# Patient Record
Sex: FEMALE | Race: WHITE | Employment: OTHER | URBAN - METROPOLITAN AREA
[De-identification: names, ages, dates, MRNs, and addresses within clinical notes are randomized per-mention and may not be internally consistent; named-entity substitution may affect disease eponyms.]

---

## 2017-01-05 ENCOUNTER — OFFICE VISIT (OUTPATIENT)
Dept: SURGERY | Age: 82
End: 2017-01-05

## 2017-01-05 VITALS
HEIGHT: 65 IN | SYSTOLIC BLOOD PRESSURE: 130 MMHG | BODY MASS INDEX: 32.99 KG/M2 | WEIGHT: 198 LBS | DIASTOLIC BLOOD PRESSURE: 72 MMHG

## 2017-01-05 DIAGNOSIS — Z98.890 POST-OPERATIVE STATE: Primary | ICD-10-CM

## 2017-01-05 PROCEDURE — 99024 POSTOP FOLLOW-UP VISIT: CPT | Performed by: SURGERY

## 2017-01-19 ENCOUNTER — OFFICE VISIT (OUTPATIENT)
Dept: SURGERY | Age: 82
End: 2017-01-19

## 2017-01-19 VITALS
BODY MASS INDEX: 33.32 KG/M2 | SYSTOLIC BLOOD PRESSURE: 132 MMHG | DIASTOLIC BLOOD PRESSURE: 74 MMHG | WEIGHT: 200 LBS | HEIGHT: 65 IN

## 2017-01-19 DIAGNOSIS — Z98.890 POST-OPERATIVE STATE: Primary | ICD-10-CM

## 2017-01-19 PROCEDURE — 99024 POSTOP FOLLOW-UP VISIT: CPT | Performed by: SURGERY

## 2021-01-01 ENCOUNTER — APPOINTMENT (OUTPATIENT)
Dept: CT IMAGING | Age: 86
DRG: 445 | End: 2021-01-01
Payer: MEDICARE

## 2021-01-01 ENCOUNTER — APPOINTMENT (OUTPATIENT)
Dept: ULTRASOUND IMAGING | Age: 86
DRG: 445 | End: 2021-01-01
Payer: MEDICARE

## 2021-01-01 ENCOUNTER — HOSPITAL ENCOUNTER (INPATIENT)
Age: 86
LOS: 5 days | Discharge: HOME | DRG: 445 | End: 2021-01-06
Attending: EMERGENCY MEDICINE | Admitting: INTERNAL MEDICINE
Payer: MEDICARE

## 2021-01-01 DIAGNOSIS — K81.0 ACUTE CHOLECYSTITIS: Primary | ICD-10-CM

## 2021-01-01 DIAGNOSIS — R10.11 RIGHT UPPER QUADRANT ABDOMINAL PAIN: ICD-10-CM

## 2021-01-01 DIAGNOSIS — N30.00 ACUTE CYSTITIS WITHOUT HEMATURIA: ICD-10-CM

## 2021-01-01 LAB
A/G RATIO: 1.5 (ref 1.1–2.2)
ALBUMIN SERPL-MCNC: 3.7 G/DL (ref 3.4–5)
ALP BLD-CCNC: 110 U/L (ref 40–129)
ALT SERPL-CCNC: 11 U/L (ref 10–40)
ANION GAP SERPL CALCULATED.3IONS-SCNC: 11 MMOL/L (ref 3–16)
AST SERPL-CCNC: 16 U/L (ref 15–37)
BACTERIA: ABNORMAL /HPF
BASOPHILS ABSOLUTE: 0.1 K/UL (ref 0–0.2)
BASOPHILS RELATIVE PERCENT: 0.5 %
BILIRUB SERPL-MCNC: 1.1 MG/DL (ref 0–1)
BILIRUBIN URINE: NEGATIVE
BLOOD, URINE: ABNORMAL
BUN BLDV-MCNC: 23 MG/DL (ref 7–20)
CALCIUM SERPL-MCNC: 9.3 MG/DL (ref 8.3–10.6)
CHLORIDE BLD-SCNC: 105 MMOL/L (ref 99–110)
CLARITY: ABNORMAL
CO2: 21 MMOL/L (ref 21–32)
COLOR: YELLOW
CREAT SERPL-MCNC: 0.7 MG/DL (ref 0.6–1.2)
EKG ATRIAL RATE: 77 BPM
EKG DIAGNOSIS: NORMAL
EKG P AXIS: 71 DEGREES
EKG P-R INTERVAL: 194 MS
EKG Q-T INTERVAL: 422 MS
EKG QRS DURATION: 88 MS
EKG QTC CALCULATION (BAZETT): 477 MS
EKG R AXIS: -21 DEGREES
EKG T AXIS: 55 DEGREES
EKG VENTRICULAR RATE: 77 BPM
EOSINOPHILS ABSOLUTE: 0 K/UL (ref 0–0.6)
EOSINOPHILS RELATIVE PERCENT: 0.3 %
EPITHELIAL CELLS, UA: ABNORMAL /HPF (ref 0–5)
GFR AFRICAN AMERICAN: >60
GFR NON-AFRICAN AMERICAN: >60
GLOBULIN: 2.5 G/DL
GLUCOSE BLD-MCNC: 167 MG/DL (ref 70–99)
GLUCOSE BLD-MCNC: 220 MG/DL (ref 70–99)
GLUCOSE BLD-MCNC: 267 MG/DL (ref 70–99)
GLUCOSE URINE: 500 MG/DL
HCT VFR BLD CALC: 38.8 % (ref 36–48)
HEMOGLOBIN: 12.8 G/DL (ref 12–16)
KETONES, URINE: ABNORMAL MG/DL
LEUKOCYTE ESTERASE, URINE: ABNORMAL
LIPASE: 18 U/L (ref 13–60)
LYMPHOCYTES ABSOLUTE: 0.8 K/UL (ref 1–5.1)
LYMPHOCYTES RELATIVE PERCENT: 6.9 %
MCH RBC QN AUTO: 30.8 PG (ref 26–34)
MCHC RBC AUTO-ENTMCNC: 33 G/DL (ref 31–36)
MCV RBC AUTO: 93.4 FL (ref 80–100)
MICROSCOPIC EXAMINATION: YES
MONOCYTES ABSOLUTE: 0.5 K/UL (ref 0–1.3)
MONOCYTES RELATIVE PERCENT: 4.6 %
NEUTROPHILS ABSOLUTE: 9.8 K/UL (ref 1.7–7.7)
NEUTROPHILS RELATIVE PERCENT: 87.7 %
NITRITE, URINE: NEGATIVE
PDW BLD-RTO: 13.7 % (ref 12.4–15.4)
PERFORMED ON: ABNORMAL
PERFORMED ON: ABNORMAL
PH UA: 5 (ref 5–8)
PLATELET # BLD: 181 K/UL (ref 135–450)
PMV BLD AUTO: 9.6 FL (ref 5–10.5)
POTASSIUM SERPL-SCNC: 3.7 MMOL/L (ref 3.5–5.1)
PROTEIN UA: NEGATIVE MG/DL
RBC # BLD: 4.16 M/UL (ref 4–5.2)
RBC UA: ABNORMAL /HPF (ref 0–4)
SARS-COV-2, NAAT: NOT DETECTED
SODIUM BLD-SCNC: 137 MMOL/L (ref 136–145)
SPECIFIC GRAVITY UA: 1.02 (ref 1–1.03)
TOTAL PROTEIN: 6.2 G/DL (ref 6.4–8.2)
URINE REFLEX TO CULTURE: YES
URINE TYPE: ABNORMAL
UROBILINOGEN, URINE: 0.2 E.U./DL
WBC # BLD: 11.2 K/UL (ref 4–11)
WBC UA: ABNORMAL /HPF (ref 0–5)

## 2021-01-01 PROCEDURE — 2580000003 HC RX 258: Performed by: EMERGENCY MEDICINE

## 2021-01-01 PROCEDURE — 6360000002 HC RX W HCPCS: Performed by: PHYSICIAN ASSISTANT

## 2021-01-01 PROCEDURE — 2580000003 HC RX 258: Performed by: PHYSICIAN ASSISTANT

## 2021-01-01 PROCEDURE — 6360000002 HC RX W HCPCS: Performed by: EMERGENCY MEDICINE

## 2021-01-01 PROCEDURE — 87186 SC STD MICRODIL/AGAR DIL: CPT

## 2021-01-01 PROCEDURE — 99285 EMERGENCY DEPT VISIT HI MDM: CPT

## 2021-01-01 PROCEDURE — 6370000000 HC RX 637 (ALT 250 FOR IP): Performed by: PHYSICIAN ASSISTANT

## 2021-01-01 PROCEDURE — 76705 ECHO EXAM OF ABDOMEN: CPT

## 2021-01-01 PROCEDURE — 6360000002 HC RX W HCPCS: Performed by: INTERNAL MEDICINE

## 2021-01-01 PROCEDURE — 83036 HEMOGLOBIN GLYCOSYLATED A1C: CPT

## 2021-01-01 PROCEDURE — U0002 COVID-19 LAB TEST NON-CDC: HCPCS

## 2021-01-01 PROCEDURE — 96375 TX/PRO/DX INJ NEW DRUG ADDON: CPT

## 2021-01-01 PROCEDURE — P9612 CATHETERIZE FOR URINE SPEC: HCPCS

## 2021-01-01 PROCEDURE — 87077 CULTURE AEROBIC IDENTIFY: CPT

## 2021-01-01 PROCEDURE — 81001 URINALYSIS AUTO W/SCOPE: CPT

## 2021-01-01 PROCEDURE — 85025 COMPLETE CBC W/AUTO DIFF WBC: CPT

## 2021-01-01 PROCEDURE — 74176 CT ABD & PELVIS W/O CONTRAST: CPT

## 2021-01-01 PROCEDURE — 1200000000 HC SEMI PRIVATE

## 2021-01-01 PROCEDURE — 99254 IP/OBS CNSLTJ NEW/EST MOD 60: CPT | Performed by: SURGERY

## 2021-01-01 PROCEDURE — 6360000002 HC RX W HCPCS

## 2021-01-01 PROCEDURE — 93005 ELECTROCARDIOGRAM TRACING: CPT | Performed by: EMERGENCY MEDICINE

## 2021-01-01 PROCEDURE — 96365 THER/PROPH/DIAG IV INF INIT: CPT

## 2021-01-01 PROCEDURE — 80053 COMPREHEN METABOLIC PANEL: CPT

## 2021-01-01 PROCEDURE — 83690 ASSAY OF LIPASE: CPT

## 2021-01-01 PROCEDURE — 87086 URINE CULTURE/COLONY COUNT: CPT

## 2021-01-01 RX ORDER — ENALAPRIL MALEATE 5 MG/1
5 TABLET ORAL DAILY
Status: DISCONTINUED | OUTPATIENT
Start: 2021-01-02 | End: 2021-01-03

## 2021-01-01 RX ORDER — 0.9 % SODIUM CHLORIDE 0.9 %
1000 INTRAVENOUS SOLUTION INTRAVENOUS ONCE
Status: COMPLETED | OUTPATIENT
Start: 2021-01-01 | End: 2021-01-01

## 2021-01-01 RX ORDER — SODIUM CHLORIDE 9 MG/ML
1000 INJECTION, SOLUTION INTRAVENOUS CONTINUOUS
Status: DISCONTINUED | OUTPATIENT
Start: 2021-01-01 | End: 2021-01-02

## 2021-01-01 RX ORDER — MAGNESIUM SULFATE 1 G/100ML
1 INJECTION INTRAVENOUS PRN
Status: DISCONTINUED | OUTPATIENT
Start: 2021-01-01 | End: 2021-01-06 | Stop reason: HOSPADM

## 2021-01-01 RX ORDER — ACETAMINOPHEN 325 MG/1
650 TABLET ORAL EVERY 6 HOURS PRN
Status: DISCONTINUED | OUTPATIENT
Start: 2021-01-01 | End: 2021-01-06 | Stop reason: HOSPADM

## 2021-01-01 RX ORDER — DEXTROSE MONOHYDRATE 25 G/50ML
12.5 INJECTION, SOLUTION INTRAVENOUS PRN
Status: DISCONTINUED | OUTPATIENT
Start: 2021-01-01 | End: 2021-01-05 | Stop reason: SDUPTHER

## 2021-01-01 RX ORDER — DEXTROSE MONOHYDRATE 50 MG/ML
100 INJECTION, SOLUTION INTRAVENOUS PRN
Status: DISCONTINUED | OUTPATIENT
Start: 2021-01-01 | End: 2021-01-05 | Stop reason: SDUPTHER

## 2021-01-01 RX ORDER — FENTANYL CITRATE 50 UG/ML
25 INJECTION, SOLUTION INTRAMUSCULAR; INTRAVENOUS EVERY 30 MIN PRN
Status: DISCONTINUED | OUTPATIENT
Start: 2021-01-01 | End: 2021-01-01

## 2021-01-01 RX ORDER — POLYETHYLENE GLYCOL 3350 17 G/17G
17 POWDER, FOR SOLUTION ORAL DAILY PRN
Status: DISCONTINUED | OUTPATIENT
Start: 2021-01-01 | End: 2021-01-06 | Stop reason: HOSPADM

## 2021-01-01 RX ORDER — NICOTINE POLACRILEX 4 MG
15 LOZENGE BUCCAL PRN
Status: DISCONTINUED | OUTPATIENT
Start: 2021-01-01 | End: 2021-01-05 | Stop reason: SDUPTHER

## 2021-01-01 RX ORDER — ONDANSETRON 2 MG/ML
INJECTION INTRAMUSCULAR; INTRAVENOUS
Status: COMPLETED
Start: 2021-01-01 | End: 2021-01-01

## 2021-01-01 RX ORDER — ATORVASTATIN CALCIUM 10 MG/1
10 TABLET, FILM COATED ORAL DAILY
Status: DISCONTINUED | OUTPATIENT
Start: 2021-01-01 | End: 2021-01-06 | Stop reason: HOSPADM

## 2021-01-01 RX ORDER — MORPHINE SULFATE 4 MG/ML
4 INJECTION, SOLUTION INTRAMUSCULAR; INTRAVENOUS ONCE
Status: COMPLETED | OUTPATIENT
Start: 2021-01-01 | End: 2021-01-01

## 2021-01-01 RX ORDER — ONDANSETRON 2 MG/ML
4 INJECTION INTRAMUSCULAR; INTRAVENOUS ONCE
Status: COMPLETED | OUTPATIENT
Start: 2021-01-01 | End: 2021-01-01

## 2021-01-01 RX ORDER — POTASSIUM CHLORIDE 7.45 MG/ML
10 INJECTION INTRAVENOUS PRN
Status: DISCONTINUED | OUTPATIENT
Start: 2021-01-01 | End: 2021-01-06 | Stop reason: HOSPADM

## 2021-01-01 RX ORDER — ONDANSETRON 2 MG/ML
4 INJECTION INTRAMUSCULAR; INTRAVENOUS EVERY 6 HOURS PRN
Status: DISCONTINUED | OUTPATIENT
Start: 2021-01-01 | End: 2021-01-06 | Stop reason: HOSPADM

## 2021-01-01 RX ORDER — PROMETHAZINE HYDROCHLORIDE 25 MG/1
12.5 TABLET ORAL EVERY 6 HOURS PRN
Status: DISCONTINUED | OUTPATIENT
Start: 2021-01-01 | End: 2021-01-06 | Stop reason: HOSPADM

## 2021-01-01 RX ORDER — SODIUM CHLORIDE 0.9 % (FLUSH) 0.9 %
10 SYRINGE (ML) INJECTION PRN
Status: DISCONTINUED | OUTPATIENT
Start: 2021-01-01 | End: 2021-01-06 | Stop reason: HOSPADM

## 2021-01-01 RX ORDER — POTASSIUM CHLORIDE 20 MEQ/1
40 TABLET, EXTENDED RELEASE ORAL PRN
Status: DISCONTINUED | OUTPATIENT
Start: 2021-01-01 | End: 2021-01-06 | Stop reason: HOSPADM

## 2021-01-01 RX ORDER — FENTANYL CITRATE 50 UG/ML
25 INJECTION, SOLUTION INTRAMUSCULAR; INTRAVENOUS ONCE
Status: COMPLETED | OUTPATIENT
Start: 2021-01-01 | End: 2021-01-01

## 2021-01-01 RX ORDER — ACETAMINOPHEN 650 MG/1
650 SUPPOSITORY RECTAL EVERY 6 HOURS PRN
Status: DISCONTINUED | OUTPATIENT
Start: 2021-01-01 | End: 2021-01-06 | Stop reason: HOSPADM

## 2021-01-01 RX ORDER — MORPHINE SULFATE 2 MG/ML
2 INJECTION, SOLUTION INTRAMUSCULAR; INTRAVENOUS EVERY 4 HOURS PRN
Status: DISCONTINUED | OUTPATIENT
Start: 2021-01-01 | End: 2021-01-06 | Stop reason: HOSPADM

## 2021-01-01 RX ORDER — KETOROLAC TROMETHAMINE 30 MG/ML
15 INJECTION, SOLUTION INTRAMUSCULAR; INTRAVENOUS ONCE
Status: COMPLETED | OUTPATIENT
Start: 2021-01-01 | End: 2021-01-01

## 2021-01-01 RX ORDER — SODIUM CHLORIDE 9 MG/ML
INJECTION, SOLUTION INTRAVENOUS CONTINUOUS
Status: DISCONTINUED | OUTPATIENT
Start: 2021-01-01 | End: 2021-01-06 | Stop reason: HOSPADM

## 2021-01-01 RX ORDER — SODIUM CHLORIDE 0.9 % (FLUSH) 0.9 %
10 SYRINGE (ML) INJECTION EVERY 12 HOURS SCHEDULED
Status: DISCONTINUED | OUTPATIENT
Start: 2021-01-01 | End: 2021-01-06 | Stop reason: HOSPADM

## 2021-01-01 RX ADMIN — FENTANYL CITRATE 25 MCG: 50 INJECTION, SOLUTION INTRAMUSCULAR; INTRAVENOUS at 10:36

## 2021-01-01 RX ADMIN — ATORVASTATIN CALCIUM 10 MG: 10 TABLET, FILM COATED ORAL at 16:35

## 2021-01-01 RX ADMIN — ONDANSETRON HYDROCHLORIDE 4 MG: 2 INJECTION, SOLUTION INTRAMUSCULAR; INTRAVENOUS at 17:57

## 2021-01-01 RX ADMIN — ONDANSETRON HYDROCHLORIDE 4 MG: 2 INJECTION, SOLUTION INTRAMUSCULAR; INTRAVENOUS at 10:47

## 2021-01-01 RX ADMIN — KETOROLAC TROMETHAMINE 15 MG: 30 INJECTION, SOLUTION INTRAMUSCULAR at 06:04

## 2021-01-01 RX ADMIN — INSULIN LISPRO 2 UNITS: 100 INJECTION, SOLUTION INTRAVENOUS; SUBCUTANEOUS at 21:32

## 2021-01-01 RX ADMIN — Medication 10 ML: at 19:52

## 2021-01-01 RX ADMIN — MORPHINE SULFATE 4 MG: 4 INJECTION INTRAVENOUS at 11:57

## 2021-01-01 RX ADMIN — MORPHINE SULFATE 2 MG: 2 INJECTION, SOLUTION INTRAMUSCULAR; INTRAVENOUS at 21:50

## 2021-01-01 RX ADMIN — ENOXAPARIN SODIUM 40 MG: 40 INJECTION SUBCUTANEOUS at 16:36

## 2021-01-01 RX ADMIN — SODIUM CHLORIDE 1000 ML: 9 INJECTION, SOLUTION INTRAVENOUS at 09:14

## 2021-01-01 RX ADMIN — ONDANSETRON HYDROCHLORIDE 4 MG: 2 INJECTION, SOLUTION INTRAVENOUS at 06:47

## 2021-01-01 RX ADMIN — SODIUM CHLORIDE 1000 ML: 9 INJECTION, SOLUTION INTRAVENOUS at 19:52

## 2021-01-01 RX ADMIN — CEFTRIAXONE SODIUM 1 G: 1 INJECTION, POWDER, FOR SOLUTION INTRAMUSCULAR; INTRAVENOUS at 09:13

## 2021-01-01 RX ADMIN — SODIUM CHLORIDE 1000 ML: 9 INJECTION, SOLUTION INTRAVENOUS at 06:04

## 2021-01-01 RX ADMIN — MORPHINE SULFATE 2 MG: 2 INJECTION, SOLUTION INTRAMUSCULAR; INTRAVENOUS at 17:56

## 2021-01-01 RX ADMIN — FENTANYL CITRATE 25 MCG: 50 INJECTION, SOLUTION INTRAMUSCULAR; INTRAVENOUS at 06:05

## 2021-01-01 ASSESSMENT — PAIN SCALES - GENERAL
PAINLEVEL_OUTOF10: 4
PAINLEVEL_OUTOF10: 8
PAINLEVEL_OUTOF10: 8
PAINLEVEL_OUTOF10: 2
PAINLEVEL_OUTOF10: 4
PAINLEVEL_OUTOF10: 7
PAINLEVEL_OUTOF10: 7

## 2021-01-01 ASSESSMENT — PAIN DESCRIPTION - PAIN TYPE
TYPE: ACUTE PAIN

## 2021-01-01 ASSESSMENT — PAIN DESCRIPTION - ORIENTATION: ORIENTATION: MID

## 2021-01-01 ASSESSMENT — PAIN DESCRIPTION - LOCATION
LOCATION: ABDOMEN

## 2021-01-01 ASSESSMENT — PAIN DESCRIPTION - FREQUENCY
FREQUENCY: CONTINUOUS
FREQUENCY: CONTINUOUS

## 2021-01-01 ASSESSMENT — PAIN DESCRIPTION - ONSET: ONSET: GRADUAL

## 2021-01-01 ASSESSMENT — PAIN DESCRIPTION - DESCRIPTORS: DESCRIPTORS: ACHING;DISCOMFORT

## 2021-01-01 NOTE — ED NOTES
Pt son Rachel Estrada updated on status. Pt denies pain med needed at this time. NICOLAS Johnson  01/01/21 8944

## 2021-01-01 NOTE — ED NOTES
[address] via stretcher.       Lance Carpio RN  01/01/21 [id number]

## 2021-01-01 NOTE — ED NOTES
2263 Perfect serve sent to Dr. Marisela Davenport for consult.     Call was returned by Dr. Katie Burnett serve sent to Dr. Gregory Del Toro for admission     Call was returned by [facility] and spoke to Dr. Jazmine Stevens  01/01/21 [address] [place] [address]  01/01/21 1029

## 2021-01-01 NOTE — ED NOTES
Perfect Serve sent to on call hospitalist: pt in pain rated 7. Has fentanyl on MAR from ED orders - should I discontinue this? Only Tylenol ordered for mild pain rated 1-3 - can she have pain med for pain rated 7?  Thanks, Nursing

## 2021-01-01 NOTE — ED NOTES
Called son Wesley Francis, to get medication and allergy information. Will pass his name and number along to day nurse for followup when we have test results.       Angel Puckett RN  01/01/21 9076

## 2021-01-01 NOTE — ED TRIAGE NOTES
Chief Complaint   Patient presents with    Abdominal Pain     abd pain since 2300 last night. Pain is located mid-RUQ abd.  pt does have hernia above her umbillicus. pt states pain got worse after eating.

## 2021-01-01 NOTE — ED PROVIDER NOTES
Emergency Physician Note  [address] 11 [facility] ED  [address] [facility] [place]. [address]  Dept: [phone]  Loc: [phone]  Open Note Time:  4:59 AM EST    Chief Complaint  Abdominal Pain (abd pain since 2300 last night. Pain is located mid-RUQ abd.  pt does have hernia above her umbillicus. pt states pain got worse after eating.)       History of Present Illness  Duong Aponte is a 82 y.o. female  has a past medical history of Cancer ([address] Clostridium difficile diarrhea, Clostridium difficile infection, Diabetes mellitus ([facility] [address] Hyperlipidemia, and Hypertension. who presents to the ED for armond pain. Patient states she started having abdominal pain around 11 PM last night. She states she had eaten several hours before that. She cannot give me any further descriptors other than that the pain in the right upper quadrant and that is constant. She not had any vomiting associated with it. Patient has infrequent bowel movements and she cannot remember the last time she had any bowel movements. Denies fever, chills, malaise, chest pain, shortness of breath, cough,vomiting, diarrhea, headache, sore throat, dysuria, back pain, rash. No palliative/provocative factors. Unless otherwise stated in this report or unable to obtain because of the patient's clinical or mental status as evidenced by the medical record, this patient's positive and negative responses for review of systems, constitutional, psych, eyes, ENT, cardiovascular, respiratory, gastrointestinal, neurological, genitourinary, musculoskeletal, integument systems and systems related to the presenting problem are either stated in the preceding paragraph or were not pertinent or were negative for the symptoms and/or complaints related to the medical problem.     I have reviewed the following from the nursing documentation:      Prior to Admission medications Medication Sig Start Date End Date Taking? Authorizing Provider   enalapril (VASOTEC) 5 MG tablet Take 1 tablet by mouth daily 1/5/16  Yes Historical Provider, MD   hydrochlorothiazide (HYDRODIURIL) 25 MG tablet Take 25 mg by mouth daily   Yes Historical Provider, MD   glyBURIDE (DIABETA) 5 MG tablet Take 7.5 mg by mouth daily (with breakfast). Yes Historical Provider, MD   atorvastatin (LIPITOR) 10 MG tablet Take 10 mg by mouth daily. Yes Historical Provider, MD   aspirin 81 MG chewable tablet Take 81 mg by mouth daily. Yes Historical Provider, MD       Allergies as of 01/01/2021 - Review Complete 01/01/2021   Allergen Reaction Noted    Clindamycin/lincomycin Diarrhea 10/12/2015    Metronidazole Photosensitivity 06/18/2015       Past Medical History:   Diagnosis Date    Cancer [facility])     skin cancer 2014; colon 2015    Clostridium difficile diarrhea 06/25/2015    per physicians notes; negative on 6/25/15    Clostridium difficile infection 12/09/2016    Diabetes mellitus ([facility] [address]     Hyperlipidemia     Hypertension         Surgical History:   Past Surgical History:   Procedure Laterality Date    ABDOMEN SURGERY Right 10/15/2015    right colectomy, pain ball    ABDOMEN SURGERY  12/06/2016    trasverse colectomy    COLON SURGERY      COLONOSCOPY  2015    ascending colon mass, diverticulosis, hemorrhoids    COLONOSCOPY  11/08/2016    Transverse colon mass; Diverticulosis; Hemorrhoids    HERNIA REPAIR      x2    OTHER SURGICAL HISTORY Left 9/29/2014    excision of basal cell skin cancer Left face with full thickness skin graph from abdomen    SKIN GRAFT      UPPER GASTROINTESTINAL ENDOSCOPY      VASCULAR SURGERY          Family History:    Family History   Problem Relation Age of Onset    Cancer Mother     Cancer Father        Social History     Socioeconomic History    Marital status:       Spouse name: Not on file    Number of children: Not on file    Years of education: Not on file  Highest education level: Not on file   Occupational History    Not on file   Social Needs    Financial resource strain: Not on file    Food insecurity     Worry: Not on file     Inability: Not on file    Transportation needs     Medical: Not on file     Non-medical: Not on file   Tobacco Use    Smoking status: Never Smoker   Substance and Sexual Activity    Alcohol use: No    Drug use: No    Sexual activity: Not Currently   Lifestyle    Physical activity     Days per week: Not on file     Minutes per session: Not on file    Stress: Not on file   Relationships    Social connections     Talks on phone: Not on file     Gets together: Not on file     Attends Jew service: Not on file     Active member of club or organization: Not on file     Attends meetings of clubs or organizations: Not on file     Relationship status: Not on file    Intimate partner violence     Fear of current or ex partner: Not on file     Emotionally abused: Not on file     Physically abused: Not on file     Forced sexual activity: Not on file   Other Topics Concern    Not on file   Social History Narrative    Not on file       Nursing notes reviewed. ED Triage Vitals [01/01/21 0442]   Enc Vitals Group      BP (!) 175/63      Pulse 82      Resp 18      Temp 97.9 °F (36.6 °C)      Temp Source Oral      SpO2 96 %      Weight 208 lb (94.3 kg)      Height 5' 5\" (1.651 m)      Head Circumference       Peak Flow       Pain Score       Pain Loc       Pain Edu? Excl. in [address]? GENERAL:   Body mass index is 34.61 kg/m². Awake, alert. Well developed, well nourished with apparent distress. Nontoxic-appearing, non-ill-appearing. HENT:   Normocephalic, Atraumatic, no lacerations. No ENT exam due to PPE. EYES:   Conjunctiva normal,   Pupils equal round and reactive to light,   Extraocular movements normal.  NECK:  Trachea is midline. No stridor.     CHEST:  Regular rate and regular rhythm, no murmurs/rubs/gallops, normal S1/S2, chest wall non-tender. LUNGS:  No respiratory distress. No abdominal retractions, no sternal retractions. Clear to auscultation bilaterally, no wheezing, no rhochi, no rales  Speaking comfortably in full sentences  ABDOMEN:  Soft, moderate right upper quadrant tenderness to palpation, negative Santos sign, non-distended. No rebound. No costovertebral angle tenderness to palpation. Normal BS, no organomegaly, no abdominal masses  EXTREMITIES:  Moves extremities x4 with purpose. Normal range of motion, no edema,   No tenderness, no deformity,   distal pulses present and equal bilaterally. BACK:  No midline tenderness in the cervical, thoracic, and lumbar spine. No deformities, no step-off. SKIN:  Warm, dry and intact. NEUROLOGIC:  Normal mental status. Moving all extremities to command. Alert and oriented x4   without focal motor deficit or gross sensory deficit. Normal speech. PSYCHIATRIC:  Not anxious,   normal mood and affect,   thoughts are linear and organized,   without delusions/hallucinations,   Not responding to internal stimuli,  responds appropriately to questions    LABS and DIAGNOSTIC RESULTS  EKG  The Ekg interpreted by me shows  normal sinus rhythm with a rate of 77  Axis is   Left axis deviation  QTc is  normal  Intervals and Durations are unremarkable. ST Segments: normal  Delta waves, Brugada Syndrome, and Short TN are not present. Prior EKG to compare with was available. No significant changes compared to prior EKG from 2016-12-24    RADIOLOGY  X-RAYS:  I have reviewed radiologic plain film image(s). ALL OTHER NON-PLAIN FILM IMAGES SUCH AS CT, ULTRASOUND AND MRI HAVE BEEN READ BY THE RADIOLOGIST.   US GALLBLADDER RUQ    (Results Pending)   CT ABDOMEN PELVIS W WO CONTRAST Additional Contrast? None    (Results Pending)        LABS  Results for orders placed or performed during the hospital encounter of 01/01/21   CBC auto differential Result Value Ref Range    WBC 11.2 (H) 4.0 - 11.0 K/uL    RBC 4.16 4.00 - 5.20 M/uL    Hemoglobin 12.8 12.0 - 16.0 g/dL    Hematocrit 38.8 36.0 - 48.0 %    MCV 93.4 80.0 - 100.0 fL    MCH 30.8 26.0 - 34.0 pg    MCHC 33.0 31.0 - 36.0 g/dL    RDW 13.7 12.4 - 15.4 %    Platelets 487 827 - 444 K/uL    MPV 9.6 5.0 - 10.5 fL    Neutrophils % 87.7 %    Lymphocytes % 6.9 %    Monocytes % 4.6 %    Eosinophils % 0.3 %    Basophils % 0.5 %    Neutrophils Absolute 9.8 (H) 1.7 - 7.7 K/uL    Lymphocytes Absolute 0.8 (L) 1.0 - 5.1 K/uL    Monocytes Absolute 0.5 0.0 - 1.3 K/uL    Eosinophils Absolute 0.0 0.0 - 0.6 K/uL    Basophils Absolute 0.1 0.0 - 0.2 K/uL   Comprehensive metabolic panel   Result Value Ref Range    Sodium 137 136 - 145 mmol/L    Potassium 3.7 3.5 - 5.1 mmol/L    Chloride 105 99 - 110 mmol/L    CO2 21 21 - 32 mmol/L    Anion Gap 11 3 - 16    Glucose 267 (H) 70 - 99 mg/dL    BUN 23 (H) 7 - 20 mg/dL    CREATININE 0.7 0.6 - 1.2 mg/dL    GFR Non-African American >60 >60    GFR African American >60 >60    Calcium 9.3 8.3 - 10.6 mg/dL    Total Protein 6.2 (L) 6.4 - 8.2 g/dL    Alb 3.7 3.4 - 5.0 g/dL    Albumin/Globulin Ratio 1.5 1.1 - 2.2    Total Bilirubin 1.1 (H) 0.0 - 1.0 mg/dL    Alkaline Phosphatase 110 40 - 129 U/L    ALT 11 10 - 40 U/L    AST 16 15 - 37 U/L    Globulin 2.5 g/dL   Lipase   Result Value Ref Range    Lipase 18.0 13.0 - 60.0 U/L       SCREENINGS  NIH Score     [facility]     [facility]    Heart Score              PROCEDURES    MEDICAL DECISION MAKING    Procedures/interventions/images ordered for this visit  Orders Placed This Encounter   Procedures    CBC auto differential    Comprehensive metabolic panel    Lipase    Urine, reflex to culture    EKG 12 Lead       Medications ordered for this visit  No orders of the defined types were placed in this encounter.       ED course notes for this visit I wore N95 Envo mask with filter protection, facial shield and gloves when I evaluated the patient. I evaluated the patient in room 05/05      I have signed out Mily Newman Emergency Department care to my colleague, Dr. Naomi Hale. We discussed the pertinent history, physical exam, completed/pending test results (if applicable) and current treatment plan. Please refer to his/her chart for the patients remaining Emergency Department course and final disposition. Final Impression    1. Right upper quadrant abdominal pain        Blood pressure (!) 175/63, pulse 82, temperature 97.9 °F (36.6 °C), temperature source Oral, resp. rate 18, height 5' 5\" (1.651 m), weight 208 lb (94.3 kg), SpO2 96 %, not currently breastfeeding. The note was completed using Dragon voice recognition transcription. Every effort was made to ensure accuracy; however, inadvertent transcription errors may be present despite my best efforts to edit errors.     Chris Dominguez MD  [address] [place] [address], MD  01/01/21 9769

## 2021-01-01 NOTE — CONSULTS
Department of General Surgery Consult    PATIENT NAME: Rupinder Shah   YOB: 1935    ADMISSION DATE: 1/1/2021  4:33 AM      TODAY'S DATE: 1/1/2021    Reason for Consult:  cholecystitis    Chief Complaint: RUQ pain    Requesting Physician:  Dennis Jarrell    HISTORY OF PRESENT ILLNESS:              The patient is a 82 y.o. female who presents with RUQ pain. Reports starting last night and has been sharp and constant. Some nausea but no emesis. No fevers. No pain like this before. Past Medical History:        Diagnosis Date    Cancer [facility])     skin cancer 2014; colon 2015    Clostridium difficile diarrhea 06/25/2015    per physicians notes; negative on 6/25/15    Clostridium difficile infection 12/09/2016    Diabetes mellitus ([address]     Hyperlipidemia     Hypertension        Past Surgical History:        Procedure Laterality Date    ABDOMEN SURGERY Right 10/15/2015    right colectomy, pain ball    ABDOMEN SURGERY  12/06/2016    trasverse colectomy    COLON SURGERY      COLONOSCOPY  2015    ascending colon mass, diverticulosis, hemorrhoids    COLONOSCOPY  11/08/2016    Transverse colon mass; Diverticulosis; Hemorrhoids    HERNIA REPAIR      x2    OTHER SURGICAL HISTORY Left 9/29/2014    excision of basal cell skin cancer Left face with full thickness skin graph from abdomen    SKIN GRAFT      UPPER GASTROINTESTINAL ENDOSCOPY      VASCULAR SURGERY         Current Medications:   Current Facility-Administered Medications: fentaNYL (SUBLIMAZE) injection 25 mcg, 25 mcg, Intravenous, Q30 Min PRN  0.9 % sodium chloride infusion, 1,000 mL, Intravenous, Continuous  ondansetron (ZOFRAN) injection 4 mg, 4 mg, Intravenous, Q6H PRN  Prior to Admission medications    Medication Sig Start Date End Date Taking?  Authorizing Provider   enalapril (VASOTEC) 5 MG tablet Take 1 tablet by mouth daily 1/5/16  Yes Historical Provider, MD hydrochlorothiazide (HYDRODIURIL) 25 MG tablet Take 25 mg by mouth daily   Yes Historical Provider, MD   glyBURIDE (DIABETA) 5 MG tablet Take 7.5 mg by mouth daily (with breakfast). Yes Historical Provider, MD   atorvastatin (LIPITOR) 10 MG tablet Take 10 mg by mouth daily. Yes Historical Provider, MD   aspirin 81 MG chewable tablet Take 81 mg by mouth daily. Yes Historical Provider, MD        Allergies:  Clindamycin/lincomycin, Metronidazole, and Vancomycin    Social History:   TOBACCO:  no  ETOH:  no    Family History:        Problem Relation Age of Onset    Cancer Mother     Cancer Father        REVIEW OF SYSTEMS:  CONSTITUTIONAL:  negative  HEENT:  negative  RESPIRATORY:  negative  CARDIOVASCULAR:  negative  GASTROINTESTINAL:  negative except for nausea and abdominal pain  GENITOURINARY:  negative  HEMATOLOGIC/LYMPHATIC:  negative  NEUROLOGICAL:  Negative  * All other ROS reviewed and negative. PHYSICAL EXAM:  VITALS:  BP (!) 131/55   Pulse 68   Temp 98.7 °F (37.1 °C) (Oral)   Resp 17   Ht 5' 5\" (1.651 m)   Wt 208 lb (94.3 kg)   SpO2 95%   BMI 34.61 kg/m²   24HR INTAKE/OUTPUT:    No intake/output data recorded.   I/O this shift:  In: -   Out: 150 [Urine:150]    CONSTITUTIONAL:  alert, no apparent distress and mildly obese  EYES:  PERRL, sclera clear  ENT:  Normocephalic,atraumatic, without obvious abnormality  NECK:  supple, symmetrical, trachea midline  LUNGS: Resp effort easy and unlabored, no crackles or wheezing  CARDIOVASCULAR:  NO JVD, regular rate and rhythm and no murmur noted  ABDOMEN:  , normal bowel sounds, soft, non-distended, tenderness noted in the right upper quadrant Santos's sign is absent, voluntary guarding absent, no masses palpated   MUSCULOSKELETAL: No clubbing or cyanosis, 0+ pitting edema lower extremities  NEUROLOGIC:  Mental Status Exam:  Level of Alertness:   awake  PSYCHIATRIC:   person, place, time  SKIN:  no jaundice    DATA:    CBC:   Recent Labs     01/01/21

## 2021-01-02 LAB
ESTIMATED AVERAGE GLUCOSE: 159.9 MG/DL
GLUCOSE BLD-MCNC: 180 MG/DL (ref 70–99)
GLUCOSE BLD-MCNC: 199 MG/DL (ref 70–99)
GLUCOSE BLD-MCNC: 208 MG/DL (ref 70–99)
GLUCOSE BLD-MCNC: 247 MG/DL (ref 70–99)
GLUCOSE BLD-MCNC: 275 MG/DL (ref 70–99)
HBA1C MFR BLD: 7.2 %
PERFORMED ON: ABNORMAL

## 2021-01-02 PROCEDURE — 6370000000 HC RX 637 (ALT 250 FOR IP): Performed by: PHYSICIAN ASSISTANT

## 2021-01-02 PROCEDURE — 2580000003 HC RX 258: Performed by: PHYSICIAN ASSISTANT

## 2021-01-02 PROCEDURE — 6360000002 HC RX W HCPCS: Performed by: INTERNAL MEDICINE

## 2021-01-02 PROCEDURE — 6360000002 HC RX W HCPCS: Performed by: PHYSICIAN ASSISTANT

## 2021-01-02 PROCEDURE — 1200000000 HC SEMI PRIVATE

## 2021-01-02 PROCEDURE — 99232 SBSQ HOSP IP/OBS MODERATE 35: CPT | Performed by: SURGERY

## 2021-01-02 PROCEDURE — 99223 1ST HOSP IP/OBS HIGH 75: CPT | Performed by: INTERNAL MEDICINE

## 2021-01-02 RX ADMIN — Medication 10 ML: at 09:00

## 2021-01-02 RX ADMIN — ENALAPRIL MALEATE 5 MG: 5 TABLET ORAL at 09:44

## 2021-01-02 RX ADMIN — SODIUM CHLORIDE: 9 INJECTION, SOLUTION INTRAVENOUS at 06:18

## 2021-01-02 RX ADMIN — INSULIN LISPRO 3 UNITS: 100 INJECTION, SOLUTION INTRAVENOUS; SUBCUTANEOUS at 08:53

## 2021-01-02 RX ADMIN — Medication 10 ML: at 20:35

## 2021-01-02 RX ADMIN — ENOXAPARIN SODIUM 40 MG: 40 INJECTION SUBCUTANEOUS at 08:53

## 2021-01-02 RX ADMIN — INSULIN LISPRO 2 UNITS: 100 INJECTION, SOLUTION INTRAVENOUS; SUBCUTANEOUS at 11:41

## 2021-01-02 RX ADMIN — MORPHINE SULFATE 2 MG: 2 INJECTION, SOLUTION INTRAMUSCULAR; INTRAVENOUS at 23:45

## 2021-01-02 RX ADMIN — SODIUM CHLORIDE: 9 INJECTION, SOLUTION INTRAVENOUS at 18:42

## 2021-01-02 RX ADMIN — ATORVASTATIN CALCIUM 10 MG: 10 TABLET, FILM COATED ORAL at 08:52

## 2021-01-02 RX ADMIN — INSULIN LISPRO 1 UNITS: 100 INJECTION, SOLUTION INTRAVENOUS; SUBCUTANEOUS at 16:29

## 2021-01-02 RX ADMIN — MORPHINE SULFATE 2 MG: 2 INJECTION, SOLUTION INTRAMUSCULAR; INTRAVENOUS at 06:18

## 2021-01-02 RX ADMIN — INSULIN LISPRO 2 UNITS: 100 INJECTION, SOLUTION INTRAVENOUS; SUBCUTANEOUS at 20:36

## 2021-01-02 RX ADMIN — MORPHINE SULFATE 2 MG: 2 INJECTION, SOLUTION INTRAMUSCULAR; INTRAVENOUS at 10:45

## 2021-01-02 ASSESSMENT — PAIN SCALES - GENERAL
PAINLEVEL_OUTOF10: 3
PAINLEVEL_OUTOF10: 7
PAINLEVEL_OUTOF10: 7
PAINLEVEL_OUTOF10: 8
PAINLEVEL_OUTOF10: 7

## 2021-01-02 ASSESSMENT — PAIN DESCRIPTION - LOCATION
LOCATION: BACK
LOCATION: ABDOMEN

## 2021-01-02 ASSESSMENT — PAIN DESCRIPTION - PAIN TYPE: TYPE: ACUTE PAIN

## 2021-01-02 ASSESSMENT — PAIN DESCRIPTION - DESCRIPTORS: DESCRIPTORS: ACHING;DISCOMFORT

## 2021-01-02 NOTE — PROGRESS NOTES
Admission assessment complete. A/O x4. See doc flow. Nightly medications given see MAR. Patient activity, bedrest. Pur-wick in place. Patient aware to call out for bedpan if needed. Call light and bedside table within easy reach. Will continue to monitor.

## 2021-01-02 NOTE — PROGRESS NOTES
[facility] SURGERY    PATIENT NAME: Lev Newman     TODAY'S DATE: 1/2/2021    CHIEF COMPLAINT: abd pain    INTERVAL HISTORY/HPI:    Pt with less RUQ pain, no nausea or emesis, no fevers. REVIEW OF SYSTEMS:  Pertinent positives and negatives as per interval history section    OBJECTIVE:  VITALS:  /63   Pulse 78   Temp 99.4 °F (37.4 °C) (Oral)   Resp 18   Ht 5' 5\" (1.651 m)   Wt 205 lb (93 kg)   SpO2 90%   BMI 34.11 kg/m²     INTAKE/OUTPUT:    I/O last 3 completed shifts:  In: -   Out: 350 [Urine:350]  No intake/output data recorded. CONSTITUTIONAL:  awake and alert  LUNGS:  Respirations easy and unlabored, no crackles or wheezing  CARD:  regular rate and rhythm  ABDOMEN:  normal bowel sounds, soft, non-distended, less tender RUQ     Data:  CBC:   Recent Labs     01/01/21  0530   WBC 11.2*   HGB 12.8   HCT 38.8        BMP:    Recent Labs     01/01/21  0530      K 3.7      CO2 21   BUN 23*   CREATININE 0.7   GLUCOSE 267*     Hepatic:   Recent Labs     01/01/21  0530   AST 16   ALT 11   BILITOT 1.1*   ALKPHOS 110     Mag:    No results for input(s): MG in the last 72 hours. Phos:   No results for input(s): PHOS in the last 72 hours. INR: No results for input(s): INR in the last 72 hours. Radiology Review:  *Imaging personally reviewed by me. NA      ASSESSMENT AND PLAN:  81 yo with acute cholecystitis  1. Start clear liquid diet  2. Continue IV abx  3.   If continues to improve then possible further diet advancement and discharge tomorrow on oral abx     Electronically signed by Sue Alan, [address]

## 2021-01-02 NOTE — H&P
History and Physical        HISTORY OF PRESENT ILLNESS:      82-year-old female with history of diabetes mellitus, hypertension, hyperlipidemia presenting  with complaints of right upper quadrant abdominal pain for a few hours. Severe . Imaging studies in the ED consistent with acute cholecystitis. patient admitted. patient seen in consultation by general surgery. Patient complains of persistent pain in the right upper quadrant area, denies any nausea or vomiting, she is having low-grade fevers. She is in no distress  On IV antibiotics    Past Medical History:   Diagnosis Date    Cancer [facility])     skin cancer 2014; colon 2015    Clostridium difficile diarrhea 06/25/2015    per physicians notes; negative on 6/25/15    Clostridium difficile infection 12/09/2016    Diabetes mellitus ([facility] [address]     Hyperlipidemia     Hypertension        Past Surgical History:   Procedure Laterality Date    ABDOMEN SURGERY Right 10/15/2015    right colectomy, pain ball    ABDOMEN SURGERY  12/06/2016    trasverse colectomy    COLON SURGERY      COLONOSCOPY  2015    ascending colon mass, diverticulosis, hemorrhoids    COLONOSCOPY  11/08/2016    Transverse colon mass; Diverticulosis; Hemorrhoids    HERNIA REPAIR      x2    OTHER SURGICAL HISTORY Left 9/29/2014    excision of basal cell skin cancer Left face with full thickness skin graph from abdomen    SKIN GRAFT      UPPER GASTROINTESTINAL ENDOSCOPY      VASCULAR SURGERY         Patient is allergic to clindamycin/lincomycin; metronidazole; and vancomycin. Prior to Admission medications    Medication Sig Start Date End Date Taking? Authorizing Provider   enalapril (VASOTEC) 5 MG tablet Take 1 tablet by mouth daily 1/5/16  Yes Historical Provider, MD   hydrochlorothiazide (HYDRODIURIL) 25 MG tablet Take 25 mg by mouth daily   Yes Historical Provider, MD   glyBURIDE (DIABETA) 5 MG tablet Take 7.5 mg by mouth daily (with breakfast).    Yes Historical Provider, MD BP: (!) 141/64   Pulse: 77   Resp: 16   Temp: 99.4 °F (37.4 °C)   SpO2: 92%       Gen: No distress. Alert. Appears fatigued and ill  Eyes: PERRL. No sclera icterus. No conjunctival injection. ENT: No discharge. Pharynx clear. Neck: Trachea midline. Normal thyroid. Resp: No accessory muscle use. No crackles. No wheezes. No rhonchi. No dullness on percussion. CV: Regular rate. Regular rhythm. No murmur or rub. No edema. GI: Right upper quadrant tender. Non-distended. No masses. No organomegaly. Normal bowel sounds. No hernia. Skin: Warm and dry. No nodule on exposed extremities. No rash on exposed extremities. Lymph: No cervical LAD. No supraclavicular LAD. M/S: No cyanosis. No joint deformity. No clubbing. Intact peripheral pulses. Brisk cap refill, < 2 secs  Neuro: Awake. Reflexes 2+ symmetric bilaterally   Psych: Oriented x 3. No anxiety or agitation. CBC:   Recent Labs     01/01/21  0530   WBC 11.2*   HGB 12.8   HCT 38.8   MCV 93.4        BMP:   Recent Labs     01/01/21  0530      K 3.7      CO2 21   BUN 23*   CREATININE 0.7     LIVER PROFILE:   Recent Labs     01/01/21  0530   AST 16   ALT 11   LIPASE 18.0   BILITOT 1.1*   ALKPHOS 110     PT/INR: No results for input(s): PROTIME, INR in the last 72 hours. APTT: No results for input(s): APTT in the last 72 hours. UA:  Recent Labs     01/01/21  0758   COLORU Yellow   PHUR 5.0   WBCUA 10-20*   RBCUA 0-2   BACTERIA 3+*   CLARITYU SL CLOUDY*   SPECGRAV 1.025   LEUKOCYTESUR SMALL*   UROBILINOGEN 0.2   BILIRUBINUR Negative   BLOODU TRACE-INTACT*   GLUCOSEU 500*     Radiology  CT ABDOMEN PELVIS WO CONTRAST Additional Contrast? None   Final Result   Mild focal ileus in the jejunum, with adjacent mesenteric edema. Enteritis   appears most likely. Rather quadrant hernia with small bowel content. No obstruction is apparent. Cholelithiasis.          US GALLBLADDER RUQ   Final Result Cholelithiasis with features suggestive of acute cholecystitis. No significant dilation of the common duct. Hepatic steatosis. 1.2 cm hyperechoic focus in the left lobe of the liver,   suggestive of hemangioma. ASSESSMENT:    Active Problems:    Acute cholecystitis  Resolved Problems:    * No resolved hospital problems. *      PLAN:  #  Acute cholecystitis, cholelithiasis.   -General surgery consult  -Continue IV antibiotics  Pain control    #Diabetes mellitus   -on sliding scale insulin    #Hypertension   -blood pressure stable     #Hyperlipidemia   -on statins     DIET CLEAR LIQUID;   Full Code        Katie Ball 1/2/2021 2:38 PM

## 2021-01-02 NOTE — PROGRESS NOTES
Pt transported in stretcher with all personal belongings to . Assist x3 to bed and positioned for comfort with PureWick in place. Call light and bed alarm use reviewed with verbal understanding received and call light in reach, bed alarm activated. Primary nurse, Ángel Peres RN aware of pt's arrival and care of pt transferred.  Shahriar Ely Clinical

## 2021-01-02 NOTE — CARE COORDINATION
Case Management Assessment  Initial Evaluation      Patient Name: Maia Diaz  YOB: 1935  Diagnosis: Acute cholecystitis [K81.0]  Date / Time: 1/1/2021  4:33 AM    Admission status/Date: IP 01/01/2021  Chart Reviewed: Yes      Patient Interviewed: No -attempted (no answers)  Family Interviewed:  Yes - sonEloy      Hospitalization in the last 30 days:  No      Health Care Decision Maker :    [address] (Son) (CM - must 1st enter selection under Navigator - emergency contact- Health Care Decision Maker Relationship and pick relationship)   Who do you trust or have selected to make healthcare decisions for you      Met with: [address] (son, NOK)  Bridger Fernando conducted  (bedside/phone): phone    Current PCP: ISADORA Solomon    Financial  Medicare  Precert required for SNF : NA         3 night stay required - WAIVED    ADLS  Support Systems/Care Needs:    Transportation: family    Meal Preparation: self Brittany Alfred    Housing  Living Arrangements: Lives w/son   Steps: 3  Intent for return to present living arrangements: Yes  Identified Issues: NA    Home Care Information  Active with [address] : No Agency:(Services)     Passport/Waiver : No  :                      Phone Number:    Passport/Waiver Services: NA          Durable Medical Equiptment   DME Provider: ELD[facility]  Equipment:   Walker_X__Cane___RTS___ BSC_X__Shower Chair_X__Hospital Bed___W/C____Other________  02 at ____Liter(s)---wears(frequency)_______ HHN ___ CPAP___ BiPap___   N/A____      Home O2 Use :  No      Community Service Affiliation  Dialysis:  No    · Agency:  · Location:  · Dialysis Schedule:  · Phone:   · Fax:     Other Community Services: (ex:PT/OT,Mental Health,Wound Clinic, [facility]) DISCHARGE PLAN: Explained Case Management role/services. Chart reviewed. Met with pt's son via phone d/t C19 restrictions and explained the role of the CM. Plans to return home. IPTA. Uses walker PRN. Denies any new HHC or DMe needs at this time. CM will follow and reassess. Anticipate DC on oral abtx tomorrow.

## 2021-01-03 LAB
GLUCOSE BLD-MCNC: 169 MG/DL (ref 70–99)
GLUCOSE BLD-MCNC: 180 MG/DL (ref 70–99)
GLUCOSE BLD-MCNC: 194 MG/DL (ref 70–99)
GLUCOSE BLD-MCNC: 212 MG/DL (ref 70–99)
GLUCOSE BLD-MCNC: 217 MG/DL (ref 70–99)
ORGANISM: ABNORMAL
PERFORMED ON: ABNORMAL
URINE CULTURE, ROUTINE: ABNORMAL

## 2021-01-03 PROCEDURE — 1200000000 HC SEMI PRIVATE

## 2021-01-03 PROCEDURE — 99232 SBSQ HOSP IP/OBS MODERATE 35: CPT | Performed by: INTERNAL MEDICINE

## 2021-01-03 PROCEDURE — 6370000000 HC RX 637 (ALT 250 FOR IP): Performed by: INTERNAL MEDICINE

## 2021-01-03 PROCEDURE — 99231 SBSQ HOSP IP/OBS SF/LOW 25: CPT | Performed by: SURGERY

## 2021-01-03 PROCEDURE — 6360000002 HC RX W HCPCS: Performed by: EMERGENCY MEDICINE

## 2021-01-03 PROCEDURE — 6360000002 HC RX W HCPCS: Performed by: PHYSICIAN ASSISTANT

## 2021-01-03 PROCEDURE — 2580000003 HC RX 258: Performed by: PHYSICIAN ASSISTANT

## 2021-01-03 PROCEDURE — 6370000000 HC RX 637 (ALT 250 FOR IP): Performed by: PHYSICIAN ASSISTANT

## 2021-01-03 PROCEDURE — 6360000002 HC RX W HCPCS: Performed by: INTERNAL MEDICINE

## 2021-01-03 RX ORDER — ENALAPRIL MALEATE 10 MG/1
5 TABLET ORAL DAILY
Status: DISCONTINUED | OUTPATIENT
Start: 2021-01-03 | End: 2021-01-06 | Stop reason: HOSPADM

## 2021-01-03 RX ADMIN — ENALAPRIL MALEATE 5 MG: 10 TABLET ORAL at 10:28

## 2021-01-03 RX ADMIN — INSULIN LISPRO 1 UNITS: 100 INJECTION, SOLUTION INTRAVENOUS; SUBCUTANEOUS at 20:09

## 2021-01-03 RX ADMIN — SODIUM CHLORIDE: 9 INJECTION, SOLUTION INTRAVENOUS at 20:04

## 2021-01-03 RX ADMIN — ACETAMINOPHEN 650 MG: 325 TABLET ORAL at 08:02

## 2021-01-03 RX ADMIN — ENOXAPARIN SODIUM 40 MG: 40 INJECTION SUBCUTANEOUS at 08:02

## 2021-01-03 RX ADMIN — SODIUM CHLORIDE: 9 INJECTION, SOLUTION INTRAVENOUS at 08:02

## 2021-01-03 RX ADMIN — ATORVASTATIN CALCIUM 10 MG: 10 TABLET, FILM COATED ORAL at 08:02

## 2021-01-03 RX ADMIN — MORPHINE SULFATE 2 MG: 2 INJECTION, SOLUTION INTRAMUSCULAR; INTRAVENOUS at 17:04

## 2021-01-03 RX ADMIN — ACETAMINOPHEN 650 MG: 325 TABLET ORAL at 16:45

## 2021-01-03 RX ADMIN — INSULIN LISPRO 2 UNITS: 100 INJECTION, SOLUTION INTRAVENOUS; SUBCUTANEOUS at 08:05

## 2021-01-03 RX ADMIN — Medication 10 ML: at 08:02

## 2021-01-03 RX ADMIN — ONDANSETRON HYDROCHLORIDE 4 MG: 2 INJECTION, SOLUTION INTRAMUSCULAR; INTRAVENOUS at 17:04

## 2021-01-03 RX ADMIN — INSULIN LISPRO 1 UNITS: 100 INJECTION, SOLUTION INTRAVENOUS; SUBCUTANEOUS at 12:14

## 2021-01-03 RX ADMIN — INSULIN LISPRO 1 UNITS: 100 INJECTION, SOLUTION INTRAVENOUS; SUBCUTANEOUS at 16:45

## 2021-01-03 RX ADMIN — Medication 10 ML: at 20:05

## 2021-01-03 ASSESSMENT — PAIN SCALES - GENERAL
PAINLEVEL_OUTOF10: 0

## 2021-01-03 NOTE — PROGRESS NOTES
[facility] SURGERY    PATIENT NAME: Jacques Braga     TODAY'S DATE: 1/3/2021    CHIEF COMPLAINT: abd pain    INTERVAL HISTORY/HPI:    Pt reports some pain improved, some nausea today,  Low grade fevers,      REVIEW OF SYSTEMS:  Pertinent positives and negatives as per interval history section    OBJECTIVE:  VITALS:  BP (!) 146/70   Pulse 76   Temp 100.8 °F (38.2 °C) (Oral)   Resp 18   Ht 5' 5\" (1.651 m)   Wt 205 lb (93 kg)   SpO2 90%   BMI 34.11 kg/m²     INTAKE/OUTPUT:    I/O last 3 completed shifts: In: 560 [P.O.:560]  Out: 700 [Urine:700]  No intake/output data recorded. CONSTITUTIONAL:  awake and alert  LUNGS:  Respirations easy and unlabored,  CARD:  regular rate  ABDOMEN:  normal bowel sounds, soft, non-distended, tenderness noted RUQ     Data:  CBC:   Recent Labs     01/01/21  0530   WBC 11.2*   HGB 12.8   HCT 38.8        BMP:    Recent Labs     01/01/21  0530      K 3.7      CO2 21   BUN 23*   CREATININE 0.7   GLUCOSE 267*     Hepatic:   Recent Labs     01/01/21  0530   AST 16   ALT 11   BILITOT 1.1*   ALKPHOS 110     Mag:    No results for input(s): MG in the last 72 hours. Phos:   No results for input(s): PHOS in the last 72 hours. INR: No results for input(s): INR in the last 72 hours. Radiology Review:  *Imaging personally reviewed by me. NA      ASSESSMENT AND PLAN:  83 yo with acute cholecystitis  1. Stay on clear liquids until pain improved further and nausea better  2. Continue IV abx  3.   Repeat labs tomorrow     Electronically signed by Syed Cardenas, [address]

## 2021-01-03 NOTE — PROGRESS NOTES
Progress Note    Admit Date:  1/1/2021    Admitted with acute cholecystitis. Surgery consulted    Subjective:  Ms. Kimberly Perez complains of persistent abdominal pain. she has right upper quadrant tenderness . she has recurrent nausea and vomiting.   continues to have fevers    Objective:   BP (!) 146/70   Pulse 76   Temp 100.8 °F (38.2 °C) (Oral)   Resp 18   Ht 5' 5\" (1.651 m)   Wt 205 lb (93 kg)   SpO2 90%   BMI 34.11 kg/m²       Intake/Output Summary (Last 24 hours) at 1/3/2021 1831  Last data filed at 1/3/2021 1741  Gross per 24 hour   Intake 860 ml   Output 700 ml   Net 160 ml         Physical Exam:  General:  Awake, alert, NAD  Ill appearing  Skin:  Warm and dry  Neck:  JVD absent. Neck supple  Chest:  Clear to auscultation, respiration easy. No wheezes, rales or rhonchi. Cardiovascular:  RRR ,S1S2 normal  Abdomen:  Soft,  RUQ tender, non distended, BS +  Extremities:  No edema. Intact peripheral pulses. Brisk cap refill, < 2 secs  Neuro: non focal      Medications:   Scheduled Meds:   enalapril  5 mg Oral Daily    atorvastatin  10 mg Oral Daily    sodium chloride flush  10 mL Intravenous 2 times per day    enoxaparin  40 mg Subcutaneous Daily    insulin lispro  0-6 Units Subcutaneous Q4H       Continuous Infusions:   dextrose      sodium chloride 75 mL/hr at 01/03/21 0802       Data:  CBC:   Recent Labs     01/01/21  0530   WBC 11.2*   RBC 4.16   HGB 12.8   HCT 38.8   MCV 93.4   RDW 13.7        BMP:   Recent Labs     01/01/21  0530      K 3.7      CO2 21   BUN 23*   CREATININE 0.7     BNP: No results for input(s): BNP in the last 72 hours. PT/INR: No results for input(s): PROTIME, INR in the last 72 hours. APTT: No results for input(s): APTT in the last 72 hours. CARDIAC ENZYMES: No results for input(s): CKMB, CKMBINDEX, TROPONINI in the last 72 hours.     Invalid input(s): CKTOTAL;3  FASTING LIPID PANEL:  Lab Results   Component Value Date    CHOL 120 10/13/2015 HDL 35 (L) 10/13/2015    TRIG 209 (H) 10/13/2015     LIVER PROFILE:   Recent Labs     01/01/21  0530   AST 16   ALT 11   BILITOT 1.1*   ALKPHOS 110          CT ABDOMEN PELVIS WO CONTRAST Additional Contrast? None   Final Result   Mild focal ileus in the jejunum, with adjacent mesenteric edema. Enteritis   appears most likely. Rather quadrant hernia with small bowel content. No obstruction is apparent. Cholelithiasis. US GALLBLADDER RUQ   Final Result   Cholelithiasis with features suggestive of acute cholecystitis. No significant dilation of the common duct. Hepatic steatosis. 1.2 cm hyperechoic focus in the left lobe of the liver,   suggestive of hemangioma. Assessment:  Active Problems:    Acute cholecystitis    Essential hypertension    Hyperlipidemia  Resolved Problems:    * No resolved hospital problems. *      Plan:    #  Acute cholecystitis, cholelithiasis.   -General surgery consulted  -Continue IV antibiotics  -Pain control   she is doing about the same , continue current management     #Diabetes mellitus   -on sliding scale insulin     #Hypertension   -blood pressure stable      #Hyperlipidemia   -on statins      DIET CLEAR LIQUID;   Full Code         Narciso Rivera MD 1/3/2021 6:31 PM

## 2021-01-04 LAB
ANION GAP SERPL CALCULATED.3IONS-SCNC: 10 MMOL/L (ref 3–16)
BASOPHILS ABSOLUTE: 0.1 K/UL (ref 0–0.2)
BASOPHILS RELATIVE PERCENT: 0.4 %
BUN BLDV-MCNC: 21 MG/DL (ref 7–20)
CALCIUM SERPL-MCNC: 8.6 MG/DL (ref 8.3–10.6)
CHLORIDE BLD-SCNC: 110 MMOL/L (ref 99–110)
CO2: 19 MMOL/L (ref 21–32)
CREAT SERPL-MCNC: 0.6 MG/DL (ref 0.6–1.2)
EOSINOPHILS ABSOLUTE: 0.2 K/UL (ref 0–0.6)
EOSINOPHILS RELATIVE PERCENT: 0.9 %
GFR AFRICAN AMERICAN: >60
GFR NON-AFRICAN AMERICAN: >60
GLUCOSE BLD-MCNC: 152 MG/DL (ref 70–99)
GLUCOSE BLD-MCNC: 159 MG/DL (ref 70–99)
GLUCOSE BLD-MCNC: 162 MG/DL (ref 70–99)
GLUCOSE BLD-MCNC: 167 MG/DL (ref 70–99)
GLUCOSE BLD-MCNC: 173 MG/DL (ref 70–99)
GLUCOSE BLD-MCNC: 179 MG/DL (ref 70–99)
GLUCOSE BLD-MCNC: 206 MG/DL (ref 70–99)
HCT VFR BLD CALC: 36.2 % (ref 36–48)
HEMOGLOBIN: 11.8 G/DL (ref 12–16)
LYMPHOCYTES ABSOLUTE: 0.7 K/UL (ref 1–5.1)
LYMPHOCYTES RELATIVE PERCENT: 4.3 %
MCH RBC QN AUTO: 31 PG (ref 26–34)
MCHC RBC AUTO-ENTMCNC: 32.6 G/DL (ref 31–36)
MCV RBC AUTO: 95.2 FL (ref 80–100)
MONOCYTES ABSOLUTE: 0.9 K/UL (ref 0–1.3)
MONOCYTES RELATIVE PERCENT: 5.4 %
NEUTROPHILS ABSOLUTE: 14.7 K/UL (ref 1.7–7.7)
NEUTROPHILS RELATIVE PERCENT: 89 %
PDW BLD-RTO: 14.4 % (ref 12.4–15.4)
PERFORMED ON: ABNORMAL
PLATELET # BLD: 169 K/UL (ref 135–450)
PMV BLD AUTO: 9.4 FL (ref 5–10.5)
POTASSIUM SERPL-SCNC: 3.3 MMOL/L (ref 3.5–5.1)
RBC # BLD: 3.81 M/UL (ref 4–5.2)
SODIUM BLD-SCNC: 139 MMOL/L (ref 136–145)
WBC # BLD: 16.5 K/UL (ref 4–11)

## 2021-01-04 PROCEDURE — 99232 SBSQ HOSP IP/OBS MODERATE 35: CPT | Performed by: SURGERY

## 2021-01-04 PROCEDURE — 6360000002 HC RX W HCPCS: Performed by: PHYSICIAN ASSISTANT

## 2021-01-04 PROCEDURE — 85025 COMPLETE CBC W/AUTO DIFF WBC: CPT

## 2021-01-04 PROCEDURE — 2580000003 HC RX 258: Performed by: PHYSICIAN ASSISTANT

## 2021-01-04 PROCEDURE — 36415 COLL VENOUS BLD VENIPUNCTURE: CPT

## 2021-01-04 PROCEDURE — 80048 BASIC METABOLIC PNL TOTAL CA: CPT

## 2021-01-04 PROCEDURE — 6370000000 HC RX 637 (ALT 250 FOR IP): Performed by: INTERNAL MEDICINE

## 2021-01-04 PROCEDURE — 6360000002 HC RX W HCPCS: Performed by: NURSE PRACTITIONER

## 2021-01-04 PROCEDURE — 99232 SBSQ HOSP IP/OBS MODERATE 35: CPT | Performed by: INTERNAL MEDICINE

## 2021-01-04 PROCEDURE — 1200000000 HC SEMI PRIVATE

## 2021-01-04 PROCEDURE — 6370000000 HC RX 637 (ALT 250 FOR IP): Performed by: PHYSICIAN ASSISTANT

## 2021-01-04 PROCEDURE — 2580000003 HC RX 258: Performed by: NURSE PRACTITIONER

## 2021-01-04 RX ADMIN — PIPERACILLIN SODIUM AND TAZOBACTAM SODIUM 3.38 G: 3; .375 INJECTION, POWDER, LYOPHILIZED, FOR SOLUTION INTRAVENOUS at 09:54

## 2021-01-04 RX ADMIN — ENALAPRIL MALEATE 5 MG: 10 TABLET ORAL at 07:40

## 2021-01-04 RX ADMIN — ATORVASTATIN CALCIUM 10 MG: 10 TABLET, FILM COATED ORAL at 07:40

## 2021-01-04 RX ADMIN — Medication 10 ML: at 07:41

## 2021-01-04 RX ADMIN — INSULIN LISPRO 1 UNITS: 100 INJECTION, SOLUTION INTRAVENOUS; SUBCUTANEOUS at 07:45

## 2021-01-04 RX ADMIN — POTASSIUM BICARBONATE 40 MEQ: 782 TABLET, EFFERVESCENT ORAL at 09:55

## 2021-01-04 RX ADMIN — PIPERACILLIN SODIUM AND TAZOBACTAM SODIUM 3.38 G: 3; .375 INJECTION, POWDER, LYOPHILIZED, FOR SOLUTION INTRAVENOUS at 16:19

## 2021-01-04 RX ADMIN — INSULIN LISPRO 2 UNITS: 100 INJECTION, SOLUTION INTRAVENOUS; SUBCUTANEOUS at 11:31

## 2021-01-04 RX ADMIN — ENOXAPARIN SODIUM 40 MG: 40 INJECTION SUBCUTANEOUS at 07:40

## 2021-01-04 RX ADMIN — INSULIN LISPRO 1 UNITS: 100 INJECTION, SOLUTION INTRAVENOUS; SUBCUTANEOUS at 16:23

## 2021-01-04 ASSESSMENT — PAIN SCALES - GENERAL: PAINLEVEL_OUTOF10: 0

## 2021-01-04 NOTE — PROGRESS NOTES
Patient resting, eyes closed, respirations witnessed as e/e. No signs of distress. Call light and bedside table is easy reach. Will continue to monitor.

## 2021-01-04 NOTE — PROGRESS NOTES
Shift assessment complete. See doc flow. Nightly medications given see MAR. Patient assisted to BSC, barry BM, dark green in color. Call light and bedside table within easy reach. Will continue to monitor.

## 2021-01-04 NOTE — CARE COORDINATION
INTERDISCIPLINARY PLAN OF CARE CONFERENCE    Date/Time: 1/4/2021 3:32 PM  Completed by: Page Phipps Case Management      Patient Name:  Mini Grubbs  YOB: 1935  Admitting Diagnosis: Acute cholecystitis [K81.0]     Admit Date/Time:  1/1/2021  4:33 AM    Chart reviewed. Interdisciplinary team contacted or reviewed plan related to patient progress and discharge plans. Disciplines included Case Management, Nursing, and Dietitian. Current Status: Stable  PT/OT recommendation for discharge plan of care: N/A    Expected D/C Disposition:  Home  Confirmed plan with patient  Yes   Discharge Plan Comments: Reviewed chart and spoke with pt at bedside. Plan remains for home. Unsure of dc needs. Will follow.     Home O2 in place on admit: No  Pt informed of need to bring portable home O2 tank on day of discharge for nursing to connect prior to leaving:  Not Indicated  Verbalized agreement/Understanding:  Not Indicated

## 2021-01-04 NOTE — PLAN OF CARE
Problem: Pain:  Goal: Pain level will decrease  Outcome: Ongoing  Goal: Control of acute pain  Outcome: Ongoing  Goal: Control of chronic pain  Outcome: Ongoing     Problem: Falls - Risk of:  Goal: Will remain free from falls  Outcome: Ongoing  Goal: Absence of physical injury  Outcome: Ongoing     Problem: Skin Integrity:  Goal: Will show no infection signs and symptoms  Outcome: Ongoing  Goal: Absence of new skin breakdown  Outcome: Ongoing

## 2021-01-04 NOTE — PROGRESS NOTES
MCHC 32.6 01/04/2021    RDW 14.4 01/04/2021    NRBC 1 06/24/2015    SEGSPCT 73.5 01/30/2017    BANDSPCT 8 06/25/2015    METASPCT 1 06/25/2015    LYMPHOPCT 4.3 01/04/2021    MONOPCT 5.4 01/04/2021    BASOPCT 0.4 01/04/2021    MONOSABS 0.9 01/04/2021    LYMPHSABS 0.7 01/04/2021    EOSABS 0.2 01/04/2021    BASOSABS 0.1 01/04/2021     CMP:    Lab Results   Component Value Date     01/04/2021    K 3.3 01/04/2021     01/04/2021    CO2 19 01/04/2021    BUN 21 01/04/2021    CREATININE 0.6 01/04/2021    GFRAA >60 01/04/2021    AGRATIO 1.5 01/01/2021    LABGLOM >60 01/04/2021    GLUCOSE 159 01/04/2021    PROT 6.2 01/01/2021    LABALBU 3.7 01/01/2021    CALCIUM 8.6 01/04/2021    BILITOT 1.1 01/01/2021    ALKPHOS 110 01/01/2021    AST 16 01/01/2021    ALT 11 01/01/2021         Brielle Alford [employer]  Electronically signed 1/4/2021 at 3:18 PM     Patient seen and examined. I agree with the assessment and plan from NIRAJ Mosqueda. Patient without complaints. Abdomen benign. WBC 16 today. Continue antibiotics. Follow WBC.     [address] [facility]

## 2021-01-04 NOTE — PROGRESS NOTES
PT/INR: No results for input(s): PROTIME, INR in the last 72 hours. APTT: No results for input(s): APTT in the last 72 hours. CARDIAC ENZYMES: No results for input(s): CKMB, CKMBINDEX, TROPONINI in the last 72 hours. Invalid input(s): CKTOTAL;3  FASTING LIPID PANEL:  Lab Results   Component Value Date    CHOL 120 10/13/2015    HDL 35 (L) 10/13/2015    TRIG 209 (H) 10/13/2015     LIVER PROFILE:   No results for input(s): AST, ALT, ALB, BILIDIR, BILITOT, ALKPHOS in the last 72 hours. CT ABDOMEN PELVIS WO CONTRAST Additional Contrast? None   Final Result   Mild focal ileus in the jejunum, with adjacent mesenteric edema. Enteritis   appears most likely. Rather quadrant hernia with small bowel content. No obstruction is apparent. Cholelithiasis. US GALLBLADDER RUQ   Final Result   Cholelithiasis with features suggestive of acute cholecystitis. No significant dilation of the common duct. Hepatic steatosis. 1.2 cm hyperechoic focus in the left lobe of the liver,   suggestive of hemangioma. Assessment:  Active Problems:    Acute cholecystitis    Essential hypertension    Hyperlipidemia  Resolved Problems:    * No resolved hospital problems. *      Plan:    #  Acute cholecystitis, cholelithiasis.   -General surgery consulted  -Continue IV antibiotics  -Pain control   she is doing about the same , continue current management   IV zosyn day #1     # Klebsiella UTI  -  On zosyn    #Diabetes mellitus   -on sliding scale insulin     #Hypertension   -blood pressure stable      #Hyperlipidemia   -on statins      DIET CLEAR LIQUID;   Full Code         Philippe Hines MD 1/4/2021 11:58 AM

## 2021-01-04 NOTE — FLOWSHEET NOTE
01/04/21 0714   Vital Signs   Temp 97.8 °F (36.6 °C)   Temp Source Oral   Pulse 87   Heart Rate Source Monitor   Resp 16   BP (!) 156/73   BP Location Right upper arm   Patient Position Semi fowlers   Level of Consciousness Alert (0)   MEWS Score 1   Patient Currently in Pain Denies   Oxygen Therapy   SpO2 90 %   O2 Device None (Room air)   Alert and oriented. 90-92% on room air. No respiratory distress noted. Pt resting in bed quietly at this time. Denies any needs. All needs and call light within reach.

## 2021-01-05 LAB
ALBUMIN SERPL-MCNC: 2.1 G/DL (ref 3.4–5)
ALP BLD-CCNC: 113 U/L (ref 40–129)
ALT SERPL-CCNC: 51 U/L (ref 10–40)
ANION GAP SERPL CALCULATED.3IONS-SCNC: 15 MMOL/L (ref 3–16)
AST SERPL-CCNC: 94 U/L (ref 15–37)
BASOPHILS ABSOLUTE: 0 K/UL (ref 0–0.2)
BASOPHILS RELATIVE PERCENT: 0.3 %
BILIRUB SERPL-MCNC: 1.5 MG/DL (ref 0–1)
BILIRUBIN DIRECT: 0.4 MG/DL (ref 0–0.3)
BILIRUBIN, INDIRECT: 1.1 MG/DL (ref 0–1)
BUN BLDV-MCNC: 17 MG/DL (ref 7–20)
CALCIUM SERPL-MCNC: 8.3 MG/DL (ref 8.3–10.6)
CHLORIDE BLD-SCNC: 106 MMOL/L (ref 99–110)
CO2: 19 MMOL/L (ref 21–32)
CREAT SERPL-MCNC: 0.6 MG/DL (ref 0.6–1.2)
EOSINOPHILS ABSOLUTE: 0.2 K/UL (ref 0–0.6)
EOSINOPHILS RELATIVE PERCENT: 1.8 %
GFR AFRICAN AMERICAN: >60
GFR NON-AFRICAN AMERICAN: >60
GLUCOSE BLD-MCNC: 137 MG/DL (ref 70–99)
GLUCOSE BLD-MCNC: 138 MG/DL (ref 70–99)
GLUCOSE BLD-MCNC: 139 MG/DL (ref 70–99)
GLUCOSE BLD-MCNC: 145 MG/DL (ref 70–99)
GLUCOSE BLD-MCNC: 153 MG/DL (ref 70–99)
GLUCOSE BLD-MCNC: 155 MG/DL (ref 70–99)
GLUCOSE BLD-MCNC: 161 MG/DL (ref 70–99)
GLUCOSE BLD-MCNC: 168 MG/DL (ref 70–99)
HCT VFR BLD CALC: 34.2 % (ref 36–48)
HEMOGLOBIN: 11.2 G/DL (ref 12–16)
LYMPHOCYTES ABSOLUTE: 0.7 K/UL (ref 1–5.1)
LYMPHOCYTES RELATIVE PERCENT: 5.9 %
MAGNESIUM: 2.1 MG/DL (ref 1.8–2.4)
MCH RBC QN AUTO: 30.9 PG (ref 26–34)
MCHC RBC AUTO-ENTMCNC: 32.6 G/DL (ref 31–36)
MCV RBC AUTO: 94.7 FL (ref 80–100)
MONOCYTES ABSOLUTE: 1 K/UL (ref 0–1.3)
MONOCYTES RELATIVE PERCENT: 8.8 %
NEUTROPHILS ABSOLUTE: 9.5 K/UL (ref 1.7–7.7)
NEUTROPHILS RELATIVE PERCENT: 83.2 %
PDW BLD-RTO: 14.4 % (ref 12.4–15.4)
PERFORMED ON: ABNORMAL
PHOSPHORUS: 1.8 MG/DL (ref 2.5–4.9)
PLATELET # BLD: 175 K/UL (ref 135–450)
PMV BLD AUTO: 9.4 FL (ref 5–10.5)
POTASSIUM SERPL-SCNC: 3.1 MMOL/L (ref 3.5–5.1)
RBC # BLD: 3.61 M/UL (ref 4–5.2)
SODIUM BLD-SCNC: 140 MMOL/L (ref 136–145)
TOTAL PROTEIN: 5.4 G/DL (ref 6.4–8.2)
WBC # BLD: 11.4 K/UL (ref 4–11)

## 2021-01-05 PROCEDURE — 83735 ASSAY OF MAGNESIUM: CPT

## 2021-01-05 PROCEDURE — 36415 COLL VENOUS BLD VENIPUNCTURE: CPT

## 2021-01-05 PROCEDURE — 85025 COMPLETE CBC W/AUTO DIFF WBC: CPT

## 2021-01-05 PROCEDURE — 80048 BASIC METABOLIC PNL TOTAL CA: CPT

## 2021-01-05 PROCEDURE — 84100 ASSAY OF PHOSPHORUS: CPT

## 2021-01-05 PROCEDURE — 2580000003 HC RX 258: Performed by: INTERNAL MEDICINE

## 2021-01-05 PROCEDURE — 6360000002 HC RX W HCPCS: Performed by: NURSE PRACTITIONER

## 2021-01-05 PROCEDURE — 99232 SBSQ HOSP IP/OBS MODERATE 35: CPT | Performed by: SURGERY

## 2021-01-05 PROCEDURE — 6360000002 HC RX W HCPCS: Performed by: PHYSICIAN ASSISTANT

## 2021-01-05 PROCEDURE — 2580000003 HC RX 258: Performed by: NURSE PRACTITIONER

## 2021-01-05 PROCEDURE — 2500000003 HC RX 250 WO HCPCS: Performed by: INTERNAL MEDICINE

## 2021-01-05 PROCEDURE — 1200000000 HC SEMI PRIVATE

## 2021-01-05 PROCEDURE — 80076 HEPATIC FUNCTION PANEL: CPT

## 2021-01-05 PROCEDURE — 99232 SBSQ HOSP IP/OBS MODERATE 35: CPT | Performed by: INTERNAL MEDICINE

## 2021-01-05 PROCEDURE — 6370000000 HC RX 637 (ALT 250 FOR IP): Performed by: INTERNAL MEDICINE

## 2021-01-05 PROCEDURE — 2580000003 HC RX 258: Performed by: PHYSICIAN ASSISTANT

## 2021-01-05 PROCEDURE — 6370000000 HC RX 637 (ALT 250 FOR IP): Performed by: PHYSICIAN ASSISTANT

## 2021-01-05 RX ORDER — DEXTROSE MONOHYDRATE 25 G/50ML
12.5 INJECTION, SOLUTION INTRAVENOUS PRN
Status: DISCONTINUED | OUTPATIENT
Start: 2021-01-05 | End: 2021-01-06 | Stop reason: HOSPADM

## 2021-01-05 RX ORDER — DEXTROSE MONOHYDRATE 50 MG/ML
100 INJECTION, SOLUTION INTRAVENOUS PRN
Status: DISCONTINUED | OUTPATIENT
Start: 2021-01-05 | End: 2021-01-06 | Stop reason: HOSPADM

## 2021-01-05 RX ORDER — NICOTINE POLACRILEX 4 MG
15 LOZENGE BUCCAL PRN
Status: DISCONTINUED | OUTPATIENT
Start: 2021-01-05 | End: 2021-01-06 | Stop reason: HOSPADM

## 2021-01-05 RX ADMIN — ENALAPRIL MALEATE 5 MG: 10 TABLET ORAL at 07:59

## 2021-01-05 RX ADMIN — PIPERACILLIN SODIUM AND TAZOBACTAM SODIUM 3.38 G: 3; .375 INJECTION, POWDER, LYOPHILIZED, FOR SOLUTION INTRAVENOUS at 16:35

## 2021-01-05 RX ADMIN — PIPERACILLIN SODIUM AND TAZOBACTAM SODIUM 3.38 G: 3; .375 INJECTION, POWDER, LYOPHILIZED, FOR SOLUTION INTRAVENOUS at 01:26

## 2021-01-05 RX ADMIN — ATORVASTATIN CALCIUM 10 MG: 10 TABLET, FILM COATED ORAL at 07:59

## 2021-01-05 RX ADMIN — SODIUM CHLORIDE: 9 INJECTION, SOLUTION INTRAVENOUS at 10:20

## 2021-01-05 RX ADMIN — ENOXAPARIN SODIUM 40 MG: 40 INJECTION SUBCUTANEOUS at 07:59

## 2021-01-05 RX ADMIN — ACETAMINOPHEN 650 MG: 325 TABLET ORAL at 01:44

## 2021-01-05 RX ADMIN — INSULIN LISPRO 1 UNITS: 100 INJECTION, SOLUTION INTRAVENOUS; SUBCUTANEOUS at 12:00

## 2021-01-05 RX ADMIN — PIPERACILLIN SODIUM AND TAZOBACTAM SODIUM 3.38 G: 3; .375 INJECTION, POWDER, LYOPHILIZED, FOR SOLUTION INTRAVENOUS at 08:00

## 2021-01-05 RX ADMIN — POTASSIUM PHOSPHATE, MONOBASIC AND POTASSIUM PHOSPHATE, DIBASIC 40 MMOL: 224; 236 INJECTION, SOLUTION, CONCENTRATE INTRAVENOUS at 10:19

## 2021-01-05 RX ADMIN — SODIUM CHLORIDE: 9 INJECTION, SOLUTION INTRAVENOUS at 08:03

## 2021-01-05 RX ADMIN — ACETAMINOPHEN 650 MG: 325 TABLET ORAL at 20:56

## 2021-01-05 ASSESSMENT — PAIN SCALES - GENERAL
PAINLEVEL_OUTOF10: 3
PAINLEVEL_OUTOF10: 0

## 2021-01-05 NOTE — PROGRESS NOTES
General Surgery [facility], [address] [facility]  Daily Progress Note    Pt Name: Sahil Arita  Medical Record Number: [id number]  Date of Birth 1935   Today's Date: 1/5/2021    ASSESSMENT  1. CT abd and pelvis 1/1: gallstones with focal jejunal ileus  2. RUQ ultrasound: stones with sludge, + Santos's sign  3. ABD: soft, + RUQ tenderness appears improved, no N/V, + flatus, + Bms, obese   4. Leuks improving 16.5->11.4  5. K + 3.3->3.1  6. VSS: afebrile     PLAN  1. Zosyn   2. IVF  3. Labs in the am   4. May advance to low fat DM diet  5. OOB/Ambulate   6. Pt appears to be improving: if pt tolerates a diet and she continues to improve would anticipate D/C in the am     Yanick Nieves has improved from yesterday. Pain is well controlled. She has no nausea and no vomiting. She has passed flatus and has had a bowel movement. She is tolerating clear liquids. Current activity is up with assistance    OBJECTIVE  VITALS:  height is 5' 5\" (1.651 m) and weight is 205 lb (93 kg). Her oral temperature is 98 °F (36.7 °C). Her blood pressure is 148/70 (abnormal) and her pulse is 68. Her respiration is 18 and oxygen saturation is 95%. VITALS:  BP (!) 148/70   Pulse 68   Temp 98 °F (36.7 °C) (Oral)   Resp 18   Ht 5' 5\" (1.651 m)   Wt 205 lb (93 kg)   SpO2 95%   BMI 34.11 kg/m²   INTAKE/OUTPUT:  No intake or output data in the 24 hours ending 01/05/21 1155  GENERAL: alert, no distress  I/O last 3 completed shifts: In: 120 [P.O.:120]  Out: -   No intake/output data recorded.     LABS  Recent Labs     01/05/21  0620   WBC 11.4*   HGB 11.2*   HCT 34.2*         K 3.1*      CO2 19*   BUN 17   CREATININE 0.6   MG 2.10   PHOS 1.8*   CALCIUM 8.3   AST 94*   ALT 51*   BILITOT 1.5*   BILIDIR 0.4*     CBC with Differential:    Lab Results   Component Value Date    WBC 11.4 01/05/2021    RBC 3.61 01/05/2021    HGB 11.2 01/05/2021    HCT 34.2 01/05/2021     01/05/2021    MCV 94.7 01/05/2021 MCH 30.9 01/05/2021    MCHC 32.6 01/05/2021    RDW 14.4 01/05/2021    NRBC 1 06/24/2015    SEGSPCT 73.5 01/30/2017    BANDSPCT 8 06/25/2015    METASPCT 1 06/25/2015    LYMPHOPCT 5.9 01/05/2021    MONOPCT 8.8 01/05/2021    BASOPCT 0.3 01/05/2021    MONOSABS 1.0 01/05/2021    LYMPHSABS 0.7 01/05/2021    EOSABS 0.2 01/05/2021    BASOSABS 0.0 01/05/2021     CMP:    Lab Results   Component Value Date     01/05/2021    K 3.1 01/05/2021     01/05/2021    CO2 19 01/05/2021    BUN 17 01/05/2021    CREATININE 0.6 01/05/2021    GFRAA >60 01/05/2021    AGRATIO 1.5 01/01/2021    LABGLOM >60 01/05/2021    GLUCOSE 145 01/05/2021    PROT 5.4 01/05/2021    LABALBU 2.1 01/05/2021    CALCIUM 8.3 01/05/2021    BILITOT 1.5 01/05/2021    ALKPHOS 113 01/05/2021    AST 94 01/05/2021    ALT 51 01/05/2021         Brielle Alford [employer]  Electronically signed 1/5/2021 at 11:55 AM

## 2021-01-05 NOTE — PROGRESS NOTES
Progress Note    Admit Date:  1/1/2021    Admitted with acute cholecystitis. Surgery consulted. Subjective:  Ms. Jose Eduardo Perez  is starting to improve today . no fevers . abdominal pain is decreased. Potassium was low today, she is getting IV KCl and complains of pain in her right wrist and arm. Objective:   BP (!) 144/68   Pulse 82   Temp 97.8 °F (36.6 °C) (Oral)   Resp 18   Ht 5' 5\" (1.651 m)   Wt 205 lb (93 kg)   SpO2 93%   BMI 34.11 kg/m²     No intake or output data in the 24 hours ending 01/05/21 1355      Physical Exam:  General:  Awake, alert, NAD  Ill appearing  Skin:  Warm and dry  Neck:  JVD absent. Neck supple  Chest:  Clear to auscultation, respiration easy. No wheezes, rales or rhonchi. Cardiovascular:  RRR ,S1S2 normal  Abdomen:  Soft,  RUQ tenderness decreased , non distended, BS +  Extremities:  No edema. Intact peripheral pulses. Brisk cap refill, < 2 secs  Neuro: non focal      Medications:   Scheduled Meds:   potassium phosphate IVPB  40 mmol Intravenous Once    piperacillin-tazobactam  3.375 g Intravenous Q8H    enalapril  5 mg Oral Daily    atorvastatin  10 mg Oral Daily    sodium chloride flush  10 mL Intravenous 2 times per day    enoxaparin  40 mg Subcutaneous Daily    insulin lispro  0-6 Units Subcutaneous Q4H       Continuous Infusions:   dextrose      sodium chloride 75 mL/hr at 01/05/21 1020       Data:  CBC:   Recent Labs     01/04/21  0648 01/05/21  0620   WBC 16.5* 11.4*   RBC 3.81* 3.61*   HGB 11.8* 11.2*   HCT 36.2 34.2*   MCV 95.2 94.7   RDW 14.4 14.4    175     BMP:   Recent Labs     01/04/21  0648 01/05/21  0620    140   K 3.3* 3.1*    106   CO2 19* 19*   PHOS  --  1.8*   BUN 21* 17   CREATININE 0.6 0.6     BNP: No results for input(s): BNP in the last 72 hours. PT/INR: No results for input(s): PROTIME, INR in the last 72 hours. APTT: No results for input(s): APTT in the last 72 hours. CARDIAC ENZYMES: No results for input(s): CKMB, CKMBINDEX, TROPONINI in the last 72 hours. Invalid input(s): CKTOTAL;3  FASTING LIPID PANEL:  Lab Results   Component Value Date    CHOL 120 10/13/2015    HDL 35 (L) 10/13/2015    TRIG 209 (H) 10/13/2015     LIVER PROFILE:   Recent Labs     01/05/21  0620   AST 94*   ALT 51*   BILIDIR 0.4*   BILITOT 1.5*   ALKPHOS 113          CT ABDOMEN PELVIS WO CONTRAST Additional Contrast? None   Final Result   Mild focal ileus in the jejunum, with adjacent mesenteric edema. Enteritis   appears most likely. Rather quadrant hernia with small bowel content. No obstruction is apparent. Cholelithiasis. US GALLBLADDER RUQ   Final Result   Cholelithiasis with features suggestive of acute cholecystitis. No significant dilation of the common duct. Hepatic steatosis. 1.2 cm hyperechoic focus in the left lobe of the liver,   suggestive of hemangioma. Assessment:  Active Problems:    Acute cholecystitis    Essential hypertension    Hyperlipidemia  Resolved Problems:    * No resolved hospital problems. *      Plan:    #  Acute cholecystitis, cholelithiasis.   -General surgery consulted  -Continue IV antibiotics  -Pain control   she is doing about the same , continue current management   IV zosyn day #2  White count decreased 11.0 today  Afebrile     # Klebsiella UTI  -  On zosyn    # Hypokalemia and hypophosphatemia  - replete    #Diabetes mellitus   -on sliding scale insulin     #Hypertension   -blood pressure stable      #Hyperlipidemia   -on statins      DIET CLEAR LIQUID;   Full Code         Irvin Henning MD 1/5/2021 1:55 PM

## 2021-01-05 NOTE — PROGRESS NOTES
Patient states PIV site is hurting/burning. Potassium phosphate infusing,  through PIV at 100 ml/hr, rate decreased to 50 ml/hr, patient states \"feels much better. \"

## 2021-01-05 NOTE — PROGRESS NOTES
Handoff report and transfer of care given to [facility]. Pt in stable condition. Call light within reach. Bed locked in lowest position. Denies further needs at this time.

## 2021-01-05 NOTE — PROGRESS NOTES
Shift assessment complete; see flow sheet. Pt/AO x4. Scheduled medications administered; See MAR. IV infusing without difficulty. Pt denies any needs at this time. Call light within reach, bed in low locked position.

## 2021-01-05 NOTE — FLOWSHEET NOTE
01/05/21 0745   Vital Signs   Temp 98 °F (36.7 °C)   Temp Source Oral   Pulse 72   Heart Rate Source Monitor   Resp 18   BP (!) 154/69   BP Location Left upper arm   Patient Position Sitting   Level of Consciousness Alert (0)   MEWS Score 1   Patient Currently in Pain Denies   Pain Assessment   Pain Assessment 0-10   Pain Level 0   Oxygen Therapy   SpO2 95 %   O2 Device None (Room air)   Lab returned with some abnormal's. New orders received. Potassium phosphate given as ordered. 95% room air. Abdomen remain tender, round. Zosyn as ordered. Pt resting in bed quietly at this time. Denies any needs. All needs and call light within reach.

## 2021-01-06 VITALS
TEMPERATURE: 99.1 F | WEIGHT: 205 LBS | OXYGEN SATURATION: 95 % | DIASTOLIC BLOOD PRESSURE: 64 MMHG | HEIGHT: 65 IN | HEART RATE: 89 BPM | BODY MASS INDEX: 34.16 KG/M2 | RESPIRATION RATE: 18 BRPM | SYSTOLIC BLOOD PRESSURE: 175 MMHG

## 2021-01-06 LAB
ANION GAP SERPL CALCULATED.3IONS-SCNC: 11 MMOL/L (ref 3–16)
BUN BLDV-MCNC: 12 MG/DL (ref 7–20)
CALCIUM SERPL-MCNC: 8.4 MG/DL (ref 8.3–10.6)
CHLORIDE BLD-SCNC: 109 MMOL/L (ref 99–110)
CO2: 20 MMOL/L (ref 21–32)
CREAT SERPL-MCNC: 0.6 MG/DL (ref 0.6–1.2)
GFR AFRICAN AMERICAN: >60
GFR NON-AFRICAN AMERICAN: >60
GLUCOSE BLD-MCNC: 107 MG/DL (ref 70–99)
GLUCOSE BLD-MCNC: 125 MG/DL (ref 70–99)
GLUCOSE BLD-MCNC: 142 MG/DL (ref 70–99)
GLUCOSE BLD-MCNC: 145 MG/DL (ref 70–99)
GLUCOSE BLD-MCNC: 205 MG/DL (ref 70–99)
HCT VFR BLD CALC: 33.8 % (ref 36–48)
HEMOGLOBIN: 11.1 G/DL (ref 12–16)
MAGNESIUM: 2.2 MG/DL (ref 1.8–2.4)
MCH RBC QN AUTO: 30.5 PG (ref 26–34)
MCHC RBC AUTO-ENTMCNC: 32.7 G/DL (ref 31–36)
MCV RBC AUTO: 93.2 FL (ref 80–100)
PDW BLD-RTO: 14.5 % (ref 12.4–15.4)
PERFORMED ON: ABNORMAL
PHOSPHORUS: 2 MG/DL (ref 2.5–4.9)
PLATELET # BLD: 176 K/UL (ref 135–450)
PMV BLD AUTO: 9.6 FL (ref 5–10.5)
POTASSIUM SERPL-SCNC: 3 MMOL/L (ref 3.5–5.1)
RBC # BLD: 3.63 M/UL (ref 4–5.2)
SODIUM BLD-SCNC: 140 MMOL/L (ref 136–145)
WBC # BLD: 12.2 K/UL (ref 4–11)

## 2021-01-06 PROCEDURE — 6360000002 HC RX W HCPCS: Performed by: PHYSICIAN ASSISTANT

## 2021-01-06 PROCEDURE — 6360000002 HC RX W HCPCS: Performed by: NURSE PRACTITIONER

## 2021-01-06 PROCEDURE — 36415 COLL VENOUS BLD VENIPUNCTURE: CPT

## 2021-01-06 PROCEDURE — 99238 HOSP IP/OBS DSCHRG MGMT 30/<: CPT | Performed by: INTERNAL MEDICINE

## 2021-01-06 PROCEDURE — 6370000000 HC RX 637 (ALT 250 FOR IP): Performed by: INTERNAL MEDICINE

## 2021-01-06 PROCEDURE — 84100 ASSAY OF PHOSPHORUS: CPT

## 2021-01-06 PROCEDURE — 83735 ASSAY OF MAGNESIUM: CPT

## 2021-01-06 PROCEDURE — 85027 COMPLETE CBC AUTOMATED: CPT

## 2021-01-06 PROCEDURE — 2500000003 HC RX 250 WO HCPCS: Performed by: INTERNAL MEDICINE

## 2021-01-06 PROCEDURE — 2580000003 HC RX 258: Performed by: INTERNAL MEDICINE

## 2021-01-06 PROCEDURE — 80048 BASIC METABOLIC PNL TOTAL CA: CPT

## 2021-01-06 PROCEDURE — 2580000003 HC RX 258: Performed by: NURSE PRACTITIONER

## 2021-01-06 PROCEDURE — 6370000000 HC RX 637 (ALT 250 FOR IP): Performed by: PHYSICIAN ASSISTANT

## 2021-01-06 PROCEDURE — 99231 SBSQ HOSP IP/OBS SF/LOW 25: CPT | Performed by: SURGERY

## 2021-01-06 RX ORDER — POTASSIUM CHLORIDE 20 MEQ/1
20 TABLET, EXTENDED RELEASE ORAL 2 TIMES DAILY
Qty: 60 TABLET | Refills: 1 | Status: ON HOLD | OUTPATIENT
Start: 2021-01-06 | End: 2021-02-18 | Stop reason: HOSPADM

## 2021-01-06 RX ORDER — AMOXICILLIN AND CLAVULANATE POTASSIUM 875; 125 MG/1; MG/1
1 TABLET, FILM COATED ORAL 2 TIMES DAILY
Qty: 20 TABLET | Refills: 0 | Status: SHIPPED | OUTPATIENT
Start: 2021-01-06 | End: 2021-01-06 | Stop reason: HOSPADM

## 2021-01-06 RX ORDER — LEVOFLOXACIN 750 MG/1
750 TABLET ORAL DAILY
Qty: 10 TABLET | Refills: 0 | Status: SHIPPED | OUTPATIENT
Start: 2021-01-06 | End: 2021-01-16

## 2021-01-06 RX ORDER — LACTOBACILLUS RHAMNOSUS GG 10B CELL
CAPSULE ORAL
Qty: 30 CAPSULE | Refills: 1 | Status: SHIPPED | OUTPATIENT
Start: 2021-01-06 | End: 2021-04-08

## 2021-01-06 RX ADMIN — PIPERACILLIN SODIUM AND TAZOBACTAM SODIUM 3.38 G: 3; .375 INJECTION, POWDER, LYOPHILIZED, FOR SOLUTION INTRAVENOUS at 09:45

## 2021-01-06 RX ADMIN — ENALAPRIL MALEATE 5 MG: 10 TABLET ORAL at 08:54

## 2021-01-06 RX ADMIN — POTASSIUM PHOSPHATE, MONOBASIC AND POTASSIUM PHOSPHATE, DIBASIC 40 MMOL: 224; 236 INJECTION, SOLUTION, CONCENTRATE INTRAVENOUS at 10:26

## 2021-01-06 RX ADMIN — ENOXAPARIN SODIUM 40 MG: 40 INJECTION SUBCUTANEOUS at 08:53

## 2021-01-06 RX ADMIN — ATORVASTATIN CALCIUM 10 MG: 10 TABLET, FILM COATED ORAL at 08:54

## 2021-01-06 RX ADMIN — PIPERACILLIN SODIUM AND TAZOBACTAM SODIUM 3.38 G: 3; .375 INJECTION, POWDER, LYOPHILIZED, FOR SOLUTION INTRAVENOUS at 01:36

## 2021-01-06 RX ADMIN — POTASSIUM & SODIUM PHOSPHATES POWDER PACK 280-160-250 MG 250 MG: 280-160-250 PACK at 10:29

## 2021-01-06 NOTE — PROGRESS NOTES
General Surgery [place], [place]  Daily Progress Note    Pt Name: Lev Newman  Medical Record Number: [id number]  Date of Birth 1935   Today's Date: 1/6/2021    ASSESSMENT  1. CT abd and pelvis 1/1: gallstones with focal jejunal ileus  2. RUQ ultrasound: stones with sludge, + Santos's sign  3. ABD: soft, + RUQ tenderness which continues to improve per pt, no N/V, + flatus, + Bms, obese   4. Leuks improving 16.5->11.4->12.2  5. K + 3.3->3.1->3  6. VSS: afebrile     PLAN  1. Zosyn   2. IVF  3. Labs in the am   4. May advance to low fat DM diet  5. OOB/Ambulate   6. Pt may be discharged to home from a surgical standpoint on PO antibiotics. F/U with Dr. Vane Prince in the office in 1-2 weeks to discuss possible cholecystectomy. Lori Chowdhury has improved from yesterday. Pain is well controlled. She has no nausea and no vomiting. She has passed flatus and has had a bowel movement. She is tolerating a low fat diet. Current activity is up with assistance    OBJECTIVE  VITALS:  height is 5' 5\" (1.651 m) and weight is 205 lb (93 kg). Her oral temperature is 99.1 °F (37.3 °C). Her blood pressure is 175/64 (abnormal) and her pulse is 89. Her respiration is 18 and oxygen saturation is 95%. VITALS:  BP (!) 175/64   Pulse 89   Temp 99.1 °F (37.3 °C) (Oral)   Resp 18   Ht 5' 5\" (1.651 m)   Wt 205 lb (93 kg)   SpO2 95%   BMI 34.11 kg/m²   INTAKE/OUTPUT:      Intake/Output Summary (Last 24 hours) at 1/6/2021 1510  Last data filed at 1/6/2021 0901  Gross per 24 hour   Intake 60 ml   Output    Net 60 ml     GENERAL: alert, no distress  I/O last 3 completed shifts: In: 61 [P.O.:60]  Out: -   No intake/output data recorded.     LABS  Recent Labs     01/05/21  0620 01/06/21  0646   WBC 11.4* 12.2*   HGB 11.2* 11.1*   HCT 34.2* 33.8*    176    140   K 3.1* 3.0*    109   CO2 19* 20*   BUN 17 12   CREATININE 0.6 0.6   MG 2.10 2.20   PHOS 1.8* 2.0*   CALCIUM 8.3 8.4   AST 94*  -- ALT 51*  --    BILITOT 1.5*  --    BILIDIR 0.4*  --      CBC with Differential:    Lab Results   Component Value Date    WBC 12.2 01/06/2021    RBC 3.63 01/06/2021    HGB 11.1 01/06/2021    HCT 33.8 01/06/2021     01/06/2021    MCV 93.2 01/06/2021    MCH 30.5 01/06/2021    MCHC 32.7 01/06/2021    RDW 14.5 01/06/2021    NRBC 1 06/24/2015    SEGSPCT 73.5 01/30/2017    BANDSPCT 8 06/25/2015    METASPCT 1 06/25/2015    LYMPHOPCT 5.9 01/05/2021    MONOPCT 8.8 01/05/2021    BASOPCT 0.3 01/05/2021    MONOSABS 1.0 01/05/2021    LYMPHSABS 0.7 01/05/2021    EOSABS 0.2 01/05/2021    BASOSABS 0.0 01/05/2021     CMP:    Lab Results   Component Value Date     01/06/2021    K 3.0 01/06/2021     01/06/2021    CO2 20 01/06/2021    BUN 12 01/06/2021    CREATININE 0.6 01/06/2021    GFRAA >60 01/06/2021    AGRATIO 1.5 01/01/2021    LABGLOM >60 01/06/2021    GLUCOSE 142 01/06/2021    PROT 5.4 01/05/2021    LABALBU 2.1 01/05/2021    CALCIUM 8.4 01/06/2021    BILITOT 1.5 01/05/2021    ALKPHOS 113 01/05/2021    AST 94 01/05/2021    ALT 51 01/05/2021         Brielle Rocío [employer]  Electronically signed 1/6/2021 at 3:10 PM       Patient seen and examined. I agree with the assessment and plan from NIRAJ Mosqueda. Feeling better. Abdomen benign. Home with PO antibiotics and outpatient follow up.     [address] [facility]

## 2021-01-06 NOTE — PROGRESS NOTES
Pt antibiotic changed to Levaquin and is aware to  at [facility] [place]. Pt given written and verbal discharge instructions with prescriptions. Pt indicated understanding and received prescription and home medication instructions. Pt packed own belongings.  Pt waiting to for son to

## 2021-01-06 NOTE — CARE COORDINATION
DISCHARGE ORDER  Date/Time 2021 2:06 PM  Completed by: Destini Sahu, Case Management    Patient Name: Joanna Rosales      : 1935  Admitting Diagnosis: Acute cholecystitis [K81.0]      Admit order Date and Status: IP 2021  (verify MD's last order for status of admission)      Noted discharge order. If applicable PT/OT recommendation at Discharge: NA  DME recommendation by PT/OT:NA  Confirmed discharge plan w/patient (face to face) pt will call family for transportation  Discharge Plan: Chart reviewed. Met with pt at bedside and explained the role of the CM. Plans to return home in care of son. No HHC or DME needs. Reviewed chart. Role of discharge planner explained and patient verbalized understanding. Discharge order is noted. Has Home O2 in place on admit:  No  Informed of need to bring portable home O2 tank on day of discharge for nursing to connect prior to leaving:   No  Verbalized agreement/Understanding:   No  Pt is being d/c'd to home today. Pt's O2 sats are 95% on RA. Discharge timeout done with David Ricketts. All discharge needs and concerns addressed.

## 2021-01-06 NOTE — PROGRESS NOTES
AM assessment completed, see flow sheet. Pt is alert and oriented. Vital signs are WNL. Respirations are even & easy. No complaints voiced. Pt denies needs at this time. SR up x 2, and bed in low position. Call light is within reach.

## 2021-01-06 NOTE — PROGRESS NOTES
Handoff report and transfer of care given to [facility]. Pt in stable condition. Call light within reach. Bed locked in lowest position. Denies further needs at this time.

## 2021-01-06 NOTE — DISCHARGE SUMMARY
Name:  El Pearl  Room:  8970/1528-46  MRN:    [id number]    Discharge Summary      This discharge summary is in conjunction with a complete physical exam done on the day of discharge. Discharging Physician: Dr. Leon Batch: 1/1/2021  Discharge:  1/6/2020    HPI taken from admission H&P:       82-year-old female with history of diabetes mellitus, hypertension, hyperlipidemia presenting  with complaints of right upper quadrant abdominal pain for a few hours. Severe . Imaging studies in the ED consistent with acute cholecystitis. patient admitted. patient seen in consultation by general surgery. Patient complains of persistent pain in the right upper quadrant area, denies any nausea or vomiting, she is having low-grade fevers. She is in no distress  On IV antibiotics    Diagnoses this Admission and Hospital Course     Acute cholecystitis, cholelithiasis. - General surgery consulted  - Pain control  - treated with IV zosyn   - White count decreased, Afebrile   - discharged on PO abx Levaquin with Lactobacillus. close follow up with general surgeon for cholecystectomy     Klebsiella UTI  - s/p rocephin x 1 dose > On zosyn x 3 days  - sent w/ Levaquin as above     Hypokalemia and hypophosphatemia  - repleted  - discharged with K supplements     Diabetes mellitus   - on sliding scale insulin     Hypertension   - blood pressure stable      Hyperlipidemia   - on statins    Procedures (Please Review Full Report for Details)  N/A    Consults    General Surgery    Physical Exam at Discharge:    BP (!) 175/64   Pulse 89   Temp 99.1 °F (37.3 °C) (Oral)   Resp 18   Ht 5' 5\" (1.651 m)   Wt 205 lb (93 kg)   SpO2 95%   BMI 34.11 kg/m²   General:  Awake, alert, NAD  Ill appearing  Skin:  Warm and dry  Neck:  JVD absent. Neck supple  Chest:  Clear to auscultation, respiration easy. No wheezes, rales or rhonchi.    Cardiovascular:  RRR ,S1S2 normal Abdomen:  Soft,  RUQ tenderness decreased , non distended, BS +  Extremities:  No edema. Intact peripheral pulses. Brisk cap refill, < 2 secs  Neuro: non focal    CBC:   Recent Labs     01/04/21  0648 01/05/21  0620 01/06/21  0646   WBC 16.5* 11.4* 12.2*   HGB 11.8* 11.2* 11.1*   HCT 36.2 34.2* 33.8*   MCV 95.2 94.7 93.2    175 176     BMP:   Recent Labs     01/04/21  0648 01/05/21  0620 01/06/21  0646    140 140   K 3.3* 3.1* 3.0*    106 109   CO2 19* 19* 20*   PHOS  --  1.8* 2.0*   BUN 21* 17 12   CREATININE 0.6 0.6 0.6     LIVER PROFILE:   Recent Labs     01/05/21  0620   AST 94*   ALT 51*   BILIDIR 0.4*   BILITOT 1.5*   ALKPHOS 113     CULTURES    Urine cx: Klebsiella pneumoniae >100,000 CFU/mL    Klebsiella pneumoniae (1)    Antibiotic Interpretation LOAN Status    ampicillin Resistant >=32 mcg/mL     ceFAZolin Sensitive <=4 mcg/mL      NOTE: Cefazolin should only be used for uncomplicated UTI         for E.coli or Klebsiella pneumoniae. cefepime Sensitive <=0.12 mcg/mL     cefTRIAXone Sensitive <=0.25 mcg/mL     ciprofloxacin Sensitive <=0.25 mcg/mL     ertapenem Sensitive <=0.12 mcg/mL     gentamicin Sensitive <=1 mcg/mL     levofloxacin Sensitive <=0.12 mcg/mL     nitrofurantoin Resistant 128 mcg/mL     piperacillin-tazobactam Sensitive <=4 mcg/mL     trimethoprim-sulfamethoxazole Sensitive <=20 mcg/mL       RADIOLOGY    CT ABDOMEN PELVIS WO CONTRAST Additional Contrast? None 1/1/2021   Final Result   Mild focal ileus in the jejunum, with adjacent mesenteric edema. Enteritis   appears most likely. Rather quadrant hernia with small bowel content. No obstruction is apparent. Cholelithiasis. US GALLBLADDER RUQ 1/1/2021   Final Result   Cholelithiasis with features suggestive of acute cholecystitis. No significant dilation of the common duct. Hepatic steatosis. 1.2 cm hyperechoic focus in the left lobe of the liver,   suggestive of hemangioma. Discharge Medications     Medication List      START taking these medications    lactobacillus capsule  One po tid     levoFLOXacin 750 MG tablet  Commonly known as: Levaquin  Take 1 tablet by mouth daily for 10 days     potassium chloride 20 MEQ extended release tablet  Commonly known as: KLOR-CON M  Take 1 tablet by mouth 2 times daily        CONTINUE taking these medications    aspirin 81 MG chewable tablet     atorvastatin 10 MG tablet  Commonly known as: LIPITOR     Diabeta 5 MG tablet  Generic drug: glyBURIDE     enalapril 5 MG tablet  Commonly known as: VASOTEC     hydroCHLOROthiazide 25 MG tablet  Commonly known as: HYDRODIURIL        STOP taking these medications    ferrous sulfate 325 (65 Fe) MG tablet  Commonly known as: Pankaj-Jeremie     tetrahydrozoline 0.05 % ophthalmic solution     warfarin 1 MG tablet  Commonly known as: COUMADIN           Where to Get Your Medications      These medications were sent to [address] [place] [address] [phone]  [address]    Phone: [phone]   · lactobacillus capsule  · levoFLOXacin 750 MG tablet     You can get these medications from any pharmacy    Bring a paper prescription for each of these medications  · potassium chloride 20 MEQ extended release tablet           Discharged in stable condition to home. Follow Up: Follow up with PCP in 1 week.       Simona Ray MD  1/6/21

## 2021-01-06 NOTE — PROGRESS NOTES
Shift assessment complete; see flow sheet. Pt A/O x4. Scheduled medications administered; See MAR. IV infusing without difficulty. Pt c/o pain 3/10 PRN tylenol given at this time. Pt denies any needs at this time. Call light within reach, bed in low locked position.

## 2021-01-06 NOTE — DISCHARGE INSTR - COC
Continuity of Care Form    Patient Name: Oscar Lopez   :  1935  MRN:  [id number]    Admit date:  2021  Discharge date:  ***    Code Status Order: Full Code   Advance Directives:      Admitting Physician:  Mercedez Funk MD  PCP: Yaima Bishop, APRN - CNP    Discharging Nurse: [facility] Unit/Room#: 0226/0226-02  Discharging Unit Phone Number: ***    Emergency Contact:   Extended Emergency Contact Information  Primary Emergency Contact: Telly Solomon \"Bill\"   [address] Phone: [phone]  Work Phone: [phone]  Mobile Phone: [phone]  Relation: Child  Secondary Emergency Contact: Sandy [address] Phone: [phone]  Work Phone: [phone]  Mobile Phone: [phone]  Relation: Child    Past Surgical History:  Past Surgical History:   Procedure Laterality Date    ABDOMEN SURGERY Right 10/15/2015    right colectomy, pain ball    ABDOMEN SURGERY  2016    trasverse colectomy    COLON SURGERY      COLONOSCOPY  2015    ascending colon mass, diverticulosis, hemorrhoids    COLONOSCOPY  2016    Transverse colon mass; Diverticulosis; Hemorrhoids    HERNIA REPAIR      x2    OTHER SURGICAL HISTORY Left 2014    excision of basal cell skin cancer Left face with full thickness skin graph from abdomen    SKIN GRAFT      UPPER GASTROINTESTINAL ENDOSCOPY      VASCULAR SURGERY         Immunization History: There is no immunization history on file for this patient.     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    Malignant neoplasm of transverse colon ([address] C18.4 Malignant neoplasm of transverse colon (HCC) C18.4    Intra-abdominal abscess ([address] K65.1    Acute cholecystitis K81.0    Essential hypertension I10    Hyperlipidemia E78.5       Isolation/Infection:   Isolation            No Isolation          Patient Infection Status       Infection Onset Added Last Indicated Last Indicated By Review Planned Expiration Resolved Resolved By    None active    Resolved    COVID-19 Rule Out 01/01/21 01/01/21 01/01/21 COVID-19 (Ordered)   01/01/21 Rule-Out Test Resulted            Nurse Assessment:  Last Vital Signs: BP (!) 175/74   Pulse 91   Temp 99.6 °F (37.6 °C) (Oral)   Resp 18   Ht 5' 5\" (1.651 m)   Wt 205 lb (93 kg)   SpO2 94%   BMI 34.11 kg/m²     Last documented pain score (0-10 scale): Pain Level: 3  Last Weight:   Wt Readings from Last 1 Encounters:   01/02/21 205 lb (93 kg)     Mental Status:  {IP PT MENTAL STATUS:20030:::0}    IV Access:  { CHRIS IV ACCESS:157562389:::0}    Nursing Mobility/ADLs:  Walking   {CHP DME ADLs:598211545:::0}  Transfer  {CHP DME ADLs:606947822:::0}  Bathing  {CHP DME ADLs:702535167:::0}  Dressing  {CHP DME ADLs:915863524:::0}  Toileting  {CHP DME ADLs:044496937:::0}  Feeding  {CHP DME ADLs:546990993:::0}  Med Admin  {CHP DME ADLs:743007660:::0}  Med Delivery   { CHRIS MED Delivery:246829557:::0}    Wound Care Documentation and Therapy:        Elimination:  Continence: Bowel: {YES / KN:39785}  Bladder: {YES / GR:17872}  Urinary Catheter: {Urinary Catheter:570044911:::0}   Colostomy/Ileostomy/Ileal Conduit: {YES / KQ:21255}       Date of Last BM: ***    Intake/Output Summary (Last 24 hours) at 1/6/2021 1300  Last data filed at 1/6/2021 0901  Gross per 24 hour   Intake 60 ml   Output    Net 60 ml     No intake/output data recorded.     Safety Concerns:     [facility] Safety Concerns:671358292:::0}    Impairments/Disabilities:      [facility] Impairments/Disabilities:487767156:::0}    Nutrition Therapy:  Current Nutrition Therapy:

## 2021-01-08 ENCOUNTER — TELEPHONE (OUTPATIENT)
Dept: SURGERY | Age: 86
End: 2021-01-08

## 2021-01-08 NOTE — TELEPHONE ENCOUNTER
Patient has been established with both  and  most recently Dr. Katie Bhakta for bowel resection in 2016, pt now needs to be seen for gallbladder. She was scheduled with  I called the patient to try and reschedule her with Dr. Katie Bhakta since he is the last and most recent surgeon and patient refused to change appt.  Sending as an [address] [address]

## 2021-01-11 ENCOUNTER — TELEPHONE (OUTPATIENT)
Dept: SURGERY | Age: 86
End: 2021-01-11

## 2021-01-11 NOTE — TELEPHONE ENCOUNTER
She was scheduled Wednesday 1/13/2021 for consult for gallbladder. She has recently been in hospital. Son is asking if you would be willing to do a telephone visit for her to discuss possible surgery?

## 2021-01-11 NOTE — TELEPHONE ENCOUNTER
Spoke to patient's son about appt on Wednesday, he stated that she fell today when he took her out of the house and is now requesting a virtual visit. Okay to schedule virtual visit/ phone call?

## 2021-01-13 ENCOUNTER — HOSPITAL ENCOUNTER (OUTPATIENT)
Age: 86
Discharge: HOME | End: 2021-01-13

## 2021-01-13 ENCOUNTER — INITIAL CONSULT (OUTPATIENT)
Dept: SURGERY | Age: 86
End: 2021-01-13

## 2021-01-13 VITALS — DIASTOLIC BLOOD PRESSURE: 53 MMHG | TEMPERATURE: 97.3 F | SYSTOLIC BLOOD PRESSURE: 110 MMHG

## 2021-01-13 DIAGNOSIS — Z01.818 PRE-OP TESTING: Primary | ICD-10-CM

## 2021-01-13 DIAGNOSIS — K81.0 ACUTE CHOLECYSTITIS: Primary | ICD-10-CM

## 2021-01-13 DIAGNOSIS — Z01.818 PRE-OP TESTING: ICD-10-CM

## 2021-01-13 PROCEDURE — U0003 INFECTIOUS AGENT DETECTION BY NUCLEIC ACID (DNA OR RNA); SEVERE ACUTE RESPIRATORY SYNDROME CORONAVIRUS 2 (SARS-COV-2) (CORONAVIRUS DISEASE [COVID-19]), AMPLIFIED PROBE TECHNIQUE, MAKING USE OF HIGH THROUGHPUT TECHNOLOGIES AS DESCRIBED BY CMS-2020-01-R: HCPCS

## 2021-01-13 PROCEDURE — 99213 OFFICE O/P EST LOW 20 MIN: CPT | Performed by: SURGERY

## 2021-01-13 PROCEDURE — U0002 COVID-19 LAB TEST NON-CDC: HCPCS

## 2021-01-13 RX ORDER — HEPARIN SODIUM 5000 [USP'U]/ML
5000 INJECTION, SOLUTION INTRAVENOUS; SUBCUTANEOUS ONCE
Status: CANCELLED | OUTPATIENT
Start: 2021-01-13

## 2021-01-13 RX ORDER — SODIUM CHLORIDE 0.9 % (FLUSH) 0.9 %
10 SYRINGE (ML) INJECTION EVERY 12 HOURS SCHEDULED
Status: CANCELLED | OUTPATIENT
Start: 2021-01-13

## 2021-01-13 RX ORDER — SODIUM CHLORIDE 0.9 % (FLUSH) 0.9 %
10 SYRINGE (ML) INJECTION PRN
Status: CANCELLED | OUTPATIENT
Start: 2021-01-13

## 2021-01-14 ENCOUNTER — TELEPHONE (OUTPATIENT)
Dept: SURGERY | Age: 86
End: 2021-01-14

## 2021-01-14 LAB — SARS-COV-2, PCR: DETECTED

## 2021-01-15 NOTE — TELEPHONE ENCOUNTER
I called and spoke to her son, Yaima Vitale, he is on hippa. He rescheduled her surgery to 2/16/2021.

## 2021-02-10 VITALS — WEIGHT: 203 LBS | HEIGHT: 65 IN | BODY MASS INDEX: 33.82 KG/M2

## 2021-02-10 NOTE — PROGRESS NOTES
PAT completed with patient and son, orders in Epic placed by MD. Patient aware of 0645 arrival time on 02/16/2021 NPO after midnight the night prior to surgery may take medication with sip of water, pt aware due to Covid restrictions she can bring 1 family member with her DOS buy they will remain in waiting room. Keyona Cooley

## 2021-02-10 NOTE — PRE-PROCEDURE INSTRUCTIONS

## 2021-02-10 NOTE — PRE-PROCEDURE INSTRUCTIONS
[facility] Preoperative Screening for Elective Surgery/Invasive Procedures While COVID-19 present in the community     Have you had any of the following symptoms? o Fever, chills  o Cough  o Shortness of breath  o Muscle aches/pain  o Diarrhea  o Abdominal pain, nausea, vomiting  o Loss or decrease in taste and / or smell   Risk of Exposure  o Have you recently been hospitalized for COVID-19 or flu-like illness, if so when?  o Recently diagnosed with COVID-19, if so when?  o Recently tested for COVID-19, if so when?  o Have you been in close contact with a person or family member who currently has or recently had COVID-19? If yes, when and in what context?  o Do you live with anybody who in the last 14 days has had fever, chills, shortness of breath, muscle aches, flu-like illness?  o Do you have any close contacts or family members who are currently in the hospital for COVID-19 or flu-like illness? If yes, assess recent close contact with this person. Indicate if the patient has a positive screen by answering yes to one or more of the above questions. Patients who test positive or screen positive prior to surgery or on the day of surgery should be evaluated in conjunction with the surgeon/proceduralist/anesthesiologist to determine the urgency of the procedure.

## 2021-02-12 NOTE — PROGRESS NOTES
[facility]      S:   Patient presents for follow up of recent admission at [facility] for acute cholecystitis. She reports improvement. Occasional abdominal pain. Eating OK. O:   Comfortable. No distress. 203#     Chest CTA   Heart regular   Abdomen soft. Mild RUQ tenderness. A:     Encounter Diagnosis   Name Primary?  Acute cholecystitis Yes            P:   The diagnosis and recommended procedure were explained. Questions answered. Prepare for gallbladder surgery. Greater than 50% visit spent counseling about diagnosis, treatment plan and expected post operative course. The patient was counseled at length about the risks of beth Covid-19 during their perioperative period and any recovery window from their procedure. The patient was made aware that beth Covid-19  may worsen their prognosis for recovering from their procedure  and lend to a higher morbidity and/or mortality risk. All material risks, benefits, and reasonable alternatives including postponing the procedure were discussed. The patient does wish to proceed with the procedure at this time.

## 2021-02-17 ENCOUNTER — APPOINTMENT (OUTPATIENT)
Dept: GENERAL RADIOLOGY | Age: 86
DRG: 310 | End: 2021-02-17
Attending: INTERNAL MEDICINE
Payer: MEDICARE

## 2021-02-17 ENCOUNTER — HOSPITAL ENCOUNTER (OUTPATIENT)
Age: 86
Setting detail: OUTPATIENT SURGERY
Discharge: STILL ADMITTED OR ADMINISTRATIVE | End: 2021-02-17
Attending: SURGERY | Admitting: SURGERY

## 2021-02-17 ENCOUNTER — HOSPITAL ENCOUNTER (INPATIENT)
Age: 86
LOS: 1 days | Discharge: HOME | DRG: 310 | End: 2021-02-18
Attending: INTERNAL MEDICINE | Admitting: INTERNAL MEDICINE
Payer: MEDICARE

## 2021-02-17 PROBLEM — I48.91 ATRIAL FIBRILLATION (HCC): Status: ACTIVE | Noted: 2021-02-17

## 2021-02-17 LAB
A/G RATIO: 1.3 (ref 1.1–2.2)
ALBUMIN SERPL-MCNC: 3.4 G/DL (ref 3.4–5)
ALP BLD-CCNC: 104 U/L (ref 40–129)
ALT SERPL-CCNC: 12 U/L (ref 10–40)
ANION GAP SERPL CALCULATED.3IONS-SCNC: 10 MMOL/L (ref 3–16)
AST SERPL-CCNC: 17 U/L (ref 15–37)
BASOPHILS ABSOLUTE: 0.1 K/UL (ref 0–0.2)
BASOPHILS RELATIVE PERCENT: 0.8 %
BILIRUB SERPL-MCNC: 1.3 MG/DL (ref 0–1)
BILIRUBIN DIRECT: 0.3 MG/DL (ref 0–0.3)
BILIRUBIN, INDIRECT: 1 MG/DL (ref 0–1)
BUN BLDV-MCNC: 16 MG/DL (ref 7–20)
CALCIUM SERPL-MCNC: 9.3 MG/DL (ref 8.3–10.6)
CHLORIDE BLD-SCNC: 107 MMOL/L (ref 99–110)
CO2: 23 MMOL/L (ref 21–32)
CREAT SERPL-MCNC: 0.7 MG/DL (ref 0.6–1.2)
EKG ATRIAL RATE: 49 BPM
EKG DIAGNOSIS: NORMAL
EKG Q-T INTERVAL: 336 MS
EKG QRS DURATION: 84 MS
EKG QTC CALCULATION (BAZETT): 490 MS
EKG R AXIS: -35 DEGREES
EKG T AXIS: 58 DEGREES
EKG VENTRICULAR RATE: 128 BPM
EOSINOPHILS ABSOLUTE: 0.1 K/UL (ref 0–0.6)
EOSINOPHILS RELATIVE PERCENT: 0.9 %
GFR AFRICAN AMERICAN: >60
GFR NON-AFRICAN AMERICAN: >60
GLOBULIN: 2.6 G/DL
GLUCOSE BLD-MCNC: 139 MG/DL (ref 70–99)
GLUCOSE BLD-MCNC: 154 MG/DL (ref 70–99)
GLUCOSE BLD-MCNC: 157 MG/DL (ref 70–99)
GLUCOSE BLD-MCNC: 181 MG/DL (ref 70–99)
HCT VFR BLD CALC: 37 % (ref 36–48)
HEMOGLOBIN: 12 G/DL (ref 12–16)
LYMPHOCYTES ABSOLUTE: 1 K/UL (ref 1–5.1)
LYMPHOCYTES RELATIVE PERCENT: 11.5 %
MCH RBC QN AUTO: 30 PG (ref 26–34)
MCHC RBC AUTO-ENTMCNC: 32.6 G/DL (ref 31–36)
MCV RBC AUTO: 92 FL (ref 80–100)
MONOCYTES ABSOLUTE: 0.5 K/UL (ref 0–1.3)
MONOCYTES RELATIVE PERCENT: 6.5 %
NEUTROPHILS ABSOLUTE: 6.8 K/UL (ref 1.7–7.7)
NEUTROPHILS RELATIVE PERCENT: 80.3 %
PDW BLD-RTO: 15.3 % (ref 12.4–15.4)
PERFORMED ON: ABNORMAL
PLATELET # BLD: 200 K/UL (ref 135–450)
PMV BLD AUTO: 9 FL (ref 5–10.5)
POTASSIUM REFLEX MAGNESIUM: 4.2 MMOL/L (ref 3.5–5.1)
RBC # BLD: 4.02 M/UL (ref 4–5.2)
SODIUM BLD-SCNC: 140 MMOL/L (ref 136–145)
T4 FREE: 1 NG/DL (ref 0.9–1.8)
TOTAL PROTEIN: 6 G/DL (ref 6.4–8.2)
TSH SERPL DL<=0.05 MIU/L-ACNC: 2.18 UIU/ML (ref 0.27–4.2)
WBC # BLD: 8.5 K/UL (ref 4–11)

## 2021-02-17 PROCEDURE — 36415 COLL VENOUS BLD VENIPUNCTURE: CPT

## 2021-02-17 PROCEDURE — 6370000000 HC RX 637 (ALT 250 FOR IP): Performed by: INTERNAL MEDICINE

## 2021-02-17 PROCEDURE — 93010 ELECTROCARDIOGRAM REPORT: CPT | Performed by: INTERNAL MEDICINE

## 2021-02-17 PROCEDURE — 2060000000 HC ICU INTERMEDIATE R&B

## 2021-02-17 PROCEDURE — 2580000003 HC RX 258: Performed by: INTERNAL MEDICINE

## 2021-02-17 PROCEDURE — 99255 IP/OBS CONSLTJ NEW/EST HI 80: CPT | Performed by: INTERNAL MEDICINE

## 2021-02-17 PROCEDURE — 6360000002 HC RX W HCPCS: Performed by: INTERNAL MEDICINE

## 2021-02-17 PROCEDURE — 71046 X-RAY EXAM CHEST 2 VIEWS: CPT

## 2021-02-17 PROCEDURE — 93005 ELECTROCARDIOGRAM TRACING: CPT | Performed by: ANESTHESIOLOGY

## 2021-02-17 PROCEDURE — 84443 ASSAY THYROID STIM HORMONE: CPT

## 2021-02-17 PROCEDURE — 80053 COMPREHEN METABOLIC PANEL: CPT

## 2021-02-17 PROCEDURE — 99223 1ST HOSP IP/OBS HIGH 75: CPT | Performed by: PHYSICIAN ASSISTANT

## 2021-02-17 PROCEDURE — 83036 HEMOGLOBIN GLYCOSYLATED A1C: CPT

## 2021-02-17 PROCEDURE — 84439 ASSAY OF FREE THYROXINE: CPT

## 2021-02-17 PROCEDURE — 85025 COMPLETE CBC W/AUTO DIFF WBC: CPT

## 2021-02-17 RX ORDER — DEXTROSE MONOHYDRATE 50 MG/ML
100 INJECTION, SOLUTION INTRAVENOUS PRN
Status: DISCONTINUED | OUTPATIENT
Start: 2021-02-17 | End: 2021-02-18 | Stop reason: HOSPADM

## 2021-02-17 RX ORDER — DEXTROSE MONOHYDRATE 25 G/50ML
12.5 INJECTION, SOLUTION INTRAVENOUS PRN
Status: DISCONTINUED | OUTPATIENT
Start: 2021-02-17 | End: 2021-02-18 | Stop reason: HOSPADM

## 2021-02-17 RX ORDER — SODIUM CHLORIDE 0.9 % (FLUSH) 0.9 %
10 SYRINGE (ML) INJECTION EVERY 12 HOURS SCHEDULED
Status: DISCONTINUED | OUTPATIENT
Start: 2021-02-17 | End: 2021-02-18 | Stop reason: HOSPADM

## 2021-02-17 RX ORDER — POTASSIUM CHLORIDE 750 MG/1
20 TABLET, EXTENDED RELEASE ORAL 2 TIMES DAILY
Status: DISCONTINUED | OUTPATIENT
Start: 2021-02-17 | End: 2021-02-18 | Stop reason: HOSPADM

## 2021-02-17 RX ORDER — SODIUM CHLORIDE 0.9 % (FLUSH) 0.9 %
10 SYRINGE (ML) INJECTION PRN
Status: DISCONTINUED | OUTPATIENT
Start: 2021-02-17 | End: 2021-02-18 | Stop reason: HOSPADM

## 2021-02-17 RX ORDER — ATORVASTATIN CALCIUM 10 MG/1
10 TABLET, FILM COATED ORAL DAILY
Status: DISCONTINUED | OUTPATIENT
Start: 2021-02-17 | End: 2021-02-18 | Stop reason: HOSPADM

## 2021-02-17 RX ORDER — HEPARIN SODIUM 5000 [USP'U]/ML
5000 INJECTION, SOLUTION INTRAVENOUS; SUBCUTANEOUS ONCE
Status: DISCONTINUED | OUTPATIENT
Start: 2021-02-17 | End: 2021-02-17 | Stop reason: HOSPADM

## 2021-02-17 RX ORDER — POLYETHYLENE GLYCOL 3350 17 G/17G
17 POWDER, FOR SOLUTION ORAL DAILY PRN
Status: DISCONTINUED | OUTPATIENT
Start: 2021-02-17 | End: 2021-02-18 | Stop reason: HOSPADM

## 2021-02-17 RX ORDER — ONDANSETRON 2 MG/ML
4 INJECTION INTRAMUSCULAR; INTRAVENOUS EVERY 6 HOURS PRN
Status: DISCONTINUED | OUTPATIENT
Start: 2021-02-17 | End: 2021-02-18 | Stop reason: HOSPADM

## 2021-02-17 RX ORDER — DIGOXIN 125 MCG
125 TABLET ORAL DAILY
Status: DISCONTINUED | OUTPATIENT
Start: 2021-02-18 | End: 2021-02-18 | Stop reason: HOSPADM

## 2021-02-17 RX ORDER — SODIUM CHLORIDE 0.9 % (FLUSH) 0.9 %
10 SYRINGE (ML) INJECTION EVERY 12 HOURS SCHEDULED
Status: DISCONTINUED | OUTPATIENT
Start: 2021-02-17 | End: 2021-02-17 | Stop reason: HOSPADM

## 2021-02-17 RX ORDER — SODIUM CHLORIDE 0.9 % (FLUSH) 0.9 %
10 SYRINGE (ML) INJECTION PRN
Status: DISCONTINUED | OUTPATIENT
Start: 2021-02-17 | End: 2021-02-17 | Stop reason: HOSPADM

## 2021-02-17 RX ORDER — ACETAMINOPHEN 650 MG/1
650 SUPPOSITORY RECTAL EVERY 6 HOURS PRN
Status: DISCONTINUED | OUTPATIENT
Start: 2021-02-17 | End: 2021-02-18 | Stop reason: HOSPADM

## 2021-02-17 RX ORDER — ACETAMINOPHEN 325 MG/1
650 TABLET ORAL EVERY 6 HOURS PRN
Status: DISCONTINUED | OUTPATIENT
Start: 2021-02-17 | End: 2021-02-18 | Stop reason: HOSPADM

## 2021-02-17 RX ORDER — PROMETHAZINE HYDROCHLORIDE 25 MG/1
12.5 TABLET ORAL EVERY 6 HOURS PRN
Status: DISCONTINUED | OUTPATIENT
Start: 2021-02-17 | End: 2021-02-18 | Stop reason: HOSPADM

## 2021-02-17 RX ORDER — DIGOXIN 0.25 MG/ML
250 INJECTION INTRAMUSCULAR; INTRAVENOUS EVERY 4 HOURS
Status: COMPLETED | OUTPATIENT
Start: 2021-02-17 | End: 2021-02-17

## 2021-02-17 RX ORDER — SODIUM CHLORIDE, SODIUM LACTATE, POTASSIUM CHLORIDE, CALCIUM CHLORIDE 600; 310; 30; 20 MG/100ML; MG/100ML; MG/100ML; MG/100ML
INJECTION, SOLUTION INTRAVENOUS CONTINUOUS
Status: DISCONTINUED | OUTPATIENT
Start: 2021-02-17 | End: 2021-02-17 | Stop reason: HOSPADM

## 2021-02-17 RX ORDER — GLYBURIDE 5 MG/1
7.5 TABLET ORAL
Status: DISCONTINUED | OUTPATIENT
Start: 2021-02-17 | End: 2021-02-18 | Stop reason: HOSPADM

## 2021-02-17 RX ORDER — NICOTINE POLACRILEX 4 MG
15 LOZENGE BUCCAL PRN
Status: DISCONTINUED | OUTPATIENT
Start: 2021-02-17 | End: 2021-02-18 | Stop reason: HOSPADM

## 2021-02-17 RX ADMIN — POTASSIUM CHLORIDE 20 MEQ: 750 TABLET, EXTENDED RELEASE ORAL at 20:13

## 2021-02-17 RX ADMIN — POTASSIUM CHLORIDE 20 MEQ: 750 TABLET, EXTENDED RELEASE ORAL at 11:15

## 2021-02-17 RX ADMIN — METOPROLOL TARTRATE 25 MG: 25 TABLET, FILM COATED ORAL at 11:15

## 2021-02-17 RX ADMIN — DIGOXIN 250 MCG: 0.25 INJECTION INTRAMUSCULAR; INTRAVENOUS at 11:15

## 2021-02-17 RX ADMIN — ENOXAPARIN SODIUM 90 MG: 100 INJECTION SUBCUTANEOUS at 11:15

## 2021-02-17 RX ADMIN — Medication 10 ML: at 11:25

## 2021-02-17 RX ADMIN — ENOXAPARIN SODIUM 90 MG: 100 INJECTION SUBCUTANEOUS at 20:13

## 2021-02-17 RX ADMIN — Medication 10 ML: at 20:12

## 2021-02-17 RX ADMIN — METOPROLOL TARTRATE 25 MG: 25 TABLET, FILM COATED ORAL at 20:13

## 2021-02-17 RX ADMIN — INSULIN LISPRO 1 UNITS: 100 INJECTION, SOLUTION INTRAVENOUS; SUBCUTANEOUS at 16:16

## 2021-02-17 RX ADMIN — ATORVASTATIN CALCIUM 10 MG: 10 TABLET, FILM COATED ORAL at 11:15

## 2021-02-17 RX ADMIN — DIGOXIN 250 MCG: 0.25 INJECTION INTRAMUSCULAR; INTRAVENOUS at 15:47

## 2021-02-17 NOTE — CONSULTS
[facility]  [phone]        Reason for Consultation/Chief Complaint: \"I have been asked to see her for new onset afib. \"    History of Present Illness:  Tiffanie Roque is a 82 y.o. patient who presented to the hospital for elective cholecystectomy but found to be in afib RVR now admitted for further evaluation and optimization of cardiac status before dario. She has history of PAD,s/p thrombectomy and fasciotomy on 6/20/15, then underwent angiogram w/ angioplasty on 6/22/15    DM2,No angina CHF MI or CVA   Acute renal failure recovered. Denies chest pain, shortness of breath, edema, dizziness, palpitations and syncope. I have been asked to provide consultation regarding further management and testing. Past Medical History:   has a past medical history of Cancer ([facility] Clostridium difficile diarrhea, Clostridium difficile infection, COVID-19, Diabetes mellitus ([facility] Hyperlipidemia, and Hypertension. Surgical History:   has a past surgical history that includes hernia repair; other surgical history (Left, 9/29/2014); Upper gastrointestinal endoscopy; vascular surgery; Skin graft; Colonoscopy (2015); Colonoscopy (11/08/2016); Abdomen surgery (Right, 10/15/2015); Abdomen surgery (12/06/2016); and Colon surgery. Social History:   reports that she has never smoked. She has never used smokeless tobacco. She reports that she does not drink alcohol or use drugs. Family History:  family history includes Cancer in her father and mother. Home Medications:  Were reviewed and are listed in nursing record. and/or listed below  Prior to Admission medications    Medication Sig Start Date End Date Taking? Authorizing Provider   glyBURIDE (DIABETA) 5 MG tablet Take 7.5 mg by mouth daily (with breakfast). Yes Historical Provider, MD   aspirin 81 MG chewable tablet Take 81 mg by mouth daily.    Yes Historical Provider, MD   potassium chloride (KLOR-CON M) 20 MEQ extended release tablet Take 1 tablet by mouth 2 times daily 1/6/21   Fran Challenger, MD   lactobacillus (CULTURELLE) capsule One po tid 1/6/21   DESIRAE Duggan   enalapril (VASOTEC) 5 MG tablet Take 1 tablet by mouth daily 1/5/16   Historical Provider, MD   hydrochlorothiazide (HYDRODIURIL) 25 MG tablet Take 25 mg by mouth daily    Historical Provider, MD   atorvastatin (LIPITOR) 10 MG tablet Take 10 mg by mouth daily. Historical Provider, MD        Allergies:  Clindamycin/lincomycin, Metronidazole, and Vancomycin     Review of Systems:   12 point ROS negative in all areas as listed below except as in [place]  Constitutional, EENT,pulmonary, , Musculoskeletal, skin, neurological, hematological, endocrine, Psychiatric    Physical Examination:    Vitals:    02/17/21 0847   BP: 124/78   Pulse: 133   Resp: 18   Temp: 98 °F (36.7 °C)   SpO2: 97%    Weight: 203 lb (92.1 kg)     obese    General Appearance:  Alert, cooperative, no distress, appears stated age   Head:  Normocephalic, without obvious abnormality, atraumatic   Eyes:  PERRL, conjunctiva/corneas clear       Nose: Nares normal, no drainage or sinus tenderness   Throat: Lips, mucosa, and tongue normal   Neck: Supple, symmetrical, trachea midline, no adenopathy, thyroid: not enlarged, symmetric, no tenderness/mass/nodules, no carotid bruit or JVD       Lungs:   Clear to auscultation bilaterally, respirations unlabored   Chest Wall:  No tenderness or deformity   Heart:   irreg irreg high pitched aortic stenosis murmur. no rub or gallop   Abdomen:   Soft, non-tender, bowel sounds active all four quadrants,  no masses, no organomegaly    Obese midline scar       Extremities: Extremities normal, atraumatic, no cyanosis or edema   Pulses:  faint pulses infeet.    Skin: Skin color, texture, turgor normal, no rashes or lesions   Pysch: Normal mood and affect   Neurologic: Normal gross motor and sensory exam.         Labs  CBC:   Lab Results   Component Value Date    WBC 12.2 01/06/2021    RBC 3.63 01/06/2021    HGB 11.1 01/06/2021    HCT 33.8 01/06/2021    MCV 93.2 01/06/2021    RDW 14.5 01/06/2021     01/06/2021     CMP:    Lab Results   Component Value Date     01/06/2021    K 3.0 01/06/2021     01/06/2021    CO2 20 01/06/2021    BUN 12 01/06/2021    CREATININE 0.6 01/06/2021    GFRAA >60 01/06/2021    AGRATIO 1.5 01/01/2021    LABGLOM >60 01/06/2021    GLUCOSE 142 01/06/2021    PROT 5.4 01/05/2021    CALCIUM 8.4 01/06/2021    BILITOT 1.5 01/05/2021    ALKPHOS 113 01/05/2021    AST 94 01/05/2021    ALT 51 01/05/2021     PT/INR:  No results found for: PTINR  Lab Results   Component Value Date    CKTOTAL 1,458 (H) 06/24/2015    TROPONINI <0.01 10/13/2015     I have reviewed the following tests and documented in this encounter as follows:   Discussed with the patient. EKG:  I have reviewed EKG with the following interpretation:  Impression:       Final 02/17/2021  7:13 AM 14   Atrial fibrillation with rapid ventricular responseLeft axis deviationLow voltage QRSInferior infarct , age undeterminedCannot rule out Anterior infarct , age undeterminedAbnormal ECGWhen compared with ECG of 01-JAN-2021 04:53,Atrial fibrillation has replaced Sinus rhythmVent.  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  Hypertension   Hyperlipidemia    Patient Active Problem List   Diagnosis    Ischemia of lower extremity    DM2 (diabetes mellitus, type 2) ([facility] [address]    History of fasciotomy    Mass of colon    Anemia    Iron deficiency anemia due to chronic blood loss    Chest pain    Colonic mass    Ischemic neuropathy of right foot    Malignant neoplasm of transverse colon ([facility]    Malignant neoplasm of transverse colon ([facility]    [facility]-abdominal [facility]    Acute cholecystitis    Essential hypertension    Hyperlipidemia    Atrial fibrillation (HCC)         Plan:    I had the opportunity to review the clinical symptoms and presentation of Mandie Solomon. I recommend that the patient undergo chest xray echo doppler of heart to evaluate heart murmur and heart function  TSH  Rate control with metoprolol  Anticoagulation with lovenox  Replace K  Echo doppler of heart to evaluate for aortic stenosis  If rate is controlled and no critical echo findings she could proceed with gall bladder surgery  I will follow her. I will address the patient's cardiac risk factors and adjusted pharmacologic treatment as needed. In addition, I have reinforced the need for patient directed risk factor modification. Thank you for allowing to us to participate in the care or Gagan Burton. Further evaluation will be based upon the patient's clinical course and testing results. All questions and concerns were addressed to the patient/family. Alternatives to my treatment were discussed. The note was completed using EMR. Every effort was made to ensure accuracy; however, inadvertent computerized transcription errors may be present.

## 2021-02-17 NOTE — PROGRESS NOTES
Patient given second dose of digoxin, HR in the 90's currently but remains in A-fib. Son at bedside visiting patient. Call light in reach. Will continue to monitor.

## 2021-02-17 NOTE — PROGRESS NOTES
Patient came in with irregular heart rate EKG obtained shows new onset A-fib with RVR  updated states surgery is cancelled. Spoke with  called placed to hospital MD and patient admitted to room 307 for evaluation. Madhu Short

## 2021-02-17 NOTE — H&P
potassium chloride (KLOR-CON M) 20 MEQ extended release tablet Take 1 tablet by mouth 2 times daily 1/6/21   Irvin Henning MD   lactobacillus (CULTURELLE) capsule One po tid 1/6/21   DESIRAE Alvarado   enalapril (VASOTEC) 5 MG tablet Take 1 tablet by mouth daily 1/5/16   Historical Provider, MD   hydrochlorothiazide (HYDRODIURIL) 25 MG tablet Take 25 mg by mouth daily    Historical Provider, MD   atorvastatin (LIPITOR) 10 MG tablet Take 10 mg by mouth daily. Historical Provider, MD       Allergies:  Clindamycin/lincomycin, Metronidazole, and Vancomycin    Social History:  The patient currently lives at home with her son     TOBACCO:   reports that she has never smoked. She has never used smokeless tobacco.  ETOH:   reports no history of alcohol use. Family History:   Positive as follows:        Problem Relation Age of Onset   [facility] Cancer Mother     Cancer Father        REVIEW OF SYSTEMS:       Constitutional: Negative for fever   HENT: Negative for sore throat   Eyes: Negative for redness   Respiratory: Negative  for dyspnea, cough   Cardiovascular: Negative for chest pain   Gastrointestinal: Negative for vomiting, diarrhea   Genitourinary: Negative for hematuria   Musculoskeletal: Negative for arthralgias   Skin: Negative for rash   Neurological: Negative for syncope   Hematological: Negative for adenopathy   Psychiatric/Behavorial: Negative for anxiety    PHYSICAL EXAM:    /78   Pulse 133   Temp 98 °F (36.7 °C) (Oral)   Resp 18   Ht 5' 5\" (1.651 m)   Wt 203 lb (92.1 kg)   SpO2 97%   BMI 33.78 kg/m²     Gen: No distress. Alert. Eyes: PERRL. No sclera icterus. No conjunctival injection. ENT: No discharge. Pharynx clear. Neck: No JVD. No Carotid Bruit. Trachea midline. Resp: No accessory muscle use. No crackles. No wheezes. No rhonchi. CV: Tachycardic . Irregularly irregular rhythm. +8/6 systolic murmur at sternal border. No rub. No edema. GI: Non-tender. Non-distended.   Normal bowel sounds. Old healed abdominal scar with hernia- soft, not tender   Skin: Warm and dry. No nodule on exposed extremities. No rash on exposed extremities. M/S: No cyanosis. No joint deformity. No clubbing. Neuro: Awake. Grossly nonfocal    Psych: Oriented x 3. No anxiety or agitation. CBC:   Recent Labs     02/17/21 0915   WBC 8.5   HGB 12.0   HCT 37.0   MCV 92.0          BMP:   Recent Labs     02/17/21  0915      K 4.2      CO2 23   BUN 16   CREATININE 0.7     LIVER PROFILE:   Recent Labs     02/17/21 0915   AST 17   ALT 12   BILITOT 1.3*   ALKPHOS 104       U/A:    Lab Results   Component Value Date    COLORU Yellow 01/01/2021    WBCUA 10-20 01/01/2021    RBCUA 0-2 01/01/2021    BACTERIA 3+ 01/01/2021    CLARITYU SL CLOUDY 01/01/2021    SPECGRAV 1.025 01/01/2021    LEUKOCYTESUR SMALL 01/01/2021    BLOODU TRACE-INTACT 01/01/2021    GLUCOSEU 500 01/01/2021    AMORPHOUS 4+ 06/19/2015       CULTURES  None     EKG:  I have reviewed the EKG with the following interpretation:   A fib RVR- ventricular rate 128 bpm     RADIOLOGY  XR CHEST (2 VW)    (Results Pending)       Pertinent previous results reviewed   Stress test 2015  Impression   IMPRESSION:   1. No pharmcologically induced reversible perfusion defects to suggest   ischemia   2. Ejection fraction of 67%   3. No focal wall motion abnormality. ASSESSMENT/PLAN:    #Atrial fibrillation with RVR  - new onset  - TTE ordered  - TSH 2.18  - Lopressor 25 mg BID added  - Dig IV today with PO starting 2/18  - [id number]=0. Will use Lovenox 1 mg/kg BID for now  - appreciate cardio assistance     #Heart murmur  - TTE pending     #Cholelithiasis   - admitted early Jan 2021 for acute cholecystitis and treated conservatively with antibiotics.   Returned to [facility] 2/17/21 for elective cholecystectomy, surgery postponed 2/2 a fib rvr as above    #History of VTE  - previously on coumadin, no longer taking     #PAD   - f/b vascular surgery   - post

## 2021-02-17 NOTE — PROGRESS NOTES
4 Eyes Skin Assessment     The patient is being assess for   Admission    I agree that 2 RN's have performed a thorough Head to Toe Skin Assessment on the patient. ALL assessment sites listed below have been assessed. Areas assessed by both nurses:   [x]   Head, Face, and Ears   [x]   Shoulders, Back, and Chest, Abdomen  [x]   Arms, Elbows, and Hands   [x]   Coccyx, Sacrum, and Ischium  [x]   Legs, Feet, and Heels        Redness to R great toe  Redness to bilateral groins  Blanchable redness on coccyx  Rash on abdomen and large scar noted    **SHARE this note so that the co-signing nurse is able to place an eSignature**    Co-signer eSignature: Electronically signed by Yolanda Sam RN on 2/17/21 at 6:35 PM EST    Does the Patient have Skin Breakdown?   Yes [facility] WOUND CARE was Initiated documentation include the Jen-wound, Wound Assessment, Measurements, Dressing Treatment, Drainage, and Color\",          Priyank Prevention initiated:  Yes   Wound Care Orders initiated:  Yes      [id number]  nurse consulted for Pressure Injury (Stage 3,4, Unstageable, DTI, NWPT, Complex wounds)and New or Established Ostomies:  No      Primary Nurse eSignature: Electronically signed by Shanelle Meneses RN on 2/17/21 at 6:34 PM EST

## 2021-02-17 NOTE — PROGRESS NOTES
Patient admitted to room 307 from pre-op. Patient oriented to room, call light, bed rails, phone, lights and bathroom. Patient instructed about the schedule of the day including: vital sign frequency, lab draws, possible tests, frequency of MD and staff rounds, daily weights, I &O's and prescribed diet. Bed alarm in place, patient aware of placement and reason. Telemetry box in place, patient aware of placement and reason. Bed locked, in lowest position, side rails up 2/4, call light within reach. Recliner Assessment  Patient is not able to demonstrate the ability to move from a reclining position to an upright position within the recliner due to generalized weakness. Pt's son, Sourav Rao updated on plan of care and is agreeable.

## 2021-02-18 VITALS
OXYGEN SATURATION: 97 % | HEART RATE: 95 BPM | HEIGHT: 65 IN | SYSTOLIC BLOOD PRESSURE: 121 MMHG | BODY MASS INDEX: 33.2 KG/M2 | WEIGHT: 199.3 LBS | TEMPERATURE: 98.1 F | RESPIRATION RATE: 16 BRPM | DIASTOLIC BLOOD PRESSURE: 74 MMHG

## 2021-02-18 PROBLEM — I48.91 ATRIAL FIBRILLATION WITH RVR (HCC): Status: RESOLVED | Noted: 2021-02-17 | Resolved: 2021-02-18

## 2021-02-18 LAB
ANION GAP SERPL CALCULATED.3IONS-SCNC: 10 MMOL/L (ref 3–16)
BASOPHILS ABSOLUTE: 0 K/UL (ref 0–0.2)
BASOPHILS RELATIVE PERCENT: 0.6 %
BUN BLDV-MCNC: 18 MG/DL (ref 7–20)
CALCIUM SERPL-MCNC: 9.1 MG/DL (ref 8.3–10.6)
CHLORIDE BLD-SCNC: 109 MMOL/L (ref 99–110)
CO2: 21 MMOL/L (ref 21–32)
CREAT SERPL-MCNC: 0.7 MG/DL (ref 0.6–1.2)
EOSINOPHILS ABSOLUTE: 0.2 K/UL (ref 0–0.6)
EOSINOPHILS RELATIVE PERCENT: 3.2 %
ESTIMATED AVERAGE GLUCOSE: 145.6 MG/DL
GFR AFRICAN AMERICAN: >60
GFR NON-AFRICAN AMERICAN: >60
GLUCOSE BLD-MCNC: 121 MG/DL (ref 70–99)
GLUCOSE BLD-MCNC: 124 MG/DL (ref 70–99)
GLUCOSE BLD-MCNC: 175 MG/DL (ref 70–99)
HBA1C MFR BLD: 6.7 %
HCT VFR BLD CALC: 35.5 % (ref 36–48)
HEMOGLOBIN: 11.6 G/DL (ref 12–16)
INR BLD: 1.14 (ref 0.86–1.14)
LV EF: 58 %
LVEF MODALITY: NORMAL
LYMPHOCYTES ABSOLUTE: 1.6 K/UL (ref 1–5.1)
LYMPHOCYTES RELATIVE PERCENT: 22.4 %
MCH RBC QN AUTO: 30.4 PG (ref 26–34)
MCHC RBC AUTO-ENTMCNC: 32.8 G/DL (ref 31–36)
MCV RBC AUTO: 92.7 FL (ref 80–100)
MONOCYTES ABSOLUTE: 0.6 K/UL (ref 0–1.3)
MONOCYTES RELATIVE PERCENT: 8.3 %
NEUTROPHILS ABSOLUTE: 4.7 K/UL (ref 1.7–7.7)
NEUTROPHILS RELATIVE PERCENT: 65.5 %
PDW BLD-RTO: 15.5 % (ref 12.4–15.4)
PERFORMED ON: ABNORMAL
PERFORMED ON: ABNORMAL
PLATELET # BLD: 189 K/UL (ref 135–450)
PMV BLD AUTO: 9.1 FL (ref 5–10.5)
POTASSIUM REFLEX MAGNESIUM: 4.3 MMOL/L (ref 3.5–5.1)
PROTHROMBIN TIME: 13.3 SEC (ref 10–13.2)
RBC # BLD: 3.82 M/UL (ref 4–5.2)
SODIUM BLD-SCNC: 140 MMOL/L (ref 136–145)
WBC # BLD: 7.2 K/UL (ref 4–11)

## 2021-02-18 PROCEDURE — 85025 COMPLETE CBC W/AUTO DIFF WBC: CPT

## 2021-02-18 PROCEDURE — 2580000003 HC RX 258: Performed by: INTERNAL MEDICINE

## 2021-02-18 PROCEDURE — 6370000000 HC RX 637 (ALT 250 FOR IP): Performed by: INTERNAL MEDICINE

## 2021-02-18 PROCEDURE — 6360000002 HC RX W HCPCS: Performed by: INTERNAL MEDICINE

## 2021-02-18 PROCEDURE — 80048 BASIC METABOLIC PNL TOTAL CA: CPT

## 2021-02-18 PROCEDURE — 36415 COLL VENOUS BLD VENIPUNCTURE: CPT

## 2021-02-18 PROCEDURE — 93306 TTE W/DOPPLER COMPLETE: CPT

## 2021-02-18 PROCEDURE — 85610 PROTHROMBIN TIME: CPT

## 2021-02-18 PROCEDURE — 99239 HOSP IP/OBS DSCHRG MGMT >30: CPT | Performed by: PHYSICIAN ASSISTANT

## 2021-02-18 PROCEDURE — 99233 SBSQ HOSP IP/OBS HIGH 50: CPT | Performed by: INTERNAL MEDICINE

## 2021-02-18 RX ORDER — WARFARIN SODIUM 2.5 MG/1
TABLET ORAL
Qty: 60 TABLET | Refills: 0 | Status: SHIPPED | OUTPATIENT
Start: 2021-02-18 | End: 2021-02-18 | Stop reason: SDUPTHER

## 2021-02-18 RX ORDER — DIGOXIN 125 MCG
125 TABLET ORAL DAILY
Qty: 30 TABLET | Refills: 0 | Status: SHIPPED | OUTPATIENT
Start: 2021-02-19 | End: 2021-02-26 | Stop reason: SDUPTHER

## 2021-02-18 RX ORDER — WARFARIN SODIUM 2.5 MG/1
TABLET ORAL
Qty: 60 TABLET | Refills: 0 | Status: ON HOLD | OUTPATIENT
Start: 2021-02-18 | End: 2021-03-25

## 2021-02-18 RX ORDER — DIGOXIN 125 MCG
125 TABLET ORAL DAILY
Qty: 30 TABLET | Refills: 0 | Status: SHIPPED | OUTPATIENT
Start: 2021-02-19 | End: 2021-02-18

## 2021-02-18 RX ADMIN — METOPROLOL TARTRATE 25 MG: 25 TABLET, FILM COATED ORAL at 08:43

## 2021-02-18 RX ADMIN — ENOXAPARIN SODIUM 90 MG: 100 INJECTION SUBCUTANEOUS at 08:43

## 2021-02-18 RX ADMIN — GLYBURIDE 7.5 MG: 5 TABLET ORAL at 08:43

## 2021-02-18 RX ADMIN — Medication 10 ML: at 08:43

## 2021-02-18 RX ADMIN — INSULIN LISPRO 1 UNITS: 100 INJECTION, SOLUTION INTRAVENOUS; SUBCUTANEOUS at 12:02

## 2021-02-18 RX ADMIN — POTASSIUM CHLORIDE 20 MEQ: 750 TABLET, EXTENDED RELEASE ORAL at 08:43

## 2021-02-18 RX ADMIN — DIGOXIN 125 MCG: 125 TABLET ORAL at 08:43

## 2021-02-18 RX ADMIN — ATORVASTATIN CALCIUM 10 MG: 10 TABLET, FILM COATED ORAL at 08:43

## 2021-02-18 NOTE — DISCHARGE SUMMARY
artery angioplasty. - ASA held for potential surgery in near future, but will resume now. She is at risk for bleeding with ASA and warfarin use, but with history of PAD I will cont ASA for now. She can f/u with PCP for further evaluation of ASA necessity      #HTN  - Held enalapril and HCTZ- BP stable with addition of lopressor. Will stop enalapril and HCTZ, K supplementation. cont lopressor on discharge      #DM2  - can resume home regimen      #HLD  - cont statin    Procedures (Please Review Full Report for Details)  None     Consults    Cardiology       Physical Exam at Discharge:    /74   Pulse 95   Temp 98.1 °F (36.7 °C) (Oral)   Resp 16   Ht 5' 5\" (1.651 m)   Wt 199 lb 4.8 oz (90.4 kg)   SpO2 97%   BMI 33.17 kg/m²   Gen: No distress. Alert. Eyes: PERRL. No sclera icterus. No conjunctival injection. ENT: No discharge. Pharynx clear. Neck: No JVD. No Carotid Bruit. Trachea midline. Resp: No accessory muscle use. No crackles. No wheezes. No rhonchi. CV: Normal rate . Irregularly irregular rhythm. +0/5 systolic murmur at sternal border. No rub. No edema. GI: Non-tender. Non-distended. Normal bowel sounds. Old healed abdominal scar with hernia- soft, not tender   Skin: Warm and dry. No nodule on exposed extremities. No rash on exposed extremities. M/S: No cyanosis. No joint deformity. No clubbing. Neuro: Awake. Grossly nonfocal    Psych: Oriented x 3. No anxiety or agitation.        CBC:   Recent Labs     02/17/21  0915 02/18/21  0457   WBC 8.5 7.2   HGB 12.0 11.6*   HCT 37.0 35.5*   MCV 92.0 92.7    189     BMP:   Recent Labs     02/17/21  0915 02/18/21  0456    140   K 4.2 4.3    109   CO2 23 21   BUN 16 18   CREATININE 0.7 0.7     LIVER PROFILE:   Recent Labs     02/17/21  0915   AST 17   ALT 12   BILIDIR 0.3   BILITOT 1.3*   ALKPHOS 104     CULTURES  None     RADIOLOGY  XR CHEST (2 VW)   Final Result   Pulmonary vascular congestion with no pulmonary edema or focal infiltrate             TTE 2/18/2021  Normal left ventricular systolic function with an estimated ejection   fraction of 55-60%. No regional wall motion abnormalities are seen. There is moderate concentric left ventricular hypertrophy. The left atrium is mildly dilated. Mild posterior mitral annular calcification is present. Mild mitral regurgitation. Moderate aortic stenosis and moderate aortic regurgitation is present. Normal systolic pulmonary artery pressure (SPAP) estimated at 31 mmHg (RA   pressure 3 mmHg). Discharge Medications     Medication List      START taking these medications    digoxin 125 MCG tablet  Commonly known as: LANOXIN  Take 1 tablet by mouth daily  Start taking on: February 19, 2021     enoxaparin 100 MG/ML injection  Commonly known as: LOVENOX  Inject 0.9 mLs into the skin 2 times daily for 5 days     metoprolol tartrate 25 MG tablet  Commonly known as: LOPRESSOR  Take 1 tablet by mouth 2 times daily     warfarin 2.5 MG tablet  Commonly known as: Coumadin  Take 3 tablets by mouth daily for 1 day, THEN 2 tablets daily for 5 days. Then see the coumadin clinic for blood check and further dosing.   Start taking on: February 18, 2021        CONTINUE taking these medications    aspirin 81 MG chewable tablet     atorvastatin 10 MG tablet  Commonly known as: LIPITOR     Diabeta 5 MG tablet  Generic drug: glyBURIDE     lactobacillus capsule  One po tid        STOP taking these medications    enalapril 5 MG tablet  Commonly known as: VASOTEC     hydroCHLOROthiazide 25 MG tablet  Commonly known as: HYDRODIURIL     potassium chloride 20 MEQ extended release tablet  Commonly known as: KLOR-CON M           Where to Get Your Medications      These medications were sent to [address] [place] [address] [phone]  [address] [place] [address]    Phone: [phone]   · digoxin 125 MCG tablet  · enoxaparin 100 MG/ML injection  · metoprolol tartrate 25 MG tablet  · warfarin 2.5 MG tablet           Discharged in stable condition to home     Follow Up:   Follow up with PCP in 1 week (BP check, BMP checK), cardio as recommended, Coumadin clinic on Monday 2/22  >30 mts spent  Huber Hernandez PA-C  2/18/2021 9:33 AM

## 2021-02-18 NOTE — CARE COORDINATION
Schedule:  · Phone:   · Fax: Other Community Services: (ex:PT/OT,Mental Health,Wound Clinic, Cardio/Pul [address])    DISCHARGE PLAN: Explained Case Management role/services. Reviewed chart and spoke with pt via telephone. Pt states that she lives at home with her son and is IPTA. Pt states that she plans to return home at discharge and denies need for hhc or other services. Pt states that she does not have prescription coverage and cannot afford Eliquis/Xarelto. Pt cost would be $470 per Praveena Purcell in [employer]. Shayy Buitrago PA aware and will direct CM. Shayy Buitrago, [address] will look at Lovenox/Coumadin therapy. DISCHARGE ORDER  Date/Time 2021 1:53 PM  Completed by: Angi Sawyer, Case Management    Patient Name: Pam Cisneros      : 1935  Admitting Diagnosis: Atrial fibrillation ([address] [I48.91]      Admit order Date and Status:2021 inpt  (verify MD's last order for status of admission)      Noted discharge order. If applicable PT/OT recommendation at Discharge: n/a  DME recommendation by PT/OT:n/a  Confirmed discharge plan  (pt): Yes  with whom_______pt________  If pt confirmed DC plan does family need to be contacted by CM No if yes who___n/a___  Discharge Plan: Reviewed chart. Role of discharge planner explained and patient verbalized understanding. Discharge order is noted. Pt is being d/c'd to home today with son. Pt's O2 sats are 97% on RA. Pt states that she is independent and declines HHC. Pt's Lovenox and Cuumadin was sent to [facility] Tahira Quijano RN to set up first Appt to Coumadin clinic. Info in the d/c instructions to coumadin clinic. No further discharge needs needed or noted. Reviewed chart. Role of discharge planner explained and patient verbalized understanding. Discharge order is noted.     Has Home O2 in place on admit:  No  Informed of need to bring portable home O2 tank on day of discharge for nursing to connect prior to leaving:   Not Indicated  Verbalized agreement/Understanding:   Not Indicated    Discharge timeout done with Lisa Olivier RN. All discharge needs and concerns addressed.

## 2021-02-18 NOTE — PROGRESS NOTES
Shift assessment completed. See flow sheet. Medications given. Patient is A&O x4. She is sitting up eating breakfast. Echo arrived to perform ECHO. Patient denies further needs at this time. Call light within reach.

## 2021-02-18 NOTE — PROGRESS NOTES
End of shift report given to Deacon Roman. Pt in stable condition, care transferred at this time.  Electronically signed by Toro Turpin RN on 2/18/2021 at 7:35 AM

## 2021-02-18 NOTE — PROGRESS NOTES
Patient educated on discharge instructions as well as new medications use, dosage, administration and possible side effects. Patient verified knowledge. IV removed without difficulty and dry dressing in place. Telemetry monitor removed and returned to [facility]. Pt left facility in stable condition to Home with all of their personal belongings.

## 2021-02-18 NOTE — PROGRESS NOTES
[address] Daily Progress Note      Admit Date:  2/17/2021    Subjective:  Ms. Collins Cornelius is seen for new onset afib   She feels well this am  Denies chest pain, shortness of breath, edema, dizziness, palpitations and syncope.        Reason for Consultation/Chief Complaint: \"I have been asked to see her for new onset afib. \"     History of Present Illness:  Rupinder Shah is a 82 y.o. patient who presented to the hospital for elective cholecystectomy but found to be in afib RVR now admitted for further evaluation and optimization of cardiac status before dario. She has history of PAD,s/p thrombectomy and fasciotomy on 6/20/15, then underwent angiogram w/ angioplasty on 6/22/15    DM2,No angina CHF MI or CVA   Acute renal failure recovered. Denies chest pain, shortness of breath, edema, dizziness, palpitations and syncope. ROS:  12 point ROS negative in all areas as listed below except as in [address]  Constitutional, EENT,  pulmonary, GI, , Musculoskeletal, skin, neurological, hematological, endocrine, Psychiatric    Past Medical History:   Diagnosis Date    Cancer [address] [address]     skin cancer 2014; colon 2015    Clostridium difficile diarrhea 06/25/2015    per physicians notes; negative on 6/25/15    Clostridium difficile infection 12/09/2016    COVID-19 01/13/2021    Diabetes mellitus ([facility] [address] [facility]     Hyperlipidemia     Hypertension      Past Surgical History:   Procedure Laterality Date    ABDOMEN SURGERY Right 10/15/2015    right colectomy, pain ball    ABDOMEN SURGERY  12/06/2016    trasverse colectomy    COLON SURGERY      COLONOSCOPY  2015    ascending colon mass, diverticulosis, hemorrhoids    COLONOSCOPY  11/08/2016    Transverse colon mass; Diverticulosis;  Hemorrhoids    HERNIA REPAIR      x2    OTHER SURGICAL HISTORY Left 9/29/2014    excision of basal cell skin cancer Left face with full thickness skin graph from abdomen    SKIN GRAFT      UPPER GASTROINTESTINAL ENDOSCOPY      VASCULAR SURGERY Objective:   /74   Pulse 95   Temp 98.1 °F (36.7 °C) (Oral)   Resp 16   Ht 5' 5\" (1.651 m)   Wt 199 lb 4.8 oz (90.4 kg)   SpO2 97%   BMI 33.17 kg/m²       Intake/Output Summary (Last 24 hours) at 2/18/2021 0841  Last data filed at 2/18/2021 7572  Gross per 24 hour   Intake 420 ml   Output 620 ml   Net -200 ml       TELEMETRY:  afib rate 85/min    Physical Exam:  General: No Respiratory distress, appears well developed and well nourished. Eyes:  Sclera nonicteric  Nose/Sinuses:  negative findings: nose shows no deformity, asymmetry, or inflammation, nasal mucosa normal, septum midline with no perforation or bleeding  Back:  no pain to palpation  Joint:  no active joint inflammation  Musculoskeletal:  negative  Skin:  Warm and dry  Neck:  Negative for JVD and Carotid Bruits. Chest:  Crackles bilat  auscultation, respiration easy  Cardiovascular:  irreg irreg  N2L9 normal, Systolic  Murmur loudest at base of heart, no rub or thrill.   Extremities:   No edema, clubbing, cyanosis,  Neuro: intact    Medications:    atorvastatin  10 mg Oral Daily    glyBURIDE  7.5 mg Oral Daily with breakfast    potassium chloride  20 mEq Oral BID    sodium chloride flush  10 mL Intravenous 2 times per day    insulin lispro  0-6 Units Subcutaneous TID WC    insulin lispro  0-3 Units Subcutaneous Nightly    metoprolol tartrate  25 mg Oral BID    digoxin  125 mcg Oral Daily    enoxaparin  1 mg/kg Subcutaneous BID      dextrose       glucose, dextrose, glucagon (rDNA), dextrose, sodium chloride flush, promethazine **OR** ondansetron, polyethylene glycol, acetaminophen **OR** acetaminophen, perflutren lipid microspheres    Lab Data:  CBC:   Recent Labs     02/17/21  0915 02/18/21  0457   WBC 8.5 7.2   HGB 12.0 11.6*   HCT 37.0 35.5*   MCV 92.0 92.7    189     BMP:   Recent Labs     02/17/21  0915 02/18/21  0456    140   K 4.2 4.3    109   CO2 23 21   BUN 16 18   CREATININE 0.7 0.7     LIVER PROFILE:   Recent Labs     02/17/21  0915   AST 17   ALT 12   BILIDIR 0.3   BILITOT 1.3*   ALKPHOS 104     PT/INR: No results for input(s): PROTIME, INR in the last 72 hours. APTT: No results for input(s): APTT in the last 72 hours. BNP:  No results for input(s): BNP in the last 72 hours. IMAGING:   I have reviewed the following tests and documented in this encounter as follows:   Discussed with the patient. EKG:  I have reviewed EKG with the following interpretation:  Impression:          Final 02/17/2021  7:13 AM 14   Atrial fibrillation with rapid ventricular responseLeft axis deviationLow voltage QRSInferior infarct , age undeterminedCannot rule out Anterior infarct , age undeterminedAbnormal ECGWhen compared with ECG of 01-JAN-2021 04:53,Atrial fibrillation has replaced Sinus rhythmVent. rate has increased BY  51 BPMMinimal criteria for Anterior infarct are now PresentConfirmed by Lakeisha Minor MD, [address] ([id number]) on 2/17/2021 7:26:24 AM   Chest xray pending  Echo pending     Assessment  Atrial fibrillations RVR- rate now controlled  Hypokalemia- resolved K 4.3  Moderate aortic stenosis and moderate aortic regurgitation  Normal systolic function. Hypertension controlled  Hyperlipidemia   she is euthyroid TSH 2.1      Plan:  1. Rate controlled continue digoxin and metoprolol  2. Anticoagulation ->switch to PO apixaban  3.  Ok to proceed with dario when schedule permits  Stop anticoagulation for 38 hrs before anticipated surgery  4. stop potassium since K is normalized  Signing off  Will set up office follow up    Fariba Ware MD 2/18/2021 8:41 AM

## 2021-02-22 ENCOUNTER — ANTI-COAG VISIT (OUTPATIENT)
Dept: PHARMACY | Age: 86
End: 2021-02-22

## 2021-02-22 ENCOUNTER — TELEPHONE (OUTPATIENT)
Dept: SURGERY | Age: 86
End: 2021-02-22

## 2021-02-22 DIAGNOSIS — I48.91 ATRIAL FIBRILLATION WITH RVR (HCC): ICD-10-CM

## 2021-02-22 LAB — INTERNATIONAL NORMALIZATION RATIO, POC: 1.6

## 2021-02-22 PROCEDURE — 85610 PROTHROMBIN TIME: CPT | Performed by: PHARMACIST

## 2021-02-22 PROCEDURE — 99213 OFFICE O/P EST LOW 20 MIN: CPT | Performed by: PHARMACIST

## 2021-02-22 NOTE — TELEPHONE ENCOUNTER
I called and spoke to son, Charmaine May, he is on hipaa. He rescheduled her lap dario surgery for 3/2/2021. He states she  is on Lovenox injections and will be starting on Warfarin. She has 1 more dose of Lovenox tomorrow morning, then will start Warfarin. He states she also takes Aspirin 81 mg. What should she do with Warfarin and Aspirin prior to surgery on 3/2/2021?

## 2021-02-22 NOTE — PROGRESS NOTES
Ms. Bob Almeida is here for management of anticoagulation for new onset AFib. PMH also significant for Hx DVT, PAD, DM, HTN, HLD. She presents today w/out complaint. Pt verifies dosing regimen as listed above. Pt denies s/s bleeding/bruising/swelling/SOB. No BRBPR. No melena. Address missed doses  Reviewed pt medication list  No changes in RX/OTCs/Herbal medications. Reviewed dietary concerns  Address EToH and tobacco use. As initial clinic visit, provided pt with counseling for medication use/compliance, adverse events, and nutrition; clinic overview & orientation; and educational materials. Cardiologist: Dr. Ricco Bello  PCP: John Bower NP. Pt plans to have PCP manage warfarin going forward? States she had it managed by PCP last time she was on warfarin in ~2015 for DVT. She started warfarin on Fri 2/19 with 7.5 mg on day one, then 5 mg daily. Has gotten 3 doses so far. As INR has risen pretty quickly for just 3 doses, will cut back slightly on dose going forward. Still bridging with lovenox, last dose tomorrow AM. Based on trend so far that should be ok to stop then. I had planned to recheck INR this Wed but pt did not want to make appointment. Strongly recommended pt to call clinic for appt this week if able or to contact PCP office to have INR checked this week. INR 1.6 is below therapeutic range of 2-3  Recommend to reduce dose to 5 mg daily except 2.5 mg Q Tue/Thu/Sat  Patient has 2.5 mg tablets. Will continue to monitor and check INR in 2-3 days. Dosing reminder card given with phone number, appointment date and time. Return to clinic:  Pt to call clinic or follow up with PCP.     Lyudmila Davis, PharmD 9:04 AM EST 2/22/21

## 2021-02-22 NOTE — TELEPHONE ENCOUNTER
Pts son Jojo William calling in to get the patient's gallbladder surgery rescheduled, his call back number is [phone]

## 2021-02-23 NOTE — TELEPHONE ENCOUNTER
I called son, Galo Saha to inform him of instructions. He now states he \" forgot\" that she has already been taking Warfarin since she got home from hospital on 2/19/2021, and she was still on Lovenox also, with last dose of Lovenox this morning. So she is currently on Warfarin and Aspirin. She went to anti-coag clinic yesterday, and she is to have her INR rechecked in 2-3 days. Her lap dario surgery is scheduled for 3/2/2021. Please advise re: Warfarin and Aspirin.

## 2021-02-23 NOTE — TELEPHONE ENCOUNTER
Continue ASA. Do not start warfarin. Please find out what dose of Lovenox she is taking and call in enough syringes to continue until last dose of Lovenox will be the night before surgery.

## 2021-02-23 NOTE — TELEPHONE ENCOUNTER
I called and spoke to Marion and informed him of instructions. He repeated them back to me and verbalized understanding. I called [address] in [facility] and spoke to pharmacist Nam Bustamante and called in verbal order for Lovenox injections per her previous order, Lovenox 100 mg/ml injections, Inject 0.9 mls into the skin 2 x daily x 13 doses.

## 2021-02-23 NOTE — TELEPHONE ENCOUNTER
Discontinue warfarin. Does not need INR. Continue Lovenox at current dose until night before surgery. Call in necessary syringes to accomplish that. Stop ASA 3 days pre op.

## 2021-02-24 PROBLEM — I35.0 NONRHEUMATIC AORTIC VALVE STENOSIS: Status: ACTIVE | Noted: 2021-02-24

## 2021-02-24 PROBLEM — I35.1 NONRHEUMATIC AORTIC VALVE INSUFFICIENCY: Status: ACTIVE | Noted: 2021-02-24

## 2021-02-24 NOTE — PROGRESS NOTES
[address] Office Note  2/26/2021     Subjective:  Ms. Doug Moulton is here today for hospital follow up of afib    [place]:    She present to the hospital 2/17/21 for elective cholecystectomy but was found to be afib rvr. She had echo during hospitalization  Which revealed EF 55-60%. Mild MR. Moderate AS and moderate AR. Today she reports doing well. Denies chest pain, shortness of breath, edema, dizziness, palpitations and syncope. She is not taking aspirin and coumadin at this time in view of upcoming surgery on 3/2/21. PMH:   Afib, HLD, PAD, s/p thrombectomy and fasciotomy on 6/20/15, then underwent angiogram w/ angioplasty on 6/22/15, DM2. Acute renal failure recovered     Review of Systems:         12 point ROS negative in all areas as listed below except as in [address], EENT, pulmonary, GI, , Musculoskeletal, skin, neurological, hematological, endocrine, Psychiatric    Reviewed past medical history, social, and family history. reports that she has never smoked. She has never used smokeless tobacco. She reports that she does not drink alcohol or use drugs. MOM- Brain cancer, no heart disease   DAD- prostate cancer, no heart disease   Past Medical History:   Diagnosis Date    Cancer ([address]     skin cancer 2014; colon 2015    Clostridium difficile diarrhea 06/25/2015    per physicians notes; negative on 6/25/15    Clostridium difficile infection 12/09/2016    COVID-19 01/13/2021    Diabetes mellitus [facility]     Hyperlipidemia     Hypertension      Past Surgical History:   Procedure Laterality Date    ABDOMEN SURGERY Right 10/15/2015    right colectomy, pain ball    ABDOMEN SURGERY  12/06/2016    trasverse colectomy    COLON SURGERY      COLONOSCOPY  2015    ascending colon mass, diverticulosis, hemorrhoids    COLONOSCOPY  11/08/2016    Transverse colon mass; Diverticulosis;  Hemorrhoids    HERNIA REPAIR      x2    OTHER SURGICAL HISTORY Left 9/29/2014    excision of basal cell skin cancer Left face with full thickness skin graph from abdomen    SKIN GRAFT      UPPER GASTROINTESTINAL ENDOSCOPY      VASCULAR SURGERY         Objective:   /60   Pulse 90   Temp 96.9 °F (36.1 °C)   Ht 5' 5\" (1.651 m)   Wt 200 lb 12.8 oz (91.1 kg)   SpO2 98%   BMI 33.41 kg/m²     Wt Readings from Last 3 Encounters:   02/24/21 199 lb (90.3 kg)   02/26/21 200 lb 12.8 oz (91.1 kg)   02/18/21 199 lb 4.8 oz (90.4 kg)       Physical Exam:  General: No Respiratory distress, appears well developed and well nourished. Eyes:  Sclera nonicteric  Nose/Sinuses:  negative findings: nose shows no deformity, asymmetry, or inflammation, nasal mucosa normal, septum midline with no perforation or bleeding  Back:  no pain to palpation  Joint:  no active joint inflammation  Musculoskeletal:  negative  Skin:  Warm and dry  Neck:  Negative for JVD and Carotid Bruits. Chest:  Clear to auscultation, respiration easy  Cardiovascular:  irreg ireg S2 normal, Aortic stenosis murmur, no rub or thrill. Extremities:   Trace BLE edema, clubbing, cyanosis,  Pulses:  pedal pulses are normal.  Neuro: intact    Medications:   Outpatient Encounter Medications as of 2/26/2021   Medication Sig Dispense Refill    atorvastatin (LIPITOR) 10 MG tablet Take 1 tablet by mouth daily 90 tablet 3    digoxin (LANOXIN) 125 MCG tablet Take 1 tablet by mouth daily 90 tablet 3    metoprolol tartrate (LOPRESSOR) 25 MG tablet Take 1 tablet by mouth 2 times daily 180 tablet 3    warfarin (COUMADIN) 2.5 MG tablet Take 3 tablets by mouth daily for 1 day, THEN 2 tablets daily for 5 days. Then see the coumadin clinic for blood check and further dosing. 60 tablet 0    glyBURIDE (DIABETA) 5 MG tablet Take 7.5 mg by mouth daily (with breakfast).  aspirin 81 MG chewable tablet Take 81 mg by mouth daily.       [DISCONTINUED] enoxaparin (LOVENOX) 100 MG/ML injection Inject into the skin 2 times daily      [DISCONTINUED] metoprolol tartrate (LOPRESSOR) 25 MG tablet Take 1 tablet by mouth 2 times daily 60 tablet 0    [DISCONTINUED] digoxin (LANOXIN) 125 MCG tablet Take 1 tablet by mouth daily 30 tablet 0    lactobacillus (CULTURELLE) capsule One po tid 30 capsule 1    [DISCONTINUED] atorvastatin (LIPITOR) 10 MG tablet Take 10 mg by mouth daily. No facility-administered encounter medications on file as of 2/26/2021. Lab Data:  CBC: No results for input(s): WBC, HGB, HCT, MCV, PLT in the last 72 hours. BMP: No results for input(s): NA, K, CL, CO2, PHOS, BUN, CREATININE in the last 72 hours. Invalid input(s): CA  LIVER PROFILE: No results for input(s): AST, ALT, LIPASE, BILIDIR, BILITOT, ALKPHOS in the last 72 hours. Invalid input(s): AMYLASE,  ALB  LIPID:   Lab Results   Component Value Date    CHOL 120 10/13/2015     Lab Results   Component Value Date    TRIG 209 (H) 10/13/2015     Lab Results   Component Value Date    HDL 35 (L) 10/13/2015     Lab Results   Component Value Date    LDLCALC 43 10/13/2015     Lab Results   Component Value Date    LABVLDL 42 10/13/2015     No results found for: CHOLHDLRATIO  PT/INR:   No results for input(s): PROTIME, INR in the last 72 hours. A1C:   Lab Results   Component Value Date    LABA1C 6.7 02/17/2021     BNP:  No results for input(s): BNP in the last 72 hours. IMAGING:   I have reviewed the following tests and documented in this encounter as follows:     ECHO 2/18/21   Normal left ventricular systolic function with an estimated ejection   fraction of 55-60%. No regional wall motion abnormalities are seen. There is moderate concentric left ventricular hypertrophy. The left atrium is mildly dilated. Mild posterior mitral annular calcification is present. Mild mitral regurgitation. Moderate aortic stenosis and moderate aortic regurgitation is present. Normal systolic pulmonary artery pressure (SPAP) estimated at 31 mmHg (RA   pressure 3 mmHg).     CXR 2/17/21   Pulmonary vascular congestion with no pulmonary edema or focal infiltrate       EKG 2/17/20   Atrial fibrillation with rapid ventricular responseLeft axis deviationLow voltage QRSInferior infarct , age undeterminedCannot rule out Anterior infarct , age undeterminedAbnormal ECGWhen compared with ECG of 01-JAN-2021 04:53,Atrial fibrillation has replaced Sinus rhythmVent. rate has increased BY  51 BPMMinimal criteria for Anterior infarct are now PresentConfirmed by Ray Chen MD, [address] ([id number]) on 2/17/2021 7:26:24 AM     EKG 1/1/21   Normal sinus rhythmWhen compared with ECG of 12/23/16, low voltage QRS now present. Confirmed by Dana Baca ([id number]) on 1/1/2021 11:49:00 AM     Assessment:  1. Persistent atrial fibrillation ([address]    2. Mixed hyperlipidemia    3. Nonrheumatic aortic valve stenosis    4. Nonrheumatic aortic valve insufficiency     [id number] Score for Atrial Fibrillation Stroke Risk   Risk   Factors  Component Value   C CHF No 0   H HTN Yes 1   A2 Age >= 76 Yes,  (82 y.o.) 2   D DM Yes 1   S2 Prior Stroke/TIA No 0   V Vascular Disease No 0   A Age 74-69 No,  (82 y.o.) 0   Sc Sex female 1    [id number]  Score  5   Score last updated 2/26/21 9:09 PM EST    Click here for a link to the UpToDate guideline \"Atrial Fibrillation: Anticoagulation therapy to prevent embolization    Disclaimer: Risk Score calculation is dependent on accuracy of patient problem list and past encounter diagnosis. Plan:  1. Meds reviewed. Refills as warranted   2. No changes today. Continue current medications including metoprolol and digoxin. she is not taking asa or coumadin due to surgery next week  3. Ok to proceed with elective cholecystectomy. Resume anticoagulation after surgery when safe per surgery recommendation. 4. Follow up with me in 6 months      This note was scribed in the presence of Molly Velásquez MD by Angeli Tabor RN      QUALITY MEASURES  1. Tobacco Cessation Counseling: NA   2. Retake of BP if >140/90: NA   3. Documentation to PCP/referring for new patient:  Sent to PCP at close of office visit  4. CAD patient on anti-platelet: NA   5. CAD patient on STATIN therapy: HLD- Lipitor  6. Patient with CHF and aFib on anticoagulation: Afib- warfarin   I, Dr. Sandy Burt, personally performed the services described in this documentation, as scribed by the above signed scribe in my presence. It is both accurate and complete to my knowledge. I agree with the details independently gathered by the clinical support staff, while the remaining scribed note accurately describes my personal service to the patient.           Kristine Ndiaye MD 2/26/2021 6:37 PM

## 2021-02-26 ENCOUNTER — OFFICE VISIT (OUTPATIENT)
Dept: CARDIOLOGY CLINIC | Age: 86
End: 2021-02-26
Payer: MEDICARE

## 2021-02-26 VITALS
TEMPERATURE: 96.9 F | BODY MASS INDEX: 33.45 KG/M2 | SYSTOLIC BLOOD PRESSURE: 132 MMHG | DIASTOLIC BLOOD PRESSURE: 60 MMHG | HEART RATE: 90 BPM | WEIGHT: 200.8 LBS | OXYGEN SATURATION: 98 % | HEIGHT: 65 IN

## 2021-02-26 DIAGNOSIS — I48.19 PERSISTENT ATRIAL FIBRILLATION (HCC): Primary | ICD-10-CM

## 2021-02-26 DIAGNOSIS — E78.2 MIXED HYPERLIPIDEMIA: ICD-10-CM

## 2021-02-26 DIAGNOSIS — I35.0 NONRHEUMATIC AORTIC VALVE STENOSIS: ICD-10-CM

## 2021-02-26 DIAGNOSIS — I35.1 NONRHEUMATIC AORTIC VALVE INSUFFICIENCY: ICD-10-CM

## 2021-02-26 PROCEDURE — 99214 OFFICE O/P EST MOD 30 MIN: CPT | Performed by: INTERNAL MEDICINE

## 2021-02-26 RX ORDER — DIGOXIN 125 MCG
125 TABLET ORAL DAILY
Qty: 90 TABLET | Refills: 3 | Status: ON HOLD | OUTPATIENT
Start: 2021-02-26 | End: 2021-03-25 | Stop reason: HOSPADM

## 2021-02-26 RX ORDER — ATORVASTATIN CALCIUM 10 MG/1
10 TABLET, FILM COATED ORAL DAILY
Qty: 90 TABLET | Refills: 3 | Status: SHIPPED | OUTPATIENT
Start: 2021-02-26 | End: 2021-07-07

## 2021-02-26 NOTE — PATIENT INSTRUCTIONS
1. Meds reviewed. Refills as warranted   2. No changes today. Continue current medications   3. Ok to proceed with elective cholecystectomy. Resume anticoagulation after surgery when safe per surgery recommendation.    4. Follow up with me in 6 months

## 2021-03-01 ENCOUNTER — ANESTHESIA EVENT (OUTPATIENT)
Dept: OPERATING ROOM | Age: 86
DRG: 419 | End: 2021-03-01
Payer: MEDICARE

## 2021-03-01 NOTE — ANESTHESIA PRE PROCEDURE
Department of Anesthesiology  Preprocedure Note       Name:  Yordy Mckee   Age:  82 y.o.  :  1935                                          MRN:  [id number]         Date:  3/2/2021      Surgeon: Barber Rojas MD    Procedure: LAPAROSCOPIC CHOLECYSTECTOMY, POSSIBLE CHOLANGIOGRAMS, POSSIBLE CONVENTIONAL CHOLECYSTECTOMY    HPI:  82 y.o. female with DM, HTN, HLD, PAD, history of colon CA sp resection 2016 who presented to [facility] for an outpatient cholecystectomy early 2021. In preop she was found to be in a fib with RVR, this is a new diagnosis. She was directly admitted to hospital for further eval She also had acute cholecystitis and was treated conservatively with antibiotics. [id number]   Atrial fibrillation 120; rapid ventricular response  Left axis deviation  Low voltage QRS  Inferior infarct , age undetermined  Cannot rule out Anterior infarct , age undetermined  Abnormal ECG  When compared with ECG of 2021 04:53,  Atrial fibrillation has replaced Sinus rhythm  Vent. rate has increased BY 51 BPM  Minimal criteria for Anterior infarct are now Present . .. Echo:  Normal left ventricular systolic function with an estimated ejection   fraction of 55-60%. No regional wall motion abnormalities are seen. There is moderate concentric left ventricular hypertrophy. The left atrium is mildly dilated. Mild posterior mitral annular calcification is present. Mild mitral regurgitation. Moderate aortic stenosis and moderate aortic regurgitation is present. Normal systolic pulmonary artery pressure    Medications prior to admission:    warfarin (COUMADIN) 2.5 MG tablet Take 3 tablets by mouth daily for 1 day, THEN 2 tablets daily for 5 days. Then see the coumadin clinic for blood check and further dosing. lactobacillus (CULTURELLE) capsule One po tid   glyBURIDE (DIABETA) 5 MG tablet Take 7.5 mg by mouth daily (with breakfast). aspirin 81 MG chewable tablet Take 81 mg by mouth daily. atorvastatin (LIPITOR) 10 MG tablet Take 1 tablet by mouth daily   digoxin (LANOXIN) 125 MCG tablet Take 1 tablet by mouth daily   metoprolol tartrate (LOPRESSOR) 25 MG tablet Take 1 tablet by mouth 2 times daily     Allergies:     Clindamycin/Lincomycin Diarrhea    Metronidazole Photosensitivity    Vancomycin      Son said unknown reaction     Problem List:     Ischemia of lower extremity    DM2 (di[facility] mellitus, type 2) [facility]    History of fasciotomy    Mass of colon    Anemia    Iron deficiency anemia due to chronic blood loss    Chest pain    Colonic mass    Ischemic neuropathy of right foot    Malignant neoplasm of transverse colon (HCC)    Malignant neoplasm of transverse colon (HCC)    Intra-abdominal abscess (HCC)    Acute cholecystitis    Benign essential HTN    Hyperlipidemia    Atrial fibrillation with RVR (HCC)    PAD (peripheral artery disease) (HCC)    History of venous thromboembolism    Calculus of gallbladder without cholecystitis without obstruction    Nonrheumatic aortic valve stenosis    Nonrheumatic aortic valve insufficiency     Past Medical History:     Cancer ([facility]     skin cancer 2014; colon 2015    Clostridium difficile diarrhea 06/25/2015    per physicians notes; negative on 6/25/15    Clostridium difficile infection 12/09/2016    COVID-19 01/13/2021    Diabetes mellitus ([facility]     Hyperlipidemia     Hypertension      Past Surgical History:     ABDOMEN SURGERY Right 10/15/2015    right colectomy, pain ball    ABDOMEN SURGERY  12/06/2016    trasverse colectomy    COLON SURGERY      COLONOSCOPY  2015    ascending colon mass, diverticulosis, hemorrhoids    COLONOSCOPY  11/08/2016    Transverse colon mass; Diverticulosis;  Hemorrhoids    HERNIA REPAIR      x2    OTHER SURGICAL HISTORY Left 9/29/2014    excision of basal cell skin cancer Left face with full thickness skin graph from abdomen  SKIN GRAFT      UPPER GASTROINTESTINAL ENDOSCOPY      VASCULAR SURGERY       Social History:    Tobacco Use    Smoking status: Never Smoker    Smokeless tobacco: Never Used   Substance Use Topics    Alcohol use: No     Vital Signs (Current):    BP: 165/90 Pulse: 100   Resp: 18 SpO2: 99   Temp: 97.4 °F (36.3 °C)   Height: 5' 5\" (1.651 m)  (02/24/21) Weight: 199 lb (90.3 kg)  (02/24/21)   BMI: 33.2           BP Readings from Last 3 Encounters:   02/26/21 132/60   02/18/21 121/74   01/13/21 (!) 110/53     NPO Status: >8 hrs                          BMI:   Wt Readings from Last 3 Encounters:   02/26/21 200 lb 12.8 oz (91.1 kg)   02/18/21 199 lb 4.8 oz (90.4 kg)   02/10/21 203 lb (92.1 kg)     Body mass index is 33.12 kg/m².     CBC:     WBC 7.2 02/18/2021    HGB 11.6 02/18/2021    HCT 35.5 02/18/2021     02/18/2021     CMP:      02/18/2021    K 4.3 02/18/2021     02/18/2021    CO2 21 02/18/2021    BUN 18 02/18/2021    CREATININE 0.7 02/18/2021    GLUCOSE 124 02/18/2021    PROT 6.0 02/17/2021    CALCIUM 9.1 02/18/2021    BILITOT 1.3 02/17/2021    ALKPHOS 104 02/17/2021    AST 17 02/17/2021    ALT 12 02/17/2021     Coags:    PROTIME 13.3 02/18/2021    INR 1.6 02/22/2021    INR 1.14 02/18/2021     COVID-19 Screening (If Applicable):   Lab Results   Component Value Date    COVID19 DETECTED 01/13/2021     Anesthesia Evaluation  Patient summary reviewed and Nursing notes reviewed no history of anesthetic complications:   Airway: Mallampati: III  TM distance: >3 FB   Neck ROM: limited  Mouth opening: < 3 FB Dental:          Pulmonary: breath sounds clear to auscultation  (+) recent URI:      (-) COPD, asthma, sleep apnea, wheezes and not a current smoker                           Cardiovascular:  Exercise tolerance: good (>4 METS),   (+) hypertension:, dysrhythmias: atrial fibrillation,     (-) past MI,  angina,  OCASIO and murmur    ECG reviewed  Rhythm: regular  Rate: normal  Echocardiogram reviewed Neuro/Psych:   (+) neuromuscular disease:,    (-) seizures, TIA and CVA            ROS comment: H/O peripheral neuropathy- foot GI/Hepatic/Renal:   (+) morbid obesity     (-) GERD, hepatitis, liver disease and no renal disease      ROS comment: GI: See HPI. Endo/Other:    (+) DiabetesType II DM, , blood dyscrasia: bridge therapy and anticoagulation therapy:., .    (-) hypothyroidism               Abdominal:   (+) obese,         Vascular:   + DVT, .  - PE.       ROS comment: +DVT 4-5 yrs ago. Anesthesia Plan      general     ASA 3     (Intercostal nerve blocks if an open procedure is required. Clay Jesus )  Induction: intravenous. MIPS: Prophylactic antiemetics administered. Anesthetic plan and risks discussed with patient. Plan discussed with CRNA.             Jonatan Teague MD

## 2021-03-02 ENCOUNTER — HOSPITAL ENCOUNTER (INPATIENT)
Age: 86
LOS: 2 days | Discharge: HOME HEALTH | DRG: 419 | End: 2021-03-04
Attending: SURGERY | Admitting: SURGERY
Payer: MEDICARE

## 2021-03-02 ENCOUNTER — ANESTHESIA (OUTPATIENT)
Dept: OPERATING ROOM | Age: 86
DRG: 419 | End: 2021-03-02
Payer: MEDICARE

## 2021-03-02 VITALS — SYSTOLIC BLOOD PRESSURE: 160 MMHG | OXYGEN SATURATION: 100 % | DIASTOLIC BLOOD PRESSURE: 68 MMHG

## 2021-03-02 DIAGNOSIS — K80.00 ACUTE CALCULOUS CHOLECYSTITIS: Primary | ICD-10-CM

## 2021-03-02 LAB
GLUCOSE BLD-MCNC: 163 MG/DL (ref 70–99)
GLUCOSE BLD-MCNC: 263 MG/DL (ref 70–99)
GLUCOSE BLD-MCNC: 271 MG/DL (ref 70–99)
GLUCOSE BLD-MCNC: 271 MG/DL (ref 70–99)
PERFORMED ON: ABNORMAL

## 2021-03-02 PROCEDURE — 6360000002 HC RX W HCPCS: Performed by: SURGERY

## 2021-03-02 PROCEDURE — 6360000002 HC RX W HCPCS: Performed by: NURSE ANESTHETIST, CERTIFIED REGISTERED

## 2021-03-02 PROCEDURE — 2500000003 HC RX 250 WO HCPCS: Performed by: SURGERY

## 2021-03-02 PROCEDURE — 2580000003 HC RX 258: Performed by: SURGERY

## 2021-03-02 PROCEDURE — 2709999900 HC NON-CHARGEABLE SUPPLY: Performed by: SURGERY

## 2021-03-02 PROCEDURE — 3700000000 HC ANESTHESIA ATTENDED CARE: Performed by: SURGERY

## 2021-03-02 PROCEDURE — 2500000003 HC RX 250 WO HCPCS: Performed by: NURSE ANESTHETIST, CERTIFIED REGISTERED

## 2021-03-02 PROCEDURE — 2580000003 HC RX 258: Performed by: ANESTHESIOLOGY

## 2021-03-02 PROCEDURE — 6370000000 HC RX 637 (ALT 250 FOR IP): Performed by: SURGERY

## 2021-03-02 PROCEDURE — 3600000004 HC SURGERY LEVEL 4 BASE: Performed by: SURGERY

## 2021-03-02 PROCEDURE — 47562 LAPAROSCOPIC CHOLECYSTECTOMY: CPT | Performed by: SURGERY

## 2021-03-02 PROCEDURE — 88304 TISSUE EXAM BY PATHOLOGIST: CPT

## 2021-03-02 PROCEDURE — 2720000010 HC SURG SUPPLY STERILE: Performed by: SURGERY

## 2021-03-02 PROCEDURE — 6360000002 HC RX W HCPCS: Performed by: ANESTHESIOLOGY

## 2021-03-02 PROCEDURE — 3700000001 HC ADD 15 MINUTES (ANESTHESIA): Performed by: SURGERY

## 2021-03-02 PROCEDURE — 7100000001 HC PACU RECOVERY - ADDTL 15 MIN: Performed by: SURGERY

## 2021-03-02 PROCEDURE — 0FT44ZZ RESECTION OF GALLBLADDER, PERCUTANEOUS ENDOSCOPIC APPROACH: ICD-10-PCS | Performed by: SURGERY

## 2021-03-02 PROCEDURE — 1200000000 HC SEMI PRIVATE

## 2021-03-02 PROCEDURE — 3600000014 HC SURGERY LEVEL 4 ADDTL 15MIN: Performed by: SURGERY

## 2021-03-02 PROCEDURE — 7100000000 HC PACU RECOVERY - FIRST 15 MIN: Performed by: SURGERY

## 2021-03-02 RX ORDER — OXYCODONE HYDROCHLORIDE AND ACETAMINOPHEN 5; 325 MG/1; MG/1
1 TABLET ORAL PRN
Status: DISCONTINUED | OUTPATIENT
Start: 2021-03-02 | End: 2021-03-02 | Stop reason: HOSPADM

## 2021-03-02 RX ORDER — HYDRALAZINE HYDROCHLORIDE 20 MG/ML
5 INJECTION INTRAMUSCULAR; INTRAVENOUS EVERY 10 MIN PRN
Status: DISCONTINUED | OUTPATIENT
Start: 2021-03-02 | End: 2021-03-02 | Stop reason: HOSPADM

## 2021-03-02 RX ORDER — LIDOCAINE HYDROCHLORIDE 20 MG/ML
INJECTION, SOLUTION EPIDURAL; INFILTRATION; INTRACAUDAL; PERINEURAL PRN
Status: DISCONTINUED | OUTPATIENT
Start: 2021-03-02 | End: 2021-03-02 | Stop reason: SDUPTHER

## 2021-03-02 RX ORDER — DEXTROSE MONOHYDRATE 25 G/50ML
12.5 INJECTION, SOLUTION INTRAVENOUS PRN
Status: DISCONTINUED | OUTPATIENT
Start: 2021-03-02 | End: 2021-03-04 | Stop reason: HOSPADM

## 2021-03-02 RX ORDER — KETOROLAC TROMETHAMINE 30 MG/ML
INJECTION, SOLUTION INTRAMUSCULAR; INTRAVENOUS PRN
Status: DISCONTINUED | OUTPATIENT
Start: 2021-03-02 | End: 2021-03-02 | Stop reason: SDUPTHER

## 2021-03-02 RX ORDER — OXYCODONE HYDROCHLORIDE 5 MG/1
5 TABLET ORAL EVERY 4 HOURS PRN
Status: DISCONTINUED | OUTPATIENT
Start: 2021-03-02 | End: 2021-03-04 | Stop reason: HOSPADM

## 2021-03-02 RX ORDER — MEPERIDINE HYDROCHLORIDE 25 MG/ML
12.5 INJECTION INTRAMUSCULAR; INTRAVENOUS; SUBCUTANEOUS EVERY 5 MIN PRN
Status: DISCONTINUED | OUTPATIENT
Start: 2021-03-02 | End: 2021-03-02 | Stop reason: HOSPADM

## 2021-03-02 RX ORDER — DEXTROSE MONOHYDRATE 50 MG/ML
100 INJECTION, SOLUTION INTRAVENOUS PRN
Status: DISCONTINUED | OUTPATIENT
Start: 2021-03-02 | End: 2021-03-04 | Stop reason: HOSPADM

## 2021-03-02 RX ORDER — OXYCODONE HYDROCHLORIDE AND ACETAMINOPHEN 5; 325 MG/1; MG/1
2 TABLET ORAL PRN
Status: DISCONTINUED | OUTPATIENT
Start: 2021-03-02 | End: 2021-03-02 | Stop reason: HOSPADM

## 2021-03-02 RX ORDER — SODIUM CHLORIDE, SODIUM LACTATE, POTASSIUM CHLORIDE, AND CALCIUM CHLORIDE .6; .31; .03; .02 G/100ML; G/100ML; G/100ML; G/100ML
IRRIGANT IRRIGATION PRN
Status: DISCONTINUED | OUTPATIENT
Start: 2021-03-02 | End: 2021-03-02 | Stop reason: ALTCHOICE

## 2021-03-02 RX ORDER — SODIUM CHLORIDE 9 MG/ML
INJECTION, SOLUTION INTRAVENOUS CONTINUOUS
Status: DISCONTINUED | OUTPATIENT
Start: 2021-03-02 | End: 2021-03-04 | Stop reason: HOSPADM

## 2021-03-02 RX ORDER — METOPROLOL TARTRATE 5 MG/5ML
INJECTION INTRAVENOUS PRN
Status: DISCONTINUED | OUTPATIENT
Start: 2021-03-02 | End: 2021-03-02 | Stop reason: SDUPTHER

## 2021-03-02 RX ORDER — PROMETHAZINE HYDROCHLORIDE 25 MG/ML
6.25 INJECTION, SOLUTION INTRAMUSCULAR; INTRAVENOUS EVERY 6 HOURS PRN
Status: DISCONTINUED | OUTPATIENT
Start: 2021-03-02 | End: 2021-03-04 | Stop reason: HOSPADM

## 2021-03-02 RX ORDER — FENTANYL CITRATE 50 UG/ML
25 INJECTION, SOLUTION INTRAMUSCULAR; INTRAVENOUS EVERY 5 MIN PRN
Status: DISCONTINUED | OUTPATIENT
Start: 2021-03-02 | End: 2021-03-02 | Stop reason: HOSPADM

## 2021-03-02 RX ORDER — PROPOFOL 10 MG/ML
INJECTION, EMULSION INTRAVENOUS PRN
Status: DISCONTINUED | OUTPATIENT
Start: 2021-03-02 | End: 2021-03-02 | Stop reason: SDUPTHER

## 2021-03-02 RX ORDER — DEXAMETHASONE SODIUM PHOSPHATE 4 MG/ML
INJECTION, SOLUTION INTRA-ARTICULAR; INTRALESIONAL; INTRAMUSCULAR; INTRAVENOUS; SOFT TISSUE PRN
Status: DISCONTINUED | OUTPATIENT
Start: 2021-03-02 | End: 2021-03-02 | Stop reason: SDUPTHER

## 2021-03-02 RX ORDER — FENTANYL CITRATE 50 UG/ML
INJECTION, SOLUTION INTRAMUSCULAR; INTRAVENOUS PRN
Status: DISCONTINUED | OUTPATIENT
Start: 2021-03-02 | End: 2021-03-02 | Stop reason: SDUPTHER

## 2021-03-02 RX ORDER — SODIUM CHLORIDE, SODIUM LACTATE, POTASSIUM CHLORIDE, CALCIUM CHLORIDE 600; 310; 30; 20 MG/100ML; MG/100ML; MG/100ML; MG/100ML
INJECTION, SOLUTION INTRAVENOUS CONTINUOUS
Status: DISCONTINUED | OUTPATIENT
Start: 2021-03-02 | End: 2021-03-02

## 2021-03-02 RX ORDER — LIDOCAINE HYDROCHLORIDE 10 MG/ML
1 INJECTION, SOLUTION EPIDURAL; INFILTRATION; INTRACAUDAL; PERINEURAL
Status: DISCONTINUED | OUTPATIENT
Start: 2021-03-02 | End: 2021-03-02 | Stop reason: HOSPADM

## 2021-03-02 RX ORDER — BUPIVACAINE HYDROCHLORIDE 5 MG/ML
INJECTION, SOLUTION EPIDURAL; INTRACAUDAL PRN
Status: DISCONTINUED | OUTPATIENT
Start: 2021-03-02 | End: 2021-03-02 | Stop reason: ALTCHOICE

## 2021-03-02 RX ORDER — NICOTINE POLACRILEX 4 MG
15 LOZENGE BUCCAL PRN
Status: DISCONTINUED | OUTPATIENT
Start: 2021-03-02 | End: 2021-03-04 | Stop reason: HOSPADM

## 2021-03-02 RX ORDER — OXYCODONE HYDROCHLORIDE 5 MG/1
10 TABLET ORAL EVERY 4 HOURS PRN
Status: DISCONTINUED | OUTPATIENT
Start: 2021-03-02 | End: 2021-03-04 | Stop reason: HOSPADM

## 2021-03-02 RX ORDER — ROCURONIUM BROMIDE 10 MG/ML
INJECTION, SOLUTION INTRAVENOUS PRN
Status: DISCONTINUED | OUTPATIENT
Start: 2021-03-02 | End: 2021-03-02 | Stop reason: SDUPTHER

## 2021-03-02 RX ORDER — ONDANSETRON 2 MG/ML
INJECTION INTRAMUSCULAR; INTRAVENOUS PRN
Status: DISCONTINUED | OUTPATIENT
Start: 2021-03-02 | End: 2021-03-02 | Stop reason: SDUPTHER

## 2021-03-02 RX ORDER — SODIUM CHLORIDE 0.9 % (FLUSH) 0.9 %
10 SYRINGE (ML) INJECTION EVERY 12 HOURS SCHEDULED
Status: DISCONTINUED | OUTPATIENT
Start: 2021-03-02 | End: 2021-03-02 | Stop reason: HOSPADM

## 2021-03-02 RX ORDER — DIGOXIN 125 MCG
125 TABLET ORAL DAILY
Status: DISCONTINUED | OUTPATIENT
Start: 2021-03-02 | End: 2021-03-04 | Stop reason: HOSPADM

## 2021-03-02 RX ORDER — ACETAMINOPHEN 325 MG/1
650 TABLET ORAL EVERY 6 HOURS PRN
Status: DISCONTINUED | OUTPATIENT
Start: 2021-03-02 | End: 2021-03-04 | Stop reason: HOSPADM

## 2021-03-02 RX ORDER — HEPARIN SODIUM 5000 [USP'U]/ML
5000 INJECTION, SOLUTION INTRAVENOUS; SUBCUTANEOUS ONCE
Status: COMPLETED | OUTPATIENT
Start: 2021-03-02 | End: 2021-03-02

## 2021-03-02 RX ORDER — ONDANSETRON 2 MG/ML
4 INJECTION INTRAMUSCULAR; INTRAVENOUS EVERY 6 HOURS PRN
Status: DISCONTINUED | OUTPATIENT
Start: 2021-03-02 | End: 2021-03-04 | Stop reason: HOSPADM

## 2021-03-02 RX ORDER — MAGNESIUM HYDROXIDE 1200 MG/15ML
LIQUID ORAL CONTINUOUS PRN
Status: COMPLETED | OUTPATIENT
Start: 2021-03-02 | End: 2021-03-02

## 2021-03-02 RX ORDER — ONDANSETRON 2 MG/ML
4 INJECTION INTRAMUSCULAR; INTRAVENOUS
Status: DISCONTINUED | OUTPATIENT
Start: 2021-03-02 | End: 2021-03-02 | Stop reason: HOSPADM

## 2021-03-02 RX ORDER — PROMETHAZINE HYDROCHLORIDE 25 MG/ML
6.25 INJECTION, SOLUTION INTRAMUSCULAR; INTRAVENOUS
Status: DISCONTINUED | OUTPATIENT
Start: 2021-03-02 | End: 2021-03-02 | Stop reason: HOSPADM

## 2021-03-02 RX ORDER — GLYBURIDE 5 MG/1
7.5 TABLET ORAL
Status: DISCONTINUED | OUTPATIENT
Start: 2021-03-03 | End: 2021-03-04 | Stop reason: HOSPADM

## 2021-03-02 RX ORDER — SODIUM CHLORIDE 0.9 % (FLUSH) 0.9 %
10 SYRINGE (ML) INJECTION PRN
Status: DISCONTINUED | OUTPATIENT
Start: 2021-03-02 | End: 2021-03-02 | Stop reason: HOSPADM

## 2021-03-02 RX ADMIN — FAMOTIDINE 20 MG: 10 INJECTION, SOLUTION INTRAVENOUS at 21:08

## 2021-03-02 RX ADMIN — FENTANYL CITRATE 100 MCG: 50 INJECTION INTRAMUSCULAR; INTRAVENOUS at 10:27

## 2021-03-02 RX ADMIN — SODIUM CHLORIDE: 9 INJECTION, SOLUTION INTRAVENOUS at 21:07

## 2021-03-02 RX ADMIN — DEXAMETHASONE SODIUM PHOSPHATE 4 MG: 4 INJECTION, SOLUTION INTRAMUSCULAR; INTRAVENOUS at 07:57

## 2021-03-02 RX ADMIN — HEPARIN SODIUM 5000 UNITS: 5000 INJECTION, SOLUTION INTRAVENOUS; SUBCUTANEOUS at 07:27

## 2021-03-02 RX ADMIN — SUGAMMADEX 200 MG: 100 INJECTION, SOLUTION INTRAVENOUS at 10:15

## 2021-03-02 RX ADMIN — PROPOFOL 120 MG: 10 INJECTION, EMULSION INTRAVENOUS at 07:43

## 2021-03-02 RX ADMIN — MEROPENEM 1000 MG: 1 INJECTION INTRAVENOUS at 13:22

## 2021-03-02 RX ADMIN — METOPROLOL TARTRATE 1 MG: 5 INJECTION INTRAVENOUS at 08:05

## 2021-03-02 RX ADMIN — HYDROMORPHONE HYDROCHLORIDE 0.5 MG: 1 INJECTION, SOLUTION INTRAMUSCULAR; INTRAVENOUS; SUBCUTANEOUS at 10:48

## 2021-03-02 RX ADMIN — SODIUM CHLORIDE: 9 INJECTION, SOLUTION INTRAVENOUS at 14:24

## 2021-03-02 RX ADMIN — DIGOXIN 125 MCG: 125 TABLET ORAL at 14:30

## 2021-03-02 RX ADMIN — METOPROLOL TARTRATE 25 MG: 25 TABLET, FILM COATED ORAL at 21:15

## 2021-03-02 RX ADMIN — MEROPENEM 1000 MG: 1 INJECTION, POWDER, FOR SOLUTION INTRAVENOUS at 21:07

## 2021-03-02 RX ADMIN — MEROPENEM 1000 MG: 1 INJECTION INTRAVENOUS at 07:33

## 2021-03-02 RX ADMIN — ROCURONIUM BROMIDE 40 MG: 10 INJECTION, SOLUTION INTRAVENOUS at 07:43

## 2021-03-02 RX ADMIN — FAMOTIDINE 20 MG: 10 INJECTION, SOLUTION INTRAVENOUS at 14:30

## 2021-03-02 RX ADMIN — ROCURONIUM BROMIDE 15 MG: 10 INJECTION, SOLUTION INTRAVENOUS at 08:18

## 2021-03-02 RX ADMIN — METOPROLOL TARTRATE 1 MG: 5 INJECTION INTRAVENOUS at 07:57

## 2021-03-02 RX ADMIN — SODIUM CHLORIDE, POTASSIUM CHLORIDE, SODIUM LACTATE AND CALCIUM CHLORIDE: 600; 310; 30; 20 INJECTION, SOLUTION INTRAVENOUS at 09:21

## 2021-03-02 RX ADMIN — FENTANYL CITRATE 50 MCG: 50 INJECTION INTRAMUSCULAR; INTRAVENOUS at 09:56

## 2021-03-02 RX ADMIN — ONDANSETRON 4 MG: 2 INJECTION, SOLUTION INTRAMUSCULAR; INTRAVENOUS at 07:57

## 2021-03-02 RX ADMIN — OXYCODONE 5 MG: 5 TABLET ORAL at 14:27

## 2021-03-02 RX ADMIN — LIDOCAINE HYDROCHLORIDE 40 MG: 20 INJECTION, SOLUTION EPIDURAL; INFILTRATION; INTRACAUDAL; PERINEURAL at 07:43

## 2021-03-02 RX ADMIN — SODIUM CHLORIDE, POTASSIUM CHLORIDE, SODIUM LACTATE AND CALCIUM CHLORIDE: 600; 310; 30; 20 INJECTION, SOLUTION INTRAVENOUS at 07:00

## 2021-03-02 RX ADMIN — OXYCODONE 10 MG: 5 TABLET ORAL at 21:21

## 2021-03-02 RX ADMIN — KETOROLAC TROMETHAMINE 30 MG: 30 INJECTION, SOLUTION INTRAMUSCULAR at 10:15

## 2021-03-02 RX ADMIN — INSULIN LISPRO 6 UNITS: 100 INJECTION, SOLUTION INTRAVENOUS; SUBCUTANEOUS at 16:59

## 2021-03-02 RX ADMIN — FENTANYL CITRATE 50 MCG: 50 INJECTION INTRAMUSCULAR; INTRAVENOUS at 08:02

## 2021-03-02 RX ADMIN — ROCURONIUM BROMIDE 15 MG: 10 INJECTION, SOLUTION INTRAVENOUS at 09:17

## 2021-03-02 ASSESSMENT — PULMONARY FUNCTION TESTS
PIF_VALUE: 27
PIF_VALUE: 30
PIF_VALUE: 27
PIF_VALUE: 30
PIF_VALUE: 29
PIF_VALUE: 27
PIF_VALUE: 28
PIF_VALUE: 24
PIF_VALUE: 22
PIF_VALUE: 13
PIF_VALUE: 32
PIF_VALUE: 12
PIF_VALUE: 28
PIF_VALUE: 22
PIF_VALUE: 31
PIF_VALUE: 28
PIF_VALUE: 28
PIF_VALUE: 14
PIF_VALUE: 30
PIF_VALUE: 28
PIF_VALUE: 26
PIF_VALUE: 29
PIF_VALUE: 28
PIF_VALUE: 28
PIF_VALUE: 31
PIF_VALUE: 25
PIF_VALUE: 27
PIF_VALUE: 28
PIF_VALUE: 27
PIF_VALUE: 26
PIF_VALUE: 29
PIF_VALUE: 27
PIF_VALUE: 30
PIF_VALUE: 31
PIF_VALUE: 22
PIF_VALUE: 28
PIF_VALUE: 28
PIF_VALUE: 27
PIF_VALUE: 30
PIF_VALUE: 28
PIF_VALUE: 28
PIF_VALUE: 26
PIF_VALUE: 28
PIF_VALUE: 15
PIF_VALUE: 28
PIF_VALUE: 29
PIF_VALUE: 1
PIF_VALUE: 27
PIF_VALUE: 22
PIF_VALUE: 28
PIF_VALUE: 30
PIF_VALUE: 27
PIF_VALUE: 22
PIF_VALUE: 2
PIF_VALUE: 27
PIF_VALUE: 26
PIF_VALUE: 23
PIF_VALUE: 30
PIF_VALUE: 22
PIF_VALUE: 27
PIF_VALUE: 4
PIF_VALUE: 24
PIF_VALUE: 27
PIF_VALUE: 3
PIF_VALUE: 22
PIF_VALUE: 2
PIF_VALUE: 24
PIF_VALUE: 24
PIF_VALUE: 27
PIF_VALUE: 28
PIF_VALUE: 1
PIF_VALUE: 26
PIF_VALUE: 30
PIF_VALUE: 26
PIF_VALUE: 28
PIF_VALUE: 28
PIF_VALUE: 22
PIF_VALUE: 28
PIF_VALUE: 26
PIF_VALUE: 22
PIF_VALUE: 27
PIF_VALUE: 27
PIF_VALUE: 28
PIF_VALUE: 28
PIF_VALUE: 31
PIF_VALUE: 28
PIF_VALUE: 26
PIF_VALUE: 28
PIF_VALUE: 27

## 2021-03-02 ASSESSMENT — PAIN DESCRIPTION - PAIN TYPE
TYPE: SURGICAL PAIN
TYPE: SURGICAL PAIN

## 2021-03-02 ASSESSMENT — PAIN SCALES - GENERAL
PAINLEVEL_OUTOF10: 4
PAINLEVEL_OUTOF10: 0
PAINLEVEL_OUTOF10: 4

## 2021-03-02 ASSESSMENT — LIFESTYLE VARIABLES: SMOKING_STATUS: 0

## 2021-03-02 ASSESSMENT — PAIN DESCRIPTION - LOCATION: LOCATION: ABDOMEN

## 2021-03-02 NOTE — H&P
Department of General Surgery - Adult  Surgical Service   Attending History and Physical        CHIEF COMPLAINT:  RUQ pain      History Obtained From:  patient    HISTORY OF PRESENT ILLNESS:    This patient is a 82 y.o. female with significant past medical history of acute cholecystitis who presents with need for GB surgery. Past Medical History:        Diagnosis Date    [facility]fib [facility]     Cancer ([facility]     skin cancer 2014; colon 2015    Clostridium difficile diarrhea 06/25/2015    per physicians notes; negative on 6/25/15    Clostridium difficile infection 12/09/2016    COVID-19 01/13/2021    Diabetes mellitus ([facility]     Hyperlipidemia     Hypertension      Past Surgical History:        Procedure Laterality Date    ABDOMEN SURGERY Right 10/15/2015    right colectomy, pain ball    ABDOMEN SURGERY  12/06/2016    trasverse colectomy    COLON SURGERY      COLONOSCOPY  2015    ascending colon mass, diverticulosis, hemorrhoids    COLONOSCOPY  11/08/2016    Transverse colon mass; Diverticulosis;  Hemorrhoids    HERNIA REPAIR      x2    OTHER SURGICAL HISTORY Left 9/29/2014    excision of basal cell skin cancer Left face with full thickness skin graph from abdomen    SKIN GRAFT      UPPER GASTROINTESTINAL ENDOSCOPY      VASCULAR SURGERY       Current Medications:  Current Facility-Administered Medications   Medication Dose Route Frequency Provider Last Rate Last Admin    lactated ringers infusion   Intravenous Continuous Mravin Delgadillo MD   New Bag at 03/02/21 0700    sodium chloride flush 0.9 % injection 10 mL  10 mL Intravenous 2 times per day Marvin Delgadillo MD        sodium chloride flush 0.9 % injection 10 mL  10 mL Intravenous PRN Marvin Delgadillo MD        lidocaine PF 1 % injection 1 mL  1 mL Intradermal Once PRN Marvin Delgadillo MD        meperidine (DEMEROL) injection 12.5 mg  12.5 mg Intravenous Q5 Min PRN Rusty Stevens MD        fentaNYL (SUBLIMAZE) injection 25 mcg  25 mcg Intravenous Q5 Min PRN James Villavicencio MD        HYDROmorphone (DILAUDID) injection 0.5 mg  0.5 mg Intravenous Q5 Min PRN James Villavicencio MD        HYDROmorphone (DILAUDID) injection 0.25 mg  0.25 mg Intravenous Q5 Min PRN James Villavicencio MD        HYDROmorphone (DILAUDID) injection 0.5 mg  0.5 mg Intravenous Q5 Min PRN James Villavicencio MD        oxyCODONE-acetaminophen (PERCOCET) 5-325 MG per tablet 1 tablet  1 tablet Oral PRN James Villavicencio MD        Or    oxyCODONE-acetaminophen (PERCOCET) 5-325 MG per tablet 2 tablet  2 tablet Oral PRN James Villavicencio MD        ondansetron [facility]) injection 4 mg  4 mg Intravenous Once PRN James Villavicencio MD        [facility]) injection 6.25 mg  6.25 mg Intramuscular Once PRN James Villavicencio MD        hydrALAZINE (APRESOLINE) injection 5 mg  5 mg Intravenous Q10 Min PRN James Villavicencio MD        meropenem [facility]) 1,000 mg in sodium chloride 0.9 % 100 mL IVPB  1,000 mg Intravenous Once Junie Fernandez MD        heparin (porcine) injection 5,000 Units  5,000 Units Subcutaneous Once Junie Fernandez MD         Home Medications:  Prior to Admission medications    Medication Sig Start Date End Date Taking? Authorizing Provider   atorvastatin (LIPITOR) 10 MG tablet Take 1 tablet by mouth daily 2/26/21  Yes Chely Mcgee MD   digoxin [facility]) 125 MCG tablet Take 1 tablet by mouth daily 2/26/21  Yes Chely Mcgee MD   metoprolol tartrate (LOPRESSOR) 25 MG tablet Take 1 tablet by mouth 2 times daily 2/26/21  Yes Chely Mcgee MD   warfarin (COUMADIN) 2.5 MG tablet Take 3 tablets by mouth daily for 1 day, THEN 2 tablets daily for 5 days. Then see the coumadin clinic for blood check and further dosing. 2/18/21 2/26/21 Yes Jerry Gallagher PA-C   lactobacillus (CULTURELLE) capsule One po tid 1/6/21  Yes DESIRAE Payne   glyBURIDE (DIABETA) 5 MG tablet Take 7.5 mg by mouth daily (with breakfast).    Yes Historical Provider, MD   aspirin 81 MG chewable tablet Take 81 mg by mouth daily. Yes Historical Provider, MD     Allergies:  Clindamycin/lincomycin, Metronidazole, and Vancomycin      Social History:   TOBACCO:   reports that she has never smoked. She has never used smokeless tobacco.  ETOH:   reports no history of alcohol use. Family History:       Problem Relation Age of Onset    Cancer Mother     Cancer Father      REVIEW OF SYSTEMS:    Patient reports no complaints related to eyes, ears, nose , throat or mouth. No chest pain or SOB. No urinary complaints or musculoskeletal complaints. No skin rashes, bleeding tendencies. Current GI complaints RUQ pain. PHYSICAL EXAM:    VITALS:  BP (!) 165/90   Pulse 100   Temp 97.4 °F (36.3 °C) (Temporal)   Resp 18   Ht 5' 5\" (1.651 m)   Wt 199 lb (90.3 kg)   SpO2 99%   BMI 33.12 kg/m²   Comfortable  Eyes:  No scleral icterus  Mouth:  Mucus membranes moist  Skin:  No rashes  Chest:  CTA  Heart:  Regular  Abdomen:Soft. Tender RUQ. Extremities:  No edema  Neurologic:  No focal deficits  Psychiatric : AAA O x 3          ASSESSMENT:   Acute calculous cholecystitis    Plan:    The diagnosis and recommended procedure were explained. Questions answered. Prepare for surgery.

## 2021-03-02 NOTE — PLAN OF CARE
Problem: Pain:  Goal: Pain level will decrease  Description: Pain level will decrease  5/3/2409 2202 by Chata Carolina RN  Outcome: Ongoing  4/6/4183 4985 by Chata Carolina RN  Outcome: Ongoing  Goal: Control of acute pain  Description: Control of acute pain  2/2/9236 9415 by Chata Carolina RN  Outcome: Ongoing  3/7/4006 0107 by Chata Carolina RN  Outcome: Ongoing  Goal: Control of chronic pain  Description: Control of chronic pain  2/5/2746 1375 by Chata Carolina RN  Outcome: Ongoing  8/6/9370 8865 by Chata Carolina RN  Outcome: Ongoing  Goal: Patient's pain/discomfort is manageable  Description: Patient's pain/discomfort is manageable  Outcome: Ongoing     Problem: Falls - Risk of:  Goal: Will remain free from falls  Description: Will remain free from falls  0/1/3073 3634 by Chata Carolina RN  Outcome: Ongoing  2/1/4227 3394 by Chata Carolina RN  Outcome: Ongoing  Goal: Absence of physical injury  Description: Absence of physical injury  8/8/7659 8529 by Chata Carolina RN  Outcome: Ongoing  9/8/2874 6900 by Chata Carolina RN  Outcome: Ongoing

## 2021-03-02 NOTE — PROGRESS NOTES
Patient admitted to room __236__ from OR. Patient oriented to room, call light, bed rails, phone, lights and bathroom. Patient instructed about the schedule of the day including: vital sign frequency, lab draws, possible tests, frequency of MD and staff rounds, daily weights, I &O's and prescribed diet. Bed alarm deferred patient low fall risk and refuses alarm. Telemetry box in place, patient aware of placement and reason. Bed locked, in lowest position, side rails up 2/4, call light within reach. PRN pain medication given, diet ordered. Recliner Assessment:     Patient is not able to demonstrate the ability to move from a reclining position to an upright position within the recliner due to surgery. 4 Eyes Skin Assessment:     The patient is being assess for   Admission    I agree that 2 RN's have performed a thorough Head to Toe Skin Assessment on the patient. ALL assessment sites listed below have been assessed. Areas assessed by both nurses:   [x]   Head, Face, and Ears   [x]   Shoulders, Back, and Chest, Abdomen  [x]   Arms, Elbows, and Hands   [x]   Coccyx, Sacrum, and Ischium  [x]   Legs, Feet, and Heels        Scattered scabs & abrasions  Large amounts of bruising on abdomen (home Lovenox shots)  4 incisions from lap dario clean dry intact bandages, RAJIV drain in place with minimal output  Red groin/elda area as well as bottom that is blanchable. +2 edema on RLE & +1 on LLE    **SHARE this note so that the co-signing nurse is able to place an eSignature**    Co-signer eSignature: Electronically signed by Jessica Hong RN on 3/2/21 at 6:32 PM EST    Does the Patient have Skin Breakdown?   No          Priyank Prevention initiated:  No   Wound Care Orders initiated:  No      WOC nurse consulted for Pressure Injury (Stage 3,4, Unstageable, DTI, NWPT, Complex wounds)and New or Established Ostomies:  No      Primary Nurse eSignature: Electronically signed by Evy Gutierrez RN on 5/7/70 at 3:58 PM EST

## 2021-03-02 NOTE — BRIEF OP NOTE
Brief Postoperative Note      Patient: Yordy Mckee  YOB: 1935  MRN: [id number]    Date of Procedure: 3/2/2021    Pre-Op Diagnosis: ACUTE CHOLECYSTITIS    Post-Op Diagnosis: Same       Procedure(s):  LAPAROSCOPIC CHOLECYSTECTOMY    Surgeon(s):  Barber Rojas MD    Assistant:  Surgical Assistant: Kishan Estrada    Anesthesia: General    Estimated Blood Loss (mL): 709    Complications: None    Specimens:   ID Type Source Tests Collected by Time Destination   A : gallbladder and contents Tissue Gallbladder SURGICAL PATHOLOGY Barber Rojas MD 3/2/2021 1239        Implants:  * No implants in log *      Drains:   Closed/Suction Drain Right Abdomen Bulb 10 [place] (Active)       External Urinary Catheter (Active)       Findings: As above    Electronically signed by Dash Caal MD on 3/2/2021 at 10:15 AM

## 2021-03-02 NOTE — FLOWSHEET NOTE
03/02/21 1717   Fall Risk Interventions   Toilet Every 2 Hours-In Advance of Need Yes  (BSC)   Hourly Visual Checks In bed;Awake   Mobility   Activity Commode   Level of Assistance Maximum assist, patient does 25-49%   Assistive Device None   Distance Ambulated (ft) 20 ft   Ambulation Response Tolerated well       Patient able to ambulate to [facility] with 2 assist

## 2021-03-02 NOTE — CARE COORDINATION
Case Management Assessment  Initial Evaluation      Patient Name: Abhi Yao  YOB: 1935  Diagnosis: Acute calculous cholecystitis [K80.00]  Date / Time: 3/2/2021  6:14 AM    Admission status/Date: 3/2/21 inpt  Chart Reviewed: Yes      Patient Interviewed: Yes   Family Interviewed:  No      Hospitalization in the last 30 days:  Yes      Health Care Decision Maker :   Primary Decision Maker: Telly Solomon \"Bill\" - Child - [phone]    (CM - must 1st enter selection under Navigator - emergency contact- Health Care Decision Maker Relationship and pick relationship)   Who do you trust or have selected to make healthcare decisions for you      Met with:  patient  Interview conducted  (bedside/phone):    Current PCP:  [address] required for SNF :  N          3 night stay required - Y    ADLS  Support Systems/Care Needs: Family Members  Transportation: family    Meal Preparation: self    Housing  Living Arrangements:  Lives 1 story home Steps: 3  Intent for return to present living arrangements: Yes  Identified Issues:     [address] with [address] : No Agency:(Services)     Passport/Waiver : No  :                      Phone Number:    Passport/Waiver Services: N/A          Durable Medical Equiptment   DME Provider: N/A  Equipment:   Walker_x__Cane___RTS___ BSC___Shower Chair_x__Hospital Bed___W/C____Other________  02 at ____Liter(s)---wears(frequency)_______ HHN ___ CPAP___ BiPap___   N/A____      Home O2 Use :  No    If No for home O2---if presently on O2 during hospitalization:  No  if yes CM to follow for potential DC O2 need  Informed of need for care provider to bring portable home O2 tank on day of discharge for nursing to connect prior to leaving:   Not Indicated  Verbalized agreement/Understanding:   Not Indicated    Community Service Affiliation  Dialysis:  No    · Agency:  · Location:  · Dialysis Schedule:  · Phone: · Fax: Other Community Services: (ex:PT/OT,Mental Health,Wound Clinic, Cardio/Pul [address]) None    DISCHARGE PLAN: Explained Case Management role/services. Reviewed chart and met with pt at bedside. Role of CM explained. States lives home with Son and plan return. Denies any in home services. IPTA. Will follow for poss HHC needs at ID.

## 2021-03-02 NOTE — PROGRESS NOTES
Patient scored high on the espana fall risk scale. Interventions taken; verified bed/chair alarm is activated, yellow socks placed on patient, and stay with me sign posted outside patients room as well as fall armband.

## 2021-03-03 LAB
ALBUMIN SERPL-MCNC: 3.3 G/DL (ref 3.4–5)
ALP BLD-CCNC: 114 U/L (ref 40–129)
ALT SERPL-CCNC: 42 U/L (ref 10–40)
ANION GAP SERPL CALCULATED.3IONS-SCNC: 9 MMOL/L (ref 3–16)
AST SERPL-CCNC: 39 U/L (ref 15–37)
BASOPHILS ABSOLUTE: 0 K/UL (ref 0–0.2)
BASOPHILS RELATIVE PERCENT: 0.4 %
BILIRUB SERPL-MCNC: 1.2 MG/DL (ref 0–1)
BILIRUBIN DIRECT: <0.2 MG/DL (ref 0–0.3)
BILIRUBIN, INDIRECT: ABNORMAL MG/DL (ref 0–1)
BUN BLDV-MCNC: 13 MG/DL (ref 7–20)
CALCIUM SERPL-MCNC: 8.8 MG/DL (ref 8.3–10.6)
CHLORIDE BLD-SCNC: 111 MMOL/L (ref 99–110)
CO2: 25 MMOL/L (ref 21–32)
CREAT SERPL-MCNC: 0.6 MG/DL (ref 0.6–1.2)
EOSINOPHILS ABSOLUTE: 0 K/UL (ref 0–0.6)
EOSINOPHILS RELATIVE PERCENT: 0 %
GFR AFRICAN AMERICAN: >60
GFR NON-AFRICAN AMERICAN: >60
GLUCOSE BLD-MCNC: 144 MG/DL (ref 70–99)
GLUCOSE BLD-MCNC: 178 MG/DL (ref 70–99)
GLUCOSE BLD-MCNC: 201 MG/DL (ref 70–99)
GLUCOSE BLD-MCNC: 205 MG/DL (ref 70–99)
GLUCOSE BLD-MCNC: 95 MG/DL (ref 70–99)
HCT VFR BLD CALC: 34.2 % (ref 36–48)
HEMOGLOBIN: 11.2 G/DL (ref 12–16)
LYMPHOCYTES ABSOLUTE: 0.7 K/UL (ref 1–5.1)
LYMPHOCYTES RELATIVE PERCENT: 7 %
MAGNESIUM: 1.9 MG/DL (ref 1.8–2.4)
MCH RBC QN AUTO: 30.4 PG (ref 26–34)
MCHC RBC AUTO-ENTMCNC: 32.9 G/DL (ref 31–36)
MCV RBC AUTO: 92.5 FL (ref 80–100)
MONOCYTES ABSOLUTE: 0.6 K/UL (ref 0–1.3)
MONOCYTES RELATIVE PERCENT: 6.4 %
NEUTROPHILS ABSOLUTE: 8.7 K/UL (ref 1.7–7.7)
NEUTROPHILS RELATIVE PERCENT: 86.2 %
PDW BLD-RTO: 15.7 % (ref 12.4–15.4)
PERFORMED ON: ABNORMAL
PERFORMED ON: NORMAL
PHOSPHORUS: 3.2 MG/DL (ref 2.5–4.9)
PLATELET # BLD: 198 K/UL (ref 135–450)
PMV BLD AUTO: 9.2 FL (ref 5–10.5)
POTASSIUM SERPL-SCNC: 3.6 MMOL/L (ref 3.5–5.1)
RBC # BLD: 3.7 M/UL (ref 4–5.2)
SODIUM BLD-SCNC: 145 MMOL/L (ref 136–145)
TOTAL PROTEIN: 5.7 G/DL (ref 6.4–8.2)
WBC # BLD: 10.1 K/UL (ref 4–11)

## 2021-03-03 PROCEDURE — 6370000000 HC RX 637 (ALT 250 FOR IP): Performed by: SURGERY

## 2021-03-03 PROCEDURE — 83735 ASSAY OF MAGNESIUM: CPT

## 2021-03-03 PROCEDURE — 2580000003 HC RX 258: Performed by: SURGERY

## 2021-03-03 PROCEDURE — 36415 COLL VENOUS BLD VENIPUNCTURE: CPT

## 2021-03-03 PROCEDURE — 85025 COMPLETE CBC W/AUTO DIFF WBC: CPT

## 2021-03-03 PROCEDURE — 6360000002 HC RX W HCPCS: Performed by: SURGERY

## 2021-03-03 PROCEDURE — 80048 BASIC METABOLIC PNL TOTAL CA: CPT

## 2021-03-03 PROCEDURE — 1200000000 HC SEMI PRIVATE

## 2021-03-03 PROCEDURE — 84100 ASSAY OF PHOSPHORUS: CPT

## 2021-03-03 PROCEDURE — 99024 POSTOP FOLLOW-UP VISIT: CPT | Performed by: NURSE PRACTITIONER

## 2021-03-03 PROCEDURE — 2500000003 HC RX 250 WO HCPCS: Performed by: SURGERY

## 2021-03-03 PROCEDURE — 80076 HEPATIC FUNCTION PANEL: CPT

## 2021-03-03 RX ADMIN — HYDROMORPHONE HYDROCHLORIDE 0.5 MG: 1 INJECTION, SOLUTION INTRAMUSCULAR; INTRAVENOUS; SUBCUTANEOUS at 21:27

## 2021-03-03 RX ADMIN — DIGOXIN 125 MCG: 125 TABLET ORAL at 08:30

## 2021-03-03 RX ADMIN — FAMOTIDINE 20 MG: 10 INJECTION, SOLUTION INTRAVENOUS at 21:25

## 2021-03-03 RX ADMIN — METOPROLOL TARTRATE 25 MG: 25 TABLET, FILM COATED ORAL at 08:30

## 2021-03-03 RX ADMIN — GLYBURIDE 7.5 MG: 5 TABLET ORAL at 08:30

## 2021-03-03 RX ADMIN — INSULIN LISPRO 4 UNITS: 100 INJECTION, SOLUTION INTRAVENOUS; SUBCUTANEOUS at 08:40

## 2021-03-03 RX ADMIN — ENOXAPARIN SODIUM 40 MG: 40 INJECTION SUBCUTANEOUS at 08:30

## 2021-03-03 RX ADMIN — OXYCODONE 5 MG: 5 TABLET ORAL at 08:30

## 2021-03-03 RX ADMIN — INSULIN LISPRO 2 UNITS: 100 INJECTION, SOLUTION INTRAVENOUS; SUBCUTANEOUS at 16:53

## 2021-03-03 RX ADMIN — MEROPENEM 1000 MG: 1 INJECTION, POWDER, FOR SOLUTION INTRAVENOUS at 21:25

## 2021-03-03 RX ADMIN — METOPROLOL TARTRATE 25 MG: 25 TABLET, FILM COATED ORAL at 21:25

## 2021-03-03 RX ADMIN — FAMOTIDINE 20 MG: 10 INJECTION, SOLUTION INTRAVENOUS at 08:30

## 2021-03-03 RX ADMIN — MEROPENEM 1000 MG: 1 INJECTION, POWDER, FOR SOLUTION INTRAVENOUS at 04:15

## 2021-03-03 RX ADMIN — MEROPENEM 1000 MG: 1 INJECTION, POWDER, FOR SOLUTION INTRAVENOUS at 12:39

## 2021-03-03 RX ADMIN — INSULIN LISPRO 4 UNITS: 100 INJECTION, SOLUTION INTRAVENOUS; SUBCUTANEOUS at 12:39

## 2021-03-03 ASSESSMENT — PAIN DESCRIPTION - PAIN TYPE: TYPE: SURGICAL PAIN

## 2021-03-03 ASSESSMENT — PAIN SCALES - GENERAL
PAINLEVEL_OUTOF10: 8
PAINLEVEL_OUTOF10: 5

## 2021-03-03 ASSESSMENT — PAIN DESCRIPTION - LOCATION: LOCATION: ABDOMEN

## 2021-03-03 NOTE — FLOWSHEET NOTE
03/03/21 0815   Vital Signs   Temp 97.4 °F (36.3 °C)   Temp Source Oral   Pulse 85   Heart Rate Source Monitor   Resp 18   /64   BP Location Right upper arm   Patient Position Semi fowlers   Level of Consciousness Alert (0)   MEWS Score 1   Patient Currently in Pain Yes   Oxygen Therapy   SpO2 95 %   O2 Device None (Room air)       Shift assessment complete. See flow sheet. Scheduled meds given. See MAR. Pt currently reporting pain 5/10 PRN pain medication given;see MAR. RAJIV drain intact and has minimal drainage in it  Incision sites are covered with SX dressing with old drainage. Call light and bedside table within reach. No further needs noted at this time. Will continue to monitor.

## 2021-03-03 NOTE — FLOWSHEET NOTE
03/03/21 1324   Vital Signs   Temp 98.2 °F (36.8 °C)   Temp Source Oral   Pulse 80   Heart Rate Source Monitor   Resp 18   /61   BP Location Right upper arm   Patient Position Semi fowlers   Level of Consciousness Alert (0)   MEWS Score 1   Patient Currently in Pain Yes   Oxygen Therapy   SpO2 95 %   O2 Device None (Room air)       Patient currently resting comfortably in bed, currently no needs noted.

## 2021-03-03 NOTE — CARE COORDINATION
INTERDISCIPLINARY PLAN OF CARE CONFERENCE    Date/Time: 3/3/2021 2:29 PM  Completed by: Maia Thompson, Case Management      Patient Name:  Philippe Knowles  YOB: 1935  Admitting Diagnosis: Acute calculous cholecystitis [K80.00]     Admit Date/Time:  3/2/2021  6:14 AM    Chart reviewed. Interdisciplinary team contacted or reviewed plan related to patient progress and discharge plans. Disciplines included Case Management, Nursing, and Dietitian. Current Status:2W admit, inpatient. S/p laprascopic cholecystectomy  PT/OT recommendation for discharge plan of care: n/a    Expected D/C Disposition:  Home  Confirmed plan with patient and/or family Yes confirmed with: Pt, yesterday    Discharge Plan Comments: home with no needs. Pt IPTA. Lives home with son.     Home O2 in place on admit: No  Pt informed of need to bring portable home O2 tank on day of discharge for nursing to connect prior to leaving:  Not Indicated  Verbalized agreement/Understanding:  Not Indicated

## 2021-03-03 NOTE — PLAN OF CARE
Problem: Pain:  Goal: Pain level will decrease  Description: Pain level will decrease  Outcome: Ongoing  Goal: Control of acute pain  Description: Control of acute pain  Outcome: Ongoing  Goal: Control of chronic pain  Description: Control of chronic pain  Outcome: Ongoing  Goal: Patient's pain/discomfort is manageable  Description: Patient's pain/discomfort is manageable  Outcome: Ongoing     Problem: Falls - Risk of:  Goal: Will remain free from falls  Description: Will remain free from falls  Outcome: Ongoing  Goal: Absence of physical injury  Description: Absence of physical injury  Outcome: Ongoing     Problem: Infection:  Goal: Will remain free from infection  Description: Will remain free from infection  Outcome: Ongoing     Problem: Daily Care:  Goal: Daily care needs are met  Description: Daily care needs are met  Outcome: Ongoing     Problem: Skin Integrity:  Goal: Skin integrity will stabilize  Description: Skin integrity will stabilize  Outcome: Ongoing     Problem: Discharge Planning:  Goal: Patients continuum of care needs are met  Description: Patients continuum of care needs are met  Outcome: Ongoing

## 2021-03-03 NOTE — FLOWSHEET NOTE
03/03/21 1050   Closed/Suction Drain Right Abdomen Bulb 10 [place] of [place]   Placement Date/Time: 03/02/21 1009   Timeout: Patient;Procedure;Site/Side;Appropriate Equipment; Consent Confirmed;Sterile Technique using full body drape  Inserted by: DR. Simeon Piedra  Tube Number: 1  Orientation: (c) Right  Location: Abdomen  Drain Tube . .. Site Description Unable to view   Dressing Status Intact; Old drainage   Drainage Appearance Bloody;Brown   Status To bulb suction   Output (ml) 20 ml

## 2021-03-03 NOTE — PROGRESS NOTES
Shift assessment complete. See doc flow. Nightly medications given see MAR. Patient A/O x4 resting in bed. Abdominal tenderness, RAJIV drain in place. IVF infusing. Patient c/o 7/10 abdominal pain PRN oxy given. Call light and bedside table within easy reach. Will continue to monitor.

## 2021-03-03 NOTE — PROGRESS NOTES
MCV 92.5 03/03/2021    MCH 30.4 03/03/2021    MCHC 32.9 03/03/2021    RDW 15.7 03/03/2021    NRBC 1 06/24/2015    SEGSPCT 73.5 01/30/2017    BANDSPCT 8 06/25/2015    METASPCT 1 06/25/2015    LYMPHOPCT 7.0 03/03/2021    MONOPCT 6.4 03/03/2021    BASOPCT 0.4 03/03/2021    MONOSABS 0.6 03/03/2021    LYMPHSABS 0.7 03/03/2021    EOSABS 0.0 03/03/2021    BASOSABS 0.0 03/03/2021     CMP:    Lab Results   Component Value Date     03/03/2021    K 3.6 03/03/2021    K 4.3 02/18/2021     03/03/2021    CO2 25 03/03/2021    BUN 13 03/03/2021    CREATININE 0.6 03/03/2021    GFRAA >60 03/03/2021    AGRATIO 1.3 02/17/2021    LABGLOM >60 03/03/2021    GLUCOSE 178 03/03/2021    PROT 5.7 03/03/2021    LABALBU 3.3 03/03/2021    CALCIUM 8.8 03/03/2021    BILITOT 1.2 03/03/2021    ALKPHOS 114 03/03/2021    AST 39 03/03/2021    ALT 42 03/03/2021         Brielle Mcdonald Parbrigida  Electronically signed 3/3/2021 at 5:19 PM

## 2021-03-04 VITALS
RESPIRATION RATE: 16 BRPM | WEIGHT: 213.44 LBS | HEART RATE: 80 BPM | OXYGEN SATURATION: 97 % | DIASTOLIC BLOOD PRESSURE: 80 MMHG | HEIGHT: 65 IN | TEMPERATURE: 98 F | SYSTOLIC BLOOD PRESSURE: 134 MMHG | BODY MASS INDEX: 35.56 KG/M2

## 2021-03-04 PROBLEM — K80.00 ACUTE CALCULOUS CHOLECYSTITIS: Status: RESOLVED | Noted: 2021-01-01 | Resolved: 2021-03-04

## 2021-03-04 LAB
GLUCOSE BLD-MCNC: 119 MG/DL (ref 70–99)
GLUCOSE BLD-MCNC: 137 MG/DL (ref 70–99)
PERFORMED ON: ABNORMAL
PERFORMED ON: ABNORMAL

## 2021-03-04 PROCEDURE — 6360000002 HC RX W HCPCS: Performed by: SURGERY

## 2021-03-04 PROCEDURE — 99024 POSTOP FOLLOW-UP VISIT: CPT | Performed by: NURSE PRACTITIONER

## 2021-03-04 PROCEDURE — 97162 PT EVAL MOD COMPLEX 30 MIN: CPT

## 2021-03-04 PROCEDURE — 6370000000 HC RX 637 (ALT 250 FOR IP): Performed by: SURGERY

## 2021-03-04 PROCEDURE — 97116 GAIT TRAINING THERAPY: CPT

## 2021-03-04 PROCEDURE — 97530 THERAPEUTIC ACTIVITIES: CPT

## 2021-03-04 PROCEDURE — 97166 OT EVAL MOD COMPLEX 45 MIN: CPT

## 2021-03-04 PROCEDURE — 97535 SELF CARE MNGMENT TRAINING: CPT

## 2021-03-04 PROCEDURE — 2580000003 HC RX 258: Performed by: SURGERY

## 2021-03-04 PROCEDURE — 2500000003 HC RX 250 WO HCPCS: Performed by: SURGERY

## 2021-03-04 RX ORDER — AMOXICILLIN AND CLAVULANATE POTASSIUM 875; 125 MG/1; MG/1
1 TABLET, FILM COATED ORAL 2 TIMES DAILY
Qty: 20 TABLET | Refills: 0 | Status: SHIPPED | OUTPATIENT
Start: 2021-03-04 | End: 2021-03-14

## 2021-03-04 RX ORDER — OXYCODONE HYDROCHLORIDE 5 MG/1
5 TABLET ORAL EVERY 4 HOURS PRN
Qty: 20 TABLET | Refills: 0 | Status: SHIPPED | OUTPATIENT
Start: 2021-03-04 | End: 2021-03-09

## 2021-03-04 RX ADMIN — MEROPENEM 1000 MG: 1 INJECTION, POWDER, FOR SOLUTION INTRAVENOUS at 04:25

## 2021-03-04 RX ADMIN — DIGOXIN 125 MCG: 125 TABLET ORAL at 08:34

## 2021-03-04 RX ADMIN — ENOXAPARIN SODIUM 40 MG: 40 INJECTION SUBCUTANEOUS at 08:35

## 2021-03-04 RX ADMIN — MEROPENEM 1000 MG: 1 INJECTION, POWDER, FOR SOLUTION INTRAVENOUS at 12:40

## 2021-03-04 RX ADMIN — GLYBURIDE 7.5 MG: 5 TABLET ORAL at 08:34

## 2021-03-04 RX ADMIN — FAMOTIDINE 20 MG: 10 INJECTION, SOLUTION INTRAVENOUS at 08:34

## 2021-03-04 RX ADMIN — METOPROLOL TARTRATE 25 MG: 25 TABLET, FILM COATED ORAL at 08:34

## 2021-03-04 NOTE — CARE COORDINATION
DISCHARGE ORDER  Date/Time 3/4/2021 2:51 PM  Completed by: Edith Degroot, Case Management    Patient Name: Oscar Lopez      : 1935  Admitting Diagnosis: Acute calculous cholecystitis [K80.00]      Admit order Date and Status: 3/2/21 inpt  (verify MD's last order for status of admission)      Noted discharge order. If applicable PT/OT recommendation at Discharge:  Home with  assist and home PT  DME recommendation by PT/OT:Needs met  Confirmed discharge plan  : Yes  with whom patient and Son Yaima Vitale  If pt confirmed DC plan does family need to be contacted by CM Yes if yes who Son  Discharge Plan:  Order for dc noted. Spoke with pt and son who  Cont plan to return home with Ellie Sanon and choose [facility]. Referral called to Marsha Miller at [facility] will arrange for home care. Nsg to send Rx to meds to beds as pt has no Rx coverage. Son at bedside and will transport home. Chart reviewed and no other dc needs identified. Reviewed chart. Role of discharge planner explained and patient verbalized understanding. Discharge order is noted. Has Home O2 in place on admit:  No  Informed of need to bring portable home O2 tank on day of discharge for nursing to connect prior to leaving:   Not Indicated  Verbalized agreement/Understanding:   Not Indicated  Pt is being d/c'd to home today. Pt's O2 sats are 97% on RA. Discharge timeout done with  Nsg, CM and pt and son. All discharge needs and concerns addressed.

## 2021-03-04 NOTE — CARE COORDINATION
[facility]    Referral received from CM to follow for home care services. I will follow for needs, and speak with patient to verify demos.           Mehrdad Stauffer  Work mobile: [phone]  [facility] office: [phone]

## 2021-03-04 NOTE — DISCHARGE INSTR - COC
Continuity of Care Form    Patient Name: Sahil Arita   :  1935  MRN:  [id number]    Admit date:  3/2/2021  Discharge date:  3/4/2021    Code Status Order: Full Code   Advance Directives:   Advance Care Flowsheet Documentation       Date/Time Healthcare Directive Type of Healthcare Directive Copy in [address] Agent's Name Healthcare Agent's Phone Number    21 0618  No, patient does not have an advance directive for healthcare treatment -- -- -- -- --            Admitting Physician:  Ximena Mendoza MD  PCP: ISADORA Pugh - CNP    Discharging Nurse: [facility] Unit/Room#: 0236/0236-01  Discharging Unit Phone Number: [phone]    Emergency Contact:   Extended Emergency Contact Information  Primary Emergency Contact: Telly Solomon \"Bill\"   Manuela Georgees of [address] Phone: [phone]  Work Phone: [phone]  Mobile Phone: [phone]  Relation: Child   needed? No  Secondary Emergency Contact: Correne Bound [place] of [address] Phone: [phone]  Work Phone: [phone]  Mobile Phone: [phone]  Relation: Child    Past Surgical History:  Past Surgical History:   Procedure Laterality Date    ABDOMEN SURGERY Right 10/15/2015    right colectomy, pain ball    ABDOMEN SURGERY  2016    trasverse colectomy    CHOLECYSTECTOMY  2021    : LAPAROSCOPIC CHOLECYSTECTOMY     CHOLECYSTECTOMY, LAPAROSCOPIC N/A 3/2/2021    LAPAROSCOPIC CHOLECYSTECTOMY performed by Ximena Mendoza MD at [address] COLONOSCOPY      ascending colon mass, diverticulosis, hemorrhoids    COLONOSCOPY  2016    Transverse colon mass; Diverticulosis;  Hemorrhoids    HERNIA REPAIR      x2    OTHER SURGICAL HISTORY Left 2014    excision of basal cell skin cancer Left face with full thickness skin graph from abdomen    SKIN GRAFT      UPPER GASTROINTESTINAL ENDOSCOPY      VASCULAR SURGERY         Immunization History: There is no immunization history on file for this patient.     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    Intra-abdominal abscess (HCC) K65.1    Benign essential HTN I10    Hyperlipidemia E78.5    Atrial fibrillation with RVR (HCC) I48.91    PAD (peripheral artery disease) (HCC) I73.9    History of venous thromboembolism Z86.718    Calculus of gallbladder without cholecystitis without obstruction K80.20    Nonrheumatic aortic valve stenosis I35.0    Nonrheumatic aortic valve insufficiency I35.1       Isolation/Infection:   Isolation            No Isolation          Patient Infection Status       Infection Onset Added Last Indicated Last Indicated By Review Planned Expiration Resolved Resolved By    None active    Resolved    COVID-19 21 COVID-19   21     COVID-19 Rule Out 21 COVID-19 (Ordered)   21 Rule-Out Test Resulted    COVID-19 Rule Out 21 COVID-19 (Ordered)   21 Rule-Out Test Resulted            Nurse Assessment:  Last Vital Signs: /80   Pulse 80   Temp 98 °F (36.7 °C) (Oral)   Resp 16   Ht 5' 5\" (1.651 m)   Wt 213 lb 7 oz (96.8 kg)   SpO2 97%   BMI 35.52 kg/m²     Last documented pain score (0-10 scale): Pain Level: 2  Last Weight:   Wt Readings from Last 1 Encounters:   21 213 lb 7 oz (96.8 kg)     Mental Status:  oriented and alert    IV Access:  - None    Nursing Mobility/ADLs:  Walking   Assisted  Transfer Irina Suárez on 3/4/21 at 12:40 PM EST    PHYSICIAN SECTION    Prognosis: Good    Condition at Discharge: Stable    Rehab Potential (if transferring to Rehab): Good    Recommended Labs or Other Treatments After Discharge: PT/OT, skilled nursing. Physician Certification: I certify the above information and transfer of Cam Elias  is necessary for the continuing treatment of the diagnosis listed and that she requires Home Care for less 30 days.      Update Admission H&P: No change in H&P    PHYSICIAN SIGNATURE:  Electronically signed by NASEEM Clement, AARON on 3/4/21 at 12:41 PM EST

## 2021-03-04 NOTE — FLOWSHEET NOTE
03/04/21 0830   Vital Signs   Temp 97.7 °F (36.5 °C)   Temp Source Oral   Pulse 73   Heart Rate Source Monitor   Resp 14   BP (!) 151/74   BP Location Right upper arm   Patient Position Semi fowlers   Level of Consciousness Alert (0)   MEWS Score 0   Patient Currently in Pain No   Oxygen Therapy   SpO2 95 %   O2 Device None (Room air)       Shift assessment complete. See flow sheet. Scheduled meds given. See MAR. Pt denies any pain at this time, was able to ambulate to the [facility] without difficulty. RAJIV drain intact with old drainage on bandages with minimal output in drain. Call light and bedside table within reach. No further needs noted at this time. Will continue to monitor.

## 2021-03-04 NOTE — PROGRESS NOTES
RN removed serosanguineous saturated gauze from RAJIV drain insertion site to R ABD. Dry dressing applied and covered with transparent dressing. No redness, warmth, or purulent drainage noted. Patient tolerated well. 20g angiocath removed from R forearm without difficulty. Dry dressing applied. Patient tolerated well.

## 2021-03-04 NOTE — PROGRESS NOTES
Inpatient Occupational Therapy  Evaluation and Treatment    Unit: [facility]  Date:  3/4/2021  Patient Name:    Lev Newman  Admitting diagnosis:  Acute calculous cholecystitis [K80.00]  Admit Date:  3/2/2021  Precautions/Restrictions/WB Status/ Lines/ Wounds/ Oxygen:   Fall risk, Bed/chair alarm, Lines -IV and Drains (RAJIV) and abdominal incision  Treatment Time:  [id number]  Treatment Number: 1   Timed code treatment minutes 35 minutes   Total Treatment minutes:   45 minutes  Patient Goals for Therapy:  \" go home \"      Discharge Recommendations: Home OT  DME needs for discharge: Needs Met       Therapy recommendations for staff:   Assist of 1 with use of rolling walker (RW) for all ambulation to/from bathroom    History of Present Illness:   83 yo female admitted for scheduled GB surgery with Dr. Vane Prince on 3/2/21. PT/OT consulted by RN to assist pt with ambulation and discharge planning. Pt has had multiple recent admission. PMH: CA, DM, HTN, HDL, A-fib, COVID-19 (1/13/21), hx of abdominal surgeries   Home Health S4 Level Recommendation:  Level 1 Standard  AM-PAC Score: 21  Preadmission Environment    Pt. Lives with son (able to be home with pt during day)  Home environment: one story home. Steps to enter first floor: 2 plus one to two without rail  Bath: walk in shower with seat and grab bars, raised toilet, hand held shower  Equipment owned: [facility], Shower chair, rolling walker, reacher , manual W/C      Preadmission Status   Pt. Not Able to drive   Pt indep with dressing (except needed help socks and shoes), indep with bathing  Pt. Assists with cooking/cleaning ( both) , pt does her own laundry   Pt. Fully independent for transfers and gait and walked with AD occasionally     Pain  Yes  Rating:mild  Location:Rt side of abdomen   Pain Medicine Status: No request made      Cognition    A&O x4   Able to follow 2 step commands    Subjective  Patient sitting edge of  with family at bedside.    Pt agreeable to this OT eval & tx. Upper Extremity ROM:    WFL    Upper Extremity Strength:    WFL    Upper Extremity Sensation    WFL    Upper Extremity Proprioception:  WFL    Coordination and Tone  WFL    Balance  Functional Sitting Balance:  WFL  Functional Standing Balance:Diminished    Bed mobility:    Supine to sit:   Not Tested- pt sitting on edge of bed   Sit to supine:   Not Tested  Rolling:    Not Tested  Scooting in sitting:  Independent  Scooting to head of bed:   Not Tested    Bridging:   Not Tested    Transfers:    Sit to stand:  CGA  Stand to sit:  CGA  Bed to chair:   [facility]  Standard toilet: Not Tested  Bed to [facility]:  Not Tested    Dressing:      UE:   Not Tested  LE:    SBA to don underwear    Bathing:    UE:  Not Tested  LE:  Not Tested    Eating:   Not Tested    Toileting:  Not Tested    Activity Tolerance   Pt completed therapy session with No adverse symptoms noted w/activity    Positioning Needs:   Pt up in chair, alarm set, positioned in proper neutral alignment and pressure relief provided. Exercise / Activities Initiated:   N/A    Patient/Family Education:   Role of OT    Assessment of Deficits: Pt seen for Occupational therapy evaluation in acute care setting. Pt demonstrated decreased Activity tolerance, ADLs, Balance , Bed mobility and Transfers. Pt functioning below baseline and will likely benefit from skilled occupational therapy services to maximize safety and independence. Goal(s) : To be met in 3 Visits:  1). Bed to toilet/BSC: SBA with AD as needed    To be met in 5 Visits:  1). Supine to/from Sit:  Min A   2). Upper Body Bathing:   Independent  3). Lower Body Bathing:   SBA  4). Upper Body Dressing:  Independent  5). Lower Body Dressing:  SBA  6). Pt to demonstrate UE exs x 15 reps with minimal cues    Rehabilitation Potential:  Good for goals listed above. Strengths for achieving goals include: Pt cooperative  Barriers to achieving goals include:  Complexity of condition     Plan:   To be seen 3-5 x/wk while in acute care setting for therapeutic exercises, bed mobility, transfers, dressing, bathing, family/patient education, ADL/IADL retraining, energy conservation training.      Laurent Hernandez OTR/L [id number]          If patient discharges from this facility prior to next visit, this note will serve as the Discharge Summary

## 2021-03-04 NOTE — PROGRESS NOTES
Inpatient Physical Therapy Evaluation and Treatment    Unit: [facility]  Date:  3/4/2021  Patient Name:    Chiquis Martin  Admitting diagnosis:  Acute calculous cholecystitis [K80.00]  Admit Date:  3/2/2021  Precautions/Restrictions/WB Status/ Lines/ Wounds/ Oxygen: Fall risk, Bed/chair alarm, Lines -IV and Drains (RAJIV) and abdominal incision    Treatment Time:  09:50-10:40  Treatment Number:  1   Timed Code Treatment Minutes: 50 minutes  Total Treatment Minutes:  40  minutes    Patient Goals for Therapy: \" go home \"          Discharge Recommendations: Home 24 hr assist  and Home PT  DME needs for discharge: Needs Met       Therapy recommendation for EMS Transport: can transport by wheelchair    Therapy recommendations for staff:   Assist of 1 with use of rolling walker (RW) for all transfers and ambulation within room  within halls    History of Present Illness: 83 yo female admitted for scheduled GB surgery with Dr. Sidney Mckeon on 3/2/21. PT/OT consulted by RN to assist pt with ambulation and discharge planning. Pt has had multiple recent admission. PMH: CA, DM, HTN, HDL, A-fib, COVID-19 (1/13/21), hx of abdominal surgeries     Home Health S4 Level Recommendation:  Level 1 Standard  AM-PAC Mobility Score    AM-PAC Inpatient Mobility Raw Score : 17       Preadmission Environment    Pt. Lives with son (able to be home with pt during day)  Home environment: one story home. Steps to enter first floor: 2+1 steps without rail   W/c and portable ramp available to get pt in home  Bath: walk in shower with seat and grab bars, raised toilet, hand held shower  Equipment owned: [facility], Shower chair, rolling walker, reacher , manual W/C      Preadmission Status   Pt. Not Able to drive   Pt indep with dressing (except needed help socks and shoes), indep with bathing  Pt. Assists with cooking/cleaning ( both) , pt does her own laundry   Pt.  Fully independent for transfers and gait and walked with AD occasionally      Pain  Yes  Rating: mild  Location: Rt side of abdomen   Pain Medicine Status: No request made       Cognition    A&O x4   Able to follow 2 step commands     Subjective  Patient sitting edge of bed with family at bedside. Pt agreeable to this PT eval & tx. Upper Extremity ROM/Strength  Please see OT evaluation. Lower Extremity ROM / Strength   AROM WFL: Yes    BLE strength WFL, but not formally assessed with MMT. B LEs grossly at least 3/5 as observed through functional mobility. Lower Extremity Sensation    Diminished on R LE below the knee    Lower Extremity Proprioception:   WFL    Coordination and Tone  WFL    Balance  Sitting:  Good ; Independent  Comments: for static and dynamic sitting at EOB    Standing: Good ; CGA  Comments: with B UE support, increased need of UE with fatigue    Bed Mobility   Supine to Sit:    Not Tested   Sit to Supine:   Not Tested  Rolling:   Not Tested  Scooting in sitting: Independent  Scooting in supine:  Not Tested    Transfer Training     Sit to stand:   CGA from bed with unilateral UE support, and from chair  Stand to sit:   CGA to chair with cues for hand placement (2 trials)  Bed to Chair:   CGA with use of gait belt and IV pole, unilateral HHA    Gait gait completed as indicated below  Distance:      15 + 50 ft  Deviations (firm surface/linoleum):  decreased chau, forward flexed posture and decreased step height bilaterally  Assistive Device Used:    gait belt and IV pole, unilateral HHA  Level of Assist:    CGA  Comment: increased supported needed through HHA with increased distance and fatigue, walker located and left in room to use with staff   Encouraged pt to use walker at home and wean off with home PT    Stair Training deferred, family plans use of w/c and portable ramp to get patient in home    Activity Tolerance   Pt completed therapy session with Pain noted with transitional movements at times. Tolerable for session.     Positioning Needs   Pt up in chair, alarm set, positioned in proper neutral alignment and pressure relief provided. Call light provided and all needs within reach    Exercises Initiated  Discussed ankle pumps, LAQs, and seated marches for HEP    Other  See OT note for assist with lower body dressing. Patient/Family Education   Pt educated on role of inpatient PT, POC, importance of continued activity, DC recommendations, safety awareness, transfer techniques, HEP and calling for assist with mobility. Assessment  Pt seen for Physical Therapy evaluation in acute care setting. Pt demonstrated decreased Activity tolerance, Balance, Safety and Strength as well as decreased independence with Ambulation, Bed Mobility  and Transfers. Pt is able to transfer with assist of 1 but benefits from use of RW at this time. Pt's family is able to provide 24/7 assist.    Recommending Home 24 hr assist and with home PT upon discharge as patient functioning below baseline level and would benefit from continued therapy services due to s/p lap dario on 3/2/21. Goals : To be met in 3 visits:  1). Independent with LE Ex x 10 reps    To be met in 6 visits:  1). Supine to/from sit: Min A with use of log roll technique  2). Sit to/from stand: SBA  3). Bed to chair: SBA with RW  4). Gait: Ambulate 50 ft.  with  SBA and use of rolling walker (RW)  5). Tolerate B LE exercises 3 sets of 10-15 reps  6). Ascend/descend 2+1 steps with SBA with use of no handrail and LRAD (least restrictive assistive device)    Rehabilitation Potential: Good  Strengths for achieving goals include:   Pt motivated, PLOF, Family Support and Pt cooperative   Barriers to achieving goals include:    Pain    Plan    To be seen 2-3 x / week  while in acute care setting for therapeutic exercises, bed mobility, transfers, progressive gait training, balance training, and family/patient education.     Signature: Isaias Calixto, PT, DPT #[id number]    If patient discharges from this facility prior to next visit, this note will serve as the Discharge Summary.

## 2021-03-04 NOTE — PROGRESS NOTES
Shift assessment complete. See doc flow. Nightly medications given see MAR. Patient c/o surgical abdominal pain 8/10. PRN Dilaudid given. Call light and bedside table within easy reach. Will continue to monitor.

## 2021-03-04 NOTE — PROGRESS NOTES
Patient given discharge instructions including medications, lifting restrictions, and RAJIV drain management. Patient verbalizes understanding and signed discharge paperwork.

## 2021-03-07 NOTE — OP NOTE
[address]                 [address] [address]                                OPERATIVE REPORT    PATIENT NAME: Radha Bacon                      :        1935  MED REC NO:   [id number]                          ROOM:       0236  ACCOUNT NO:   [de-identified]                           ADMIT DATE: 2021  PROVIDER:     Tommie Duffy MD    DATE OF PROCEDURE:  2021    PREOPERATIVE DIAGNOSIS:  Acute calculous cholecystitis. POSTOPERATIVE DIAGNOSIS:  Acute calculous cholecystitis. OPERATION PERFORMED:  Complicated laparoscopic cholecystectomy. SURGEON:  Tommie Duffy MD    ANESTHESIA:  General.    COMPLICATIONS:  None. ESTIMATED BLOOD LOSS:  200 mL. INDICATIONS FOR THE OPERATION:  An 68-year-old female with a complicated  past surgical history. She has been having increasing troubles with her  gallbladder. She was recently hospitalized. I recommended operative  intervention for acute cholecystitis. She understood the risks and  benefits and wanted to proceed. DESCRIPTION OF OPERATION:  The patient was brought to the operating  room. General anesthesia was induced. She was prepped and draped in  usual surgical sterile fashion. A 5 mm right middle quadrant incision  was made. Camera inside of a trocar was used to visualize the layers of  the abdominal wall, as I went through them. There were significant  adhesions in the right upper quadrant. I was able to get a 5 mm trocar  in and do lysis of adhesions in the right upper quadrant, which allowed  me to then place an 11 mm port in the epigastrium as well as another 5  mm port in the right middle quadrant. This was difficult exposure given  the gallbladder was markedly abnormal.  It was distended. We were able  to retract it. It was a very difficult dissection given the  inflammatory change.   This was a very slow, meticulous process to try to  identify the tia hepatis and its structures. There was significant  inflammatory change. There was difficulty. I eventually identified the  cystic duct and was able to dissect this. The cystic duct was so  friable that it transected. I then grasped the very small remnant of  the cystic duct and was able to put a PDS Endoloop around this. Cystic  artery had two clips placed proximal, one clip distal, and it was  divided. Again, it was very slow dissection to try to dissect the tia  hepatis, but I felt like I had to avoid all critical structures and  began to dissect the gallbladder from the gallbladder fossa of the liver  bed. The gallbladder was eventually brought out through the epigastric  incision. There were necrotic changes in the plane between the  gallbladder and the liver. I reinspected the right upper quadrant. I  copiously irrigated the area. I cauterized some areas. At the end of  the case, there was no evident bleeding, bile leak, or complication. I  placed a drain in the right upper quadrant. I removed the trocars and  deflated the abdomen. The fascia at the epigastric port site was  reapproximated with 0 Vicryl suture. Local anesthetic was infiltrated. 4-0 Vicryl was used to reapproximate the skin at all the incisions. Benzoin and Steri-Strip dressing were placed. DISPOSITION:  It should be noted this was a very complicated case. The  case took 2 hours, which is significantly longer than a normal case. The gallbladder was encased in adhesions and was markedly abnormal.  The  tia hepatis was significantly inflamed and took a huge amount of time  to dissect this out and try to avoid injury to critical structures. The  patient's combination of her past surgical history also made entry into  the abdomen difficult and exposure limited because the trocars were not  near normal positions based on adhesions and scarring in the abdomen. The patient went to Recovery in stable condition. Rich Carr MD    D: 03/06/2021 11:41:56       T: 03/06/2021 11:54:28     [email or web address]  Job#: [id number]     Doc#: [id number]    CC:

## 2021-03-08 ENCOUNTER — OFFICE VISIT (OUTPATIENT)
Dept: SURGERY | Age: 86
End: 2021-03-08

## 2021-03-08 VITALS — SYSTOLIC BLOOD PRESSURE: 127 MMHG | DIASTOLIC BLOOD PRESSURE: 71 MMHG | TEMPERATURE: 97.5 F

## 2021-03-08 DIAGNOSIS — Z09 SURGICAL FOLLOW-UP CARE: Primary | ICD-10-CM

## 2021-03-08 PROCEDURE — 99024 POSTOP FOLLOW-UP VISIT: CPT | Performed by: SURGERY

## 2021-03-11 NOTE — CARE COORDINATION
Readmission Assessment  Number of Days since last admission?: 8-30 days  Previous Disposition: Home with Family(RETURN FOR PRESENT ADMIT FOR SCHEDULED CHOLEY)  Who is being Interviewed: (CHART REVIEW)  What was the patient's/caregiver's perception as to why they think they needed to return back to the hospital?: Other (Comment)(SCHEDULED SURGERY)  Did you visit your Primary Care Physician after you left the hospital, before you returned this time?: No  Did you see a specialist, such as Cardiac, Pulmonary, Orthopedic Physician, etc. after you left the hospital?: Yes([facility] [address] [place] [address])  Who advised the patient to return to the hospital?: Physician  Does the patient report anything that got in the way of taking their medications?: No  In our efforts to provide the best possible care to you and others like you, can you think of anything that we could have done to help you after you left the hospital the first time, so that you might not have needed to return so soon?: Other (Comment)(ON INDEX ADMIT WAS SCHEDULED FOR SURGERY AND HAD TO BE CANCELLED AS PT WAS IN A FIB - RESCHEDULED FOR 3/2)

## 2021-03-15 ENCOUNTER — TELEPHONE (OUTPATIENT)
Dept: SURGERY | Age: 86
End: 2021-03-15

## 2021-03-15 NOTE — TELEPHONE ENCOUNTER
Patient's son calling in asking to make an appt, he states that his mother is still very sore and has some drainage,she states that she still just does not feel back to normal. I did not schedule her an appt as this sounded like something that could be resolved over the phone.

## 2021-03-15 NOTE — TELEPHONE ENCOUNTER
I called and spoke to son, Rachel Estrada [place]. He states she has tenderness and slight drainage where her drain was. He states the area looks pretty good, denies redness or hot to touch. He states she has not had a fever and denies N&V. He states the drainage was minimal, and looks normal with no pus. We discussed using Tylenol or Ibuprofen and covering area with clean gauze or bandage. I offered appt if he wanted to bring her in, but he stated he is comfortable with watching the area, and will call us back if symptoms do not improve or worsens, or any other concerns.

## 2021-03-17 ENCOUNTER — TELEPHONE (OUTPATIENT)
Dept: CARDIOLOGY CLINIC | Age: 86
End: 2021-03-17

## 2021-03-17 NOTE — TELEPHONE ENCOUNTER
She had her surgery 3/2/21 doing well restarted aspirin but about 1 week after surgery   Son reports mom has been very anxious depressed crying at drop of a hat very emotional and he feels its related to digoxin by process of elimination please advise. And apparently she was not restarted on coumadin according to list and son. Son states he cant have his mom acting like this.    Krish Crook RN

## 2021-03-19 NOTE — DISCHARGE SUMMARY
GENERAL SURGERY  Discharge Summary      Pt Name:Mandie Solmoon  MRN: [id number]  YOB: 1935  Primary Care Physician: ISADORA Leblanc CNP  Admit date:  3/2/2021  6:14 AM  Discharge date:  3/4/2021  5:15 PM  Disposition: Home   Admitting Diagnosis:   1. 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    Malignant neoplasm of transverse colon (HCC) C18.4    Malignant neoplasm of transverse colon (HCC) C18.4    Intra-abdominal abscess (HCC) K65.1    Benign essential HTN I10    Hyperlipidemia E78.5    Atrial fibrillation with RVR (HCC) I48.91    PAD (peripheral artery disease) (HCC) I73.9    History of venous thromboembolism Z86.718    Calculus of gallbladder without cholecystitis without obstruction K80.20    Nonrheumatic aortic valve stenosis I35.0    Nonrheumatic aortic valve insufficiency I35.1     Consultants:  none  Procedures/Diagnostic Test:  Complicated laparoscopic cholecystectomy  Hospital Course: Eh Alejandra originally presented to the hospital on 3/2/2021  6:14 AM for a laparoscopic cholecystectomy after suffering from acute cholecystitis. Prior to the procedure, all the risks, benefits and alternatives were discussed with the patient and she agreed to proceed with the procedure. At this point, the patient was taken to [facility] operating room where the aforementioned procedure was carried out. The patient tolerated the procedure well and she was transferred to recovery in stable condition. Post-operatively, the patient did very well. She was tolerating a full liquid diet, ambulating, tolerating a full liquid diet.  Her pain was controlled with pain pills. As a result, the patient was discharged to home in stable condition with her RAJIV drain in place. At time of discharge, [place] was tolerating a full liquid diet, having bowel movements, ambulating on her own accord and had adequate analgesia on oral pain medications, and had no signs of symptoms of complications. PHYSICAL EXAMINATION   Discharge Vitals:  height is 5' 5\" (1.651 m) and weight is 213 lb 7 oz (96.8 kg). Her oral temperature is 98 °F (36.7 °C). Her blood pressure is 134/80 and her pulse is 80. Her respiration is 16 and oxygen saturation is 97%. General appearance - alert, well appearing, and in no distress  Abdomen - RAJIV drain: dark old, this bloody/brown drainage  soft, incisional tenderness only, bowel sounds present  Neurological - motor and sensory grossly normal bilaterally  Musculoskeletal - full range of motion without pain  Incision: healing well, no drainage, RAJIV in place    LABS   No results for input(s): WBC, HGB, HCT, PLT, NA, K, CL, CO2, BUN, CREATININE in the last 72 hours. DISCHARGE INSTRUCTIONS   Discharge Medications:      Medication List      CHANGE how you take these medications    enoxaparin 100 MG/ML injection  Commonly known as: LOVENOX  Inject 1 mL into the skin 2 times daily  What changed: how much to take  Notes to patient: 2 x's a day        CONTINUE taking these medications    aspirin 81 MG chewable tablet     atorvastatin 10 MG tablet  Commonly known as: LIPITOR  Take 1 tablet by mouth daily     Diabeta 5 MG tablet  Generic drug: glyBURIDE     digoxin 125 MCG tablet  Commonly known as: LANOXIN  Take 1 tablet by mouth daily     lactobacillus capsule  One po tid     metoprolol tartrate 25 MG tablet  Commonly known as: LOPRESSOR  Take 1 tablet by mouth 2 times daily     warfarin 2.5 MG tablet  Commonly known as: Coumadin  Take as directed. If you are unsure how to take this medication, talk to your nurse or doctor.   Original instructions: Take 3 tablets by mouth daily for 1 day, THEN 2 tablets daily for 5 days. Then see the coumadin clinic for blood check and further dosing. Start taking on: February 18, 2021        ASK your doctor about these medications    amoxicillin-clavulanate 875-125 MG per tablet  Commonly known as: AUGMENTIN  Take 1 tablet by mouth 2 times daily for 10 days  Ask about: Should I take this medication?     oxyCODONE 5 MG immediate release tablet  Commonly known as: ROXICODONE  Take 1 tablet by mouth every 4 hours as needed for Pain for up to 5 days. Ask about: Should I take this medication? Where to Get Your Medications      You can get these medications from any pharmacy    Bring a paper prescription for each of these medications  · amoxicillin-clavulanate 875-125 MG per tablet  · enoxaparin 100 MG/ML injection  · oxyCODONE 5 MG immediate release tablet       Diet: Home on full liquids then may advance diet as tolerated  Activity: no lifting more than 10-20 lbs for next two weeks  Wound Care: RAJIV drain care  Follow-up:  in 1 week with Dr. Klarissa Starr   Time Spent for discharge: 30 minutes      Electronically signed by ISADORA Leyva CNP on 3/18/2021 at 8:54 PM      This patient is being prescribed a dose of opioid pain medication greater than 30 MED due to excessive pain from recent surgery or an acute inflammatory process.

## 2021-03-24 ENCOUNTER — APPOINTMENT (OUTPATIENT)
Dept: CT IMAGING | Age: 86
DRG: 292 | End: 2021-03-24
Payer: MEDICARE

## 2021-03-24 ENCOUNTER — APPOINTMENT (OUTPATIENT)
Dept: GENERAL RADIOLOGY | Age: 86
DRG: 292 | End: 2021-03-24
Payer: MEDICARE

## 2021-03-24 ENCOUNTER — HOSPITAL ENCOUNTER (INPATIENT)
Age: 86
LOS: 1 days | Discharge: HOME HEALTH | DRG: 292 | End: 2021-03-25
Attending: EMERGENCY MEDICINE | Admitting: INTERNAL MEDICINE
Payer: MEDICARE

## 2021-03-24 DIAGNOSIS — E87.6 HYPOKALEMIA: ICD-10-CM

## 2021-03-24 DIAGNOSIS — R06.00 DYSPNEA AND RESPIRATORY ABNORMALITIES: ICD-10-CM

## 2021-03-24 DIAGNOSIS — I50.9 ACUTE CONGESTIVE HEART FAILURE, UNSPECIFIED HEART FAILURE TYPE (HCC): Primary | ICD-10-CM

## 2021-03-24 DIAGNOSIS — I48.91 ATRIAL FIBRILLATION, UNSPECIFIED TYPE (HCC): ICD-10-CM

## 2021-03-24 DIAGNOSIS — J81.0 ACUTE PULMONARY EDEMA (HCC): ICD-10-CM

## 2021-03-24 DIAGNOSIS — R06.89 DYSPNEA AND RESPIRATORY ABNORMALITIES: ICD-10-CM

## 2021-03-24 DIAGNOSIS — J90 PLEURAL EFFUSION, BILATERAL: ICD-10-CM

## 2021-03-24 PROBLEM — I50.33 ACUTE ON CHRONIC DIASTOLIC HEART FAILURE (HCC): Status: ACTIVE | Noted: 2021-03-24

## 2021-03-24 PROBLEM — I50.31 ACUTE DIASTOLIC CHF (CONGESTIVE HEART FAILURE) (HCC): Status: ACTIVE | Noted: 2021-03-24

## 2021-03-24 LAB
A/G RATIO: 1.3 (ref 1.1–2.2)
ALBUMIN SERPL-MCNC: 3.6 G/DL (ref 3.4–5)
ALP BLD-CCNC: 126 U/L (ref 40–129)
ALT SERPL-CCNC: 12 U/L (ref 10–40)
ANION GAP SERPL CALCULATED.3IONS-SCNC: 11 MMOL/L (ref 3–16)
APTT: 25.4 SEC (ref 24.2–36.2)
AST SERPL-CCNC: 21 U/L (ref 15–37)
BACTERIA: ABNORMAL /HPF
BASE EXCESS VENOUS: -3 MMOL/L (ref -3–3)
BASOPHILS ABSOLUTE: 0.1 K/UL (ref 0–0.2)
BASOPHILS RELATIVE PERCENT: 1.4 %
BILIRUB SERPL-MCNC: 1.5 MG/DL (ref 0–1)
BILIRUBIN URINE: NEGATIVE
BLOOD, URINE: NEGATIVE
BUN BLDV-MCNC: 13 MG/DL (ref 7–20)
CALCIUM SERPL-MCNC: 9.3 MG/DL (ref 8.3–10.6)
CARBOXYHEMOGLOBIN: 2 % (ref 0–1.5)
CHLORIDE BLD-SCNC: 110 MMOL/L (ref 99–110)
CLARITY: CLEAR
CO2: 24 MMOL/L (ref 21–32)
COLOR: YELLOW
CREAT SERPL-MCNC: 0.6 MG/DL (ref 0.6–1.2)
DIGOXIN LEVEL: <0.3 NG/ML (ref 0.8–2)
EKG ATRIAL RATE: 117 BPM
EKG DIAGNOSIS: NORMAL
EKG Q-T INTERVAL: 368 MS
EKG QRS DURATION: 84 MS
EKG QTC CALCULATION (BAZETT): 479 MS
EKG R AXIS: 39 DEGREES
EKG T AXIS: 17 DEGREES
EKG VENTRICULAR RATE: 102 BPM
EOSINOPHILS ABSOLUTE: 0.2 K/UL (ref 0–0.6)
EOSINOPHILS RELATIVE PERCENT: 2.8 %
EPITHELIAL CELLS, UA: ABNORMAL /HPF (ref 0–5)
GFR AFRICAN AMERICAN: >60
GFR NON-AFRICAN AMERICAN: >60
GLOBULIN: 2.7 G/DL
GLUCOSE BLD-MCNC: 137 MG/DL (ref 70–99)
GLUCOSE BLD-MCNC: 143 MG/DL (ref 70–99)
GLUCOSE BLD-MCNC: 193 MG/DL (ref 70–99)
GLUCOSE URINE: 100 MG/DL
HCO3 VENOUS: 21.3 MMOL/L (ref 23–29)
HCT VFR BLD CALC: 39.9 % (ref 36–48)
HEMOGLOBIN: 12.6 G/DL (ref 12–16)
INR BLD: 1.03 (ref 0.86–1.14)
INR BLD: 1.12 (ref 0.86–1.14)
KETONES, URINE: NEGATIVE MG/DL
LACTIC ACID, SEPSIS: 2.1 MMOL/L (ref 0.4–1.9)
LEUKOCYTE ESTERASE, URINE: ABNORMAL
LIPASE: 10 U/L (ref 13–60)
LYMPHOCYTES ABSOLUTE: 1.3 K/UL (ref 1–5.1)
LYMPHOCYTES RELATIVE PERCENT: 16.7 %
MCH RBC QN AUTO: 28.9 PG (ref 26–34)
MCHC RBC AUTO-ENTMCNC: 31.7 G/DL (ref 31–36)
MCV RBC AUTO: 91.3 FL (ref 80–100)
METHEMOGLOBIN VENOUS: 0.1 %
MICROSCOPIC EXAMINATION: YES
MONOCYTES ABSOLUTE: 0.7 K/UL (ref 0–1.3)
MONOCYTES RELATIVE PERCENT: 9.3 %
NEUTROPHILS ABSOLUTE: 5.2 K/UL (ref 1.7–7.7)
NEUTROPHILS RELATIVE PERCENT: 69.8 %
NITRITE, URINE: NEGATIVE
O2 CONTENT, VEN: 16 VOL %
O2 SAT, VEN: 87 %
O2 THERAPY: ABNORMAL
PCO2, VEN: 35.9 MMHG (ref 40–50)
PDW BLD-RTO: 16.3 % (ref 12.4–15.4)
PERFORMED ON: ABNORMAL
PERFORMED ON: ABNORMAL
PH UA: 5.5 (ref 5–8)
PH VENOUS: 7.39 (ref 7.35–7.45)
PLATELET # BLD: 228 K/UL (ref 135–450)
PMV BLD AUTO: 9.1 FL (ref 5–10.5)
PO2, VEN: 51.7 MMHG (ref 25–40)
POTASSIUM REFLEX MAGNESIUM: 3.8 MMOL/L (ref 3.5–5.1)
PRO-BNP: 2233 PG/ML (ref 0–449)
PROTEIN UA: NEGATIVE MG/DL
PROTHROMBIN TIME: 11.9 SEC (ref 10–13.2)
PROTHROMBIN TIME: 13 SEC (ref 10–13.2)
RBC # BLD: 4.37 M/UL (ref 4–5.2)
RBC UA: ABNORMAL /HPF (ref 0–4)
SARS-COV-2, NAAT: NOT DETECTED
SODIUM BLD-SCNC: 145 MMOL/L (ref 136–145)
SPECIFIC GRAVITY UA: 1.02 (ref 1–1.03)
TCO2 CALC VENOUS: 22 MMOL/L
TOTAL PROTEIN: 6.3 G/DL (ref 6.4–8.2)
TROPONIN: <0.01 NG/ML
TSH SERPL DL<=0.05 MIU/L-ACNC: 2.2 UIU/ML (ref 0.27–4.2)
URINE REFLEX TO CULTURE: ABNORMAL
URINE TYPE: ABNORMAL
UROBILINOGEN, URINE: 0.2 E.U./DL
WBC # BLD: 7.5 K/UL (ref 4–11)
WBC UA: ABNORMAL /HPF (ref 0–5)

## 2021-03-24 PROCEDURE — 99284 EMERGENCY DEPT VISIT MOD MDM: CPT

## 2021-03-24 PROCEDURE — 99223 1ST HOSP IP/OBS HIGH 75: CPT | Performed by: INTERNAL MEDICINE

## 2021-03-24 PROCEDURE — 71260 CT THORAX DX C+: CPT

## 2021-03-24 PROCEDURE — 6360000002 HC RX W HCPCS: Performed by: PHYSICIAN ASSISTANT

## 2021-03-24 PROCEDURE — 85025 COMPLETE CBC W/AUTO DIFF WBC: CPT

## 2021-03-24 PROCEDURE — 6370000000 HC RX 637 (ALT 250 FOR IP): Performed by: EMERGENCY MEDICINE

## 2021-03-24 PROCEDURE — 87186 SC STD MICRODIL/AGAR DIL: CPT

## 2021-03-24 PROCEDURE — 71045 X-RAY EXAM CHEST 1 VIEW: CPT

## 2021-03-24 PROCEDURE — 2060000000 HC ICU INTERMEDIATE R&B

## 2021-03-24 PROCEDURE — 96372 THER/PROPH/DIAG INJ SC/IM: CPT

## 2021-03-24 PROCEDURE — 81001 URINALYSIS AUTO W/SCOPE: CPT

## 2021-03-24 PROCEDURE — 87086 URINE CULTURE/COLONY COUNT: CPT

## 2021-03-24 PROCEDURE — 6360000004 HC RX CONTRAST MEDICATION: Performed by: EMERGENCY MEDICINE

## 2021-03-24 PROCEDURE — 83605 ASSAY OF LACTIC ACID: CPT

## 2021-03-24 PROCEDURE — 80053 COMPREHEN METABOLIC PANEL: CPT

## 2021-03-24 PROCEDURE — 84484 ASSAY OF TROPONIN QUANT: CPT

## 2021-03-24 PROCEDURE — 93005 ELECTROCARDIOGRAM TRACING: CPT | Performed by: EMERGENCY MEDICINE

## 2021-03-24 PROCEDURE — 80162 ASSAY OF DIGOXIN TOTAL: CPT

## 2021-03-24 PROCEDURE — 82803 BLOOD GASES ANY COMBINATION: CPT

## 2021-03-24 PROCEDURE — 96374 THER/PROPH/DIAG INJ IV PUSH: CPT

## 2021-03-24 PROCEDURE — 36415 COLL VENOUS BLD VENIPUNCTURE: CPT

## 2021-03-24 PROCEDURE — 85730 THROMBOPLASTIN TIME PARTIAL: CPT

## 2021-03-24 PROCEDURE — 99285 EMERGENCY DEPT VISIT HI MDM: CPT

## 2021-03-24 PROCEDURE — 83690 ASSAY OF LIPASE: CPT

## 2021-03-24 PROCEDURE — 84443 ASSAY THYROID STIM HORMONE: CPT

## 2021-03-24 PROCEDURE — 87635 SARS-COV-2 COVID-19 AMP PRB: CPT

## 2021-03-24 PROCEDURE — 2580000003 HC RX 258: Performed by: PHYSICIAN ASSISTANT

## 2021-03-24 PROCEDURE — 6370000000 HC RX 637 (ALT 250 FOR IP): Performed by: PHYSICIAN ASSISTANT

## 2021-03-24 PROCEDURE — 93010 ELECTROCARDIOGRAM REPORT: CPT | Performed by: INTERNAL MEDICINE

## 2021-03-24 PROCEDURE — 85610 PROTHROMBIN TIME: CPT

## 2021-03-24 PROCEDURE — 6360000002 HC RX W HCPCS: Performed by: EMERGENCY MEDICINE

## 2021-03-24 PROCEDURE — 87040 BLOOD CULTURE FOR BACTERIA: CPT

## 2021-03-24 PROCEDURE — 83880 ASSAY OF NATRIURETIC PEPTIDE: CPT

## 2021-03-24 PROCEDURE — 87077 CULTURE AEROBIC IDENTIFY: CPT

## 2021-03-24 RX ORDER — FUROSEMIDE 10 MG/ML
40 INJECTION INTRAMUSCULAR; INTRAVENOUS ONCE
Status: DISCONTINUED | OUTPATIENT
Start: 2021-03-24 | End: 2021-03-24

## 2021-03-24 RX ORDER — ATORVASTATIN CALCIUM 10 MG/1
10 TABLET, FILM COATED ORAL DAILY
Status: DISCONTINUED | OUTPATIENT
Start: 2021-03-24 | End: 2021-03-25 | Stop reason: HOSPADM

## 2021-03-24 RX ORDER — ACETAMINOPHEN 650 MG/1
650 SUPPOSITORY RECTAL EVERY 6 HOURS PRN
Status: DISCONTINUED | OUTPATIENT
Start: 2021-03-24 | End: 2021-03-25 | Stop reason: HOSPADM

## 2021-03-24 RX ORDER — MAGNESIUM SULFATE IN WATER 40 MG/ML
2000 INJECTION, SOLUTION INTRAVENOUS PRN
Status: DISCONTINUED | OUTPATIENT
Start: 2021-03-24 | End: 2021-03-25 | Stop reason: HOSPADM

## 2021-03-24 RX ORDER — PROMETHAZINE HYDROCHLORIDE 25 MG/1
12.5 TABLET ORAL EVERY 6 HOURS PRN
Status: DISCONTINUED | OUTPATIENT
Start: 2021-03-24 | End: 2021-03-25 | Stop reason: HOSPADM

## 2021-03-24 RX ORDER — POTASSIUM CHLORIDE 20 MEQ/1
40 TABLET, EXTENDED RELEASE ORAL PRN
Status: DISCONTINUED | OUTPATIENT
Start: 2021-03-24 | End: 2021-03-25 | Stop reason: HOSPADM

## 2021-03-24 RX ORDER — POTASSIUM CHLORIDE 7.45 MG/ML
10 INJECTION INTRAVENOUS PRN
Status: DISCONTINUED | OUTPATIENT
Start: 2021-03-24 | End: 2021-03-25 | Stop reason: HOSPADM

## 2021-03-24 RX ORDER — SODIUM CHLORIDE 0.9 % (FLUSH) 0.9 %
10 SYRINGE (ML) INJECTION PRN
Status: DISCONTINUED | OUTPATIENT
Start: 2021-03-24 | End: 2021-03-25 | Stop reason: HOSPADM

## 2021-03-24 RX ORDER — ACETAMINOPHEN 325 MG/1
650 TABLET ORAL EVERY 6 HOURS PRN
Status: DISCONTINUED | OUTPATIENT
Start: 2021-03-24 | End: 2021-03-25 | Stop reason: HOSPADM

## 2021-03-24 RX ORDER — WARFARIN SODIUM 5 MG/1
5 TABLET ORAL
Status: COMPLETED | OUTPATIENT
Start: 2021-03-24 | End: 2021-03-24

## 2021-03-24 RX ORDER — POLYETHYLENE GLYCOL 3350 17 G/17G
17 POWDER, FOR SOLUTION ORAL DAILY PRN
Status: DISCONTINUED | OUTPATIENT
Start: 2021-03-24 | End: 2021-03-25 | Stop reason: HOSPADM

## 2021-03-24 RX ORDER — FUROSEMIDE 10 MG/ML
40 INJECTION INTRAMUSCULAR; INTRAVENOUS ONCE
Status: COMPLETED | OUTPATIENT
Start: 2021-03-24 | End: 2021-03-24

## 2021-03-24 RX ORDER — FUROSEMIDE 10 MG/ML
40 INJECTION INTRAMUSCULAR; INTRAVENOUS 2 TIMES DAILY
Status: DISCONTINUED | OUTPATIENT
Start: 2021-03-24 | End: 2021-03-25 | Stop reason: HOSPADM

## 2021-03-24 RX ORDER — DIGOXIN 125 MCG
125 TABLET ORAL DAILY
Status: DISCONTINUED | OUTPATIENT
Start: 2021-03-24 | End: 2021-03-24

## 2021-03-24 RX ORDER — ONDANSETRON 2 MG/ML
4 INJECTION INTRAMUSCULAR; INTRAVENOUS EVERY 6 HOURS PRN
Status: DISCONTINUED | OUTPATIENT
Start: 2021-03-24 | End: 2021-03-25 | Stop reason: HOSPADM

## 2021-03-24 RX ORDER — SODIUM CHLORIDE 0.9 % (FLUSH) 0.9 %
10 SYRINGE (ML) INJECTION EVERY 12 HOURS SCHEDULED
Status: DISCONTINUED | OUTPATIENT
Start: 2021-03-24 | End: 2021-03-25 | Stop reason: HOSPADM

## 2021-03-24 RX ORDER — FUROSEMIDE 10 MG/ML
INJECTION INTRAMUSCULAR; INTRAVENOUS
Status: DISCONTINUED
Start: 2021-03-24 | End: 2021-03-24

## 2021-03-24 RX ORDER — ASPIRIN 81 MG/1
81 TABLET, CHEWABLE ORAL DAILY
Status: DISCONTINUED | OUTPATIENT
Start: 2021-03-24 | End: 2021-03-24

## 2021-03-24 RX ADMIN — IOPAMIDOL 75 ML: 755 INJECTION, SOLUTION INTRAVENOUS at 05:56

## 2021-03-24 RX ADMIN — ASPIRIN 81 MG: 81 TABLET, CHEWABLE ORAL at 17:00

## 2021-03-24 RX ADMIN — WARFARIN SODIUM 5 MG: 5 TABLET ORAL at 19:18

## 2021-03-24 RX ADMIN — DIGOXIN 125 MCG: 125 TABLET ORAL at 17:00

## 2021-03-24 RX ADMIN — ENOXAPARIN SODIUM 90 MG: 100 INJECTION SUBCUTANEOUS at 22:57

## 2021-03-24 RX ADMIN — FUROSEMIDE 40 MG: 10 INJECTION, SOLUTION INTRAMUSCULAR; INTRAVENOUS at 19:19

## 2021-03-24 RX ADMIN — METOPROLOL TARTRATE 25 MG: 25 TABLET, FILM COATED ORAL at 22:57

## 2021-03-24 RX ADMIN — ENOXAPARIN SODIUM 90 MG: 100 INJECTION SUBCUTANEOUS at 06:52

## 2021-03-24 RX ADMIN — FUROSEMIDE 40 MG: 10 INJECTION, SOLUTION INTRAMUSCULAR; INTRAVENOUS at 05:40

## 2021-03-24 RX ADMIN — ATORVASTATIN CALCIUM 10 MG: 10 TABLET, FILM COATED ORAL at 17:00

## 2021-03-24 RX ADMIN — Medication 10 ML: at 22:57

## 2021-03-24 RX ADMIN — METOPROLOL TARTRATE 25 MG: 25 TABLET, FILM COATED ORAL at 06:51

## 2021-03-24 ASSESSMENT — PAIN SCALES - GENERAL: PAINLEVEL_OUTOF10: 0

## 2021-03-24 NOTE — PROGRESS NOTES
Patient admitted to room ED from 315. Patient oriented to room, call light, bed rails, phone, lights and bathroom. Patient instructed about the schedule of the day including: vital sign frequency, lab draws, possible tests, frequency of MD and staff rounds, daily weights, I &O's and prescribed diet. bed alarm in place, patient aware of placement and reason. Telemetry box in place, patient aware of placement and reason. Bed locked, in lowest position, side rails up 2/4, call light within reach. Recliner Assessment  Patient is able to demonstrate the ability to move from a reclining position to an upright position within the recliner. 4 Eyes Skin Assessment     The patient is being assess for   Admission    I agree that 2 RN's have performed a thorough Head to Toe Skin Assessment on the patient. ALL assessment sites listed below have been assessed. Areas assessed by both nurses:   [x]   Head, Face, and Ears   [x]   Shoulders, Back, and Chest, Abdomen  [x]   Arms, Elbows, and Hands   [x]   Coccyx, Sacrum, and Ischium  [x]   Legs, Feet, and Heels        Scattered bruising    **SHARE this note so that the co-signing nurse is able to place an eSignature**    Co-signer eSignature: Electronically signed by Dustin Cortez. Joelle Schaefer RN on 3/24/21 at 5:59 PM EDT    Does the Patient have Skin Breakdown?   No          Priyank Prevention initiated:  No   Wound Care Orders initiated:  No      [facility] nurse consulted for Pressure Injury (Stage 3,4, Unstageable, DTI, NWPT, Complex wounds)and New or Established Ostomies:  No      Primary Nurse eSignature: Electronically signed by Nalini Suero RN on 3/24/21 at 4:29 PM EDT

## 2021-03-24 NOTE — ED NOTES
Pt. Placed back in bed after using bedside comode. Dr. Talib Begum speaking with pt at this time.      Grace Gomez RN  03/24/21 [id number]

## 2021-03-24 NOTE — ED PROVIDER NOTES
CHIEF COMPLAINT  Shortness of Breath (Pt. presents to ED via [facility] from home with complaints of luly X 2 weeks. States got much worse last night. EMs gave duoneb treatment in route. A-fib on monitor. Pt. states hasn't been taking her medications.)      HISTORY OF PRESENT ILLNESS  Pam Cisneros is a 82 y.o. female presents to the ED with shortness of breath, notably worse the past couple days, but she told EMS she had not been feeling well for couple weeks, she states she got a lot of fluid and was swelled in her legs after getting cholecystectomy on 3/2/2021, states she is healing up fine from the gallbladder surgery, still has issues with diarrhea but that has been ongoing, no current significant abdominal pain/nausea/vomiting, no fever that she is aware of, she states they told her she had Covid, had tested positive in January but she denies this, states \"I know my body and I did not have Covid\", no chest pain, states she has been wheezing a little, thought she had a cold, was a little congested with a little cough, no productive sputum, no sore throat or earache, states she has been urinating plenty, but no dysuria/hematuria, no melena/hematochezia, states she does not think she has restarted her blood thinners yet, was trying to figure out when she could, she states she has been crying a lot and thought it was due to the digoxin so she had stopped this with permission of her cardiologist, she does have a history of A. fib, she also has a history of blood clot in her right leg in the past.  She reports her son called EMS, came in by [employer], she normally uses a walker to get around her house, no history of COPD, non-smoker, she is not sure what medications she is currently taking, no other complaints, modifying factors or associated symptoms. I have reviewed the following from the nursing documentation.     Past Medical History:   Diagnosis Date    [facility] [facility]     Cancer ([facility] [address] skin cancer 2014; colon 2015    Clostridium difficile diarrhea 06/25/2015    per physicians notes; negative on 6/25/15    Clostridium difficile infection 12/09/2016    COVID-19 01/13/2021    [facility] mellitus [facility] [address]     Hyperlipidemia     Hypertension      Past Surgical History:   Procedure Laterality Date    ABDOMEN SURGERY Right 10/15/2015    right colectomy, pain ball    ABDOMEN SURGERY  12/06/2016    trasverse colectomy    CHOLECYSTECTOMY  03/02/2021    : LAPAROSCOPIC CHOLECYSTECTOMY     CHOLECYSTECTOMY, LAPAROSCOPIC N/A 3/2/2021    LAPAROSCOPIC CHOLECYSTECTOMY performed by Barber Rojas MD at [facility]  2015    ascending colon mass, diverticulosis, hemorrhoids    COLONOSCOPY  11/08/2016    Transverse colon mass; Diverticulosis; Hemorrhoids    HERNIA REPAIR      x2    OTHER SURGICAL HISTORY Left 9/29/2014    excision of basal cell skin cancer Left face with full thickness skin graph from abdomen    SKIN GRAFT      UPPER GASTROINTESTINAL ENDOSCOPY      VASCULAR SURGERY       Family History   Problem Relation Age of Onset    Cancer Mother     Cancer Father      Social History     Socioeconomic History    Marital status:       Spouse name: Not on file    Number of children: Not on file    Years of education: Not on file    Highest education level: Not on file   Occupational History    Not on file   Social Needs    Financial resource strain: Not on file    Food insecurity     Worry: Not on file     Inability: Not on file    Transportation needs     Medical: Not on file     Non-medical: Not on file   Tobacco Use    Smoking status: Never Smoker    Smokeless tobacco: Never Used   Substance and Sexual Activity    Alcohol use: No    Drug use: No    Sexual activity: Not Currently   Lifestyle    Physical activity     Days per week: Not on file     Minutes per session: Not on file    Stress: Not on file   Relationships    Social connections and alert. Cooperative. No acute distress  HEAD: Normocephalic. Atraumatic. EYES: PERRL. EOM's grossly intact. ENT: Mucous membranes are moist.  Airway patent, no stridor, slight facial deformity from prior skin cancer surgery on the medial aspect of her left upper cheek  NECK: Supple. No rigidity, no visible JVD  HEART: Tachycardic around 110, irregularly irregular rhythm. No murmurs  LUNGS: Respirations slightly labored, rate in the low 20s,. Lungs are notably diminished in the bases bilaterally, a few crackles bilaterally, occasional end expiratory wheeze, no rhonchi, no active coughing. ABDOMEN: Soft. Non-distended. Non-tender. No guarding or rebound. Normal bowel sounds. Obese abdomen, well-healing surgical incisions from recent laparoscopic cholecystectomy, no dehiscence, no significant erythema/edema, she has a notable healed midline abdominal scar as well  EXTREMITIES: 1+ lower extremity bilateral pitting peripheral edema. Moves all extremities equally. All extremities neurovascularly intact. SKIN: Warm and dry. No acute rashes. NEUROLOGICAL: Alert and oriented x4 except she thought she was at [place] instead of [facility]. No gross facial drooping. Normal speech  PSYCHIATRIC: Normal mood and affect. LABS  I have reviewed all labs for this visit.    Results for orders placed or performed during the hospital encounter of 03/24/21   Lactate, Sepsis   Result Value Ref Range    Lactic Acid, Sepsis 2.1 (H) 0.4 - 1.9 mmol/L   CBC Auto Differential   Result Value Ref Range    WBC 7.5 4.0 - 11.0 K/uL    RBC 4.37 4.00 - 5.20 M/uL    Hemoglobin 12.6 12.0 - 16.0 g/dL    Hematocrit 39.9 36.0 - 48.0 %    MCV 91.3 80.0 - 100.0 fL    MCH 28.9 26.0 - 34.0 pg    MCHC 31.7 31.0 - 36.0 g/dL    RDW 16.3 (H) 12.4 - 15.4 %    Platelets 351 620 - 476 K/uL    MPV 9.1 5.0 - 10.5 fL    Neutrophils % 69.8 %    Lymphocytes % 16.7 %    Monocytes % 9.3 %    Eosinophils % 2.8 %    Basophils % 1.4 %    Neutrophils Absolute 5. 2 1.7 - 7.7 K/uL    Lymphocytes Absolute 1.3 1.0 - 5.1 K/uL    Monocytes Absolute 0.7 0.0 - 1.3 K/uL    Eosinophils Absolute 0.2 0.0 - 0.6 K/uL    Basophils Absolute 0.1 0.0 - 0.2 K/uL   Comprehensive Metabolic Panel w/ Reflex to MG   Result Value Ref Range    Sodium 145 136 - 145 mmol/L    Potassium reflex Magnesium 3.8 3.5 - 5.1 mmol/L    Chloride 110 99 - 110 mmol/L    CO2 24 21 - 32 mmol/L    Anion Gap 11 3 - 16    Glucose 193 (H) 70 - 99 mg/dL    BUN 13 7 - 20 mg/dL    CREATININE 0.6 0.6 - 1.2 mg/dL    GFR Non-African American >60 >60    GFR African American >60 >60    Calcium 9.3 8.3 - 10.6 mg/dL    Total Protein 6.3 (L) 6.4 - 8.2 g/dL    Albumin 3.6 3.4 - 5.0 g/dL    Albumin/Globulin Ratio 1.3 1.1 - 2.2    Total Bilirubin 1.5 (H) 0.0 - 1.0 mg/dL    Alkaline Phosphatase 126 40 - 129 U/L    ALT 12 10 - 40 U/L    AST 21 15 - 37 U/L    Globulin 2.7 g/dL   Troponin   Result Value Ref Range    Troponin <0.01 <0.01 ng/mL   Protime-INR   Result Value Ref Range    Protime 11.9 10.0 - 13.2 sec    INR 1.03 0.86 - 1.14   APTT   Result Value Ref Range    aPTT 25.4 24.2 - 36.2 sec   Blood Gas, Venous   Result Value Ref Range    pH, Juan Carlos 7.391 7.350 - 7.450    pCO2, Juan Carlos 35.9 (L) 40.0 - 50.0 mmHg    pO2, Juan Carlos 51.7 (H) 25 - 40 mmHg    HCO3, Venous 21.3 (L) 23.0 - 29.0 mmol/L    Base Excess, Juan Carlos -3.0 -3.0 - 3.0 mmol/L    O2 Sat, Juan Carlos 87 Not Established %    Carboxyhemoglobin 2.0 (H) 0.0 - 1.5 %    MetHgb, Juan Carlos 0.1 <1.5 %    TC02 (Calc), Juan Carlos 22 Not Established mmol/L    O2 Content, Juan Carlos 16 Not Established VOL %    O2 Therapy Unknown    Brain Natriuretic Peptide   Result Value Ref Range    Pro-BNP 2,233 (H) 0 - 449 pg/mL   Digoxin level   Result Value Ref Range    Digoxin Lvl <0.3 (L) 0.8 - 2.0 ng/mL   Lipase   Result Value Ref Range    Lipase 10.0 (L) 13.0 - 60.0 U/L   Urinalysis Reflex to Culture    Specimen: Urine, clean catch   Result Value Ref Range    Color, UA Yellow Straw/Yellow    Clarity, UA Clear Clear    Glucose, Ur 100 (A) Negative mg/dL    Bilirubin Urine Negative Negative    Ketones, Urine Negative Negative mg/dL    Specific Gravity, UA 1.025 1.005 - 1.030    Blood, Urine Negative Negative    pH, UA 5.5 5.0 - 8.0    Protein, UA Negative Negative mg/dL    Urobilinogen, Urine 0.2 <2.0 E.U./dL    Nitrite, Urine Negative Negative    Leukocyte Esterase, Urine SMALL (A) Negative    Microscopic Examination YES     Urine Type NotGiven     Urine Reflex to Culture Not Indicated    Microscopic Urinalysis   Result Value Ref Range    WBC, UA 3-5 0 - 5 /HPF    RBC, UA 0-2 0 - 4 /HPF    Epithelial Cells, UA 6-10 (A) 0 - 5 /HPF    Bacteria, UA Rare (A) None Seen /HPF   EKG 12 Lead   Result Value Ref Range    Ventricular Rate 102 BPM    Atrial Rate 117 BPM    QRS Duration 84 ms    Q-T Interval 368 ms    QTc Calculation (Bazett) 479 ms    R Axis 39 degrees    T Axis 17 degrees    Diagnosis       Atrial fibrillation with rapid ventricular response with premature ventricular or aberrantly conducted complexesLow voltage QRSCannot rule out Anterior infarct (cited on or before 17-FEB-2021)Abnormal ECGWhen compared with ECG of 17-FEB-2021 07:13,Criteria for Inferior infarct are no longer PresentNonspecific T wave abnormality now evident in Inferior leads     RADIOLOGY  XR CHEST PORTABLE (Final result)  Result time 03/24/21 05:30:07  Final result by Adolfo Teixeira MD (03/24/21 05:30:07)                Impression:    Cardiomegaly, pulmonary vascular congestion and small bilateral pleural   effusions compatible with fluid overload and/or CHF. Bibasilar airspace disease.  Pneumonia can not be excluded. CT CHEST PULMONARY EMBOLISM W CONTRAST (Final result)  Result time 03/24/21 06:31:01  Final result by Adolfo Teixeira MD (03/24/21 06:31:01)                Impression:    No evidence of pulmonary embolism. Cardiomegaly, bilateral pleural effusions and mild pulmonary edema compatible   with fluid overload and/or CHF.      Mild dependent airspace disease bilaterally most likely represents   atelectasis. Additional chronic findings detailed above. ED COURSE/MDM  Patient seen and evaluated. Old records reviewed. Labs and imaging reviewed and results discussed with patient. 82-year-old female with shortness of breath, found to have large pleural effusions bilaterally, pulmonary edema, appears to be fluid overloaded, she is not normally on diuretics, I gave a dose of IV Lasix 40 mg, since she is already supposed to be on a beta-blocker, giving 25 mg p.o. Lopressor, her heart rate and blood pressure have been fluctuating since she has arrived, not hypotensive/not unstable A. fib, she made it well known she did not want to be on digoxin because she thinks it makes her cry, she was intermittently tearful while here in the ED, it has been over 3 weeks since her gallbladder surgery and she appears to be recovering appropriately from this, so I feel comfortable initiating anticoagulation, patient was unsure why they chose warfarin, I gave a dose of Lovenox here, but we did discuss the possibility of her starting one of the other newer anticoagulants which may be a better option, I calculated a [id number] 2 score of 8, high risk of stroke and patient should be anticoagulated. Patient was not significantly hypoxic during her ED stay, dropped to 90% with walking pulse ox using a walker to the bathroom with some dyspnea/labored breathing, but has been maintaining at 92 to 93% at rest.  Patient reports having edema since her admission for gallbladder surgery, receiving fluids for this was likely original cause of fluid overload, worsening at home over the past few weeks, she did have a normal EF on echo last month, plan for admission to hospitalist service at [facility]. Patient verbalized understanding of plan. Dr. Wong Lanier at [facility] agreed to accept for admission.   I let Dr. Milton Newell know about the patient at shift change at 0700 since she is awaiting transport. Orders Placed This Encounter   Procedures    Culture, Blood 2    Culture, Blood 1    Culture, Urine    XR CHEST PORTABLE    CT CHEST PULMONARY EMBOLISM W CONTRAST    Lactate, Sepsis    CBC Auto Differential    Comprehensive Metabolic Panel w/ Reflex to MG    Troponin    Protime-INR    APTT    Blood Gas, Venous    Brain Natriuretic Peptide    Digoxin level    Lipase    Urinalysis Reflex to Culture    Microscopic Urinalysis    Inpatient consult to Hospitalist    EKG 12 Lead    PATIENT STATUS (DIRECT) Inpatient     Orders Placed This Encounter   Medications    DISCONTD: furosemide (LASIX) injection 40 mg    furosemide (LASIX) injection 40 mg    iopamidol (ISOVUE-370) 76 % injection 75 mL    furosemide (LASIX) 10 MG/ML injection     Edna Harrington: cabinet override    metoprolol tartrate (LOPRESSOR) tablet 25 mg    enoxaparin (LOVENOX) injection 90 mg     ED Course as of Mar 24 0725   Wed Mar 24, 2021   0529 EKG interpretation by me: A. fib rate 102, low voltage, , normal axis, no ST segment or T wave changes indicative of acute ischemia   EKG 12 Lead [SY]   7686 Suspect her minor lactic acid elevation is more so related to pulmonary edema/fluid overload state with dyspnea, normal white count, elevated BNP. We will get a better evaluation for possible pneumonia with CT scan. Lactic Acid, Sepsis(!): 2.1 [SY]   0721 Patient will go as ED to ED transfer to [facility] waiting on a bed, spoke to Dr. Quincy Cortez and she agreed to accept to ED.    [SY]      ED Course User Index  [SY] Dawson Garland DO     CLINICAL IMPRESSION  1. Acute congestive heart failure, unspecified heart failure type ([address]    2. Acute pulmonary edema (HCC)    3. Pleural effusion, bilateral    4. Dyspnea and respiratory abnormalities    5. Atrial fibrillation, unspecified type ([facility])      Blood pressure (!) 148/74, pulse 111, temperature 97.8 °F (36.6 °C), resp.  rate 22, height 5' 5\" (1.651 m), weight 202 lb (91.6 kg), SpO2 96 %, not currently breastfeeding. DISPOSITION  Isidro Bose was admitted to [facility] in stable condition.                 Joe Coreas DO  03/24/21 7850

## 2021-03-24 NOTE — ED NOTES
Pt. Assisted to bedside comode at this time. Stated she may need help getting back in bed.      Griffin Briseno RN  03/24/21 [id number]

## 2021-03-24 NOTE — ED NOTES
Pt placed back in bed after using bedside. Adjusted pt pillow upon request.  Pt given medications for heart rate, and lovenox shot given.      Leonides Harris RN  03/24/21 2156

## 2021-03-24 NOTE — ED NOTES
Ambulatory pulse ox of 90%     Sarah Serrano, Pending sale to [facility]  03/24/21 [id number]

## 2021-03-24 NOTE — ED PROVIDER NOTES
I did a brief evaluation of the patient when she arrived in the ER, her only complaint at that time was that she needed to urinate. She did not appear to be in respiratory distress, nontoxic appearing, and very friendly and pleasant to interact with.   Admission orders have already been placed      Jersey Chen MD  03/24/21 5214

## 2021-03-24 NOTE — H&P
Hospital Medicine History & Physical      PCP: Joseph Galicia, APRN - CNP    Date of Admission: 3/24/2021    Date of Service: Pt seen/examined on 3/24/2021    Chief Complaint:    Chief Complaint   Patient presents with    Shortness of Breath     Pt. presents to ED via [facility] from home with complaints of luly X 2 weeks. States got much worse last night. EMs gave duoneb treatment in route. A-fib on monitor. Pt. states hasn't been taking her medications. History Of Present Illness: The patient is a 82 y.o. female with atrial fibrillation anticoagulated on Coumadin, prior history of C. difficile, diabetes, hypertension, hyperlipidemia anemia and prior COVID-19 infection who presented to [facility]  ED with complaint of shortness of breath. Patient reports that since her recent surgery she went home and felt like she should be getting stronger. She reports that she feels very weak and still feels very short of breath at times. She has had a nonproductive cough with increased wheezing and has noticed a lot of swelling in her feet or ankles that has not really improved since being admitted for the surgery. She is unfamiliar with her home medications reporting that her son gives these medications to her. She has completed the Lovenox injections but is unsure whether she has been taking her Coumadin at home or not. She denies any associated chest pain. She denies any fevers or chills.     Past Medical History:        Diagnosis Date    A-fib ([facility]     Cancer ([facility]     skin cancer 2014; colon 2015    Clostridium difficile diarrhea 06/25/2015    per physicians notes; negative on 6/25/15    Clostridium difficile infection 12/09/2016    COVID-19 01/13/2021    Diabetes mellitus ([facility]     Hyperlipidemia     Hypertension        Past Surgical History:        Procedure Laterality Date    ABDOMEN SURGERY Right 10/15/2015    right colectomy, pain ball    ABDOMEN SURGERY  12/06/2016    trasverse colectomy    CHOLECYSTECTOMY  03/02/2021    : LAPAROSCOPIC CHOLECYSTECTOMY     CHOLECYSTECTOMY, LAPAROSCOPIC N/A 3/2/2021    LAPAROSCOPIC CHOLECYSTECTOMY performed by Deandre Lizama MD at [address] COLONOSCOPY  2015    ascending colon mass, diverticulosis, hemorrhoids    COLONOSCOPY  11/08/2016    Transverse colon mass; Diverticulosis; Hemorrhoids    HERNIA REPAIR      x2    OTHER SURGICAL HISTORY Left 9/29/2014    excision of basal cell skin cancer Left face with full thickness skin graph from abdomen    SKIN GRAFT      UPPER GASTROINTESTINAL ENDOSCOPY      VASCULAR SURGERY         Medications Prior to Admission:    Prior to Admission medications    Medication Sig Start Date End Date Taking? Authorizing Provider   enoxaparin (LOVENOX) 100 MG/ML injection Inject 1 mL into the skin 2 times daily 3/4/21  Yes Brielle Lyons APRN - CNP   atorvastatin (LIPITOR) 10 MG tablet Take 1 tablet by mouth daily 2/26/21  Yes Lucius Morales MD   digoxin [facility] TRANSPLANT [facility]) 125 MCG tablet Take 1 tablet by mouth daily 2/26/21  Yes Lucius Morales MD   metoprolol tartrate (LOPRESSOR) 25 MG tablet Take 1 tablet by mouth 2 times daily 2/26/21  Yes Lucius Morales MD   lactobacillus (CULTURELLE) capsule One po tid 1/6/21  Yes DESIRAE Luna   glyBURIDE (DIABETA) 5 MG tablet Take 7.5 mg by mouth daily (with breakfast). Yes Historical Provider, MD   aspirin 81 MG chewable tablet Take 81 mg by mouth daily. Yes Historical Provider, MD   warfarin (COUMADIN) 2.5 MG tablet Take 3 tablets by mouth daily for 1 day, THEN 2 tablets daily for 5 days. Then see the coumadin clinic for blood check and further dosing. 2/18/21 2/26/21  Ishaan Lira PA-C       Allergies:  Clindamycin/lincomycin, Metronidazole, and Vancomycin    Social History:  The patient currently lives at home    TOBACCO:   reports that she has never smoked. She has never used smokeless tobacco.  ETOH:   reports no history of alcohol use. Family History:   Positive as follows:        Problem Relation Age of Onset   [facility] Cancer Mother     Cancer Father        REVIEW OF SYSTEMS:     Constitutional: Negative for fever   HENT: Negative for sore throat   Eyes: Negative for redness   Respiratory: + Cough, + wheezing  Cardiovascular: + Edema, negative for chest pain   Gastrointestinal: Negative for vomiting, diarrhea   Genitourinary: Negative for hematuria   Musculoskeletal: Negative for arthralgias   Skin: Negative for rash   Neurological: Negative for syncope   Hematological: Negative for adenopathy   Psychiatric/Behavorial: Negative for anxiety    PHYSICAL EXAM:    BP (!) 140/73   Pulse 97   Temp 97.8 °F (36.6 °C)   Resp 23   Ht 5' 5\" (1.651 m)   Wt 202 lb (91.6 kg)   SpO2 94%   BMI 33.61 kg/m²   Gen: No distress. Alert. Eyes:  No conjunctival injection. ENT: No discharge. Pharynx clear. Scarring to the left maxilla from prior skin cancer removal  Neck: Minimal JVD. Trachea midline. Resp: No accessory muscle use. Faint crackles. No wheezes. No rhonchi. On room air  CV: Regular rate. Irregular rhythm. + murmur. No rub. ++ edema. Capillary Refill: Brisk,< 3 seconds   Peripheral Pulses: +2 palpable, equal bilaterally   GI: Non-tender. Soft, nondistended, normal bowel sounds  Skin: Warm and dry. Well-healing midline abdominal scar  M/S: No cyanosis. No joint deformity. No clubbing. Neuro: Awake. Grossly nonfocal    Psych: Oriented x 3. No anxiety or agitation.      CBC:   Recent Labs     03/24/21  0440   WBC 7.5   HGB 12.6   HCT 39.9   MCV 91.3        BMP:   Recent Labs     03/24/21  0440      K 3.8      CO2 24   BUN 13   CREATININE 0.6     LIVER PROFILE:   Recent Labs     03/24/21  0440   AST 21   ALT 12   LIPASE 10.0*   BILITOT 1.5*   ALKPHOS 126     PT/INR:   Recent Labs     03/24/21  0440   PROTIME 11.9   INR 1.03     APTT:   Recent Labs     03/24/21  0440   APTT 25.4     UA:  Recent Labs     03/24/21  0547   COLORU Yellow   PHUR 5.5   WBCUA 3-5   RBCUA 0-2   BACTERIA Rare*   CLARITYU Clear   SPECGRAV 1.025   LEUKOCYTESUR SMALL*   UROBILINOGEN 0.2   BILIRUBINUR Negative   BLOODU Negative   GLUCOSEU 100*     CARDIAC ENZYMES  Recent Labs     03/24/21  0440   TROPONINI <0.01     CULTURES  COVID-19: Not detected  Urine culture: Pending    EKG:  I have reviewed the EKG with the following interpretation:   Atrial fibrillation  Low voltage QRS  Cannot rule out Anterior infarct (cited on or before 17-FEB-2021)  Nonspecific ST abnormality  Abnormal ECG    RADIOLOGY  CT CHEST PULMONARY EMBOLISM W CONTRAST   Final Result   No evidence of pulmonary embolism. Cardiomegaly, bilateral pleural effusions and mild pulmonary edema compatible   with fluid overload and/or CHF. Mild dependent airspace disease bilaterally most likely represents   atelectasis. Additional chronic findings detailed above. XR CHEST PORTABLE   Final Result   Cardiomegaly, pulmonary vascular congestion and small bilateral pleural   effusions compatible with fluid overload and/or CHF. Bibasilar airspace disease. Pneumonia can not be excluded. Pertinent previous results reviewed   Echo 2/18/21  Conclusions      Summary   Normal left ventricular systolic function with an estimated ejection   fraction of 55-60%. No regional wall motion abnormalities are seen. There is moderate concentric left ventricular hypertrophy. The left atrium is mildly dilated. Mild posterior mitral annular calcification is present. Mild mitral regurgitation. Moderate aortic stenosis and moderate aortic regurgitation is present. Normal systolic pulmonary artery pressure (SPAP) estimated at 31 mmHg (RA   pressure 3 mmHg). Jaspreet Schultz have reviewed the chart on Mandie Solomon and personally interviewed and examined patient, reviewed the data (labs and imaging) and after discussion with my PA formulated the plan.   Agree with note with the following edits. HPI:     I reviewed the patient's Past Medical History, Past Surgical History, Medications, and Allergies. Physical exam:    BP (!) 142/74   Pulse 100   Temp 97.6 °F (36.4 °C) (Oral)   Resp 20   Ht 5' 5\" (1.651 m)   Wt 202 lb (91.6 kg)   SpO2 94%   BMI 33.61 kg/m²     Gen: No distress. Alert. Eyes: PERRL. No sclera icterus. No conjunctival injection. ENT: No discharge. Pharynx clear. Neck: Trachea midline. Normal thyroid. Resp: No accessory muscle use. Few crackles. No wheezes. No rhonchi. Minimal dullness on percussion. CV: Irregularly irregular rate and rhythm. Ejection systolic murmur aortic area. ++ edema.              ASSESSMENT/PLAN:  Acute on chronic dCHF  -Last echo with EF of 55 to 60%, LVH, moderate aortic stenosis  - Chest x-ray with cardiomegaly, pulmonary vascular congestion small bilateral pleural effusions  -BNP elevated 2233, peripheral edema noted on exam, patient complains of increased shortness of breath  -Continue IV Lasix 40 mg twice daily  -Strict I's and O's, daily weights, low-sodium diet  -Cardiology consult  -CTPA negative for PE    Chronic atrial fibrillation  -Was diagnosed with atrial fibrillation in February 2021  - Normally AC on Coumadin  -Subtherapeutic INR, pharmacy to dose Coumadin  -Bridge with Lovenox  -Continue Lopressor, continue digoxin--> digoxin level low    Aortic stenosis  -Noted on most recent echo--> moderate  -Following with cardiology    DM2  -Hold home oral Rx  -Continue sliding scale insulin  -Glucose is stable    HTN  - BP is stable  - Continue Lopressor  HLD  - Continue statin     PAD  -Status post fasciotomies and revascularization of the right lower extremity via anterior tib artery angioplasty  -Followed by vascular surgery  -Continue aspirin    Recent admission for acute cholecystitis  -Was admitted on 3/2 for acute cholecystitis  -Status post laparoscopic cholecystectomy  -She was discharged home on Lovenox twice daily and instructed to resume Coumadin--patient reports that she is done taking the Lovenox injections but unsure if she resumed her Coumadin    DVT Prophylaxis: Lovenox, Coumadin  Diet: No diet orders on file   Code Status: Prior      Abdifatah Felton PA-C  3/24/2021 2:37 PM        Mu Coreas 3/24/2021 4:54 PM

## 2021-03-24 NOTE — PROGRESS NOTES
Admission assessment completed. See flow sheet. Patient was oriented to room and made comfortable. Bed in lowest position with 2/4 side rails up. Vitals stable. Patient denies further needs at this time. Call light within reach. Will continue to monitor.

## 2021-03-24 NOTE — CONSULTS
[address] [facility]  [phone]        Reason for Consultation/Chief Complaint: \"I have been having cough and swelling . \"  Consulted for CHF    History of Present Illness:  Chiquis Martin is a 82 y.o. patient who presented [facility] 3/24/21 with c/o SOB, cough, and swelling. Follows with Dr. Alfie Nobles for cardiac care--last OV 2/26/21. She has PMH of afib dx 2/21 on coumadin, DM, HTN, HLD, Covid-19 dx 1/21, and hx C. Diff. Most recent ECHO 2/17/21 normal EF=55-60%; moderate LVH; mild MR; moderate AS (velocity=3.45m/s; mean pressure gradient=26mmHg);; moderate AR; SPAP=31mmHg. Most recent lexiscan myoview 10/13/15 negative for ischemia; EF=67%. Note recent lap dario per Dr. Coulter Points on 3/2/21. She reports leg swelling started right after surgery and has not improved. Also c/o dry cough last few days along with SOB with wheezing worse with exertion. Also feels weak in general. Reports \"crying\" spells which she attributes to digoxin medication and Dr. Alfie Nobles d/c'd on 3/18/21, Admit EKG afib 102bpm; LV; anterior infarct cannot be excluded; nonspecific ST changes (2/21 EKG inferior infarct pattern resolved). Note ZPC=2936 (491 in 10/15); Мария x 2 negative; INR=1.03; CXR CM; PVC; small bilat pleural effusions; CT imaging negative PE; mild pulm edema; pleural effusions; Patient with no complaints of chest pain, palpitations, dizziness, or orthopnea/PND. I have been asked to provide consultation regarding further management and testing. Past Medical History:   has a past medical history of A-fib ([facility] Cancer ([facility] Clostridium difficile diarrhea, Clostridium difficile infection, COVID-19, Diabetes mellitus ([facility] Hyperlipidemia, and Hypertension. Surgical History:   has a past surgical history that includes hernia repair; other surgical history (Left, 9/29/2014); Upper gastrointestinal endoscopy; vascular surgery; Skin graft; Colonoscopy (2015); Colonoscopy (11/08/2016);  Abdomen surgery (Right, General Appearance:  Alert, cooperative, no distress, appears stated age   Head:  Normocephalic, without obvious abnormality, atraumatic   Eyes:  PERRL, conjunctiva/corneas clear       Nose: Nares normal, no drainage or sinus tenderness   Throat: Lips, mucosa, and tongue normal   Neck: Supple, symmetrical, trachea midline, no adenopathy, thyroid: not enlarged, symmetric, no tenderness/mass/nodules, no carotid bruit or JVD       Lungs:   Diminished breath sounds bases   Chest Wall:  No tenderness or deformity   Heart:  Regular rate and rhythm, S1, S2 normal, no rub or gallop; +II/VI MARIAELENA   Abdomen:   Soft, non-tender, bowel sounds active all four quadrants,  no masses, no organomegaly           Extremities: Extremities normal, atraumatic, no cyanosis; 1-2+ BLE edema   Pulses: 2+ and symmetric   Skin: Skin color, texture, turgor normal, no rashes or lesions   Pysch: Normal mood and affect   Neurologic: Normal gross motor and sensory exam.         Labs  CBC:   Lab Results   Component Value Date    WBC 7.5 03/24/2021    RBC 4.37 03/24/2021    HGB 12.6 03/24/2021    HCT 39.9 03/24/2021    MCV 91.3 03/24/2021    RDW 16.3 03/24/2021     03/24/2021     CMP:    Lab Results   Component Value Date     03/24/2021    K 3.8 03/24/2021     03/24/2021    CO2 24 03/24/2021    BUN 13 03/24/2021    CREATININE 0.6 03/24/2021    GFRAA >60 03/24/2021    AGRATIO 1.3 03/24/2021    LABGLOM >60 03/24/2021    GLUCOSE 193 03/24/2021    PROT 6.3 03/24/2021    CALCIUM 9.3 03/24/2021    BILITOT 1.5 03/24/2021    ALKPHOS 126 03/24/2021    AST 21 03/24/2021    ALT 12 03/24/2021     PT/INR:  No results found for: PTINR  Lab Results   Component Value Date    CKTOTAL 1,458 (H) 06/24/2015    TROPONINI <0.01 03/24/2021       EKG:  I have reviewed EKG with the following interpretation:  Impression: See HPI    CXR 3/24/21:  Impression   Cardiomegaly, pulmonary vascular congestion and small bilateral pleural   effusions compatible with fluid overload and/or CHF.       Bibasilar airspace disease.  Pneumonia can not be excluded.           CT imaging 3/24/21:  Impression   No evidence of pulmonary embolism.       Cardiomegaly, bilateral pleural effusions and mild pulmonary edema compatible   with fluid overload and/or CHF.       Mild dependent airspace disease bilaterally most likely represents   atelectasis.       Additional chronic findings detailed above.         Lexiscan myoview 10/13/15:  Impression   IMPRESSION:   1. No pharmcologically induced reversible perfusion defects to suggest   ischemia   2. Ejection fraction of 67%   3. No focal wall motion abnormality. ECHO 2/18/21 Summary   Normal left ventricular systolic function with an estimated EF of 55-60%. No regional wall motion abnormalities are seen. There is moderate concentric left ventricular hypertrophy. The left atrium is mildly dilated. Mild posterior mitral annular calcification is present. Mild mitral regurgitation. Moderate aortic stenosis and moderate aortic regurgitation is present.  (SPAP) estimated at 31 mmHg (RAP 3 mmHg). Assessment:  Maia Diaz is a 82 y.o. patient who presented [facility] 3/24/21 with c/o SOB, cough, and swelling. Follows with Dr. Krystyna Hale for cardiac care--last OV 2/26/21. She has PMH of afib dx 2/21 on coumadin, DM, HTN, HLD, Covid-19 dx 1/21, and hx C. Diff. Most recent ECHO 2/17/21 normal EF=55-60%; moderate LVH; mild MR; moderate AS (velocity=3.45m/s; mean pressure gradient=26mmHg);; moderate AR; SPAP=31mmHg. Most recent lexiscan myoview 10/13/15 negative for ischemia; EF=67%. Note recent lap dario per Dr. Leonard Allen on 3/2/21. She reports leg swelling started right after surgery and has not improved. Also c/o dry cough last few days along with SOB with wheezing worse with exertion.  Also feels weak in general. Reports \"crying\" spells which she attributes to digoxin medication and Dr. Krystyna Hale d/c'd on 3/18/21, Admit EKG afib 102bpm; LV; anterior infarct cannot be excluded; nonspecific ST changes (2/21 EKG inferior infarct pattern resolved). Note GEA=0865 (491 in 10/15); Мария x 2 negative; INR=1.03; CXR CM; PVC; small bilat pleural effusions; CT imaging negative PE; mild pulm edema; pleural effusions. Diagnosis of acute diastolic CHF in elderly female s/p recent lap dario with hx afib, moderate AS and AR. Recs:  1. Diurese with IV lasix 40mg BID and adjust accordingly. 2. D/C digoxin   3. Continue metoprolol 25mg po BID. 4. D/C baby aspirin. No definite indication. 5. Continue lovenox bridging and coumadin to goal INR 2-3.  6. No need for cardiac testing at this time. Patient Active Problem List   Diagnosis    Ischemia of lower extremity    DM2 (diabetes mellitus, type 2) ([address]    History of fasciotomy    Mass of colon    Anemia    Iron deficiency anemia due to chronic blood loss    Chest pain    Colonic mass    Ischemic neuropathy of right foot    Malignant neoplasm of transverse colon ([address]    Malignant neoplasm of transverse colon ([address]    Intra-abdominal abscess (HCC)    Benign essential HTN    Hyperlipidemia    Atrial fibrillation with RVR ([address]    PAD (peripheral artery disease) ([address]    History of venous thromboembolism    Calculus of gallbladder without cholecystitis without obstruction    Nonrheumatic aortic valve stenosis    Nonrheumatic aortic valve insufficiency    New onset of congestive heart failure ([address]       Thank you for allowing to us to participate in the care or Gagan Burton. Further evaluation will be based upon the patient's clinical course and testing results.

## 2021-03-24 NOTE — CARE COORDINATION
Case Management Assessment  Initial Evaluation      Patient Name: Jacques Braga  YOB: 1935  Diagnosis: New onset of congestive heart failure ([address] [I50.9]  Date / Time: 3/24/2021  4:31 AM    Admission status/Date:03/24/2021  Chart Reviewed: Yes      Patient Henry Brad: No -pt in the ED and covid pending per labs in epic  Family Interviewed:  Yes - pt's son Leeta Cushing at [phone]      Hospitalization in the last 30 days:  Yes from 03/02/2021-03/04/2021 with Acute calculous cholecystitis. Pt discharged home on 3/4/2021 with [facility])      Health Care Decision Maker :   Primary Decision Maker: Telly Solomon \"Bill\" - Child - [phone]    (CM - must 1st enter selection under Navigator - emergency contact- Health Care Decision Maker Relationship and pick relationship)   Who do you trust or have selected to make healthcare decisions for you This was already placed in epic      Met with: pt's son Breanna Long conducted  (bedside/phone): phone    Current PCP: LEONILA Tejeda    Financial  Medicare  Precert required for SNF : N          3 night stay required -  N-WAIVED    ADLS  Support Systems/Care Needs:  family  Transportation: family    Meal Preparation: self    Housing  Living Arrangements: Pt lives at home with her son Leeta Cushing.  He stated pt is alone at times when he runs to the store, etc.  Steps: 3  Intent for return to present living arrangements: Yes per son  Identified Issues: [id number] [facility] with [address] : No Agency:(Services)     Passport/Waiver : No  :                      Phone Number:    Passport/Waiver Services: n/a          Durable Medical Equiptment   DME Provider:   Equipment:   Walker__x_Cane___RTS___ BSC___Shower Chair_x__Hospital Bed___W/C____Other________  02 at ____Liter(s)---wears(frequency)_______ [facility] ___ CPAP___ BiPap___   N/A____      Home O2 Use :  No    If No for home O2---if presently on O2 during hospitalization:  No  if yes CM to follow for potential DC O2 need  Informed of need for care provider to bring portable home O2 tank on day of discharge for nursing to connect prior to leaving:   Not Indicated  Verbalized agreement/Understanding:   Not Indicated    Community Service Affiliation  Dialysis:  No    · Agency:  · Location:  · Dialysis Schedule:  · Phone:   · Fax: Other Community Services: n/a    DISCHARGE PLAN: Explained Case Management role/services. Chart review completed. Pt is in the ED and awaiting a covid test per labs in [place]. Called and spoke with pt's son Galo Saha who completed the assessment. He stated that the plan is for her to return home with him when discharged. He stated that [facility] ended and if she needs home care again, they would want to use [facility] again. He stated trouble breathing is what brought her back within 30 days. He is aware pt is awaiting a bed and to call back within a few hours for an update. He stated agreement and denied needs or questions for CM at this time. CM will follow. Please notify CM if needs or concerns arise.

## 2021-03-24 NOTE — Clinical Note
Patient Class: Inpatient [101]   REQUIRED: Diagnosis: New onset of congestive heart failure ([facility] [[id number]]   Estimated Length of Stay: Estimated stay of more than 2 midnights   Admitting Provider: Ray Rock [[id number]]

## 2021-03-24 NOTE — PLAN OF CARE
Admit PCU from [facility] 6 onset CHF--IV Lasix, had recent Echo already, cards c/s     A fib--mildly elevated rates, AC on coumadin but has not be resumed since her surgery 2 weeks ago, bridge with Lovenox, pharm to dose Coumadin

## 2021-03-25 VITALS
DIASTOLIC BLOOD PRESSURE: 76 MMHG | SYSTOLIC BLOOD PRESSURE: 147 MMHG | RESPIRATION RATE: 18 BRPM | WEIGHT: 214.13 LBS | TEMPERATURE: 98.2 F | BODY MASS INDEX: 35.68 KG/M2 | HEART RATE: 85 BPM | OXYGEN SATURATION: 95 % | HEIGHT: 65 IN

## 2021-03-25 DIAGNOSIS — I10 BENIGN ESSENTIAL HTN: Primary | ICD-10-CM

## 2021-03-25 LAB
ANION GAP SERPL CALCULATED.3IONS-SCNC: 11 MMOL/L (ref 3–16)
BASOPHILS ABSOLUTE: 0 K/UL (ref 0–0.2)
BASOPHILS RELATIVE PERCENT: 0.4 %
BUN BLDV-MCNC: 10 MG/DL (ref 7–20)
CALCIUM SERPL-MCNC: 8.8 MG/DL (ref 8.3–10.6)
CHLORIDE BLD-SCNC: 108 MMOL/L (ref 99–110)
CHOLESTEROL, TOTAL: 80 MG/DL (ref 0–199)
CO2: 25 MMOL/L (ref 21–32)
CREAT SERPL-MCNC: 0.6 MG/DL (ref 0.6–1.2)
EOSINOPHILS ABSOLUTE: 0.2 K/UL (ref 0–0.6)
EOSINOPHILS RELATIVE PERCENT: 3.6 %
GFR AFRICAN AMERICAN: >60
GFR NON-AFRICAN AMERICAN: >60
GLUCOSE BLD-MCNC: 117 MG/DL (ref 70–99)
GLUCOSE BLD-MCNC: 125 MG/DL (ref 70–99)
GLUCOSE BLD-MCNC: 181 MG/DL (ref 70–99)
HCT VFR BLD CALC: 34.5 % (ref 36–48)
HDLC SERPL-MCNC: 37 MG/DL (ref 40–60)
HEMOGLOBIN: 11.3 G/DL (ref 12–16)
INR BLD: 1.14 (ref 0.86–1.14)
LDL CHOLESTEROL CALCULATED: 15 MG/DL
LYMPHOCYTES ABSOLUTE: 1.4 K/UL (ref 1–5.1)
LYMPHOCYTES RELATIVE PERCENT: 21 %
MAGNESIUM: 1.9 MG/DL (ref 1.8–2.4)
MCH RBC QN AUTO: 29.7 PG (ref 26–34)
MCHC RBC AUTO-ENTMCNC: 32.7 G/DL (ref 31–36)
MCV RBC AUTO: 90.8 FL (ref 80–100)
MONOCYTES ABSOLUTE: 0.6 K/UL (ref 0–1.3)
MONOCYTES RELATIVE PERCENT: 9.5 %
NEUTROPHILS ABSOLUTE: 4.3 K/UL (ref 1.7–7.7)
NEUTROPHILS RELATIVE PERCENT: 65.5 %
PDW BLD-RTO: 15.9 % (ref 12.4–15.4)
PERFORMED ON: ABNORMAL
PERFORMED ON: ABNORMAL
PLATELET # BLD: 204 K/UL (ref 135–450)
PMV BLD AUTO: 9 FL (ref 5–10.5)
POTASSIUM SERPL-SCNC: 3.3 MMOL/L (ref 3.5–5.1)
PROTHROMBIN TIME: 13.3 SEC (ref 10–13.2)
RBC # BLD: 3.8 M/UL (ref 4–5.2)
SODIUM BLD-SCNC: 144 MMOL/L (ref 136–145)
TRIGL SERPL-MCNC: 138 MG/DL (ref 0–150)
VLDLC SERPL CALC-MCNC: 28 MG/DL
WBC # BLD: 6.6 K/UL (ref 4–11)

## 2021-03-25 PROCEDURE — 6370000000 HC RX 637 (ALT 250 FOR IP): Performed by: PHYSICIAN ASSISTANT

## 2021-03-25 PROCEDURE — 85025 COMPLETE CBC W/AUTO DIFF WBC: CPT

## 2021-03-25 PROCEDURE — 80048 BASIC METABOLIC PNL TOTAL CA: CPT

## 2021-03-25 PROCEDURE — 6360000002 HC RX W HCPCS: Performed by: PHYSICIAN ASSISTANT

## 2021-03-25 PROCEDURE — 36415 COLL VENOUS BLD VENIPUNCTURE: CPT

## 2021-03-25 PROCEDURE — 99232 SBSQ HOSP IP/OBS MODERATE 35: CPT | Performed by: INTERNAL MEDICINE

## 2021-03-25 PROCEDURE — 85610 PROTHROMBIN TIME: CPT

## 2021-03-25 PROCEDURE — 99238 HOSP IP/OBS DSCHRG MGMT 30/<: CPT | Performed by: INTERNAL MEDICINE

## 2021-03-25 PROCEDURE — 83735 ASSAY OF MAGNESIUM: CPT

## 2021-03-25 PROCEDURE — 2580000003 HC RX 258: Performed by: PHYSICIAN ASSISTANT

## 2021-03-25 PROCEDURE — 80061 LIPID PANEL: CPT

## 2021-03-25 RX ORDER — POTASSIUM CHLORIDE 20 MEQ/1
20 TABLET, EXTENDED RELEASE ORAL DAILY
Qty: 30 TABLET | Refills: 0 | Status: SHIPPED | OUTPATIENT
Start: 2021-03-25 | End: 2021-07-22 | Stop reason: SDUPTHER

## 2021-03-25 RX ORDER — FUROSEMIDE 40 MG/1
40 TABLET ORAL DAILY
Qty: 60 TABLET | Refills: 1 | Status: SHIPPED | OUTPATIENT
Start: 2021-03-25 | End: 2021-07-22 | Stop reason: SDUPTHER

## 2021-03-25 RX ORDER — WARFARIN SODIUM 2.5 MG/1
TABLET ORAL
Qty: 60 TABLET | Refills: 0
Start: 2021-03-25 | End: 2022-03-24

## 2021-03-25 RX ADMIN — ATORVASTATIN CALCIUM 10 MG: 10 TABLET, FILM COATED ORAL at 08:27

## 2021-03-25 RX ADMIN — POTASSIUM BICARBONATE 40 MEQ: 782 TABLET, EFFERVESCENT ORAL at 07:32

## 2021-03-25 RX ADMIN — Medication 10 ML: at 08:28

## 2021-03-25 RX ADMIN — FUROSEMIDE 40 MG: 10 INJECTION, SOLUTION INTRAMUSCULAR; INTRAVENOUS at 08:27

## 2021-03-25 RX ADMIN — ENOXAPARIN SODIUM 90 MG: 100 INJECTION SUBCUTANEOUS at 08:27

## 2021-03-25 RX ADMIN — METOPROLOL TARTRATE 25 MG: 25 TABLET, FILM COATED ORAL at 08:27

## 2021-03-25 NOTE — CARE COORDINATION
DISCHARGE ORDER  Date/Time 3/25/2021 2:32 PM  Completed by: Indira Barnes, Case Management    Patient Name: Gordo Rivera      : 1935  Admitting Diagnosis: New onset of congestive heart failure ([address] [I50.9]      Admit order Date and Status:3/24/2021 inpt  (verify MD's last order for status of admission)      Noted discharge order. If applicable PT/OT recommendation at Discharge: n/a  DME recommendation by PT/OT:n/a  Confirmed discharge plan  (pt): Yes  with whom________pt_______  If pt confirmed DC plan does family need to be contacted by CM Yes if yes who_son, _Bill (via phone)____  Discharge Plan: Reviewed chart. Role of discharge planner explained and patient and son, Nancy Lopez (via phone as pt wanted CM to speak with her phone) verbalized understanding. Discharge order is noted. Pt is being d/c'd to home today. Pt's O2 sats are 95% on RA. Pt and son state that they are agreeable to [address]. They state that they want [facility] for PT/OT/SN. CM called Amarilis Vinson with [facility] and she states able to accept. +HHC orders, +eCOC. Pt is a new CHF pt. Spoke with pt about daily weights and low sodium meals. Pt verbalized understanding. Pt had iNEW[employer]T called into [facility]  For $65.  Pt's son is picking up Rx now and then coming to  pt. No further discharge needs needed or noted. Reviewed chart. Role of discharge planner explained and patient verbalized understanding. Discharge order is noted. Has Home O2 in place on admit:  No  Informed of need to bring portable home O2 tank on day of discharge for nursing to connect prior to leaving:   Not Indicated  Verbalized agreement/Understanding:   Not Indicated    Discharge timeout done with Jorge Phillips RN. All discharge needs and concerns addressed.

## 2021-03-25 NOTE — PROGRESS NOTES
Pt discharged to home. PIVs removed. Verbal and written discharge instructions reviewed with patient and son at bedside per pt request. Medications reviewed and 4 xs given to patient by meds to beds. CHF education reviewed including diet, fluid intake, daily weights, s/s to look for, when to contact MD. Pt verbalized understanding. Need for lab draws on 3-29 and 4-1 discussed with pt. Pt understands. Pt left with all belongings via wheelchair for transport home.

## 2021-03-25 NOTE — FLOWSHEET NOTE
03/25/21 0826   Vital Signs   Temp 98.2 °F (36.8 °C)   Temp Source Oral   Pulse 85   Heart Rate Source Monitor   Resp 18   BP (!) 147/76   BP Location Right upper arm   Level of Consciousness Alert (0)   MEWS Score 1   Patient Currently in Pain Denies   Oxygen Therapy   SpO2 95 %   O2 Device None (Room air)   AM assessment complete. Vital signs stable. Pt denies pain or SOB. Repositioned in bed. AM medications given as ordered. No other needs identified at this time. Will continue to monitor.

## 2021-03-25 NOTE — PROGRESS NOTES
Patient's EF (Ejection Fraction) is greater than 40%    Patient's weights and intake/output reviewed:    Patient's Last Weight: 214lbs 2 oz lbs obtained by bed scale. Difference of unknown, pt admission less than 1 day ago, stated weight is 202 lbs  Intake/Output Summary (Last 24 hours) at 3/25/2021 1021  Last data filed at 3/25/2021 0946  Gross per 24 hour   Intake 670 ml   Output 425 ml   Net 245 ml         Pt is currently on RA. Pt denies shortness of breath. Pt with pitting lower extremity edema. Patient and/or Family's stated Goal of Care this Admission: better understand heart failure and disease management and reduce lower extremity edema prior to discharge      Comorbidities Reviewed Yes  Patient has a past medical history of A-fib ([address] Cancer ([address] Clostridium difficile diarrhea, Clostridium difficile infection, COVID-19, Diabetes mellitus ([address] Hyperlipidemia, and Hypertension.          >>For CHF and Comorbidity documentation on Education Time and Topics, please see Education Tab

## 2021-03-25 NOTE — PROGRESS NOTES
Pt A/Ox4. Pt unlabored, respirations even & easy. No distress noted. Shift assessment complete. See flowsheet. Lung sounds diminished bilaterally. PM meds given. See MAR. Repositioned patient in bed. Denies needs at this time. Bed alarm on. Bed in low position. Call light within reach.

## 2021-03-25 NOTE — CARE COORDINATION
[facility]  Received referral regarding HC services from Sweta EASON. Pt was a recent referral to [facility] and was a non admit due to pt declining need for [facility].. Spoke with pt. Agreeable to [facility] for SN, PT/OT. Discussed non admit after last discharge and pt agrees [facility]. needed. Verified demographics. Discussed [facility] , HCV and Zoom. Pt requests follow up with son as he is the one who has a smartphone. Liaison will follow up with son, Viv Lares. Faxed referral with orders to [facility].     Electronically signed by Verito Meadows RN on 3/25/2021 at 3:06 PM

## 2021-03-25 NOTE — PROGRESS NOTES
Miranda Tamez   Progress Note  Cardiology      HPI: Seeing Ms. Solomon today for f/u diastolic CHF. She feels better and denies SOB or other specific complaints. Tele reviewed showing afib 80's bpm.       Physical Examination:    Vitals:    03/25/21 0826   BP: (!) 147/76   Pulse: 85   Resp: 18   Temp: 98.2 °F (36.8 °C)   SpO2: 95%          Constitutional and General Appearance: NAD   Respiratory:  · Normal excursion and expansion without use of accessory muscles  · Resp Auscultation: Soft bs bases; soft crackles left base  Cardiovascular:  · The apical impulses not displaced  · Heart tones are crisp and normal; +irregularly irregular with II/VI harsh MARIAELENA  · Cervical veins are not engorged  · The carotid upstroke is normal in amplitude and contour without delay or bruit  · Normal S1S2, No S3, No Murmur  · Peripheral pulses are symmetrical and full  · There is no clubbing, cyanosis of the extremities.   · 1+ BLE edema  · Pedal Pulses: 2+ and equal   Abdomen:  · No masses or tenderness  · Liver/Spleen: No Abnormalities Noted  Neurological/Psychiatric:  · Alert and oriented in all spheres  · Moves all extremities well  · No abnormalities of mood, affect, memory, mentation, or behavior are noted  Skin: warm and dry      Lab Results   Component Value Date    WBC 6.6 03/25/2021    HGB 11.3 (L) 03/25/2021    HCT 34.5 (L) 03/25/2021    MCV 90.8 03/25/2021     03/25/2021     Lab Results   Component Value Date    CREATININE 0.6 03/25/2021    BUN 10 03/25/2021     03/25/2021    K 3.3 (L) 03/25/2021     03/25/2021    CO2 25 03/25/2021     Lab Results   Component Value Date    INR 1.12 03/24/2021    PROTIME 13.0 03/24/2021       CXR 3/24/21:  Impression   Cardiomegaly, pulmonary vascular congestion and small bilateral pleural   effusions compatible with fluid overload and/or CHF.       Bibasilar airspace disease.  Pneumonia can not be excluded.             CT imaging 3/24/21:  Impression   No evidence of pulmonary embolism.       Cardiomegaly, bilateral pleural effusions and mild pulmonary edema compatible   with fluid overload and/or CHF.       Mild dependent airspace disease bilaterally most likely represents   atelectasis.       Additional chronic findings detailed above.          Lexiscan myoview 10/13/15:  Impression   IMPRESSION:   1. No pharmcologically induced reversible perfusion defects to suggest   ischemia   2. Ejection fraction of 67%   3. No focal wall motion abnormality.         [facility] 2/18/21 Summary   Normal left ventricular systolic function with an estimated EF of 55-60%.   No regional wall motion abnormalities are seen.  Waynard Slack is moderate concentric left ventricular hypertrophy.   The left atrium is mildly dilated.   Mild posterior mitral annular calcification is present.   Mild mitral regurgitation.   Moderate aortic stenosis and moderate aortic regurgitation is present.  (SPAP) estimated at 31 mmHg (RAP 3 mmHg).    Assessment:  Elgin Gardner is a 82 y.o. patient who presented [facility] 3/24/21 with c/o SOB, cough, and swelling. Follows with Dr. Reji Deras for cardiac care--last OV 2/26/21. She has PMH of afib dx 2/21 on coumadin, DM, HTN, HLD, Covid-19 dx 1/21, and hx C. Diff. Most recent ECHO 2/17/21 normal EF=55-60%; moderate LVH; mild MR; moderate AS (velocity=3.45m/s; mean pressure gradient=26mmHg);; moderate AR; SPAP=31mmHg. Most recent lexiscan myoview 10/13/15 negative for ischemia; EF=67%. Note recent lap dario per Dr. Tania Bess on 3/2/21. She reports leg swelling started right after surgery and has not improved. Also c/o dry cough last few days along with SOB with wheezing worse with exertion. Also feels weak in general. Reports \"crying\" spells which she attributes to digoxin medication and Dr. Reji Deras d/c'd on 3/18/21, Admit EKG afib 102bpm; LV; anterior infarct cannot be excluded; nonspecific ST changes (2/21 EKG inferior infarct pattern resolved). Note ADP=4587 (491 in 10/15);  Мария x 2 negative; INR=1.03; CXR CM; PVC; small bilat pleural effusions; CT imaging negative PE; mild pulm edema; pleural effusions. Diagnosis of acute diastolic CHF in elderly female s/p recent lap dario with hx afib, moderate AS and AR.      Recs:  1. Diurese with IV lasix 40mg BID. Note I/O''s not well recorded and weights likely not accurate. She feels better clinically and LE edema improved. 2. D/C'd digoxin  3. Continue metoprolol 25mg po BID. 4. D/C baby aspirin. No definite indication. 5. Continue lovenox bridging and coumadin to goal INR 2-3. Today INR=1.12.  6. No need for cardiac testing at this time. 7. Prescribe KDur supplement given K+ 3.3 today. OK for d/c from cardiology when IM doc feels ready. I recommend lasix 40mg po daily on d/c and f/u OV with Dr. Chencho Jefferson already made for 4/8/21. F/U BMP 1 week. Signing off.     Patient Active Problem List   Diagnosis    Ischemia of lower extremity    DM2 (diabetes mellitus, type 2) ([address]    History of fasciotomy    Mass of colon    Anemia    Iron deficiency anemia due to chronic blood loss    Chest pain    Colonic mass    Ischemic neuropathy of right foot    Malignant neoplasm of transverse colon ([address]    Malignant neoplasm of transverse colon (HCC)    Intra-abdominal abscess (HCC)    Benign essential HTN    Hyperlipidemia    Atrial fibrillation (HCC)    PAD (peripheral artery disease) (HCC)    History of venous thromboembolism    Calculus of gallbladder without cholecystitis without obstruction    Aortic valve stenosis    Nonrheumatic aortic valve insufficiency    New onset of congestive heart failure (HCC)    Acute diastolic CHF (congestive heart failure) (HCC)    Acute pulmonary edema (HCC)    Pleural effusion, bilateral

## 2021-03-25 NOTE — DISCHARGE INSTR - COC
Continuity of Care Form    Patient Name: Lev Newman   :  1935  MRN:  [id number]    Admit date:  3/24/2021  Discharge date:  3/25/2021    Code Status Order: Full Code   Advance Directives:   [facility] Documentation     Date/Time Healthcare Directive Type of Healthcare Directive Copy in [address] Agent's Name Healthcare Agent's Phone Number    21 1519  No, patient does not have an advance directive for healthcare treatment -- -- -- -- --          Admitting Physician:  Doc Cohen MD  PCP: ISADORA Mariano CNP    Discharging Nurse: [facility] Unit/Room#: /1487-43  Discharging Unit Phone Number: ***    Emergency Contact:   Extended Emergency Contact Information  Primary Emergency Contact: Telly Solomon \"Bill\"   [address] Phone: [phone]  Work Phone: [phone]  Mobile Phone: [phone]  Relation: Child   needed? No  Secondary Emergency Contact: Crystal Romero [address] Phone: [phone]  Work Phone: [phone]  Mobile Phone: [phone]  Relation: Child    Past Surgical History:  Past Surgical History:   Procedure Laterality Date    ABDOMEN SURGERY Right 10/15/2015    right colectomy, pain ball    ABDOMEN SURGERY  2016    trasverse colectomy    CHOLECYSTECTOMY  2021    : LAPAROSCOPIC CHOLECYSTECTOMY     CHOLECYSTECTOMY, LAPAROSCOPIC N/A 3/2/2021    LAPAROSCOPIC CHOLECYSTECTOMY performed by Logan Davis MD at [address] COLONOSCOPY      ascending colon mass, diverticulosis, hemorrhoids    COLONOSCOPY  2016    Transverse colon mass; Diverticulosis;  Hemorrhoids    HERNIA REPAIR      x2    OTHER SURGICAL HISTORY Left 2014    excision of basal cell skin cancer Left face with full thickness skin graph from abdomen    SKIN GRAFT      UPPER GASTROINTESTINAL ENDOSCOPY      VASCULAR SURGERY Immunization History: There is no immunization history on file for this patient.     Active Problems:  Patient Active Problem List   Diagnosis Code    Ischemia of lower extremity I99.8    DM2 (diabetes mellitus, type 2) ([facility] E11.9    History of fasciotomy Z98.890    Mass of colon K63.89    Anemia D64.9    Iron deficiency anemia due to chronic blood loss D50.0    Chest pain R07.9    Colonic mass K63.89    Ischemic neuropathy of right foot G57.91    Malignant neoplasm of transverse colon (HCC) C18.4    Malignant neoplasm of transverse colon (HCC) C18.4    Intra-abdominal abscess (HCC) K65.1    Benign essential HTN I10    Hyperlipidemia E78.5    Atrial fibrillation (HCC) I48.91    PAD (peripheral artery disease) (HCC) I73.9    History of venous thromboembolism Z86.718    Calculus of gallbladder without cholecystitis without obstruction K80.20    Aortic valve stenosis I35.0    Nonrheumatic aortic valve insufficiency I35.1    New onset of congestive heart failure (HCC) I50.9    Acute on chronic diastolic heart failure (HCC) I50.33    Acute pulmonary edema (HCC) J81.0    Pleural effusion, bilateral J90       Isolation/Infection:   Isolation          No Isolation        Patient Infection Status     Infection Onset Added Last Indicated Last Indicated By Review Planned Expiration Resolved Resolved By    None active    Resolved    COVID-19 Rule Out 21 COVID-19, Rapid (Ordered)   21 Rule-Out Test Resulted    COVID-19 21 COVID-19   21     COVID-19 Rule Out 21 COVID-19 (Ordered)   21 Rule-Out Test Resulted    COVID-19 Rule Out 21 COVID-19 (Ordered)   21 Rule-Out Test Resulted          Nurse Assessment:  Last Vital Signs: BP (!) 147/76   Pulse 85   Temp 98.2 °F (36.8 °C) (Oral)   Resp 18   Ht 5' 5\" (1.651 m)   Wt 214 lb 2 oz (97.1 kg)   SpO2 95%   BMI 35.63 kg/m² Last documented pain score (0-10 scale): Pain Level: 0  Last Weight:   Wt Readings from Last 1 Encounters:   21 214 lb 2 oz (97.1 kg)     Mental Status:  {IP PT MENTAL STATUS:}    IV Access:  { CHRIS IV ACCESS:839096773}    Nursing Mobility/ADLs:  Walking   {CHP DME BXWJ:471391122}  Transfer  {CHP DME GDNT:119074591}  Bathing  {CHP DME TSXS:130219541}  Dressing  {CHP DME HZZD:572917167}  Toileting  {CHP DME KUCN:099736324}  Feeding  {CHP DME OAGY:879023156}  Med Admin  {CHP DME QMRR:593056275}  Med Delivery   { CHRIS MED Delivery:022704758}    Wound Care Documentation and Therapy:        Elimination:  Continence:   · Bowel: {YES / ZQ:26405}  · Bladder: {YES / SZ:45419}  Urinary Catheter: {Urinary Catheter:052691414}   Colostomy/Ileostomy/Ileal Conduit: {YES / KU:05223}       Date of Last BM: ***    Intake/Output Summary (Last 24 hours) at 3/25/2021 1428  Last data filed at 3/25/2021 1407  Gross per 24 hour   Intake 1090 ml   Output 1625 ml   Net -535 ml     I/O last 3 completed shifts:   In: 56 [P.O.:480; I.V.:10]  Out: 150 [Urine:150]    Safety Concerns:     [address] Safety Concerns:103689646}    Impairments/Disabilities:      [address] Impairments/Disabilities:128754996}    Nutrition Therapy:  Current Nutrition Therapy:   [address] Diet List:958537330}    Routes of Feeding: {P DME Other Feedings:011820692}  Liquids: {Slp liquid thickness:38929}  Daily Fluid Restriction: {CHP DME Yes amt example:350380602}  Last Modified Barium Swallow with Video (Video Swallowing Test): {Done Not Done [facility]:063758027}    Treatments at the Time of Hospital Discharge:   Respiratory Treatments: ***  Oxygen Therapy:  {Therapy; copd oxygen:09617}  Ventilator:    { CC Vent YUNP:368140819}    Rehab Therapies: Physical Therapy, Occupational Therapy and SN  Weight Bearing Status/Restrictions: [address] Weight Bearin}  Other Medical Equipment (for information only, NOT a DME order):  {EQUIPMENT:157478500}  Other Treatments: ***    Patient's personal belongings (please select all that are sent with patient):  {CHP DME Belongings:908393901}    RN SIGNATURE:  {Esignature:067778403}    CASE MANAGEMENT/SOCIAL WORK SECTION    Inpatient Status Date: 3/24/2021    Readmission Risk Assessment Score:  Readmission Risk              Risk of Unplanned Readmission:        21           Discharging to Facility/ Agency   Name: [facility]  Address: [address] [facility] [address] [facility],[address] [place], [address] [facility]   Phone: [phone]  Fax: [phone]      [facility]: LEVEL [address] [place] [address] to establish plan of care for patient over 60 day period   Nursing  Initial home SN evaluation visit to occur within 24-48 hours for:  medication management  VS and clinical assessment  S&S chronic disease exacerbation education + when to contact MD / NP  care coordination  Medication Reconciliation during 1st SN visit       PT/OT   Evaluate with goal of regaining prior level of functioning   OT to evaluate if patient has [address] needs for personal care      PCP Visit scheduled within 7 days of hospital discharge       / signature: Electronically signed by Maru Astudillo RN on 3/25/21 at 2:29 PM EDT    PHYSICIAN SECTION    Prognosis: {Prognosis:2085206453}    Condition at Discharge: Lia Genao Patient Condition:633194995}    Rehab Potential (if transferring to Rehab): {Prognosis:2929269319}    Recommended Labs or Other Treatments After Discharge: SN, PT/OT  HCV and Zoom    Physician Certification: I certify the above information and transfer of Tiffanie Roque  is necessary for the continuing treatment of the diagnosis listed and that she requires Home Care for less 30 days.      Update Admission H&P: {CHP DME Changes in QRYAD:511991958}    PHYSICIAN SIGNATURE:  Electronically signed by Javier Toribio MD/Sweta Piedra RN on 3/25/21 at 2:29 PM EDT

## 2021-03-25 NOTE — PLAN OF CARE
Problem: Falls - Risk of:  Goal: Will remain free from falls  Description: Will remain free from falls  3/25/2021 1019 by Ivana Miguel RN  Outcome: Ongoing  3/25/2021 0331 by Elise Demarco RN  Outcome: Ongoing  Goal: Absence of physical injury  Description: Absence of physical injury  3/25/2021 1019 by Ivana Miguel RN  Outcome: Ongoing  3/25/2021 0331 by Elise Demarco RN  Outcome: Ongoing     Problem: Infection:  Goal: Will remain free from infection  Description: Will remain free from infection  3/25/2021 1019 by Ivana Miguel RN  Outcome: Ongoing  3/25/2021 0331 by Elise Demarco RN  Outcome: Ongoing     Problem: Safety:  Goal: Free from accidental physical injury  Description: Free from accidental physical injury  3/25/2021 1019 by Ivana Miguel RN  Outcome: Ongoing  3/25/2021 0331 by Elise Demarco RN  Outcome: Ongoing  Goal: Free from intentional harm  Description: Free from intentional harm  3/25/2021 1019 by Ivana Miguel RN  Outcome: Ongoing  3/25/2021 0331 by Elise Demarco RN  Outcome: Ongoing     Problem: Pain:  Goal: Patient's pain/discomfort is manageable  Description: Patient's pain/discomfort is manageable  3/25/2021 1019 by Ivana Miguel RN  Outcome: Ongoing  3/25/2021 0331 by Elise Demarco RN  Outcome: Ongoing     Problem: Discharge Planning:  Goal: Patients continuum of care needs are met  Description: Patients continuum of care needs are met  3/25/2021 1019 by Ivana Miguel RN  Outcome: Ongoing  3/25/2021 0331 by Elise Demarco RN  Outcome: Ongoing

## 2021-03-25 NOTE — DISCHARGE SUMMARY
Name:  Miguel Brown  Room:  /4572-87  MRN:    [id number]    Discharge Summary      This discharge summary is in conjunction with a complete physical exam done on the day of discharge. Discharging Physician: Dr. Jerardo Davidson: 3/24/2021  Discharge: 3/25/2021      HPI taken from admission H&P:    The patient is a 82 y.o. female with atrial fibrillation anticoagulated on Coumadin, prior history of C. difficile, diabetes, hypertension, hyperlipidemia anemia and prior COVID-19 infection who presented to [facility]  ED with complaint of shortness of breath. Patient reports that since her recent surgery she went home and felt like she should be getting stronger. She reports that she feels very weak and still feels very short of breath at times. She has had a nonproductive cough with increased wheezing and has noticed a lot of swelling in her feet or ankles that has not really improved since being admitted for the surgery. She is unfamiliar with her home medications reporting that her son gives these medications to her. She has completed the Lovenox injections but is unsure whether she has been taking her Coumadin at home or not. She denies any associated chest pain. She denies any fevers or chills.     Diagnoses this Admission and Hospital Course   Acute on chronic dCHF  -Last echo with EF of 55 to 60%, LVH, moderate aortic stenosis  - Chest x-ray with cardiomegaly, pulmonary vascular congestion small bilateral pleural effusions  -BNP elevated 2233, peripheral edema noted on exam, patient complains of increased shortness of breath  -Continue IV Lasix 40 mg twice daily  -Strict I's and O's, daily weights, low-sodium diet  -Cardiology consult  -CTPA negative for PE  - weight 211 lbs --> 214 lbs (was 213 on discharge on 3/4 but was 199lbs on discharge on 2/18   - Patient feels clinically better today, d/c home on Lasix 40 mg PO QD and follow up OP with Dr. Tg Arshad      Chronic atrial fibrillation -Was diagnosed with atrial fibrillation in February 2021  - Normally AC on Coumadin  -Subtherapeutic INR, pharmacy to dose Coumadin  -Bridge with Lovenox x 7 days   - Patient instructed to take 7.5 mg Coumadin on 3/25-3/27, and 5 mg on 3/28 and have PT/INR checked on Monday 3/29  -Continue Lopressor  - digoxin d/c'd by cardiology      Aortic stenosis  -Noted on most recent echo--> moderate  -Following with cardiology     DM2  -Hold home oral Rx  -Continue sliding scale insulin  -Glucose is stable     HTN  - BP is stable  - Continue Lopressor  HLD  - Continue statin      PAD  -Status post fasciotomies and revascularization of the right lower extremity via anterior tib artery angioplasty  -Followed by vascular surgery  -Continue aspirin     Recent admission for acute cholecystitis  -Was admitted on 3/2 for acute cholecystitis  -Status post laparoscopic cholecystectomy  -She was discharged home on Lovenox twice daily and instructed to resume Coumadin--patient reports that she is done taking the Lovenox injections but unsure if she resumed her Coumadin    Procedures (Please Review Full Report for Details)  None     Consults    Cardiology     Physical Exam at Discharge:    BP (!) 147/76   Pulse 85   Temp 98.2 °F (36.8 °C) (Oral)   Resp 18   Ht 5' 5\" (1.651 m)   Wt 214 lb 2 oz (97.1 kg)   SpO2 95%   BMI 35.63 kg/m²   Gen: No distress. Alert. Eyes:  No conjunctival injection. ENT: No discharge. Pharynx clear. Scarring to the left maxilla from prior skin cancer removal  Neck: Minimal JVD. Trachea midline. Resp: No accessory muscle use. Faint crackles. No wheezes. No rhonchi. On room air  CV: Regular rate. Irregular rhythm. + murmur. No rub. ++ edema. Capillary Refill: Brisk,< 3 seconds   Peripheral Pulses: +2 palpable, equal bilaterally   GI: Non-tender. Soft, nondistended, normal bowel sounds  Skin: Warm and dry. Well-healing midline abdominal scar  M/S: No cyanosis. No joint deformity. No clubbing. into the skin 2 times daily for 7 days Dose: 90mg  What changed: how much to take     warfarin 2.5 MG tablet  Commonly known as: Coumadin  Take as directed. If you are unsure how to take this medication, talk to your nurse or doctor. Original instructions: Take 3 tablets by mouth daily for 3 days, THEN 2 tablets daily for 1 day. Then see the coumadin clinic for blood check and further dosing. Start taking on: March 25, 2021  What changed: See the new instructions. CONTINUE taking these medications    atorvastatin 10 MG tablet  Commonly known as: LIPITOR  Take 1 tablet by mouth daily     Diabeta 5 MG tablet  Generic drug: glyBURIDE     lactobacillus capsule  One po tid     metoprolol tartrate 25 MG tablet  Commonly known as: LOPRESSOR  Take 1 tablet by mouth 2 times daily        STOP taking these medications    aspirin 81 MG chewable tablet     digoxin 125 MCG tablet  Commonly known as: LANOXIN           Where to Get Your Medications      These medications were sent to [address] [place] [address], [address] [place] [address] [phone]  [address]    Phone: [phone]   · enoxaparin 100 MG/ML injection  · furosemide 40 MG tablet  · potassium chloride 20 MEQ extended release tablet     Information about where to get these medications is not yet available    Ask your nurse or doctor about these medications  · warfarin 2.5 MG tablet       Discharged in stable condition to home    Follow Up:   Follow up with PCP, cardiology OP, need BMP in 1 week      [facility] 3/26/2021 1:17 PM

## 2021-03-25 NOTE — PLAN OF CARE
Problem: Falls - Risk of:  Goal: Will remain free from falls  Description: Will remain free from falls  Outcome: Ongoing  Goal: Absence of physical injury  Description: Absence of physical injury  Outcome: Ongoing     Problem: Infection:  Goal: Will remain free from infection  Description: Will remain free from infection  Outcome: Ongoing     Problem: Safety:  Goal: Free from accidental physical injury  Description: Free from accidental physical injury  Outcome: Ongoing  Goal: Free from intentional harm  Description: Free from intentional harm  Outcome: Ongoing     Problem: Daily Care:  Goal: Daily care needs are met  Description: Daily care needs are met  Outcome: Ongoing     Problem: Pain:  Goal: Patient's pain/discomfort is manageable  Description: Patient's pain/discomfort is manageable  Outcome: Ongoing     Problem: Skin Integrity:  Goal: Skin integrity will stabilize  Description: Skin integrity will stabilize  Outcome: Ongoing     Problem: Discharge Planning:  Goal: Patients continuum of care needs are met  Description: Patients continuum of care needs are met  Outcome: Ongoing     Problem: OXYGENATION/RESPIRATORY FUNCTION  Goal: Patient will maintain patent airway  Outcome: Ongoing  Goal: Patient will achieve/maintain normal respiratory rate/effort  Description: Respiratory rate and effort will be within normal limits for the patient  Outcome: Ongoing     Problem: HEMODYNAMIC STATUS  Goal: Patient has stable vital signs and fluid balance  Outcome: Ongoing     Problem: FLUID AND ELECTROLYTE IMBALANCE  Goal: Fluid and electrolyte balance are achieved/maintained  Outcome: Ongoing     Problem: ACTIVITY INTOLERANCE/IMPAIRED MOBILITY  Goal: Mobility/activity is maintained at optimum level for patient  Outcome: Ongoing

## 2021-03-26 ENCOUNTER — FOLLOWUP TELEPHONE ENCOUNTER (OUTPATIENT)
Dept: MEDSURG UNIT | Age: 86
End: 2021-03-26

## 2021-03-26 LAB
ORGANISM: ABNORMAL
URINE CULTURE, ROUTINE: ABNORMAL
URINE CULTURE, ROUTINE: ABNORMAL

## 2021-03-26 NOTE — TELEPHONE ENCOUNTER
1st Attempt; No Answer- Unable to leave HIPAA compliant voicemail with Non-Urgent Heart Failure Resource Line number for call back. Spoke with pt son who states pt is sleeping. Was asked to call back after 0900.

## 2021-03-28 LAB
BLOOD CULTURE, ROUTINE: NORMAL
CULTURE, BLOOD 2: NORMAL

## 2021-03-30 ENCOUNTER — FOLLOWUP TELEPHONE ENCOUNTER (OUTPATIENT)
Dept: MEDSURG UNIT | Age: 86
End: 2021-03-30

## 2021-03-30 NOTE — TELEPHONE ENCOUNTER
Care transition faxed to PCP. Care transition included reason for hospitalization, procedures performed during hospitalization, services provided during hospitalization, discharge medications, and follow-up treatment and services needed.  Electronically signed by Landon Douglas RN on 3/30/2021 at 10:29 AM

## 2021-04-01 NOTE — TELEPHONE ENCOUNTER
Pt main pharmacy does not have Lovenox in stock.     She needs 2 injections sent to [facility] to hold her over until she is able to get from her main pharmacy

## 2021-04-06 NOTE — PROGRESS NOTES
[facility] GENERAL SURGERY      S:   Patient presents s/p lap dario 6 days  ago. She reports improvement. O:   Comfortable         Incision sites healing well. RAJIV drainage scant and non bilious. RAJIV drain removed. FINAL DIAGNOSIS:     Gallbladder, cholecystectomy:      - Acute and chronic cholecystitis with extensive mucosal ulceration        and fibrinous necrosis.    - Cholelithiasis.      ROCJO/ROCJO               A:   S/P above    P:   Follow up as needed.

## 2021-04-06 NOTE — PROGRESS NOTES
[address] Office Note  4/8/2021     Subjective:  Ms. Priya Mcdaniels is here today for cardiology follow up of permanent  Afib, moderate aortic stenosis and aortic insufficiency PAD Hyperlipidemia hypertension and diastolic CHF. [place]:    Today she reports she has been feeling well, and has recovered from her surgery. She has lost about 10 lbs since hospitalization in March 2021. Denies chest pain, shortness of breath, edema, dizziness, palpitations and syncope. Her son is present at exam.   She presented to the hospital 2/17/21 for elective cholecystectomy but was found to be afib rvr. She had echo during hospitalization  Which revealed EF 55-60%. Mild MR. Moderate AS and moderate AR. She came back to hospital 3/24/21-3/25/21 with symptoms of CHF Acute on chronic dCHF  PMH  Afib, HLD, PAD, s/p thrombectomy and fasciotomy on 6/20/15, then underwent angiogram w/ angioplasty on 6/22/15, DM2. Acute renal failure recovered     Review of Systems:         12 point ROS negative in all areas as listed below except as in [address], EENT, pulmonary, GI, , Musculoskeletal, skin, neurological, hematological, endocrine, Psychiatric    Reviewed past medical history, social, and family history. reports that she has never smoked. She has never used smokeless tobacco. She reports that she does not drink alcohol or use drugs.    MOM- Brain cancer, no heart disease   DAD- prostate cancer, no heart disease   Past Medical History:   Diagnosis Date    A-fib ([address]     [facility] [address]     skin cancer 2014; colon 2015    Clostridium difficile diarrhea 06/25/2015    per physicians notes; negative on 6/25/15    Clostridium difficile infection 12/09/2016    COVID-19 01/13/2021    Diabetes mellitus [facility] [address]     Hyperlipidemia     Hypertension      Past Surgical History:   Procedure Laterality Date    ABDOMEN SURGERY Right 10/15/2015    right colectomy, pain ball    ABDOMEN SURGERY  12/06/2016    trasverse colectomy  CHOLECYSTECTOMY  03/02/2021    : LAPAROSCOPIC CHOLECYSTECTOMY     CHOLECYSTECTOMY, LAPAROSCOPIC N/A 3/2/2021    LAPAROSCOPIC CHOLECYSTECTOMY performed by Elvira Soto MD at [address]  COLONOSCOPY  2015    ascending colon mass, diverticulosis, hemorrhoids    COLONOSCOPY  11/08/2016    Transverse colon mass; Diverticulosis; Hemorrhoids    HERNIA REPAIR      x2    OTHER SURGICAL HISTORY Left 9/29/2014    excision of basal cell skin cancer Left face with full thickness skin graph from abdomen    SKIN GRAFT      UPPER GASTROINTESTINAL ENDOSCOPY      VASCULAR SURGERY         Objective:   BP (!) 114/54   Pulse 98   Temp 97.4 °F (36.3 °C)   Ht 5' 5\" (1.651 m)   Wt 191 lb (86.6 kg)   SpO2 97%   BMI 31.78 kg/m²     Wt Readings from Last 3 Encounters:   04/08/21 191 lb (86.6 kg)   03/25/21 214 lb 2 oz (97.1 kg)   03/02/21 213 lb 7 oz (96.8 kg)       Physical Exam:  General: No Respiratory distress, appears well developed and well nourished. Eyes:  Sclera nonicteric  Nose/Sinuses:  negative findings: nose shows no deformity, asymmetry, or inflammation, nasal mucosa normal, septum midline with no perforation or bleeding  Back:  no pain to palpation  Joint:  no active joint inflammation  Musculoskeletal:  negative  Skin:  Warm and dry  Neck:  Negative for JVD and Carotid Bruits. Chest:  Clear to auscultation, respiration easy  Cardiovascular:  irreg ireg 112bpm S2 normal, Aortic stenosis murmur, no rub or thrill.   Extremities:   1+ BLE edema, clubbing, cyanosis,  Pedal pulses are intact  Neuro: intact    Medications:   Outpatient Encounter Medications as of 4/8/2021   Medication Sig Dispense Refill    metoprolol tartrate (LOPRESSOR) 50 MG tablet Take 1 tablet by mouth 2 times daily 180 tablet 3    furosemide (LASIX) 40 MG tablet Take 1 tablet by mouth daily 60 tablet 1    potassium chloride (KLOR-CON M) 20 MEQ extended release tablet Take 1 tablet by mouth daily 30 tablet 0    atorvastatin (LIPITOR) 10 MG tablet Take 1 tablet by mouth daily 90 tablet 3    glyBURIDE (DIABETA) 5 MG tablet Take 7.5 mg by mouth daily (with breakfast).  [DISCONTINUED] enoxaparin (LOVENOX) 100 MG/ML injection Inject 0.9 mLs into the skin 2 times daily for 7 days Dose: 90mg 12.6 mL 0    warfarin (COUMADIN) 2.5 MG tablet Take 3 tablets by mouth daily for 3 days, THEN 2 tablets daily for 1 day. Then see the coumadin clinic for blood check and further dosing. 60 tablet 0    [DISCONTINUED] metoprolol tartrate (LOPRESSOR) 25 MG tablet Take 1 tablet by mouth 2 times daily 180 tablet 3    [DISCONTINUED] lactobacillus (CULTURELLE) capsule One po tid 30 capsule 1     No facility-administered encounter medications on file as of 4/8/2021. Lab Data:  CBC: No results for input(s): WBC, HGB, HCT, MCV, PLT in the last 72 hours. BMP: No results for input(s): NA, K, CL, CO2, PHOS, BUN, CREATININE in the last 72 hours. Invalid input(s): CA  LIVER PROFILE: No results for input(s): AST, ALT, LIPASE, BILIDIR, BILITOT, ALKPHOS in the last 72 hours. Invalid input(s): AMYLASE,  ALB  LIPID:   Lab Results   Component Value Date    CHOL 80 03/25/2021    CHOL 120 10/13/2015     Lab Results   Component Value Date    TRIG 138 03/25/2021    TRIG 209 (H) 10/13/2015     Lab Results   Component Value Date    HDL 37 (L) 03/25/2021    HDL 35 (L) 10/13/2015     Lab Results   Component Value Date    LDLCALC 15 03/25/2021    LDLCALC 43 10/13/2015     Lab Results   Component Value Date    LABVLDL 28 03/25/2021    LABVLDL 42 10/13/2015     No results found for: CHOLHDLRATIO  PT/INR:   No results for input(s): PROTIME, INR in the last 72 hours. A1C:   Lab Results   Component Value Date    LABA1C 6.7 02/17/2021     BNP:  No results for input(s): BNP in the last 72 hours. IMAGING:   I have reviewed the following tests and documented in this encounter as follows: Discussed with patient and son.   EKG 3/24/21  Atrial fibrillationLow voltage QRSCannot rule out Anterior infarct (cited on or before 17-FEB-2021)Nonspecific ST abnormalityAbnormal ECGWhen compared with ECG of 17-FEB-2021 07:13,Criteria for Inferior infarct are no longer PresentConfirmed by KAYLEE WIGGINS, Svitlana Mercado ([id number]) on 3/24/2021 7:39:30 AM    CTPA 3/24/21  FINDINGS: Pulmonary Arteries: Pulmonary arteries are adequately opacified for evaluation. No evidence of intraluminal filling defect to suggest pulmonary embolism. Main pulmonary artery is normal in caliber. Mediastinum: Cardiomegaly. Significant coronary calcifications. Aortic valve calcifications. Few mitral annular calcifications. No concerning adenopathy. The heart and pericardium demonstrate no acute abnormality. There is no acute abnormality of the thoracic aorta. Lungs/pleura: Mild dependent atelectasis bilaterally. Moderate bilateral pleural effusions. No pneumothorax. Interlobular septal thickening. Few bilateral small ground-glass opacities. Calcified pulmonary nodules and calcified left hilar adenopathy compatible with prior granulomatous disease. Upper Abdomen: Limited imaging including the abdomen. The very top of the liver dome is seen and appears unremarkable. Soft Tissues/Bones: No acute bone or soft tissue abnormality. Confluent anterolateral vertebral body syndesmophyte formation compatible with DISH. No evidence of pulmonary embolism. Cardiomegaly, bilateral pleural effusions and mild pulmonary edema compatible with fluid overload and/or CHF. Mild dependent airspace disease bilaterally most likely represents atelectasis. CXR 3/24/21  Cardiomegaly, pulmonary vascular congestion and small bilateral pleural effusions compatible with fluid overload and/or CHF. Bibasilar airspace disease. Pneumonia can not be excluded. ECHO 2/18/21   Normal left ventricular systolic function with an estimated ejection   fraction of 55-60%.    No regional wall motion abnormalities are seen. There is moderate concentric left ventricular hypertrophy. The left atrium is mildly dilated. Mild posterior mitral annular calcification is present. Mild mitral regurgitation. Moderate aortic stenosis and moderate aortic regurgitation is present. Normal systolic pulmonary artery pressure (SPAP) estimated at 31 mmHg (RA   pressure 3 mmHg). CXR 2/17/21   Pulmonary vascular congestion with no pulmonary edema or focal infiltrate       EKG 2/17/20   Atrial fibrillation with rapid ventricular responseLeft axis deviationLow voltage QRSInferior infarct , age undeterminedCannot rule out Anterior infarct , age undeterminedAbnormal ECGWhen compared with ECG of 01-JAN-2021 04:53,Atrial fibrillation has replaced Sinus rhythmVent. rate has increased BY  51 BPMMinimal criteria for Anterior infarct are now PresentConfirmed by Wilhemina Siemens MD, [address] ([id number]) on 2/17/2021 7:26:24 AM     EKG 1/1/21   Normal sinus rhythmWhen compared with ECG of 12/23/16, low voltage QRS now present. Confirmed by Shanta Cook ([id number]) on 1/1/2021 11:49:00 AM     Assessment:  1. Permanent atrial fibrillation ([address]    2. Acute on chronic diastolic heart failure ([address]    3. Nonrheumatic aortic valve stenosis    4. Mixed hyperlipidemia    5. Nonrheumatic aortic valve insufficiency    6. PAD (peripheral artery disease) (HCC)     [id number] Score for Atrial Fibrillation Stroke Risk   Risk   Factors  Component Value   C CHF Yes 1   H HTN Yes 1   A2 Age >= 76 Yes,  (82 y.o.) 2   D DM Yes 1   S2 Prior Stroke/TIA No 0   V Vascular Disease No 0   A Age 74-69 No,  (82 y.o.) 0   Sc Sex female 1    [id number]  Score  6   Score last updated 2/26/21 6:34 PM EST    Click here for a link to the UpToDate guideline \"Atrial Fibrillation: Anticoagulation therapy to prevent embolization       [place] PCP manages her protime levels    Plan:  1. Meds reviewed. Refills as warranted   2.  Will increase Metoprolol to 50 mg twice a day to control heart

## 2021-04-08 ENCOUNTER — OFFICE VISIT (OUTPATIENT)
Dept: CARDIOLOGY CLINIC | Age: 86
End: 2021-04-08
Payer: MEDICARE

## 2021-04-08 VITALS
SYSTOLIC BLOOD PRESSURE: 114 MMHG | DIASTOLIC BLOOD PRESSURE: 54 MMHG | HEART RATE: 98 BPM | WEIGHT: 191 LBS | BODY MASS INDEX: 31.82 KG/M2 | TEMPERATURE: 97.4 F | OXYGEN SATURATION: 97 % | HEIGHT: 65 IN

## 2021-04-08 DIAGNOSIS — I35.0 NONRHEUMATIC AORTIC VALVE STENOSIS: ICD-10-CM

## 2021-04-08 DIAGNOSIS — I73.9 PAD (PERIPHERAL ARTERY DISEASE) (HCC): ICD-10-CM

## 2021-04-08 DIAGNOSIS — I50.33 ACUTE ON CHRONIC DIASTOLIC HEART FAILURE (HCC): ICD-10-CM

## 2021-04-08 DIAGNOSIS — I35.1 NONRHEUMATIC AORTIC VALVE INSUFFICIENCY: ICD-10-CM

## 2021-04-08 DIAGNOSIS — I48.21 PERMANENT ATRIAL FIBRILLATION (HCC): Primary | ICD-10-CM

## 2021-04-08 DIAGNOSIS — E78.2 MIXED HYPERLIPIDEMIA: ICD-10-CM

## 2021-04-08 PROCEDURE — 99214 OFFICE O/P EST MOD 30 MIN: CPT | Performed by: INTERNAL MEDICINE

## 2021-04-08 RX ORDER — METOPROLOL TARTRATE 50 MG/1
50 TABLET, FILM COATED ORAL 2 TIMES DAILY
Qty: 180 TABLET | Refills: 3 | Status: SHIPPED | OUTPATIENT
Start: 2021-04-08 | End: 2022-03-24 | Stop reason: SDUPTHER

## 2021-04-13 ENCOUNTER — TELEPHONE (OUTPATIENT)
Dept: CARDIOLOGY CLINIC | Age: 86
End: 2021-04-13

## 2021-04-13 NOTE — TELEPHONE ENCOUNTER
Esteban Miguel from [employer] [facility] [employer] called stating she could not obtain pt's BMP today, her vein blew. She is asking if she can try to draw again on next Monday 4-19-21. Please advise and contact [facility] at [phone].

## 2021-04-16 NOTE — TELEPHONE ENCOUNTER
Spoke with [facility]. They will try redraw on 4.19.21.  Results will be sent to vi

## 2021-04-17 ENCOUNTER — APPOINTMENT (OUTPATIENT)
Dept: CT IMAGING | Age: 86
DRG: 372 | End: 2021-04-17
Payer: MEDICARE

## 2021-04-17 ENCOUNTER — HOSPITAL ENCOUNTER (INPATIENT)
Age: 86
LOS: 2 days | Discharge: HOME | DRG: 372 | End: 2021-04-19
Attending: EMERGENCY MEDICINE | Admitting: INTERNAL MEDICINE
Payer: MEDICARE

## 2021-04-17 ENCOUNTER — TELEPHONE (OUTPATIENT)
Dept: OTHER | Facility: CLINIC | Age: 86
End: 2021-04-17

## 2021-04-17 DIAGNOSIS — Z79.01 ANTICOAGULATED ON COUMADIN: ICD-10-CM

## 2021-04-17 DIAGNOSIS — R10.84 GENERALIZED ABDOMINAL PAIN: Primary | ICD-10-CM

## 2021-04-17 DIAGNOSIS — R11.2 NON-INTRACTABLE VOMITING WITH NAUSEA, UNSPECIFIED VOMITING TYPE: ICD-10-CM

## 2021-04-17 DIAGNOSIS — I48.20 CHRONIC A-FIB (HCC): ICD-10-CM

## 2021-04-17 DIAGNOSIS — E86.0 MILD DEHYDRATION: ICD-10-CM

## 2021-04-17 PROBLEM — R10.9 ABDOMINAL PAIN: Status: ACTIVE | Noted: 2021-04-17

## 2021-04-17 LAB
A/G RATIO: 1.5 (ref 1.1–2.2)
ALBUMIN SERPL-MCNC: 4.1 G/DL (ref 3.4–5)
ALP BLD-CCNC: 149 U/L (ref 40–129)
ALT SERPL-CCNC: 26 U/L (ref 10–40)
ANION GAP SERPL CALCULATED.3IONS-SCNC: 14 MMOL/L (ref 3–16)
APTT: 32.1 SEC (ref 24.2–36.2)
AST SERPL-CCNC: 31 U/L (ref 15–37)
BACTERIA: ABNORMAL /HPF
BASOPHILS ABSOLUTE: 0.1 K/UL (ref 0–0.2)
BASOPHILS RELATIVE PERCENT: 0.5 %
BILIRUB SERPL-MCNC: 1.4 MG/DL (ref 0–1)
BILIRUBIN URINE: NEGATIVE
BLOOD, URINE: ABNORMAL
BUN BLDV-MCNC: 19 MG/DL (ref 7–20)
C DIFF TOXIN/ANTIGEN: NORMAL
CALCIUM SERPL-MCNC: 10.1 MG/DL (ref 8.3–10.6)
CHLORIDE BLD-SCNC: 102 MMOL/L (ref 99–110)
CLARITY: ABNORMAL
CO2: 25 MMOL/L (ref 21–32)
COLOR: YELLOW
CREAT SERPL-MCNC: 0.8 MG/DL (ref 0.6–1.2)
CRYSTALS, UA: ABNORMAL /HPF
EKG ATRIAL RATE: 156 BPM
EKG DIAGNOSIS: NORMAL
EKG Q-T INTERVAL: 368 MS
EKG QRS DURATION: 94 MS
EKG QTC CALCULATION (BAZETT): 491 MS
EKG R AXIS: 33 DEGREES
EKG T AXIS: 77 DEGREES
EKG VENTRICULAR RATE: 107 BPM
EOSINOPHILS ABSOLUTE: 0.2 K/UL (ref 0–0.6)
EOSINOPHILS RELATIVE PERCENT: 1.3 %
EPITHELIAL CELLS, UA: ABNORMAL /HPF (ref 0–5)
ETHANOL: NORMAL MG/DL (ref 0–0.08)
GFR AFRICAN AMERICAN: >60
GFR NON-AFRICAN AMERICAN: >60
GLOBULIN: 2.7 G/DL
GLUCOSE BLD-MCNC: 132 MG/DL (ref 70–99)
GLUCOSE BLD-MCNC: 173 MG/DL (ref 70–99)
GLUCOSE BLD-MCNC: 246 MG/DL (ref 70–99)
GLUCOSE BLD-MCNC: 259 MG/DL (ref 70–99)
GLUCOSE URINE: 100 MG/DL
HCT VFR BLD CALC: 45.3 % (ref 36–48)
HEMOGLOBIN: 14.4 G/DL (ref 12–16)
INR BLD: 2.33 (ref 0.86–1.14)
KETONES, URINE: NEGATIVE MG/DL
LACTIC ACID: 2.1 MMOL/L (ref 0.4–2)
LACTIC ACID: 2.5 MMOL/L (ref 0.4–2)
LEUKOCYTE ESTERASE, URINE: NEGATIVE
LIPASE: 20 U/L (ref 13–60)
LYMPHOCYTES ABSOLUTE: 1.6 K/UL (ref 1–5.1)
LYMPHOCYTES RELATIVE PERCENT: 12.7 %
MCH RBC QN AUTO: 28.4 PG (ref 26–34)
MCHC RBC AUTO-ENTMCNC: 31.7 G/DL (ref 31–36)
MCV RBC AUTO: 89.3 FL (ref 80–100)
MICROSCOPIC EXAMINATION: YES
MONOCYTES ABSOLUTE: 0.8 K/UL (ref 0–1.3)
MONOCYTES RELATIVE PERCENT: 6.5 %
NEUTROPHILS ABSOLUTE: 10.3 K/UL (ref 1.7–7.7)
NEUTROPHILS RELATIVE PERCENT: 79 %
NITRITE, URINE: NEGATIVE
PDW BLD-RTO: 15.4 % (ref 12.4–15.4)
PERFORMED ON: ABNORMAL
PH UA: 5 (ref 5–8)
PLATELET # BLD: 258 K/UL (ref 135–450)
PMV BLD AUTO: 9.4 FL (ref 5–10.5)
POTASSIUM REFLEX MAGNESIUM: 3.6 MMOL/L (ref 3.5–5.1)
PRO-BNP: 1229 PG/ML (ref 0–449)
PROTEIN UA: 30 MG/DL
PROTHROMBIN TIME: 26.4 SEC (ref 10–13.2)
RBC # BLD: 5.07 M/UL (ref 4–5.2)
RBC UA: ABNORMAL /HPF (ref 0–4)
SARS-COV-2, NAAT: NOT DETECTED
SODIUM BLD-SCNC: 141 MMOL/L (ref 136–145)
SPECIFIC GRAVITY UA: >=1.03 (ref 1–1.03)
TOTAL PROTEIN: 6.8 G/DL (ref 6.4–8.2)
TROPONIN: <0.01 NG/ML
URINE REFLEX TO CULTURE: ABNORMAL
URINE TYPE: ABNORMAL
UROBILINOGEN, URINE: 0.2 E.U./DL
WBC # BLD: 13 K/UL (ref 4–11)
WBC UA: ABNORMAL /HPF (ref 0–5)

## 2021-04-17 PROCEDURE — 80053 COMPREHEN METABOLIC PANEL: CPT

## 2021-04-17 PROCEDURE — 96375 TX/PRO/DX INJ NEW DRUG ADDON: CPT

## 2021-04-17 PROCEDURE — 85025 COMPLETE CBC W/AUTO DIFF WBC: CPT

## 2021-04-17 PROCEDURE — 96376 TX/PRO/DX INJ SAME DRUG ADON: CPT

## 2021-04-17 PROCEDURE — 87493 C DIFF AMPLIFIED PROBE: CPT

## 2021-04-17 PROCEDURE — 87324 CLOSTRIDIUM AG IA: CPT

## 2021-04-17 PROCEDURE — 74176 CT ABD & PELVIS W/O CONTRAST: CPT

## 2021-04-17 PROCEDURE — 1200000000 HC SEMI PRIVATE

## 2021-04-17 PROCEDURE — 2580000003 HC RX 258: Performed by: INTERNAL MEDICINE

## 2021-04-17 PROCEDURE — 6360000002 HC RX W HCPCS: Performed by: INTERNAL MEDICINE

## 2021-04-17 PROCEDURE — 99222 1ST HOSP IP/OBS MODERATE 55: CPT | Performed by: SURGERY

## 2021-04-17 PROCEDURE — 85730 THROMBOPLASTIN TIME PARTIAL: CPT

## 2021-04-17 PROCEDURE — 96374 THER/PROPH/DIAG INJ IV PUSH: CPT

## 2021-04-17 PROCEDURE — 87086 URINE CULTURE/COLONY COUNT: CPT

## 2021-04-17 PROCEDURE — 83605 ASSAY OF LACTIC ACID: CPT

## 2021-04-17 PROCEDURE — 85610 PROTHROMBIN TIME: CPT

## 2021-04-17 PROCEDURE — 87449 NOS EACH ORGANISM AG IA: CPT

## 2021-04-17 PROCEDURE — 36415 COLL VENOUS BLD VENIPUNCTURE: CPT

## 2021-04-17 PROCEDURE — 93005 ELECTROCARDIOGRAM TRACING: CPT | Performed by: EMERGENCY MEDICINE

## 2021-04-17 PROCEDURE — 83880 ASSAY OF NATRIURETIC PEPTIDE: CPT

## 2021-04-17 PROCEDURE — 6360000002 HC RX W HCPCS: Performed by: EMERGENCY MEDICINE

## 2021-04-17 PROCEDURE — 87635 SARS-COV-2 COVID-19 AMP PRB: CPT

## 2021-04-17 PROCEDURE — 84484 ASSAY OF TROPONIN QUANT: CPT

## 2021-04-17 PROCEDURE — 81001 URINALYSIS AUTO W/SCOPE: CPT

## 2021-04-17 PROCEDURE — 99223 1ST HOSP IP/OBS HIGH 75: CPT | Performed by: INTERNAL MEDICINE

## 2021-04-17 PROCEDURE — 6370000000 HC RX 637 (ALT 250 FOR IP): Performed by: INTERNAL MEDICINE

## 2021-04-17 PROCEDURE — 93010 ELECTROCARDIOGRAM REPORT: CPT | Performed by: INTERNAL MEDICINE

## 2021-04-17 PROCEDURE — 82077 ASSAY SPEC XCP UR&BREATH IA: CPT

## 2021-04-17 PROCEDURE — 99284 EMERGENCY DEPT VISIT MOD MDM: CPT

## 2021-04-17 PROCEDURE — 83690 ASSAY OF LIPASE: CPT

## 2021-04-17 PROCEDURE — 2580000003 HC RX 258: Performed by: EMERGENCY MEDICINE

## 2021-04-17 RX ORDER — MORPHINE SULFATE 4 MG/ML
4 INJECTION, SOLUTION INTRAMUSCULAR; INTRAVENOUS ONCE
Status: COMPLETED | OUTPATIENT
Start: 2021-04-17 | End: 2021-04-17

## 2021-04-17 RX ORDER — ACETAMINOPHEN 650 MG/1
650 SUPPOSITORY RECTAL EVERY 6 HOURS PRN
Status: DISCONTINUED | OUTPATIENT
Start: 2021-04-17 | End: 2021-04-19 | Stop reason: HOSPADM

## 2021-04-17 RX ORDER — SODIUM CHLORIDE 0.9 % (FLUSH) 0.9 %
5-40 SYRINGE (ML) INJECTION EVERY 12 HOURS SCHEDULED
Status: DISCONTINUED | OUTPATIENT
Start: 2021-04-17 | End: 2021-04-19 | Stop reason: HOSPADM

## 2021-04-17 RX ORDER — METOPROLOL TARTRATE 50 MG/1
50 TABLET, FILM COATED ORAL 2 TIMES DAILY
Status: DISCONTINUED | OUTPATIENT
Start: 2021-04-17 | End: 2021-04-19 | Stop reason: HOSPADM

## 2021-04-17 RX ORDER — ATORVASTATIN CALCIUM 10 MG/1
10 TABLET, FILM COATED ORAL DAILY
Status: DISCONTINUED | OUTPATIENT
Start: 2021-04-17 | End: 2021-04-19 | Stop reason: HOSPADM

## 2021-04-17 RX ORDER — POLYETHYLENE GLYCOL 3350 17 G/17G
17 POWDER, FOR SOLUTION ORAL DAILY PRN
Status: DISCONTINUED | OUTPATIENT
Start: 2021-04-17 | End: 2021-04-19 | Stop reason: HOSPADM

## 2021-04-17 RX ORDER — SODIUM CHLORIDE 0.9 % (FLUSH) 0.9 %
5-40 SYRINGE (ML) INJECTION PRN
Status: DISCONTINUED | OUTPATIENT
Start: 2021-04-17 | End: 2021-04-19 | Stop reason: HOSPADM

## 2021-04-17 RX ORDER — 0.9 % SODIUM CHLORIDE 0.9 %
500 INTRAVENOUS SOLUTION INTRAVENOUS ONCE
Status: COMPLETED | OUTPATIENT
Start: 2021-04-17 | End: 2021-04-17

## 2021-04-17 RX ORDER — WARFARIN SODIUM 2.5 MG/1
2.5 TABLET ORAL
Status: COMPLETED | OUTPATIENT
Start: 2021-04-17 | End: 2021-04-17

## 2021-04-17 RX ORDER — DEXTROSE MONOHYDRATE 25 G/50ML
12.5 INJECTION, SOLUTION INTRAVENOUS PRN
Status: DISCONTINUED | OUTPATIENT
Start: 2021-04-17 | End: 2021-04-19 | Stop reason: HOSPADM

## 2021-04-17 RX ORDER — SODIUM CHLORIDE 9 MG/ML
1000 INJECTION, SOLUTION INTRAVENOUS CONTINUOUS
Status: DISCONTINUED | OUTPATIENT
Start: 2021-04-17 | End: 2021-04-18

## 2021-04-17 RX ORDER — CIPROFLOXACIN 2 MG/ML
400 INJECTION, SOLUTION INTRAVENOUS EVERY 12 HOURS
Status: DISCONTINUED | OUTPATIENT
Start: 2021-04-17 | End: 2021-04-19 | Stop reason: HOSPADM

## 2021-04-17 RX ORDER — ACETAMINOPHEN 325 MG/1
650 TABLET ORAL EVERY 6 HOURS PRN
Status: DISCONTINUED | OUTPATIENT
Start: 2021-04-17 | End: 2021-04-19 | Stop reason: HOSPADM

## 2021-04-17 RX ORDER — POTASSIUM CHLORIDE 20 MEQ/1
20 TABLET, EXTENDED RELEASE ORAL DAILY
Status: DISCONTINUED | OUTPATIENT
Start: 2021-04-17 | End: 2021-04-19 | Stop reason: HOSPADM

## 2021-04-17 RX ORDER — ONDANSETRON 2 MG/ML
4 INJECTION INTRAMUSCULAR; INTRAVENOUS EVERY 6 HOURS PRN
Status: DISCONTINUED | OUTPATIENT
Start: 2021-04-17 | End: 2021-04-19 | Stop reason: HOSPADM

## 2021-04-17 RX ORDER — DEXTROSE MONOHYDRATE 50 MG/ML
100 INJECTION, SOLUTION INTRAVENOUS PRN
Status: DISCONTINUED | OUTPATIENT
Start: 2021-04-17 | End: 2021-04-19 | Stop reason: HOSPADM

## 2021-04-17 RX ORDER — ONDANSETRON 2 MG/ML
4 INJECTION INTRAMUSCULAR; INTRAVENOUS ONCE
Status: COMPLETED | OUTPATIENT
Start: 2021-04-17 | End: 2021-04-17

## 2021-04-17 RX ORDER — SODIUM CHLORIDE 9 MG/ML
25 INJECTION, SOLUTION INTRAVENOUS PRN
Status: DISCONTINUED | OUTPATIENT
Start: 2021-04-17 | End: 2021-04-19 | Stop reason: HOSPADM

## 2021-04-17 RX ORDER — FUROSEMIDE 40 MG/1
40 TABLET ORAL DAILY
Status: DISCONTINUED | OUTPATIENT
Start: 2021-04-17 | End: 2021-04-19 | Stop reason: HOSPADM

## 2021-04-17 RX ORDER — PROMETHAZINE HYDROCHLORIDE 25 MG/1
12.5 TABLET ORAL EVERY 6 HOURS PRN
Status: DISCONTINUED | OUTPATIENT
Start: 2021-04-17 | End: 2021-04-19 | Stop reason: HOSPADM

## 2021-04-17 RX ORDER — NICOTINE POLACRILEX 4 MG
15 LOZENGE BUCCAL PRN
Status: DISCONTINUED | OUTPATIENT
Start: 2021-04-17 | End: 2021-04-19 | Stop reason: HOSPADM

## 2021-04-17 RX ORDER — MORPHINE SULFATE 2 MG/ML
2 INJECTION, SOLUTION INTRAMUSCULAR; INTRAVENOUS EVERY 4 HOURS PRN
Status: DISCONTINUED | OUTPATIENT
Start: 2021-04-17 | End: 2021-04-19

## 2021-04-17 RX ADMIN — CIPROFLOXACIN 400 MG: 2 INJECTION, SOLUTION INTRAVENOUS at 20:19

## 2021-04-17 RX ADMIN — ONDANSETRON HYDROCHLORIDE 4 MG: 2 INJECTION, SOLUTION INTRAVENOUS at 07:23

## 2021-04-17 RX ADMIN — Medication 10 ML: at 11:38

## 2021-04-17 RX ADMIN — INSULIN LISPRO 1 UNITS: 100 INJECTION, SOLUTION INTRAVENOUS; SUBCUTANEOUS at 16:55

## 2021-04-17 RX ADMIN — SODIUM CHLORIDE 1000 ML: 9 INJECTION, SOLUTION INTRAVENOUS at 07:23

## 2021-04-17 RX ADMIN — WARFARIN SODIUM 2.5 MG: 2.5 TABLET ORAL at 17:38

## 2021-04-17 RX ADMIN — CIPROFLOXACIN 400 MG: 2 INJECTION, SOLUTION INTRAVENOUS at 11:37

## 2021-04-17 RX ADMIN — METOPROLOL TARTRATE 50 MG: 50 TABLET, FILM COATED ORAL at 20:19

## 2021-04-17 RX ADMIN — ATORVASTATIN CALCIUM 10 MG: 10 TABLET, FILM COATED ORAL at 20:19

## 2021-04-17 RX ADMIN — SODIUM CHLORIDE 500 ML: 900 INJECTION, SOLUTION INTRAVENOUS at 04:45

## 2021-04-17 RX ADMIN — SODIUM CHLORIDE 1000 ML: 9 INJECTION, SOLUTION INTRAVENOUS at 11:38

## 2021-04-17 RX ADMIN — MORPHINE SULFATE 4 MG: 4 INJECTION, SOLUTION INTRAMUSCULAR; INTRAVENOUS at 04:44

## 2021-04-17 RX ADMIN — MORPHINE SULFATE 4 MG: 4 INJECTION, SOLUTION INTRAMUSCULAR; INTRAVENOUS at 06:51

## 2021-04-17 RX ADMIN — INSULIN LISPRO 2 UNITS: 100 INJECTION, SOLUTION INTRAVENOUS; SUBCUTANEOUS at 13:16

## 2021-04-17 RX ADMIN — Medication 10 ML: at 20:18

## 2021-04-17 RX ADMIN — ACETAMINOPHEN 650 MG: 325 TABLET ORAL at 11:36

## 2021-04-17 RX ADMIN — POTASSIUM CHLORIDE 20 MEQ: 1500 TABLET, EXTENDED RELEASE ORAL at 11:36

## 2021-04-17 RX ADMIN — ONDANSETRON HYDROCHLORIDE 4 MG: 2 INJECTION, SOLUTION INTRAVENOUS at 04:45

## 2021-04-17 RX ADMIN — METOPROLOL TARTRATE 50 MG: 50 TABLET, FILM COATED ORAL at 11:36

## 2021-04-17 ASSESSMENT — PAIN SCALES - GENERAL
PAINLEVEL_OUTOF10: 0
PAINLEVEL_OUTOF10: 2
PAINLEVEL_OUTOF10: 8
PAINLEVEL_OUTOF10: 6

## 2021-04-17 ASSESSMENT — PAIN DESCRIPTION - PAIN TYPE
TYPE: CHRONIC PAIN;ACUTE PAIN
TYPE: CHRONIC PAIN;ACUTE PAIN

## 2021-04-17 ASSESSMENT — PAIN DESCRIPTION - ONSET: ONSET: AWAKENED FROM SLEEP

## 2021-04-17 ASSESSMENT — PAIN DESCRIPTION - LOCATION: LOCATION: ABDOMEN

## 2021-04-17 ASSESSMENT — PAIN DESCRIPTION - DESCRIPTORS
DESCRIPTORS: ACHING;SHARP
DESCRIPTORS: ACHING

## 2021-04-17 ASSESSMENT — PAIN DESCRIPTION - FREQUENCY
FREQUENCY: CONTINUOUS

## 2021-04-17 ASSESSMENT — PAIN SCALES - WONG BAKER: WONGBAKER_NUMERICALRESPONSE: 0

## 2021-04-17 NOTE — ED TRIAGE NOTES
pt er from home by squad for abd pain. Per ems pt woken at approx 0200 with abd pain. Pt compliant of generalized abd pain, described as sharp achy. Pt hx of abd surgery. Pt last bowel movement normal for pt. pt stated \" I always have diarrhea\"   Pt abd moderate tenderness.      Skin warm dry pink GCS 15 A&Ox4

## 2021-04-17 NOTE — CONSULTS
Pharmacy Note  Warfarin Consult  Dx: Afib  Goal INR range 2-3   Home Warfarin dose: 2.5mg Tu, Th, Sat,   5mg on other days      Date  INR  Warfarin  4/17                2.33                 2.5mg         Patient is also ordered on IV Cipro which may increase the INR. Recommend Warfarin 2.5mg tonight x1. Daily INR ordered. Rx will continue to manage therapy per consult order.   Zeny Stanley PharmD 4/17/2021 2:12 PM

## 2021-04-17 NOTE — PROGRESS NOTES
Patient admitted to room _216___ from [facility]. [place] [facility]. Patient oriented to room, call light, bed rails, phone, lights and bathroom. Patient instructed about the schedule of the day including: vital sign frequency, lab draws, possible tests, frequency of MD and staff rounds, daily weights, I &O's and prescribed diet. Bed alarm turned on for high fall risk. Bed locked, in lowest position, side rails up 2/4, call light within reach. Recliner Assessment:     Patient is not able to demonstrated the ability to move from a reclining position to an upright position within the recliner. however patient is alert, oriented and able to provide informed consent         4 Eyes Skin Assessment:     The patient is being assess for   Admission    I agree that 2 RN's have performed a thorough Head to Toe Skin Assessment on the patient. ALL assessment sites listed below have been assessed. Areas assessed by both nurses:   [x]   Head, Face, and Ears   [x]   Shoulders, Back, and Chest, Abdomen  [x]   Arms, Elbows, and Hands   [x]   Coccyx, Sacrum, and Ischium  [x]   Legs, Feet, and Heels        Scattered bruising and old surgical scars scattered. *    **SHARE this note so that the co-signing nurse is able to place an eSignature**    Co-signer eSignature: Electronically signed by Sia Coreas RN on 4/17/21 at 3:12 PM EDT    Does the Patient have Skin Breakdown? No          [place] Prevention initiated:  No   Wound Care Orders initiated:  No      [facility] nurse consulted for Pressure Injury (Stage 3,4, Unstageable, DTI, NWPT, Complex wounds)and New or Established Ostomies:  No      Primary Nurse eSignature: Electronically signed by Annette Barrios RN on 4/17/21 at 1:58 PM EDT    Bedside Mobility Assessment Tool (BMAT):     Assessment Level 1- Sit and Shake    1. From a semi-reclined position, ask patient to sit up and rotate to a seated position at the side of the bed. Can use the bedrail.     2. Ask patient to reach out and grab your hand and shake making sure patient reaches across his/her midline. Pass- Patient is able to come to a seated position, maintain core strength. Maintains seated balance while reaching across midline. Move on to Assessment Level 2. Assessment Level 2- Stretch and Point   1. With patient in seated position at the side of the bed, have patient place both feet on the floor (or stool) with knees no higher than hips. 2. Ask patient to stretch one leg and straighten the knee, then bend the ankle/flex and point the toes. If appropriate, repeat with the other leg. Pass- Patient is able to demonstrate appropriate quad strength on intended weight bearing limb(s). Move onto Assessment Level 3. Assessment Level 3- Stand   1. Ask patient to elevate off the bed or chair (seated to standing) using an assistive device (cane, bedrail). 2. Patient should be able to raise buttocks off be and hold for a count of five. May repeat once. Pass- Patient maintains standing stability for at least 5 seconds, proceed to assessment level 4. Assessment Level 4- Walk   1. Ask patient to march in place at bedside. 2. Then ask patient to advance step and return each foot. Some medical conditions may render a patient from stepping backwards, use your best clinical judgement. Fail- Patient not able to complete tasks OR requires use of assistive device. Patient is MOBILITY LEVEL 3.        Mobility Level- 3

## 2021-04-17 NOTE — ED NOTES
Report called to [facility]. RN [id number]    Transport called for transport--ETAS of 0830     Brien Haro RN  04/17/21 1543

## 2021-04-17 NOTE — TELEPHONE ENCOUNTER
Writer contacted ED provider to inform of 30 day readmission risk. Spoke to Debbie. Waiting on CT scan to come back. No Decision on disposition at this time.

## 2021-04-17 NOTE — ED PROVIDER NOTES
CHIEF COMPLAINT  Abdominal Pain      HISTORY OF PRESENT ILLNESS  Deejay Borja is a 82 y.o. female presents to the ED with abdominal pain, started abruptly at 1 AM, son here with her, he states he believes it is food related because she was fine last night before she ate, she came in by EMS, nausea, vomiting, nonbloody nonbilious, states she has had diarrhea for months, even before her cholecystectomy last month, which was complicated, significant adhesions/inflammatory changes of the gallbladder, she also has a history of C. difficile in the past, reports no changes in the diarrhea, no melena/hematochezia, has also had colectomy, had colon cancer in the past, about 5 years ago, no chest pain or shortness of breath, swelling in lower extremities has improved, patient is having some difficulty giving history, focusing due to pain, son helps and states she has not been urinating as much as expected, just started Lasix recently but no dysuria/hematuria noted, no known fever but since pain started has felt cold/warm intermittently, had Covid back in January though she denies this, she does have history of A. fib, had just seen Dr. Kristi Bran cardiologist less than 2 weeks ago for follow-up. She had been taking her Coumadin, had recent dosage change, she had recent hospitalization for CHF at the end of March, no other complaints, modifying factors or associated symptoms. I have reviewed the following from the nursing documentation.     Past Medical History:   Diagnosis Date    [facility]     Cancer ([facility] [address]     skin cancer 2014; colon 2015    Clostridium difficile diarrhea 06/25/2015    per physicians notes; negative on 6/25/15    Clostridium difficile infection 12/09/2016    COVID-19 01/13/2021    Diabetes mellitus ([address]     Hyperlipidemia     Hypertension      Past Surgical History:   Procedure Laterality Date    ABDOMEN SURGERY Right 10/15/2015    right colectomy, pain ball    ABDOMEN SURGERY  12/06/2016 sexual activity: Not on file   Other Topics Concern    Not on file   Social History Narrative    Not on file     Current Facility-Administered Medications   Medication Dose Route Frequency Provider Last Rate Last Admin    0.9 % sodium chloride infusion  1,000 mL Intravenous Continuous Michael Shamika, DO        ondansetron [facility]) injection 4 mg  4 mg Intravenous Once Michael Shamika, DO         Current Outpatient Medications   Medication Sig Dispense Refill    metoprolol tartrate (LOPRESSOR) 50 MG tablet Take 1 tablet by mouth 2 times daily 180 tablet 3    furosemide (LASIX) 40 MG tablet Take 1 tablet by mouth daily 60 tablet 1    potassium chloride (KLOR-CON M) 20 MEQ extended release tablet Take 1 tablet by mouth daily 30 tablet 0    warfarin (COUMADIN) 2.5 MG tablet Take 3 tablets by mouth daily for 3 days, THEN 2 tablets daily for 1 day. Then see the coumadin clinic for blood check and further dosing. 60 tablet 0    atorvastatin (LIPITOR) 10 MG tablet Take 1 tablet by mouth daily 90 tablet 3    glyBURIDE (DIABETA) 5 MG tablet Take 7.5 mg by mouth daily (with breakfast). Allergies   Allergen Reactions    Iohexol Other (See Comments)     Pt gets chest pains during and post injection     Clindamycin/Lincomycin Diarrhea    Metronidazole Photosensitivity    Pantoprazole Sodium     Vancomycin      Son said unknown reaction       REVIEW OF SYSTEMS  10 systems reviewed, pertinent positives per HPI otherwise noted to be negative. PHYSICAL EXAM  /74   Pulse 106   Temp 97.9 °F (36.6 °C) (Oral)   Resp 22   Ht 5' 5\" (1.651 m)   Wt 190 lb (86.2 kg)   SpO2 94%   BMI 31.62 kg/m²   GENERAL APPEARANCE: Awake and alert. Cooperative. No acute distress, but appears uncomfortable, moaning in pain  HEAD: Normocephalic. Atraumatic. EYES: PERRL. EOM's grossly intact.    ENT: Mucous membranes are moist.  Well-healed left facial scarring adjacent to nasal bridge from prior dermatological procedure/skin cancer  NECK: Supple. No rigidity, no JVD  HEART: Irregularly irregular, tacky around 898, systolic murmur noted  LUNGS: Respirations unlabored. Lungs are with poor inspiratory depth, slightly diminished in the bases but no notable wheezes crackles or rhonchi. ABDOMEN: Soft. Appears mildly distended/rotund, mild tenderness to palpation diffusely but much worse mid/periumbilical region. Negative McBurney's point tenderness, negative Rovsing sign, negative Santos sign. No guarding or rebound. Hypoactive bowel sounds, well-healed abdominal surgical scars  EXTREMITIES: Trace lower extremity bilateral peripheral edema, nonpitting. Moves all extremities equally. All extremities neurovascularly intact. SKIN: Warm and dry. No acute rashes. NEUROLOGICAL: Alert and oriented. No gross facial drooping. Strength 5/5, sensation intact. No truncal ataxia. Normal speech  PSYCHIATRIC: Normal mood and affect. Appears anxious    LABS  I have reviewed all labs for this visit.    Results for orders placed or performed during the hospital encounter of 04/17/21   Clostridium difficile toxin/antigen    Specimen: Stool   Result Value Ref Range    C.diff Toxin/Antigen       Indeterminate see results of C. difficile amplification(PCR)  Normal Range: Negative     CBC Auto Differential   Result Value Ref Range    WBC 13.0 (H) 4.0 - 11.0 K/uL    RBC 5.07 4.00 - 5.20 M/uL    Hemoglobin 14.4 12.0 - 16.0 g/dL    Hematocrit 45.3 36.0 - 48.0 %    MCV 89.3 80.0 - 100.0 fL    MCH 28.4 26.0 - 34.0 pg    MCHC 31.7 31.0 - 36.0 g/dL    RDW 15.4 12.4 - 15.4 %    Platelets 374 796 - 299 K/uL    MPV 9.4 5.0 - 10.5 fL    Neutrophils % 79.0 %    Lymphocytes % 12.7 %    Monocytes % 6.5 %    Eosinophils % 1.3 %    Basophils % 0.5 %    Neutrophils Absolute 10.3 (H) 1.7 - 7.7 K/uL    Lymphocytes Absolute 1.6 1.0 - 5.1 K/uL    Monocytes Absolute 0.8 0.0 - 1.3 K/uL    Eosinophils Absolute 0.2 0.0 - 0.6 K/uL    Basophils Absolute 0.1 0.0 - 0.2 K/uL   Comprehensive Metabolic Panel w/ Reflex to MG   Result Value Ref Range    Sodium 141 136 - 145 mmol/L    Potassium reflex Magnesium 3.6 3.5 - 5.1 mmol/L    Chloride 102 99 - 110 mmol/L    CO2 25 21 - 32 mmol/L    Anion Gap 14 3 - 16    Glucose 259 (H) 70 - 99 mg/dL    BUN 19 7 - 20 mg/dL    CREATININE 0.8 0.6 - 1.2 mg/dL    GFR Non-African American >60 >60    GFR African American >60 >60    Calcium 10.1 8.3 - 10.6 mg/dL    Total Protein 6.8 6.4 - 8.2 g/dL    Albumin 4.1 3.4 - 5.0 g/dL    Albumin/Globulin Ratio 1.5 1.1 - 2.2    Total Bilirubin 1.4 (H) 0.0 - 1.0 mg/dL    Alkaline Phosphatase 149 (H) 40 - 129 U/L    ALT 26 10 - 40 U/L    AST 31 15 - 37 U/L    Globulin 2.7 g/dL   Lipase   Result Value Ref Range    Lipase 20.0 13.0 - 60.0 U/L   Lactic Acid, Plasma   Result Value Ref Range    Lactic Acid 2.5 (H) 0.4 - 2.0 mmol/L   Troponin   Result Value Ref Range    Troponin <0.01 <0.01 ng/mL   Protime-INR   Result Value Ref Range    Protime 26.4 (H) 10.0 - 13.2 sec    INR 2.33 (H) 0.86 - 1.14   APTT   Result Value Ref Range    aPTT 32.1 24.2 - 36.2 sec   Brain Natriuretic Peptide   Result Value Ref Range    Pro-BNP 1,229 (H) 0 - 449 pg/mL   Ethanol   Result Value Ref Range    Ethanol Lvl None Detected mg/dL   Urinalysis Reflex to Culture    Specimen: Urine, straight catheter   Result Value Ref Range    Color, UA Yellow Straw/Yellow    Clarity, UA SL CLOUDY (A) Clear    Glucose, Ur 100 (A) Negative mg/dL    Bilirubin Urine Negative Negative    Ketones, Urine Negative Negative mg/dL    Specific Gravity, UA >=1.030 1.005 - 1.030    Blood, Urine TRACE-LYSED (A) Negative    pH, UA 5.0 5.0 - 8.0    Protein, UA 30 (A) Negative mg/dL    Urobilinogen, Urine 0.2 <2.0 E.U./dL    Nitrite, Urine Negative Negative    Leukocyte Esterase, Urine Negative Negative    Microscopic Examination YES     Urine Type NotGiven     Urine Reflex to Culture Not Indicated    Microscopic Urinalysis   Result Value Ref Range    WBC, UA 0-2 0 surgery, hx DVT, SOB Decision Support Exception->Emergency Medical Condition (MA) Reason for Exam: sob, hx dvt Acuity: Acute Type of Exam: Initial FINDINGS: Pulmonary Arteries: Pulmonary arteries are adequately opacified for evaluation. No evidence of intraluminal filling defect to suggest pulmonary embolism. Main pulmonary artery is normal in caliber. Mediastinum: Cardiomegaly. Significant coronary calcifications. Aortic valve calcifications. Few mitral annular calcifications. No concerning adenopathy. The heart and pericardium demonstrate no acute abnormality. There is no acute abnormality of the thoracic aorta. Lungs/pleura: Mild dependent atelectasis bilaterally. Moderate bilateral pleural effusions. No pneumothorax. Interlobular septal thickening. Few bilateral small ground-glass opacities. Calcified pulmonary nodules and calcified left hilar adenopathy compatible with prior granulomatous disease. Upper Abdomen: Limited imaging including the abdomen. The very top of the liver dome is seen and appears unremarkable. Soft Tissues/Bones: No acute bone or soft tissue abnormality. Confluent anterolateral vertebral body syndesmophyte formation compatible with DISH. No evidence of pulmonary embolism. Cardiomegaly, bilateral pleural effusions and mild pulmonary edema compatible with fluid overload and/or CHF. Mild dependent airspace disease bilaterally most likely represents atelectasis. Additional chronic findings detailed above. CT ABDOMEN PELVIS WO CONTRAST Additional Contrast? None (Final result)  Result time 04/17/21 06:36:18  Final result by Judieth Schwab, MD (04/17/21 06:36:18)                Impression:    1.  Interval worsening of now mid to distal jejunal surrounding mesenteric fat   stranding suggesting association with acute enteritis proximal to small bowel   ostomy site.  Note: Evaluation of infectious or inflammatory process limited   in absence of intravenous iodinated contrast administration. 2. Mild omental fat herniation at small bowel ostomy. 3. Severe colonic diverticulosis without evidence of diverticulitis. 4. Splenic chronic granulomatous disease. 5. Multifocal small calcified uterine leiomyomata. 6. Interval cholecystectomy. ED COURSE/MDM  Patient seen and evaluated. Old records reviewed. Labs and imaging reviewed and results discussed with patient.    73-year-old female presenting with abdominal pain, vomiting, she did have recent cholecystectomy last month, appears to have some increased inflammatory changes surrounding the small bowel ostomy site, no acute skin changes related to this, she has some bacteriuria, but no white blood cells in urine and negative nitrite/leukoesterase, sent culture, no recent urinary symptoms, she has mild leukocytosis at 13, but this could be reactive, was retching prior to arrival, afebrile, she is in chronic A. fib, her anticoagulation with Coumadin appears to be therapeutic, 2.33 INR, lower suspicion for mesenteric ischemia at this time, but this was considered, however she does have a reaction to IV dye in the past, risk/benefit of further scan with contrast for angiography may be warranted, pain and nausea had improved significantly with initial morphine/Zofran dosing, pain went from 10 out of 10 to 2 out of 10, over the course of the ED visit pain did worsen and she required second dose, given CT changes and patient's age and risk factors, I feel the patient warrants admission at this time, we did check C. difficile which was indeterminate, sent for PCR testing, but patient reports the diarrhea is not any different than her previous recently, had a small amount of stool while here, she was recently started on Lasix and has had some decrease in her urine output, may be mildly dehydrated, given 500 cc bolus given her predisposition to fluid overload, initiated maintenance fluids and made n.p.o., vital signs have remained stable, she is borderline tachycardic but in chronic A. fib, admitting to hospitalist service at [facility] for further evaluation, patient and son updated, surgical consultation would be of benefit, but not emergently at this time. Orders Placed This Encounter   Procedures    Clostridium difficile toxin/antigen    Culture, Urine    CT ABDOMEN PELVIS WO CONTRAST Additional Contrast? None    CBC Auto Differential    Comprehensive Metabolic Panel w/ Reflex to MG    Lipase    Lactic Acid, Plasma    Troponin    Protime-INR    APTT    Brain Natriuretic Peptide    Ethanol    Urinalysis Reflex to Culture    Microscopic Urinalysis    Lactic acid, plasma    Diet NPO Effective Now    Straight cath    Inpatient consult to Hospitalist    EKG 12 Lead     Orders Placed This Encounter   Medications    morphine sulfate (PF) injection 4 mg    0.9 % sodium chloride bolus    ondansetron (ZOFRAN) injection 4 mg    morphine sulfate (PF) injection 4 mg    0.9 % sodium chloride infusion    ondansetron (ZOFRAN) injection 4 mg     ED Course as of Apr 17 0715   Sat Apr 17, 2021   0423 EKG interpretation by me: A. fib with RVR, rate 107, QTc 491, normal axis, no ST segment or T wave changes indicative of acute ischemia, no significant change from March 24, 2021 EKG   EKG 12 Lead [SY]   0458 Therapeutic INR, on Coumadin.   INR(!): 2.33 [SY]   0459 Improved BNP compared to recent visit, does not appear fluid overloaded today. Pro-BNP(!): 1,229 [SY]   0712 I did speak to Dr Melanie Hankins for admission to [facility], he agreed to accept for admission.    [SY]   2135 Patient had been given a second dose of morphine, now complaining of nausea associated with this, giving another dose of Zofran since this did seem to help. [SY]      ED Course User Index  [SY] Bertrand Brewer DO       CLINICAL IMPRESSION  1. Generalized abdominal pain    2. Non-intractable vomiting with nausea, unspecified vomiting type    3.

## 2021-04-17 NOTE — PROGRESS NOTES
This writer assisted the pt. To the restroom. Pt. Gait was steady although she needed assistance with the IV pole. Pt. Walked back to bed with the alarm turned on. Call light in reach.

## 2021-04-17 NOTE — CONSULTS
tablet by mouth daily 3/25/21   DESIRAE Nair   warfarin (COUMADIN) 2.5 MG tablet Take 3 tablets by mouth daily for 3 days, THEN 2 tablets daily for 1 day. Then see the coumadin clinic for blood check and further dosing. 3/25/21 3/29/21  DESIRAE Nair        Allergies:  Iohexol, Clindamycin/lincomycin, Metronidazole, Pantoprazole sodium, and Vancomycin    Social History:   TOBACCO:   reports that she has never smoked. She has never used smokeless tobacco.  ETOH:   reports no history of alcohol use. DRUGS:   reports no history of drug use. Family History:        Problem Relation Age of Onset   Letty Morin Cancer Mother     Cancer Father        REVIEW OF SYSTEMS:  CONSTITUTIONAL:  negative  HEENT:  negative  RESPIRATORY:  negative  CARDIOVASCULAR:  negative  GASTROINTESTINAL:  negative except for nausea, vomiting and abdominal pain  GENITOURINARY:  negative  HEMATOLOGIC/LYMPHATIC:  negative  NEUROLOGICAL:  Negative  * All other ROS reviewed and negative. PHYSICAL EXAM:  VITALS:  /67   Pulse 104   Temp 97.3 °F (36.3 °C) (Oral)   Resp 18   Ht 5' 5\" (1.651 m)   Wt 190 lb (86.2 kg)   SpO2 96%   BMI 31.62 kg/m²   24HR INTAKE/OUTPUT:    I/O last 3 completed shifts:  In: -   Out: 10 [Urine:10]  I/O this shift:   In: 56 [IV Piggyback:490]  Out: -       CONSTITUTIONAL:  alert, no apparent distress and mildly obese  EYES:  PERRL, sclera clear  ENT:  Normocephalic,atraumatic, without obvious abnormality  NECK:  supple, symmetrical, trachea midline  LUNGS: Resp effort easy and unlabored, clear to auscultation  CARDIOVASCULAR:  NO JVD, irregular  ABDOMEN:  normal bowel sounds, soft, obese, tenderness noted in the epigastric region, voluntary guarding absent, no masses palpated and hernia is present in the epigastric area which is soft and reducible  MUSCULOSKELETAL: No clubbing or cyanosis, 1+ pitting edema lower extremities  NEUROLOGIC:  Mental Status Exam:  Level of Alertness:   awake PSYCHIATRIC:   person, place, time  SKIN:  no bruising or bleeding, normal skin color, texture, turgor and no redness, warmth, or swelling    DATA:    CBC:   Recent Labs     04/17/21 0404   WBC 13.0*   HGB 14.4   HCT 45.3        BMP:    Recent Labs     04/17/21 0404      K 3.6      CO2 25   BUN 19   CREATININE 0.8   GLUCOSE 259*     Hepatic:   Recent Labs     04/17/21 0404   AST 31   ALT 26   BILITOT 1.4*   ALKPHOS 149*     Mag:    No results for input(s): MG in the last 72 hours. Phos:   No results for input(s): PHOS in the last 72 hours. INR:   Recent Labs     04/17/21 0404   INR 2.33*       Radiology Review: Images personally reviewed by me. CT is reported as showing jejunal fat stranding proximal to her ostomy site. However, she does not have an ostomy. This likely represents some enteritis adjacent to her hernia site which is soft and reducible      IMPRESSION/RECOMMENDATIONS:    Enteritis. Etiology is unclear. Given improvement in her lactic acidosis, I suspect this is not ischemic in nature. I would recommend continuing IV antibiotics and bowel rest as well as parenteral narcotics as needed for pain. If she does not improve, further work-up could be pursued with a contrast-enhanced CT scan.     Electronically signed by Carmela Elizondo MD     [address] [facility]  [id number]

## 2021-04-18 LAB
ANION GAP SERPL CALCULATED.3IONS-SCNC: 7 MMOL/L (ref 3–16)
BASOPHILS ABSOLUTE: 0.1 K/UL (ref 0–0.2)
BASOPHILS RELATIVE PERCENT: 1.1 %
BUN BLDV-MCNC: 16 MG/DL (ref 7–20)
C. DIFFICILE TOXIN MOLECULAR: NORMAL
CALCIUM SERPL-MCNC: 9 MG/DL (ref 8.3–10.6)
CHLORIDE BLD-SCNC: 109 MMOL/L (ref 99–110)
CO2: 24 MMOL/L (ref 21–32)
CREAT SERPL-MCNC: 0.6 MG/DL (ref 0.6–1.2)
EOSINOPHILS ABSOLUTE: 0.2 K/UL (ref 0–0.6)
EOSINOPHILS RELATIVE PERCENT: 2.1 %
GFR AFRICAN AMERICAN: >60
GFR NON-AFRICAN AMERICAN: >60
GLUCOSE BLD-MCNC: 135 MG/DL (ref 70–99)
GLUCOSE BLD-MCNC: 140 MG/DL (ref 70–99)
GLUCOSE BLD-MCNC: 147 MG/DL (ref 70–99)
GLUCOSE BLD-MCNC: 150 MG/DL (ref 70–99)
GLUCOSE BLD-MCNC: 153 MG/DL (ref 70–99)
HCT VFR BLD CALC: 39.6 % (ref 36–48)
HEMOGLOBIN: 12.7 G/DL (ref 12–16)
INR BLD: 3.05 (ref 0.86–1.14)
LYMPHOCYTES ABSOLUTE: 1.7 K/UL (ref 1–5.1)
LYMPHOCYTES RELATIVE PERCENT: 22.4 %
MCH RBC QN AUTO: 29.1 PG (ref 26–34)
MCHC RBC AUTO-ENTMCNC: 32.2 G/DL (ref 31–36)
MCV RBC AUTO: 90.4 FL (ref 80–100)
MONOCYTES ABSOLUTE: 0.5 K/UL (ref 0–1.3)
MONOCYTES RELATIVE PERCENT: 7.2 %
NEUTROPHILS ABSOLUTE: 5 K/UL (ref 1.7–7.7)
NEUTROPHILS RELATIVE PERCENT: 67.2 %
PDW BLD-RTO: 15.4 % (ref 12.4–15.4)
PERFORMED ON: ABNORMAL
PLATELET # BLD: 211 K/UL (ref 135–450)
PMV BLD AUTO: 9.4 FL (ref 5–10.5)
POTASSIUM REFLEX MAGNESIUM: 3.9 MMOL/L (ref 3.5–5.1)
PROTHROMBIN TIME: 35.8 SEC (ref 10–13.2)
RBC # BLD: 4.38 M/UL (ref 4–5.2)
SODIUM BLD-SCNC: 140 MMOL/L (ref 136–145)
WBC # BLD: 7.5 K/UL (ref 4–11)

## 2021-04-18 PROCEDURE — 2580000003 HC RX 258: Performed by: INTERNAL MEDICINE

## 2021-04-18 PROCEDURE — 99232 SBSQ HOSP IP/OBS MODERATE 35: CPT | Performed by: INTERNAL MEDICINE

## 2021-04-18 PROCEDURE — 85610 PROTHROMBIN TIME: CPT

## 2021-04-18 PROCEDURE — 1200000000 HC SEMI PRIVATE

## 2021-04-18 PROCEDURE — 80048 BASIC METABOLIC PNL TOTAL CA: CPT

## 2021-04-18 PROCEDURE — 85025 COMPLETE CBC W/AUTO DIFF WBC: CPT

## 2021-04-18 PROCEDURE — 99232 SBSQ HOSP IP/OBS MODERATE 35: CPT | Performed by: SURGERY

## 2021-04-18 PROCEDURE — 36415 COLL VENOUS BLD VENIPUNCTURE: CPT

## 2021-04-18 PROCEDURE — 6360000002 HC RX W HCPCS: Performed by: INTERNAL MEDICINE

## 2021-04-18 PROCEDURE — 6370000000 HC RX 637 (ALT 250 FOR IP): Performed by: INTERNAL MEDICINE

## 2021-04-18 RX ORDER — SODIUM CHLORIDE 9 MG/ML
1000 INJECTION, SOLUTION INTRAVENOUS CONTINUOUS
Status: DISCONTINUED | OUTPATIENT
Start: 2021-04-18 | End: 2021-04-19

## 2021-04-18 RX ADMIN — INSULIN LISPRO 1 UNITS: 100 INJECTION, SOLUTION INTRAVENOUS; SUBCUTANEOUS at 16:54

## 2021-04-18 RX ADMIN — CIPROFLOXACIN 400 MG: 2 INJECTION, SOLUTION INTRAVENOUS at 08:41

## 2021-04-18 RX ADMIN — METOPROLOL TARTRATE 50 MG: 50 TABLET, FILM COATED ORAL at 21:18

## 2021-04-18 RX ADMIN — METOPROLOL TARTRATE 50 MG: 50 TABLET, FILM COATED ORAL at 08:41

## 2021-04-18 RX ADMIN — Medication 10 ML: at 08:41

## 2021-04-18 RX ADMIN — DICLOFENAC SODIUM 2 G: 10 GEL TOPICAL at 11:10

## 2021-04-18 RX ADMIN — DICLOFENAC SODIUM 2 G: 10 GEL TOPICAL at 21:18

## 2021-04-18 RX ADMIN — ATORVASTATIN CALCIUM 10 MG: 10 TABLET, FILM COATED ORAL at 21:18

## 2021-04-18 RX ADMIN — Medication 10 ML: at 21:19

## 2021-04-18 RX ADMIN — SODIUM CHLORIDE 1000 ML: 9 INJECTION, SOLUTION INTRAVENOUS at 08:42

## 2021-04-18 RX ADMIN — SODIUM CHLORIDE 1000 ML: 9 INJECTION, SOLUTION INTRAVENOUS at 21:21

## 2021-04-18 RX ADMIN — POTASSIUM CHLORIDE 20 MEQ: 1500 TABLET, EXTENDED RELEASE ORAL at 08:41

## 2021-04-18 RX ADMIN — CIPROFLOXACIN 400 MG: 2 INJECTION, SOLUTION INTRAVENOUS at 21:18

## 2021-04-18 NOTE — PROGRESS NOTES
IM Progress Note    Admit Date:  4/17/2021  1    Interval history:  Enteritis     Subjective:  Ms. Shirley Anand seen up in bed , reports pain some better ,no emesis or vomiting any more  No fevers  Reports left knee pain     Objective:   /63   Pulse 80   Temp 97.2 °F (36.2 °C) (Oral)   Resp 15   Ht 5' 5\" (1.651 m)   Wt 190 lb (86.2 kg)   SpO2 95%   BMI 31.62 kg/m²       Intake/Output Summary (Last 24 hours) at 4/18/2021 0757  Last data filed at 4/17/2021 2023  Gross per 24 hour   Intake 998 ml   Output    Net 998 ml       Physical Exam:    General: elderly female,    Awake, alert and oriented. Appears to be not in any distress  Mucous Membranes:  Pink , anicteric  Neck: No JVD, no carotid bruit, no thyromegaly  Chest:  Clear to auscultation bilaterally, diminished in bases  Cardiovascular:  Irregular S1S2 heard, + ASM  Abdomen:  Soft, undistended,mid line surgical scar old and healthy, mild reducible ventral hernia   mild diffuse mid abd tenderness , no organomegaly, BS present  Extremities: No edema or cyanosis.  Distal pulses well felt  Neurological : grossly normal       Medications:   Scheduled Medications:    atorvastatin  10 mg Oral Daily    [Held by provider] furosemide  40 mg Oral Daily    potassium chloride  20 mEq Oral Daily    metoprolol tartrate  50 mg Oral BID    sodium chloride flush  5-40 mL Intravenous 2 times per day    ciprofloxacin  400 mg Intravenous Q12H    insulin lispro  0-6 Units Subcutaneous TID WC    insulin lispro  0-3 Units Subcutaneous Nightly    warfarin (COUMADIN) daily dosing (placeholder)   Other RX Placeholder     I   sodium chloride Stopped (04/17/21 2020)    dextrose      sodium chloride       glucose, dextrose, glucagon (rDNA), dextrose, sodium chloride flush, sodium chloride, promethazine **OR** ondansetron, polyethylene glycol, acetaminophen **OR** acetaminophen, morphine    Lab Data:  Recent Labs     04/17/21  0404   WBC 13.0*   HGB 14.4   HCT 45.3   MCV 89.3      Recent Labs     04/17/21  0404      K 3.6      CO2 25   BUN 19   CREATININE 0.8     Recent Labs     04/17/21 0404   TROPONINI <0.01       Coagulation:   Lab Results   Component Value Date    INR 3.05 04/18/2021    APTT 32.1 04/17/2021     Cardiac markers:   Lab Results   Component Value Date    CKTOTAL 1,458 06/24/2015    TROPONINI <0.01 04/17/2021         Lab Results   Component Value Date    ALT 26 04/17/2021    AST 31 04/17/2021    ALKPHOS 149 (H) 04/17/2021    BILITOT 1.4 (H) 04/17/2021       Lab Results   Component Value Date    INR 3.05 (H) 04/18/2021    INR 2.33 (H) 04/17/2021    INR 1.14 03/25/2021    PROTIME 35.8 (H) 04/18/2021    PROTIME 26.4 (H) 04/17/2021    PROTIME 13.3 (H) 03/25/2021       Radiology       CULTURES     SARS-COV-2 - Rapid: Not detected      C diff toxin: indeterminate     EKG:  I have reviewed the EKG with the following interpretation:   Atrial fibrillation with rapid ventricular response rate of 107  Low voltage QRS  Cannot rule out Anterior infarct , age undetermined  Abnormal ECG  No previous ECGs available     RADIOLOGY     CT ABDOMEN PELVIS WO CONTRAST Additional Contrast? None   Final Result   1. Interval worsening of now mid to distal jejunal surrounding mesenteric fat   stranding suggesting association with acute enteritis proximal to small bowel   ostomy site. Note: Evaluation of infectious or inflammatory process limited   in absence of intravenous iodinated contrast administration. 2. Mild omental fat herniation at small bowel ostomy. 3. Severe colonic diverticulosis without evidence of diverticulitis. 4. Splenic chronic granulomatous disease. 5. Multifocal small calcified uterine leiomyomata.    6. Interval cholecystectomy.              Pertinent previous results reviewed      TTE 2/18/2021  Conclusions      Summary   Normal left ventricular systolic function with an estimated ejection   fraction of 55-60%.   No regional wall motion abnormalities are seen.  Rickford Math is moderate concentric left ventricular hypertrophy.   The left atrium is mildly dilated.   Mild posterior mitral annular calcification is present.   Mild mitral regurgitation.   Moderate aortic stenosis and moderate aortic regurgitation is present.   Normal systolic pulmonary artery pressure (SPAP) estimated at 31 mmHg (RA   pressure 3 mmHg).     ASSESSMENT/PLAN:     Acute Enteritis  - CT interval worsening of the mid to distal jejunal surrounding mesenteric fat stranding - proximal to small bowel ostomy site  - Admit to Med Surg  -  start Cipro, flagyl IV, IVF, PRN morphine for pain   - surgery consulted. - improving pain and symptoms. Improved lactate  - continue same, adv diet     Lactic Acidosis  - 2.5 > 2.1  - IVF  - trending down     Leukocytosis  - 13  - likely 2/2 above  - improved     Chronic Diastolic CHF  - appears compensated  - last echo 2/2021: EF of 55 to 60%, mod LVH, moderate aortic stenosis  - hold Lasix, cont Lopressor     Chronic Atrial Fibrillation  - diagnosed in Feb 2021  - cont BB; AC on Coumadin - pharm to dose; INR 2.33     Aortic stenosis  - Noted on most recent echo--> moderate  - Following with cardiology     Type II DM  - uncontrolled with BG in 240-250s  - last Hgb A1c: 6.7 in 2/2021  - hold Diabeta, add low dose SSI  - POC Glucose, carb control diet     HTN  - controlled  - cont Lopressor     Mixed HLD  - cont Lipitor     PAD  - Status post fasciotomies and revascularization of the right lower extremity via anterior tib artery angioplasty  - Followed by vascular surgery  - on Coumadin, cont Lipitor     Obesity  - Body mass index is 31.62 kg/m².   - Counseled on weight loss.      Hx of Colon Cancer  - s/p ileotransverse colectomy with ileal distal transverse anastomosis     DVT Prophylaxis: Coumadin - pharm to dose  Diet: clear   Code Status: Full Code      Payton Jeter MD 4/18/2021 7:57 AM

## 2021-04-18 NOTE — PROGRESS NOTES
Patient alert oriented x 4. Denies pain or discomfort at this time. Lung sounds diminished. No coughing heard. Abdomen soft non tender. BS + x 4 quads. Non pitting edema noted in BLE. Cipro continues per order. Blood pressure (!) 115/53, pulse 80, temperature 96.9 °F (36.1 °C), temperature source Oral, resp. rate 16, height 5' 5\" (1.651 m), weight 190 lb (86.2 kg), SpO2 97 %, not currently breastfeeding.     Electronically signed by Angela Durán RN on 4/17/2021 at 8:27 PM

## 2021-04-18 NOTE — PROGRESS NOTES
Report given @ bedside to [facility], for transferral of care. Pt resting in bed. Call light in reach.

## 2021-04-18 NOTE — PROGRESS NOTES
[facility] SURGERY    PATIENT NAME: Peggy Cadet     TODAY'S DATE: 4/18/2021    CHIEF COMPLAINT: Knee pain    INTERVAL HISTORY/HPI:    Pt complains of pain in her knee but also states she has some ongoing abdominal pain. Thinks it might be a little bit better. She is tolerating clear liquids and wants more to eat. REVIEW OF SYSTEMS:  Pertinent positives and negatives as per interval history section    OBJECTIVE:  VITALS:  /63   Pulse 80   Temp 97.2 °F (36.2 °C) (Oral)   Resp 15   Ht 5' 5\" (1.651 m)   Wt 190 lb (86.2 kg)   SpO2 95%   BMI 31.62 kg/m²     INTAKE/OUTPUT:    I/O last 3 completed shifts: In: 1488 [I.V.:998; IV Piggyback:490]  Out: -   No intake/output data recorded. CONSTITUTIONAL:  awake and alert  LUNGS:  Respirations easy and unlabored, clear to auscultation  CARD:  regular rate and rhythm  ABDOMEN:  normal bowel sounds, soft, non-distended, mild tenderness in the epigastric region where her hernia continues to be soft and reducible    Data:  CBC:   Recent Labs     04/17/21 0404 04/18/21  0733   WBC 13.0* 7.5   HGB 14.4 12.7   HCT 45.3 39.6    211     BMP:    Recent Labs     04/17/21 0404 04/18/21  0733    140   K 3.6 3.9    109   CO2 25 24   BUN 19 16   CREATININE 0.8 0.6   GLUCOSE 259* 140*     Hepatic:   Recent Labs     04/17/21 0404   AST 31   ALT 26   BILITOT 1.4*   ALKPHOS 149*     Mag:    No results for input(s): MG in the last 72 hours. Phos:   No results for input(s): PHOS in the last 72 hours. INR:   Recent Labs     04/17/21 0404 04/18/21  0733   INR 2.33* 3.05*       Radiology Review:  *Imaging personally reviewed by me. N/A      ASSESSMENT AND PLAN:  Enteritis. Etiology is unclear. Given improvement in her lactic acidosis, I suspect this is not ischemic in nature. I would recommend continuing IV antibiotics. She has not needed narcotics over the last 24 hours.   If she does not improve, further work-up could be pursued with a contrast-enhanced CT scan, however she looks to be improving at this time.      Electronically signed by Juan Hylton MD     [id number]

## 2021-04-18 NOTE — PROGRESS NOTES
Bedside Mobility Assessment Tool (BMAT):     Assessment Level 1- Sit and Shake    1. From a semi-reclined position, ask patient to sit up and rotate to a seated position at the side of the bed. Can use the bedrail. 2. Ask patient to reach out and grab your hand and shake making sure patient reaches across his/her midline. Pass- Patient is able to come to a seated position, maintain core strength. Maintains seated balance while reaching across midline. Move on to Assessment Level 2. Assessment Level 2- Stretch and Point   1. With patient in seated position at the side of the bed, have patient place both feet on the floor (or stool) with knees no higher than hips. 2. Ask patient to stretch one leg and straighten the knee, then bend the ankle/flex and point the toes. If appropriate, repeat with the other leg. Pass- Patient is able to demonstrate appropriate quad strength on intended weight bearing limb(s). Move onto Assessment Level 3. Assessment Level 3- Stand   1. Ask patient to elevate off the bed or chair (seated to standing) using an assistive device (cane, bedrail). 2. Patient should be able to raise buttocks off be and hold for a count of five. May repeat once. Pass- Patient maintains standing stability for at least 5 seconds, proceed to assessment level 4. Assessment Level 4- Walk   1. Ask patient to march in place at bedside. 2. Then ask patient to advance step and return each foot. Some medical conditions may render a patient from stepping backwards, use your best clinical judgement. Fail- Patient not able to complete tasks OR requires use of assistive device. Patient is MOBILITY LEVEL 3.        Mobility Level- 3

## 2021-04-18 NOTE — PROGRESS NOTES
Pharmacy Note  Warfarin Consult  Dx: Afib  Goal INR range 2-3   Home Warfarin dose: 2.5mg Tu, Th, Sat,   5mg on other days      Date  INR  Warfarin  4/17                2.33                 2.5mg       4/18                3.05                  hold    Patient is also ordered IV Cipro which may increase the INR. Recommend holding Warfarin tonight. Daily INR is ordered. Pharmacy will continue to manage therapy per consult order from Dr Rufino Lackey. Pharmacy will continue to monitor and adjust as necessary.

## 2021-04-18 NOTE — PROGRESS NOTES
AM assessment completed, see flow sheet. Pt is alert and oriented. Vital signs are WNL. Respirations are even & easy. No complaints voiced and denies pain or nausea this AM. Pt denies needs at this time. SR up x 2, and bed in low position. Call light is within reach.

## 2021-04-19 VITALS
BODY MASS INDEX: 31.65 KG/M2 | OXYGEN SATURATION: 99 % | WEIGHT: 190 LBS | SYSTOLIC BLOOD PRESSURE: 137 MMHG | TEMPERATURE: 97.9 F | HEIGHT: 65 IN | RESPIRATION RATE: 18 BRPM | DIASTOLIC BLOOD PRESSURE: 69 MMHG | HEART RATE: 90 BPM

## 2021-04-19 PROBLEM — I50.32 CHRONIC DIASTOLIC CONGESTIVE HEART FAILURE (HCC): Status: ACTIVE | Noted: 2021-03-24

## 2021-04-19 LAB
GLUCOSE BLD-MCNC: 143 MG/DL (ref 70–99)
GLUCOSE BLD-MCNC: 157 MG/DL (ref 70–99)
INR BLD: 3.12 (ref 0.86–1.14)
PERFORMED ON: ABNORMAL
PERFORMED ON: ABNORMAL
PROTHROMBIN TIME: 36.6 SEC (ref 10–13.2)

## 2021-04-19 PROCEDURE — 97161 PT EVAL LOW COMPLEX 20 MIN: CPT

## 2021-04-19 PROCEDURE — 85610 PROTHROMBIN TIME: CPT

## 2021-04-19 PROCEDURE — 6360000002 HC RX W HCPCS: Performed by: INTERNAL MEDICINE

## 2021-04-19 PROCEDURE — 97530 THERAPEUTIC ACTIVITIES: CPT

## 2021-04-19 PROCEDURE — 99239 HOSP IP/OBS DSCHRG MGMT >30: CPT | Performed by: INTERNAL MEDICINE

## 2021-04-19 PROCEDURE — 97116 GAIT TRAINING THERAPY: CPT

## 2021-04-19 PROCEDURE — 97166 OT EVAL MOD COMPLEX 45 MIN: CPT

## 2021-04-19 PROCEDURE — 36415 COLL VENOUS BLD VENIPUNCTURE: CPT

## 2021-04-19 PROCEDURE — 6370000000 HC RX 637 (ALT 250 FOR IP): Performed by: INTERNAL MEDICINE

## 2021-04-19 RX ORDER — AMOXICILLIN AND CLAVULANATE POTASSIUM 500; 125 MG/1; MG/1
1 TABLET, FILM COATED ORAL 2 TIMES DAILY WITH MEALS
Qty: 12 TABLET | Refills: 0 | Status: SHIPPED | OUTPATIENT
Start: 2021-04-19 | End: 2021-04-25

## 2021-04-19 RX ADMIN — DICLOFENAC SODIUM 2 G: 10 GEL TOPICAL at 10:57

## 2021-04-19 RX ADMIN — CIPROFLOXACIN 400 MG: 2 INJECTION, SOLUTION INTRAVENOUS at 09:30

## 2021-04-19 RX ADMIN — POTASSIUM CHLORIDE 20 MEQ: 1500 TABLET, EXTENDED RELEASE ORAL at 08:01

## 2021-04-19 RX ADMIN — INSULIN LISPRO 1 UNITS: 100 INJECTION, SOLUTION INTRAVENOUS; SUBCUTANEOUS at 08:01

## 2021-04-19 RX ADMIN — METOPROLOL TARTRATE 50 MG: 50 TABLET, FILM COATED ORAL at 08:01

## 2021-04-19 RX ADMIN — INSULIN LISPRO 1 UNITS: 100 INJECTION, SOLUTION INTRAVENOUS; SUBCUTANEOUS at 11:39

## 2021-04-19 NOTE — DISCHARGE SUMMARY
with cardiology     Type II DM  - uncontrolled with BG in 240-250s  - last Hgb A1c: 6.7 in 2/2021  - held Diabeta, added low dose SSI  - POC Glucose, carb control diet     HTN  - controlled  - cont'd Lopressor     Mixed HLD  - cont'd Lipitor     PAD  - Status post fasciotomies and revascularization of the right lower extremity via anterior tib artery angioplasty  - Followed by vascular surgery  - on Coumadin, cont'd Lipitor     Obesity  - Body mass index is 31.62 kg/m². - Counseled on weight loss.      Hx of Colon Cancer  - s/p ileotransverse colectomy with ileal distal transverse anastomosis     DVT Prophylaxis: Coumadin - pharm to dose    This patient has acute enteritis only, patient is not septic.     Procedures (Please Review Full Report for Details)  N/A    Consults    General surgery      Physical Exam at Discharge:    /69   Pulse 90   Temp 97.9 °F (36.6 °C) (Oral)   Resp 18   Ht 5' 5\" (1.651 m)   Wt 190 lb (86.2 kg)   SpO2 99%   BMI 31.62 kg/m²     See today's progress note    CBC:   Recent Labs     04/17/21  0404 04/18/21  0733   WBC 13.0* 7.5   HGB 14.4 12.7   HCT 45.3 39.6   MCV 89.3 90.4    211     BMP:   Recent Labs     04/17/21 0404 04/18/21  0733    140   K 3.6 3.9    109   CO2 25 24   BUN 19 16   CREATININE 0.8 0.6     LIVER PROFILE:   Recent Labs     04/17/21  0404   AST 31   ALT 26   LIPASE 20.0   BILITOT 1.4*   ALKPHOS 149*     PT/INR:   Recent Labs     04/17/21  0404 04/18/21  0733 04/19/21  0524   PROTIME 26.4* 35.8* 36.6*   INR 2.33* 3.05* 3.12*     APTT:   Recent Labs     04/17/21  0404   APTT 32.1     UA:  Recent Labs     04/17/21  0502   COLORU Yellow   PHUR 5.0   WBCUA 0-2   RBCUA 0-2   BACTERIA 3+*   CLARITYU SL CLOUDY*   SPECGRAV >=1.030   LEUKOCYTESUR Negative   UROBILINOGEN 0.2   BILIRUBINUR Negative   BLOODU TRACE-LYSED*   GLUCOSEU 100*            CARDIAC ENZYMES  Recent Labs     04/17/21  0404   TROPONINI <0.01       U/A:    Lab Results   Component Value Date    COLORU Yellow 04/17/2021    WBCUA 0-2 04/17/2021    RBCUA 0-2 04/17/2021    BACTERIA 3+ 04/17/2021    CLARITYU SL CLOUDY 04/17/2021    SPECGRAV >=1.030 04/17/2021    LEUKOCYTESUR Negative 04/17/2021    BLOODU TRACE-LYSED 04/17/2021    GLUCOSEU 100 04/17/2021    AMORPHOUS 4+ 06/19/2015       ABG    Lab Results   Component Value Date    GXT1JDM 15.6 06/20/2015    BEART -9 06/20/2015    C9OIFUSK 94 06/20/2015    PHART 7.346 06/20/2015    UGM7VOV 29 06/20/2015    PO2ART 72 06/20/2015    ZBF2GEN 16 06/20/2015         CULTURES  SARS-COV-2 - Rapid: Not detected      C diff toxin: indeterminate    RADIOLOGY  CT ABDOMEN PELVIS WO CONTRAST Additional Contrast? None   Final Result   1. Interval worsening of now mid to distal jejunal surrounding mesenteric fat   stranding suggesting association with acute enteritis proximal to small bowel   ostomy site. Note: Evaluation of infectious or inflammatory process limited   in absence of intravenous iodinated contrast administration. 2. Mild omental fat herniation at small bowel ostomy. 3. Severe colonic diverticulosis without evidence of diverticulitis. 4. Splenic chronic granulomatous disease. 5. Multifocal small calcified uterine leiomyomata. 6. Interval cholecystectomy. Pertinent previous results reviewed      TTE 2/18/2021  Conclusions      Summary   Normal left ventricular systolic function with an estimated ejection   fraction of 55-60%.   No regional wall motion abnormalities are seen.  Pollie Dy is moderate concentric left ventricular hypertrophy.   The left atrium is mildly dilated.   Mild posterior mitral annular calcification is present.   Mild mitral regurgitation.   Moderate aortic stenosis and moderate aortic regurgitation is present.   Normal systolic pulmonary artery pressure (SPAP) estimated at 31 mmHg (RA   pressure 3 mmHg).       Discharge Medications     Medication List      START taking these medications    amoxicillin-clavulanate 500-125 MG per tablet  Commonly known as: Augmentin  Take 1 tablet by mouth 2 times daily (with meals) for 6 days  Notes to patient: . Augmentin  Use: treat bacterial infections  Side effects:rash; hives; itching; shortness of breath; wheezing; cough        CONTINUE taking these medications    atorvastatin 10 MG tablet  Commonly known as: LIPITOR  Take 1 tablet by mouth daily     Diabeta 5 MG tablet  Generic drug: glyBURIDE     furosemide 40 MG tablet  Commonly known as: Lasix  Take 1 tablet by mouth daily     metoprolol tartrate 50 MG tablet  Commonly known as: LOPRESSOR  Take 1 tablet by mouth 2 times daily     potassium chloride 20 MEQ extended release tablet  Commonly known as: KLOR-CON M  Take 1 tablet by mouth daily     warfarin 2.5 MG tablet  Commonly known as: Coumadin  Take as directed. If you are unsure how to take this medication, talk to your nurse or doctor. Original instructions: Take 3 tablets by mouth daily for 3 days, THEN 2 tablets daily for 1 day. Then see the coumadin clinic for blood check and further dosing. Start taking on: March 25, 2021           Where to Get Your Medications      These medications were sent to [address] [place] [address].  [address]    Phone: [phone]   · amoxicillin-clavulanate 500-125 MG per tablet           Discharged in stable condition to home. Follow Up: Follow up with PCP. LEONOR Sutton.

## 2021-04-19 NOTE — PROGRESS NOTES
Prescription: Augmentin-[facility] and discharge instructions given. Pt verbalized understanding denies any questions/ needs at this time.  Transport called to transport pt to vehicle for discharge home

## 2021-04-19 NOTE — PROGRESS NOTES
Physician Progress Note      Dylan Ramirez  CSN #:                  [id number]  :                       1935  ADMIT DATE:       2021 4:21 AM  DISCH DATE:  RESPONDING  PROVIDER #:        Rosmery Villegas MD          QUERY TEXT:    Pt admitted with acute enteritis. Pt noted to have leukocytosis. If possible,   please document in the progress notes and discharge summary if you are   evaluating and /or treating any of the following: The medical record reflects the following:  Risk Factors: acute enteritis  Clinical Indicators: WBC 13, lactic acid 2.5, vitals on : RR 18-22, HR   , T 96.9  Treatment: IVF, IV Cipro/Flagyl, serial labs, supportive care    Thank you,  Rayna Hamilton RN, CDS  [phone]  Options provided:  -- Sepsis, present on admission  -- Sepsis, present on admission, now resolved  -- No Sepsis, acute enteritis only  -- Other - I will add my own diagnosis  -- Disagree - Not applicable / Not valid  -- Disagree - Clinically unable to determine / Unknown  -- Refer to Clinical Documentation Reviewer    PROVIDER RESPONSE TEXT:    This patient has acute enteritis only, patient is not septic.     Query created by: Carlos Cisneros on 2021 1:55 PM      Electronically signed by:  Rosmery Villegas MD 2021 2:46 PM

## 2021-04-19 NOTE — PROGRESS NOTES
IM Progress Note    Admit Date:  4/17/2021  2    Interval history:  Enteritis     Subjective:  Ms. [place] says abd pain better   Had loose sttol yesterday afternoon     Objective:   /60   Pulse 69   Temp 97.9 °F (36.6 °C) (Oral)   Resp 18   Ht 5' 5\" (1.651 m)   Wt 190 lb (86.2 kg)   SpO2 98%   BMI 31.62 kg/m²       Intake/Output Summary (Last 24 hours) at 4/19/2021 8553  Last data filed at 4/18/2021 1743  Gross per 24 hour   Intake 540 ml   Output    Net 540 ml       Physical Exam:    General: elderly female,    Awake, alert and oriented. Appears to be not in any distress  Mucous Membranes:  Pink , anicteric  Neck: No JVD, no carotid bruit, no thyromegaly  Chest:  Clear to auscultation bilaterally, diminished in bases  Cardiovascular:  Irregular S1S2 heard, + ASM  Abdomen:  Soft, undistended,mid line surgical scar old and healthy, mild reducible ventral hernia   mild diffuse mid abd tenderness , no organomegaly, BS present  Extremities: No edema or cyanosis.  Distal pulses well felt  Neurological : grossly normal       Medications:   Scheduled Medications:    atorvastatin  10 mg Oral Daily    [Held by provider] furosemide  40 mg Oral Daily    potassium chloride  20 mEq Oral Daily    metoprolol tartrate  50 mg Oral BID    sodium chloride flush  5-40 mL Intravenous 2 times per day    ciprofloxacin  400 mg Intravenous Q12H    insulin lispro  0-6 Units Subcutaneous TID WC    insulin lispro  0-3 Units Subcutaneous Nightly    warfarin (COUMADIN) daily dosing (placeholder)   Other RX Placeholder     I   sodium chloride 1,000 mL (04/18/21 2121)    dextrose      sodium chloride       diclofenac sodium, glucose, dextrose, glucagon (rDNA), dextrose, sodium chloride flush, sodium chloride, promethazine **OR** ondansetron, polyethylene glycol, acetaminophen **OR** acetaminophen, morphine    Lab Data:  Recent Labs     04/17/21  0404 04/18/21  0733   WBC 13.0* 7.5   HGB 14.4 12.7   HCT 45.3 39.6   MCV 89.3 90.4    211     Recent Labs     04/17/21  0404 04/18/21  0733    140   K 3.6 3.9    109   CO2 25 24   BUN 19 16   CREATININE 0.8 0.6     Recent Labs     04/17/21  0404   TROPONINI <0.01       Coagulation:   Lab Results   Component Value Date    INR 3.12 04/19/2021    APTT 32.1 04/17/2021     Cardiac markers:   Lab Results   Component Value Date    CKTOTAL 1,458 06/24/2015    TROPONINI <0.01 04/17/2021         Lab Results   Component Value Date    ALT 26 04/17/2021    AST 31 04/17/2021    ALKPHOS 149 (H) 04/17/2021    BILITOT 1.4 (H) 04/17/2021       Lab Results   Component Value Date    INR 3.12 (H) 04/19/2021    INR 3.05 (H) 04/18/2021    INR 2.33 (H) 04/17/2021    PROTIME 36.6 (H) 04/19/2021    PROTIME 35.8 (H) 04/18/2021    PROTIME 26.4 (H) 04/17/2021       Radiology       CULTURES     SARS-COV-2 - Rapid: Not detected      C diff toxin: indeterminate     EKG:  I have reviewed the EKG with the following interpretation:   Atrial fibrillation with rapid ventricular response rate of 107  Low voltage QRS  Cannot rule out Anterior infarct , age undetermined  Abnormal ECG  No previous ECGs available     RADIOLOGY     CT ABDOMEN PELVIS WO CONTRAST Additional Contrast? None   Final Result   1. Interval worsening of now mid to distal jejunal surrounding mesenteric fat   stranding suggesting association with acute enteritis proximal to small bowel   ostomy site. Note: Evaluation of infectious or inflammatory process limited   in absence of intravenous iodinated contrast administration. 2. Mild omental fat herniation at small bowel ostomy. 3. Severe colonic diverticulosis without evidence of diverticulitis. 4. Splenic chronic granulomatous disease. 5. Multifocal small calcified uterine leiomyomata.    6. Interval cholecystectomy.              Pertinent previous results reviewed      TTE 2/18/2021  Conclusions      Summary   Normal left ventricular systolic function with an estimated ejection  fraction of 55-60%.   No regional wall motion abnormalities are seen.  Yulissa Finders is moderate concentric left ventricular hypertrophy.   The left atrium is mildly dilated.   Mild posterior mitral annular calcification is present.   Mild mitral regurgitation.   Moderate aortic stenosis and moderate aortic regurgitation is present.   Normal systolic pulmonary artery pressure (SPAP) estimated at 31 mmHg (RA   pressure 3 mmHg).     ASSESSMENT/PLAN:     Acute Enteritis  - CT interval worsening of the mid to distal jejunal surrounding mesenteric fat stranding - proximal to small bowel ostomy site  - Admit to Med Surg  -  Continue Cipro, flagyl IV, IVF  - surgery consulted. - improving pain and symptoms. Improved lactate  -tolerating full liquid diet   D/c IVF      Lactic Acidosis  - 2.5 > 2.1  - IVF  - trending down     Leukocytosis  - 13  - likely 2/2 above  Resolved      Chronic Diastolic CHF  - appears compensated  - last echo 2/2021: EF of 55 to 60%, mod LVH, moderate aortic stenosis  - hold Lasix, cont Lopressor     Chronic Atrial Fibrillation  - diagnosed in Feb 2021  - cont BB; AC on Coumadin - pharm to dose     Aortic stenosis  - Noted on most recent echo--> moderate  - Following with cardiology     Type II DM  - uncontrolled with BG in 240-250s  - last Hgb A1c: 6.7 in 2/2021  - hold Diabeta, add low dose SSI  - POC Glucose, carb control diet     HTN  - controlled  - cont Lopressor     Mixed HLD  - cont Lipitor     PAD  - Status post fasciotomies and revascularization of the right lower extremity via anterior tib artery angioplasty  - Followed by vascular surgery  - on Coumadin, cont Lipitor     Obesity  - Body mass index is 31.62 kg/m².   - Counseled on weight loss.      Hx of Colon Cancer  - s/p ileotransverse colectomy with ileal distal transverse anastomosis     DVT Prophylaxis: Coumadin - pharm to dose  Diet: full liquid   Code Status: Full Code      Esperanza Herron MD 4/19/2021 9:21 AM

## 2021-04-19 NOTE — CARE COORDINATION
[facility]    Referral received from  to follow for home care services. I will follow for needs, and speak with patient to verify demos. Patient is active with [facility].   Sending a [address] referral [place] [address] [facility]  Work mobile: [phone]  [facility] office: [phone]

## 2021-04-19 NOTE — CARE COORDINATION
[facility]    DC order noted, all docs needed have been faxed to [facility] for home care services.           Mehrdad Stauffer  Work mobile: [phone]  [facility] office: [phone]

## 2021-04-19 NOTE — PROGRESS NOTES
Inpatient Physical Therapy Evaluation and Treatment    Unit: [address]  Date:  4/19/2021  Patient Name:    Bhargav Parker  Admitting diagnosis:  Abdominal pain [R10.9]  Admit Date:  4/17/2021  Precautions/Restrictions/WB Status/ Lines/ Wounds/ Oxygen: Fall risk, Bed/chair alarm and Lines -IV    Treatment Time: 1150 - 1210  Treatment Number:  1   Timed Code Treatment Minutes: 10 minutes  Total Treatment Minutes:  20  minutes    Patient Goals for Therapy: \" To go home \"          Discharge Recommendations: Home PRN assist and with home PT   DME needs for discharge: Needs Met       Therapy recommendation for EMS Transport: can transport by wheelchair    Therapy recommendations for staff:   Patient will benefit from using RW in the room but refused to use it and will need CGA to SBA for ambulation to bathroom. Stand by assist with use of No AD and gait belt for all transfers and ambulation to/from chair  to/from bathroom  within room     History of Present Illness: (H & P as per Raj Singh MD dated 4/17/2021): The patient is a 82 y.o. female with a PMH of Persistent Atrial Fibrillation, Chronic Diastolic CHF, DM type II, HTN, HLD, Hx of Colon Cancer who presented to the ED with complaint of abdominal pain that started around 2 this morning. Reports it is located in the middle of her abdomen. Did have a couple episodes of non-bloody emesis in the ED. Has had chronic diarrhea for about a year. States she has had a colonoscopy which was unrevealing (No colonoscopy notes noted in [facility] or Care Everywhere within the last year). Denies fevers, chills, melena, hematochezia, SOB or chest pain. She recently had her GB removed on 3/2021. She does not smoke cigarettes or drink alcohol. She does have a hx of transverse colon cancer s/p ileotransverse colectomy with ileal distal transverse anastomosis.   Acute Enteritis  - CT interval worsening of the mid to distal jejunal surrounding mesenteric fat stranding - proximal to small bowel ostomy site  - Admit to Med Surg  - NPO, start Cipro IV, IVF, PRN morphine for pain while NPO     Home Health S4 Level Recommendation:  Level 1 Standard  AM-PAC Mobility Score    AM-PAC Inpatient Mobility Raw Score : 22     Preadmission Environment    Pt. Lives with son (able to be home with pt during day)  Home environment: one story home. Steps to enter first floor: 2+1 steps without rail             W/c and portable ramp available to get pt in home  Bath: walk in shower with seat and grab bars, raised toilet, hand held shower  Equipment owned: [facility], Shower chair, rolling walker, reacher , manual W/C      Preadmission Status   Pt. Not Able to drive   Pt indep with dressing (except needed help socks and shoes), indep with bathing  Pt. Assists with cooking/cleaning ( both) , pt does her own laundry   Pt. Fully independent for transfers and gait and walked with AD occasionally     Pain   No    Cognition    A&O x4   Able to follow 2 step commands    Subjective  Patient lying supine in bed with Son present. Pt agreeable to this PT eval & tx. Upper Extremity ROM/Strength  Please see OT evaluation.       Lower Extremity ROM / Strength   AROM WFL: Yes  ROM limitations: N/A    Strength Assessment (measured on a 0-5 scale):  R LE   Quad   4+   Ant Tib  4+   Hamstring 4+   Iliopsoas 4+  L LE  Quad   4   Ant Tib  4   Hamstring 4   Iliopsoas 4    Lower Extremity Sensation    WFL    Lower Extremity Proprioception:   WFL    Coordination and Tone  WFL    Balance  Sitting:  Normal; Modified Independent  Comments:     Standing: Fair +; SBA  Comments: ~8 min    Bed Mobility   Supine to Sit:    Modified Independent  Sit to Supine:   Not Tested  Rolling:   Not Tested  Scooting in sitting: Modified Independent  Scooting in supine:  Not Tested    Transfer Training     Sit to stand:   SBA  Stand to sit:   SBA  Bed to Chair:   SBA with use of No AD and gait belt    Gait gait completed as indicated below  Distance:      80 ft  Deviations (firm surface/linoleum):  decreased chau, increased FADI, forward flexed posture, shuffles, decreased step length bilaterally and decreased stance time bilaterally  Assistive Device Used:    No AD and gait belt  Level of Assist:    SBA  Comment: Patient had shuffling gait with increased trunk sway in anterior direction during stepping. Patient will benefit from using RW for ambulation in the room to bathroom. Patient refused to use RW during the PT evaluation. Patient showed improved stepping response with verbal cueing. Stair Training deferred, pt unsafe/ not appropriate to complete stairs at this time    Activity Tolerance   Pt completed therapy session with No adverse symptoms noted w/activity  Sitting up in chair  SpO2: 99  HR: 83  BP: 148/67    Positioning Needs   Pt up in chair, alarm set, positioned in proper neutral alignment and pressure relief provided. Call light provided and all needs within reach    Exercises Initiated  all completed bilaterally unless indicated  Ankle Pumps x 10 reps    Other  None. Patient/Family Education   Pt educated on role of inpatient PT, POC, importance of continued activity, DC recommendations, safety awareness, transfer techniques, pacing activity and calling for assist with mobility. Assessment  Pt seen for Physical Therapy evaluation in acute care setting. Pt demonstrated decreased Activity tolerance, Balance, Safety and Strength as well as decreased independence with Ambulation and Transfers. Recommending Home PRN assist and with home PT upon discharge as patient functioning close to baseline level and would benefit from continued therapy services    Goals : To be met in 3 visits:  1). Independent with LE Ex x 10 reps    To be met in 6 visits:  1). Supine to/from sit: Independent  2). Sit to/from stand: Modified Independent  3). Bed to chair: Modified Independent  4).   Gait: Ambulate 150 ft.  with  Supervision and use of LRAD (least

## 2021-04-19 NOTE — PLAN OF CARE
D/c
Problem: Falls - Risk of:  Goal: Will remain free from falls  Description: Will remain free from falls  Outcome: Ongoing  Goal: Absence of physical injury  Description: Absence of physical injury  Outcome: Ongoing     Problem: Pain:  Goal: Pain level will decrease  Description: Pain level will decrease  Outcome: Ongoing  Goal: Control of acute pain  Description: Control of acute pain  Outcome: Ongoing  Goal: Control of chronic pain  Description: Control of chronic pain  Outcome: Ongoing     Problem: Activity:  Goal: Risk for activity intolerance will decrease  Description: Risk for activity intolerance will decrease  Outcome: Ongoing     Problem:  Bowel/Gastric:  Goal: Bowel function will improve  Description: Bowel function will improve  Outcome: Ongoing  Goal: Diagnostic test results will improve  Description: Diagnostic test results will improve  Outcome: Ongoing  Goal: Occurrences of nausea will decrease  Description: Occurrences of nausea will decrease  Outcome: Ongoing  Goal: Occurrences of vomiting will decrease  Description: Occurrences of vomiting will decrease  Outcome: Ongoing     Problem: Fluid Volume:  Goal: Maintenance of adequate hydration will improve  Description: Maintenance of adequate hydration will improve  Outcome: Ongoing     Problem: Health Behavior:  Goal: Ability to state signs and symptoms to report to health care provider will improve  Description: Ability to state signs and symptoms to report to health care provider will improve  Outcome: Ongoing     Problem: Physical Regulation:  Goal: Complications related to the disease process, condition or treatment will be avoided or minimized  Description: Complications related to the disease process, condition or treatment will be avoided or minimized  Outcome: Ongoing  Goal: Ability to maintain clinical measurements within normal limits will improve  Description: Ability to maintain clinical measurements within normal limits will improve
abd pain , possible ileitis
Tee Parikh RN  Outcome: Ongoing     Problem: Physical Regulation:  Goal: Complications related to the disease process, condition or treatment will be avoided or minimized  Description: Complications related to the disease process, condition or treatment will be avoided or minimized  4/18/2021 2115 by Roberto Ridley RN  Outcome: Ongoing  Goal: Ability to maintain clinical measurements within normal limits will improve  Description: Ability to maintain clinical measurements within normal limits will improve  4/18/2021 2115 by Roberto Ridley RN  Outcome: Ongoing     Problem: Sensory:  Goal: Pain level will decrease  Description: Pain level will decrease  4/18/2021 2115 by Roberto Ridley RN  Outcome: Ongoing  Goal: Ability to identify factors that increase the pain will improve  Description: Ability to identify factors that increase the pain will improve  4/18/2021 2115 by Roberto Ridley RN  Outcome: Ongoing  Goal: Ability to notify healthcare provider of pain before it becomes unmanageable or unbearable will improve  Description: Ability to notify healthcare provider of pain before it becomes unmanageable or unbearable will improve  4/18/2021 2115 by Roberto Ridley RN  Outcome: Ongoing

## 2021-04-19 NOTE — DISCHARGE INSTR - COC
GASTROINTESTINAL ENDOSCOPY      VASCULAR SURGERY         Immunization History: There is no immunization history on file for this patient.     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32    Acute pulmonary edema (HCC) J81.0    Pleural effusion, bilateral J90    Abdominal pain R10.9    Non-intractable vomiting R11.10    Anticoagulated on Coumadin Z79.01    Enteritis K52.9       Isolation/Infection:   Isolation          No Isolation        Patient Infection Status     Infection Onset Added Last Indicated Last Indicated By Review Planned Expiration Resolved Resolved By    None active    Resolved    C-diff Rule Out 04/17/21 04/18/21 04/17/21 C. difficile toxin Molecular (Ordered)   04/18/21 Rule-Out Test Resulted    C-diff Rule Out 04/17/21 04/17/21 04/17/21 Clostridium difficile toxin/antigen (Ordered)   04/17/21 Rule-Out Test Resulted    COVID-19 Rule Out 03/24/21 03/24/21 03/24/21 COVID-19, Rapid (Ordered)   03/24/21 Rule-Out Test Resulted    COVID-19 01/13/21 01/14/21 01/13/21 COVID-19 21     COVID-19 Rule Out 21 COVID-19 (Ordered)   21 Rule-Out Test Resulted    COVID-19 Rule Out 21 COVID-19 (Ordered)   21 Rule-Out Test Resulted          Nurse Assessment:  Last Vital Signs: /60   Pulse 69   Temp 97.9 °F (36.6 °C) (Oral)   Resp 18   Ht 5' 5\" (1.651 m)   Wt 190 lb (86.2 kg)   SpO2 98%   BMI 31.62 kg/m²     Last documented pain score (0-10 scale): Pain Level: 0  Last Weight:   Wt Readings from Last 1 Encounters:   21 190 lb (86.2 kg)     Mental Status:  oriented and alert    IV Access:  - None    Nursing Mobility/ADLs:  Walking   Assisted  Transfer  Assisted  Bathing  Assisted  Dressing  Assisted  Toileting  Assisted  Feeding  Independent  Med Admin  Independent  Med Delivery   whole    Wound Care Documentation and Therapy:        Elimination:  Continence:   · Bowel: Yes  · Bladder: Yes  Urinary Catheter: None   Colostomy/Ileostomy/Ileal Conduit: No       Date of Last BM: 2021    Intake/Output Summary (Last 24 hours) at 2021 1300  Last data filed at 2021 1743  Gross per 24 hour   Intake 540 ml   Output    Net 540 ml     I/O last 3 completed shifts: In: 5 [P.O.:540]  Out: -     Safety Concerns: At Risk for Falls    Impairments/Disabilities:      None    Nutrition Therapy:  Current Nutrition Therapy:   - Oral Diet:  Carb Control 4 carbs/meal (1800kcals/day)    Routes of Feeding: Oral  Liquids: No Restrictions  Daily Fluid Restriction: no  Last Modified Barium Swallow with Video (Video Swallowing Test): not done    Treatments at the Time of Hospital Discharge:   Respiratory Treatments: ***  Oxygen Therapy:  is not on home oxygen therapy.   Ventilator:    - No ventilator support    Rehab Therapies: Physical Therapy, Occupational Therapy and SKILLED NURSE, HHA   Weight Bearing Status/Restrictions: No weight bearing restirctions  Other Medical Equipment (for information only, NOT a DME order):

## 2021-04-19 NOTE — PROGRESS NOTES
Inpatient Occupational Therapy  Evaluation and Treatment    Unit: 2 [place]  Date:  4/19/2021  Patient Name:    Marzena Card  Admitting diagnosis:  Abdominal pain [R10.9]  Admit Date:  4/17/2021  Precautions/Restrictions/WB Status/ Lines/ Wounds/ Oxygen: fall risk and bed/chair alarm    Treatment Time:  3651-5009  Treatment Number: 1     Billable Treatment Time: 8 minutes   Total Treatment Time:   18   minutes    Patient Goals for Therapy:  \" go home \"      Discharge Recommendations: Home with 24/7 supervision  DME needs for discharge: Needs Met, recommend use of RW at home       Therapy recommendations for staff:   Assist of 1 with use of rolling walker (RW) for all ambulation within room    History of Present Illness: per H&P 82 y.o. female with a PMH of Persistent Atrial Fibrillation, Chronic Diastolic CHF, DM type II, HTN, HLD, Hx of Colon Cancer who presented to the ED with complaint of abdominal pain that started around 2 this morning. Reports it is located in the middle of her abdomen. Did have a couple episodes of non-bloody emesis in the ED. Has had chronic diarrhea for about a year. States she has had a colonoscopy which was unrevealing (No colonoscopy notes noted in [facility] or Care Everywhere within the last year). Denies fevers, chills, melena, hematochezia, SOB or chest pain.      She recently had her GB removed on 3/2021. She does not smoke cigarettes or drink alcohol. She does have a hx of transverse colon cancer s/p ileotransverse colectomy with ileal distal transverse anastomosis. Home Health S4 Level Recommendation:  NA  AM-PAC Score: AM-PAC Inpatient Daily Activity Raw Score: 18    Preadmission Environment    Pt. Lives with son (able to be home with pt during day)  Home environment: one story home.   Steps to enter first floor: 2 plus one to two without rail  Bath: walk in shower with seat and grab bars, raised toilet, hand held shower  Equipment owned: [facility], Shower chair, rolling walker, reacher , manual W/C      Preadmission Status   Yes fall- 6 weeks ago when going up steps-BS was low  Pt. Not Able to drive    Pt indep with dressing (except needed help socks and shoes), indep with bathing  Pt. Assists with cooking/cleaning ( both) , pt does her own laundry   Pt. Fully independent for transfers and gait and walked with RW occasionally   Holds onto son's hand when walking outside    Pain  No  Rating:NA  Location:  Pain Medicine Status: No request made      Cognition    A&O x4   Able to follow 2 step commands    Subjective  Patient lying supine in bed with son present  Pt agreeable to this OT eval & tx. Upper Extremity ROM:    WFL,  pt able to perform all bed mobility, transfers, and gait without ROM limitation. Upper Extremity Strength:    BUE strength impaired but not formally assessed w/ MMT    Upper Extremity Sensation    WFL    Upper Extremity Proprioception:  WFL    Coordination and Tone  WFL    Balance  Functional Sitting Balance:  Impaired SBA  Functional Standing Balance:Impaired SBA    Bed mobility:    Supine to sit: Independent  Sit to supine:   Not Tested  Rolling:    Not Tested  Scooting in sitting:  Independent  Scooting to head of bed:   Not Tested    Bridging:   Not Tested    Transfers:    Sit to stand:  SBA  Stand to sit:  SBA  Bed to chair:   SBA  Standard toilet: Not Tested  Bed to [facility]:  Not Tested    Dressing:      UE:   Not Tested  LE:    Max A    Bathing:    UE:  Not Tested  LE:  Not Tested    Eating:   Independent    Toileting:  Not Tested    Activity Tolerance   Pt completed therapy session with No adverse symptoms noted w/activity   Sitting up in chair  SpO2: 99  HR: 83  BP: 148/67    Positioning Needs:   Up in chair, call light and needs in reach.     Alarm Set    Exercise / Activities Initiated:   N/A    Patient/Family Education:   Role of OT  Recommendations for DC  Use of AE  Safe RW use/hand placement    Assessment of Deficits: Pt seen for Occupational therapy evaluation

## 2021-04-19 NOTE — CARE COORDINATION
DISCHARGE ORDER  Date/Time 2021 2:08 PM  Completed by: Malen Aschoff, Case Management    Patient Name: Avtar Craig      : 1935  Admitting Diagnosis: Abdominal pain [R10.9]      Admit order Date and Status: IP 2021  (verify MD's last order for status of admission)      Noted discharge order. If applicable PT/OT recommendation at Discharge:   Discharge Recommendations: Home PRN assist and with home PT   DME needs for discharge: Needs Met    Confirmed discharge plan with patient and son at bedside  Discharge Plan: Chart reviewed. Met with pt and son Stephanie Manning) at bedside and explained the role of the CM. Plans to return home and resume [facility] services with [facility]. Orders and CHRIS/AVS in Epic. Notification call placed to Nitesh Galindo [facility] liaison), she will make contact with patient to set up [facility]. Reviewed chart. Role of discharge planner explained and patient verbalized understanding. Discharge order is noted. Has Home O2 in place on admit:  No  Informed of need to bring portable home O2 tank on day of discharge for nursing to connect prior to leaving:   No  Verbalized agreement/Understanding:   No  Pt is being d/c'd to home  today. Pt's O2 sats are 99% on RA. Discharge timeout done with [facility]. All discharge needs and concerns addressed.

## 2021-04-19 NOTE — PROGRESS NOTES
Pharmacy Note  Warfarin Consult  Dx: Afib  Goal INR range 2-3   Home Warfarin dose: 2.5mg Tu, Th, Sat,   5mg on other days      Date  INR  Warfarin  4/17                2.33                 2.5mg       4/18                3.05                  hold  4/19                3.12                  hold    Patient is also ordered IV Cipro which may increase the INR. Recommend holding Warfarin tonight. Daily INR is ordered. Pharmacy will continue to manage therapy per consult order from Dr Warden Brown. Pharmacy will continue to monitor and adjust as necessary.   [facility] Pharm D 4/59/111958:24 PM  .

## 2021-04-19 NOTE — PROGRESS NOTES
AM assessment complete. Gave am meds due see mar. A/O x 4. Denies any pain/needs. Bed locked/lowest position Bed check. Call light within reach.

## 2021-04-20 LAB — URINE CULTURE, ROUTINE: NORMAL

## 2021-07-07 RX ORDER — ATORVASTATIN CALCIUM 10 MG/1
10 TABLET, FILM COATED ORAL DAILY
Qty: 90 TABLET | Refills: 3 | Status: SHIPPED | OUTPATIENT
Start: 2021-07-07 | End: 2022-03-24 | Stop reason: SDUPTHER

## 2021-07-20 NOTE — PROGRESS NOTES
[address] Office Note  7/22/2021     Subjective:  Ms. Renu Leon is here today for cardiology follow up of permanent  Afib, moderate aortic stenosis and aortic insufficiency PAD Hyperlipidemia hypertension and diastolic CHF. [place]:    Today she reports she has been  feeling good. No new complaints. Denies chest pain, shortness of breath, edema, dizziness, palpitations and syncope. Kiarra Méndez manages her coumadin dose    Her son is present at exam.     Never had covid vaccine not interested     PMH  Admitted 4/17-4/19/21 Acute Enteritis  - CT interval worsening of the mid to distal jejunal surrounding mesenteric fat stranding - proximal to small bowel ostomy site  Hx of Colon Cancer s/p ileotransverse colectomy with ileal distal transverse anastomosis  - Admitted to Med Surg  -  Continued Cipro, flagyl IV, IVF  She presented to the hospital 2/17/21 for elective cholecystectomy but was found to be afib rvr. She had echo during hospitalization  Which revealed EF 55-60%. Mild MR. Moderate AS and moderate AR. She came back to hospital 3/24/21-3/25/21 with symptoms of CHF Acute on chronic dCHF    Afib, HLD, PAD, s/p thrombectomy and fasciotomy on 6/20/15, then underwent angiogram w/ angioplasty on 6/22/15, DM2. Acute renal failure recovered     Review of Systems:         12 point ROS negative in all areas as listed below except as in [address], EENT, pulmonary, GI, , Musculoskeletal, skin, neurological, hematological, endocrine, Psychiatric    Reviewed past medical history, social, and family history. reports that she has never smoked. She has never used smokeless tobacco. She reports that she does not drink alcohol or use drugs.    MOM- Brain cancer, no heart disease   DAD- prostate cancer, no heart disease   Past Medical History:   Diagnosis Date    [facility]     Cancer ([facility]     skin cancer 2014; colon 2015    Clostridium difficile diarrhea 06/25/2015    per physicians notes; negative on 6/25/15    Clostridium difficile infection 12/09/2016    COVID-19 01/13/2021    Diabetes mellitus ([facility]     Hyperlipidemia     Hypertension      Past Surgical History:   Procedure Laterality Date    ABDOMEN SURGERY Right 10/15/2015    right colectomy, pain ball    ABDOMEN SURGERY  12/06/2016    trasverse colectomy    ANGIOPLASTY      right foot.  CHOLECYSTECTOMY  03/02/2021    : LAPAROSCOPIC CHOLECYSTECTOMY     CHOLECYSTECTOMY, LAPAROSCOPIC N/A 3/2/2021    LAPAROSCOPIC CHOLECYSTECTOMY performed by Juve Sheikh MD at [address] COLONOSCOPY  2015    ascending colon mass, diverticulosis, hemorrhoids    COLONOSCOPY  11/08/2016    Transverse colon mass; Diverticulosis; Hemorrhoids    HERNIA REPAIR      x2    OTHER SURGICAL HISTORY Left 9/29/2014    excision of basal cell skin cancer Left face with full thickness skin graph from abdomen    SKIN GRAFT      UPPER GASTROINTESTINAL ENDOSCOPY      VASCULAR SURGERY         Objective:   /70   Pulse 101   Temp 98.2 °F (36.8 °C)   Ht 5' 5\" (1.651 m)   Wt 188 lb 6.4 oz (85.5 kg)   SpO2 98%   BMI 31.35 kg/m²     Wt Readings from Last 3 Encounters:   07/22/21 188 lb 6.4 oz (85.5 kg)   04/17/21 190 lb (86.2 kg)   04/08/21 191 lb (86.6 kg)       Physical Exam:  General: No Respiratory distress, appears well developed and well nourished. Eyes:  Sclera nonicteric  Nose/Sinuses:  negative findings: nose shows no deformity, asymmetry, or inflammation, nasal mucosa normal, septum midline with no perforation or bleeding  Back:  no pain to palpation  Joint:  no active joint inflammation  Musculoskeletal:  negative  Skin:  Warm and dry  Neck:  Negative for JVD and Carotid Bruits. Chest:  Clear to auscultation, respiration easy  Cardiovascular:  irreg ireg  S2 normal, Aortic stenosis murmur, no rub or thrill.   Extremities:Trace BLE edema, no  clubbing, cyanosis,  Pedal pulses are intact  Neuro: intact    Medications:   Outpatient fibrillationLow voltage QRSCannot rule out Anterior infarct (cited on or before 17-FEB-2021)Nonspecific ST abnormalityAbnormal ECGWhen compared with ECG of 17-FEB-2021 07:13,Criteria for Inferior infarct are no longer PresentConfirmed by KAYLEE WIGGINS, Khari Santillan ([id number]) on 3/24/2021 7:39:30 AM    CTPA 3/24/21  FINDINGS: Pulmonary Arteries: Pulmonary arteries are adequately opacified for evaluation. No evidence of intraluminal filling defect to suggest pulmonary embolism. Main pulmonary artery is normal in caliber. Mediastinum: Cardiomegaly. Significant coronary calcifications. Aortic valve calcifications. Few mitral annular calcifications. No concerning adenopathy. The heart and pericardium demonstrate no acute abnormality. There is no acute abnormality of the thoracic aorta. Lungs/pleura: Mild dependent atelectasis bilaterally. Moderate bilateral pleural effusions. No pneumothorax. Interlobular septal thickening. Few bilateral small ground-glass opacities. Calcified pulmonary nodules and calcified left hilar adenopathy compatible with prior granulomatous disease. Upper Abdomen: Limited imaging including the abdomen. The very top of the liver dome is seen and appears unremarkable. Soft Tissues/Bones: No acute bone or soft tissue abnormality. Confluent anterolateral vertebral body syndesmophyte formation compatible with DISH. No evidence of pulmonary embolism. Cardiomegaly, bilateral pleural effusions and mild pulmonary edema compatible with fluid overload and/or CHF. Mild dependent airspace disease bilaterally most likely represents atelectasis. CXR 3/24/21  Cardiomegaly, pulmonary vascular congestion and small bilateral pleural effusions compatible with fluid overload and/or CHF. Bibasilar airspace disease. Pneumonia can not be excluded. ECHO 2/18/21   Normal left ventricular systolic function with an estimated ejection   fraction of 55-60%.    No regional wall motion abnormalities are seen. There is moderate concentric left ventricular hypertrophy. The left atrium is mildly dilated. Mild posterior mitral annular calcification is present. Mild mitral regurgitation. Moderate aortic stenosis and moderate aortic regurgitation is present. Normal systolic pulmonary artery pressure (SPAP) estimated at 31 mmHg (RA   pressure 3 mmHg). CXR 2/17/21   Pulmonary vascular congestion with no pulmonary edema or focal infiltrate       EKG 2/17/20   Atrial fibrillation with rapid ventricular responseLeft axis deviationLow voltage QRSInferior infarct , age undeterminedCannot rule out Anterior infarct , age undeterminedAbnormal ECGWhen compared with ECG of 01-JAN-2021 04:53,Atrial fibrillation has replaced Sinus rhythmVent. rate has increased BY  51 BPMMinimal criteria for Anterior infarct are now PresentConfirmed by Mercy Cabot MD, [address] ([id number]) on 2/17/2021 7:26:24 AM     EKG 1/1/21   Normal sinus rhythmWhen compared with ECG of 12/23/16, low voltage QRS now present. Confirmed by Selene Dunham ([id number]) on 1/1/2021 11:49:00 AM     Assessment:  1. Permanent atrial fibrillation ([address]    2. Nonrheumatic aortic valve stenosis    3. Benign essential HTN    4. Mixed hyperlipidemia     [id number] Score for Atrial Fibrillation Stroke Risk   Risk   Factors  Component Value   C CHF Yes 1   H HTN Yes 1   A2 Age >= 76 Yes,  (82 y.o.) 2   D DM Yes 1   S2 Prior Stroke/TIA No 0   V Vascular Disease No 0   A Age 74-69 No,  (82 y.o.) 0   Sc Sex female 1    [id number]  Score  6   Score last updated 2/26/21 0:80 PM EST    Click here for a link to the UpToDate guideline \"Atrial Fibrillation: Anticoagulation therapy to prevent embolization       Yosi Sandoval PCP manages her protime levels    Plan:  Labs reviewed in epic and discussed with patient   1. Meds reviewed. 2 Refills as warranted   2. No need for cardiac testing at this time. 3. Stable and will continue present medical regimen.   4. She is on Continue other current medications  Lasix potassium statin  Recheck K level   5. Follow up with me in 8  months       She has not had covid vaccine and prefers not to receive vaccine    QUALITY MEASURES  1. Tobacco Cessation Counseling: NA   2. Retake of BP if >140/90: NA   3. Documentation to PCP/referring for new patient:  Sent to PCP at close of office visit  4. CAD patient on anti-platelet: NA   5. CAD patient on STATIN therapy: HLD- Lipitor  6. Patient with CHF and aFib on anticoagulation: Afib- warfarin      This note was scribed in the presence of  Eric Jay MD by Maria Teresa Banks, RN  I, Dr. Eric Jay, personally performed the services described in this documentation, as scribed by the above signed scribe in my presence. It is both accurate and complete to my knowledge. I agree with the details independently gathered by the clinical support staff, while the remaining scribed note accurately describes my personal service to the patient.         [address] MD Celine, MD 7/22/2021 1:48 PM

## 2021-07-22 ENCOUNTER — OFFICE VISIT (OUTPATIENT)
Dept: CARDIOLOGY CLINIC | Age: 86
End: 2021-07-22
Payer: MEDICARE

## 2021-07-22 VITALS
TEMPERATURE: 98.2 F | HEIGHT: 65 IN | BODY MASS INDEX: 31.39 KG/M2 | DIASTOLIC BLOOD PRESSURE: 70 MMHG | OXYGEN SATURATION: 98 % | SYSTOLIC BLOOD PRESSURE: 100 MMHG | WEIGHT: 188.4 LBS | HEART RATE: 101 BPM

## 2021-07-22 DIAGNOSIS — I35.0 NONRHEUMATIC AORTIC VALVE STENOSIS: ICD-10-CM

## 2021-07-22 DIAGNOSIS — E78.2 MIXED HYPERLIPIDEMIA: ICD-10-CM

## 2021-07-22 DIAGNOSIS — I48.21 PERMANENT ATRIAL FIBRILLATION (HCC): Primary | ICD-10-CM

## 2021-07-22 DIAGNOSIS — I10 BENIGN ESSENTIAL HTN: ICD-10-CM

## 2021-07-22 PROCEDURE — 1123F ACP DISCUSS/DSCN MKR DOCD: CPT | Performed by: INTERNAL MEDICINE

## 2021-07-22 PROCEDURE — 4040F PNEUMOC VAC/ADMIN/RCVD: CPT | Performed by: INTERNAL MEDICINE

## 2021-07-22 PROCEDURE — 1036F TOBACCO NON-USER: CPT | Performed by: INTERNAL MEDICINE

## 2021-07-22 PROCEDURE — G8427 DOCREV CUR MEDS BY ELIG CLIN: HCPCS | Performed by: INTERNAL MEDICINE

## 2021-07-22 PROCEDURE — G8417 CALC BMI ABV UP PARAM F/U: HCPCS | Performed by: INTERNAL MEDICINE

## 2021-07-22 PROCEDURE — G8400 PT W/DXA NO RESULTS DOC: HCPCS | Performed by: INTERNAL MEDICINE

## 2021-07-22 PROCEDURE — 1090F PRES/ABSN URINE INCON ASSESS: CPT | Performed by: INTERNAL MEDICINE

## 2021-07-22 PROCEDURE — 99214 OFFICE O/P EST MOD 30 MIN: CPT | Performed by: INTERNAL MEDICINE

## 2021-07-22 RX ORDER — FUROSEMIDE 40 MG/1
40 TABLET ORAL DAILY
Qty: 90 TABLET | Refills: 3 | Status: SHIPPED | OUTPATIENT
Start: 2021-07-22 | End: 2022-03-24 | Stop reason: SDUPTHER

## 2021-07-22 RX ORDER — POTASSIUM CHLORIDE 20 MEQ/1
20 TABLET, EXTENDED RELEASE ORAL DAILY
Qty: 90 TABLET | Refills: 3 | Status: SHIPPED | OUTPATIENT
Start: 2021-07-22

## 2021-07-22 NOTE — PATIENT INSTRUCTIONS
Plan:  1. Meds reviewed. 2 Refills as warranted   2. No need for cardiac testing at this time. 3. Stable and will continue present medical regimen. 4. She is on Continue other  current medications  Lasix potassium statin  Recheck K level   5.  Follow up with me in 8  months

## 2021-11-16 ENCOUNTER — TELEPHONE (OUTPATIENT)
Dept: SURGERY | Age: 86
End: 2021-11-16

## 2021-11-17 ENCOUNTER — INITIAL CONSULT (OUTPATIENT)
Dept: SURGERY | Age: 86
End: 2021-11-17

## 2021-11-17 VITALS
SYSTOLIC BLOOD PRESSURE: 117 MMHG | TEMPERATURE: 97.3 F | HEART RATE: 109 BPM | WEIGHT: 197.2 LBS | HEIGHT: 65 IN | DIASTOLIC BLOOD PRESSURE: 70 MMHG | BODY MASS INDEX: 32.86 KG/M2

## 2021-11-17 DIAGNOSIS — K43.2 VENTRAL INCISIONAL HERNIA: Primary | ICD-10-CM

## 2021-11-17 PROCEDURE — 99213 OFFICE O/P EST LOW 20 MIN: CPT | Performed by: SURGERY

## 2021-12-03 ENCOUNTER — APPOINTMENT (OUTPATIENT)
Dept: CT IMAGING | Age: 86
End: 2021-12-03

## 2021-12-03 ENCOUNTER — HOSPITAL ENCOUNTER (EMERGENCY)
Age: 86
Discharge: HOME | End: 2021-12-03
Attending: EMERGENCY MEDICINE

## 2021-12-03 VITALS
SYSTOLIC BLOOD PRESSURE: 123 MMHG | HEART RATE: 118 BPM | TEMPERATURE: 97.5 F | DIASTOLIC BLOOD PRESSURE: 80 MMHG | OXYGEN SATURATION: 97 %

## 2021-12-03 DIAGNOSIS — R10.84 GENERALIZED ABDOMINAL PAIN: Primary | ICD-10-CM

## 2021-12-03 LAB
A/G RATIO: 1.3 (ref 1.1–2.2)
ALBUMIN SERPL-MCNC: 3.7 G/DL (ref 3.4–5)
ALP BLD-CCNC: 163 U/L (ref 40–129)
ALT SERPL-CCNC: 34 U/L (ref 10–40)
ANION GAP SERPL CALCULATED.3IONS-SCNC: 12 MMOL/L (ref 3–16)
APTT: 38 SEC (ref 26.2–38.6)
AST SERPL-CCNC: 34 U/L (ref 15–37)
BACTERIA: ABNORMAL /HPF
BASOPHILS ABSOLUTE: 0 K/UL (ref 0–0.2)
BASOPHILS RELATIVE PERCENT: 0.2 %
BILIRUB SERPL-MCNC: 1.5 MG/DL (ref 0–1)
BILIRUBIN URINE: NEGATIVE
BLOOD, URINE: ABNORMAL
BUN BLDV-MCNC: 18 MG/DL (ref 7–20)
CALCIUM SERPL-MCNC: 9.5 MG/DL (ref 8.3–10.6)
CHLORIDE BLD-SCNC: 100 MMOL/L (ref 99–110)
CLARITY: ABNORMAL
CO2: 24 MMOL/L (ref 21–32)
COLOR: YELLOW
CREAT SERPL-MCNC: 0.7 MG/DL (ref 0.6–1.2)
CRYSTALS, UA: ABNORMAL /HPF
EKG ATRIAL RATE: 74 BPM
EKG DIAGNOSIS: NORMAL
EKG Q-T INTERVAL: 324 MS
EKG QRS DURATION: 80 MS
EKG QTC CALCULATION (BAZETT): 436 MS
EKG R AXIS: -41 DEGREES
EKG T AXIS: 47 DEGREES
EKG VENTRICULAR RATE: 109 BPM
EOSINOPHILS ABSOLUTE: 0 K/UL (ref 0–0.6)
EOSINOPHILS RELATIVE PERCENT: 0.1 %
EPITHELIAL CELLS, UA: ABNORMAL /HPF (ref 0–5)
GFR AFRICAN AMERICAN: >60
GFR NON-AFRICAN AMERICAN: >60
GLUCOSE BLD-MCNC: 238 MG/DL (ref 70–99)
GLUCOSE URINE: 250 MG/DL
HCT VFR BLD CALC: 45.3 % (ref 36–48)
HEMOGLOBIN: 14.7 G/DL (ref 12–16)
HYALINE CASTS: ABNORMAL /LPF (ref 0–2)
INR BLD: 2.87 (ref 0.88–1.12)
KETONES, URINE: NEGATIVE MG/DL
LACTIC ACID, SEPSIS: 2.7 MMOL/L (ref 0.4–1.9)
LACTIC ACID, SEPSIS: 3 MMOL/L (ref 0.4–1.9)
LACTIC ACID: 2.7 MMOL/L (ref 0.4–2)
LEUKOCYTE ESTERASE, URINE: ABNORMAL
LIPASE: 21 U/L (ref 13–60)
LYMPHOCYTES ABSOLUTE: 0.7 K/UL (ref 1–5.1)
LYMPHOCYTES RELATIVE PERCENT: 6.8 %
MCH RBC QN AUTO: 29.8 PG (ref 26–34)
MCHC RBC AUTO-ENTMCNC: 32.4 G/DL (ref 31–36)
MCV RBC AUTO: 91.9 FL (ref 80–100)
MICROSCOPIC EXAMINATION: YES
MONOCYTES ABSOLUTE: 0.6 K/UL (ref 0–1.3)
MONOCYTES RELATIVE PERCENT: 5.7 %
MUCUS: ABNORMAL /LPF
NEUTROPHILS ABSOLUTE: 9 K/UL (ref 1.7–7.7)
NEUTROPHILS RELATIVE PERCENT: 87.2 %
NITRITE, URINE: NEGATIVE
PDW BLD-RTO: 14.8 % (ref 12.4–15.4)
PH UA: 5 (ref 5–8)
PLATELET # BLD: 190 K/UL (ref 135–450)
PMV BLD AUTO: 9.5 FL (ref 5–10.5)
POTASSIUM REFLEX MAGNESIUM: 3.9 MMOL/L (ref 3.5–5.1)
PROTEIN UA: NEGATIVE MG/DL
PROTHROMBIN TIME: 33.9 SEC (ref 9.9–12.7)
RBC # BLD: 4.93 M/UL (ref 4–5.2)
RBC UA: ABNORMAL /HPF (ref 0–4)
SODIUM BLD-SCNC: 136 MMOL/L (ref 136–145)
SPECIFIC GRAVITY UA: >=1.03 (ref 1–1.03)
TOTAL PROTEIN: 6.5 G/DL (ref 6.4–8.2)
TROPONIN: <0.01 NG/ML
URINE TYPE: ABNORMAL
UROBILINOGEN, URINE: 0.2 E.U./DL
WBC # BLD: 10.3 K/UL (ref 4–11)
WBC UA: ABNORMAL /HPF (ref 0–5)

## 2021-12-03 PROCEDURE — 6360000004 HC RX CONTRAST MEDICATION: Performed by: EMERGENCY MEDICINE

## 2021-12-03 PROCEDURE — 83690 ASSAY OF LIPASE: CPT

## 2021-12-03 PROCEDURE — 99283 EMERGENCY DEPT VISIT LOW MDM: CPT | Performed by: SURGERY

## 2021-12-03 PROCEDURE — 80053 COMPREHEN METABOLIC PANEL: CPT

## 2021-12-03 PROCEDURE — 85610 PROTHROMBIN TIME: CPT

## 2021-12-03 PROCEDURE — 84484 ASSAY OF TROPONIN QUANT: CPT

## 2021-12-03 PROCEDURE — 2580000003 HC RX 258: Performed by: EMERGENCY MEDICINE

## 2021-12-03 PROCEDURE — 6360000002 HC RX W HCPCS: Performed by: EMERGENCY MEDICINE

## 2021-12-03 PROCEDURE — 93005 ELECTROCARDIOGRAM TRACING: CPT | Performed by: EMERGENCY MEDICINE

## 2021-12-03 PROCEDURE — 81001 URINALYSIS AUTO W/SCOPE: CPT

## 2021-12-03 PROCEDURE — 85025 COMPLETE CBC W/AUTO DIFF WBC: CPT

## 2021-12-03 PROCEDURE — 93010 ELECTROCARDIOGRAM REPORT: CPT | Performed by: INTERNAL MEDICINE

## 2021-12-03 PROCEDURE — 74177 CT ABD & PELVIS W/CONTRAST: CPT

## 2021-12-03 PROCEDURE — 99284 EMERGENCY DEPT VISIT MOD MDM: CPT

## 2021-12-03 PROCEDURE — 96375 TX/PRO/DX INJ NEW DRUG ADDON: CPT

## 2021-12-03 PROCEDURE — 83605 ASSAY OF LACTIC ACID: CPT

## 2021-12-03 PROCEDURE — 85730 THROMBOPLASTIN TIME PARTIAL: CPT

## 2021-12-03 PROCEDURE — 96361 HYDRATE IV INFUSION ADD-ON: CPT

## 2021-12-03 PROCEDURE — 96374 THER/PROPH/DIAG INJ IV PUSH: CPT

## 2021-12-03 RX ORDER — MORPHINE SULFATE 2 MG/ML
2 INJECTION, SOLUTION INTRAMUSCULAR; INTRAVENOUS EVERY 30 MIN PRN
Status: DISCONTINUED | OUTPATIENT
Start: 2021-12-03 | End: 2021-12-03 | Stop reason: HOSPADM

## 2021-12-03 RX ORDER — 0.9 % SODIUM CHLORIDE 0.9 %
1000 INTRAVENOUS SOLUTION INTRAVENOUS ONCE
Status: COMPLETED | OUTPATIENT
Start: 2021-12-03 | End: 2021-12-03

## 2021-12-03 RX ORDER — ONDANSETRON 2 MG/ML
4 INJECTION INTRAMUSCULAR; INTRAVENOUS EVERY 30 MIN PRN
Status: DISCONTINUED | OUTPATIENT
Start: 2021-12-03 | End: 2021-12-03 | Stop reason: HOSPADM

## 2021-12-03 RX ORDER — 0.9 % SODIUM CHLORIDE 0.9 %
1000 INTRAVENOUS SOLUTION INTRAVENOUS ONCE
Status: DISCONTINUED | OUTPATIENT
Start: 2021-12-03 | End: 2021-12-03

## 2021-12-03 RX ORDER — DIPHENHYDRAMINE HYDROCHLORIDE 50 MG/ML
25 INJECTION INTRAMUSCULAR; INTRAVENOUS ONCE
Status: COMPLETED | OUTPATIENT
Start: 2021-12-03 | End: 2021-12-03

## 2021-12-03 RX ADMIN — IOPAMIDOL 75 ML: 755 INJECTION, SOLUTION INTRAVENOUS at 08:03

## 2021-12-03 RX ADMIN — DIPHENHYDRAMINE HYDROCHLORIDE 25 MG: 50 INJECTION, SOLUTION INTRAMUSCULAR; INTRAVENOUS at 07:53

## 2021-12-03 RX ADMIN — ONDANSETRON HYDROCHLORIDE 4 MG: 2 INJECTION, SOLUTION INTRAMUSCULAR; INTRAVENOUS at 07:16

## 2021-12-03 RX ADMIN — SODIUM CHLORIDE 1000 ML: 9 INJECTION, SOLUTION INTRAVENOUS at 07:15

## 2021-12-03 NOTE — ED NOTES
Writer gave pt PO challenge -- water and crackers -- and will re-assess in 15 minutes.       Yue Kauffman RN  12/03/21 [id number]

## 2021-12-03 NOTE — ED TRIAGE NOTES
Patient arrived from home with c/o increased abdominal pain that started 3 days ago. patient states she had her gall bladder removed in April patient is &o X4 SKIN IS pwd respirations are e/u with nad

## 2021-12-03 NOTE — ED PROVIDER NOTES
Emergency Physician Note  [address] 11 [facility] ED  288 [facility] [place] [address]  Dept: [phone]  Loc: [phone]  Open Note Time:  6:17 AM EST    Chief Complaint  Abdominal Pain and Nausea       History of Present Illness  Betty Maravilla is a 82 y.o. female  has a past medical history of A-fib ([address] Cancer ([address] Clostridium difficile diarrhea, Clostridium difficile infection, COVID-19, Diabetes mellitus ([address] Hyperlipidemia, and Hypertension. who presents to the ED for abdominal pain. Patient's had worsening abdominal pain for the past 3 days. She states she always has diarrhea and that is unchanged. She is also had nausea and vomiting associated with the abdominal pain for the past 3 days. The pain is mostly focused on the right lower quadrant, she can't really qualify the pain for me. Had a cholecystectomy earlier this year. She does not believe that she has had her appendix taken out yet. Patient was given Zofran by EMS. Denies fever,  chest pain, shortness of breath, cough,  headache, sore throat, dysuria, back pain, rash. No palliative/provocative factors. Unless otherwise stated in this report or unable to obtain because of the patient's clinical or mental status as evidenced by the medical record, this patient's positive and negative responses for review of systems, constitutional, psych, eyes, ENT, cardiovascular, respiratory, gastrointestinal, neurological, genitourinary, musculoskeletal, integument systems and systems related to the presenting problem are either stated in the preceding paragraph or were not pertinent or were negative for the symptoms and/or complaints related to the medical problem. I have reviewed the following from the nursing documentation:      Prior to Admission medications    Medication Sig Start Date End Date Taking?  Authorizing Provider   furosemide (LASIX) 40 MG tablet Take 1 tablet by mouth daily 7/22/21  Yes Irene Kwok MD   potassium chloride (KLOR-CON M) 20 MEQ extended release tablet Take 1 tablet by mouth daily 7/22/21  Yes Irene Kwok MD   atorvastatin (LIPITOR) 10 MG tablet Take 1 tablet by mouth daily 7/7/21  Yes Irene Kwok MD   metoprolol tartrate (LOPRESSOR) 50 MG tablet Take 1 tablet by mouth 2 times daily  Patient taking differently: Take 25 mg by mouth 2 times daily  4/8/21  Yes Irene Kwok MD   glyBURIDE (DIABETA) 5 MG tablet Take 7.5 mg by mouth daily (with breakfast). Yes Historical Provider, MD   warfarin (COUMADIN) 2.5 MG tablet Take 3 tablets by mouth daily for 3 days, THEN 2 tablets daily for 1 day. Then see the coumadin clinic for blood check and further dosing. 3/25/21 7/22/21  DESIRAE Gil       Allergies as of 12/03/2021 - Fully Reviewed 12/03/2021   Allergen Reaction Noted    Iohexol Other (See Comments) 03/06/2017    Clindamycin/lincomycin Diarrhea 10/12/2015    Metronidazole Photosensitivity 06/18/2015    Pantoprazole sodium  09/28/2016    Vancomycin  01/01/2021       Past Medical History:   Diagnosis Date    [facility]     Cancer ([facility]     skin cancer 2014; colon 2015    Clostridium difficile diarrhea 06/25/2015    per physicians notes; negative on 6/25/15    Clostridium difficile infection 12/09/2016    COVID-19 01/13/2021    Diabetes mellitus ([facility]     Hyperlipidemia     Hypertension         Surgical History:   Past Surgical History:   Procedure Laterality Date    ABDOMEN SURGERY Right 10/15/2015    right colectomy, pain ball    ABDOMEN SURGERY  12/06/2016    trasverse colectomy    ANGIOPLASTY      right foot.     CHOLECYSTECTOMY  03/02/2021    : LAPAROSCOPIC CHOLECYSTECTOMY     CHOLECYSTECTOMY, LAPAROSCOPIC N/A 3/2/2021    LAPAROSCOPIC CHOLECYSTECTOMY performed by Abigail Daniels MD at [address] COLONOSCOPY  2015    ascending colon mass, diverticulosis, hemorrhoids    COLONOSCOPY  11/08/2016    Transverse colon mass; Diverticulosis; Hemorrhoids    HERNIA REPAIR      x2    OTHER SURGICAL HISTORY Left 9/29/2014    excision of basal cell skin cancer Left face with full thickness skin graph from abdomen    SKIN GRAFT      UPPER GASTROINTESTINAL ENDOSCOPY      VASCULAR SURGERY          Family History:    Family History   Problem Relation Age of Onset    Cancer Mother     Cancer Father        Social History     Socioeconomic History    Marital status:      Spouse name: Not on file    Number of children: Not on file    Years of education: Not on file    Highest education level: Not on file   Occupational History    Not on file   Tobacco Use    Smoking status: Never Smoker    Smokeless tobacco: Never Used   Vaping Use    Vaping Use: Never used   Substance and Sexual Activity    Alcohol use: No    Drug use: No    Sexual activity: Not Currently   Other Topics Concern    Not on file   Social History Narrative    Not on file     Social Determinants of Health     Financial Resource Strain:     Difficulty of Paying Living Expenses: Not on file   Food Insecurity:     Worried About Running Out of Food in the Last Year: Not on file    Alvaro of Food in the Last Year: Not on file   Transportation Needs:     Lack of Transportation (Medical): Not on file    Lack of Transportation (Non-Medical):  Not on file   Physical Activity:     Days of Exercise per Week: Not on file    Minutes of Exercise per Session: Not on file   Stress:     Feeling of Stress : Not on file   Social Connections:     Frequency of Communication with Friends and Family: Not on file    Frequency of Social Gatherings with Friends and Family: Not on file    Attends [employer]ism Services: Not on file    Active Member of Clubs or Organizations: Not on file    Attends Club or Organization Meetings: Not on file    Marital Status: Not on file   Intimate Partner Violence:     Fear of Current or Ex-Partner: Not on file    Emotionally Abused: Not on file    Physically Abused: Not on file    Sexually Abused: Not on file   Housing Stability:     Unable to Pay for Housing in the Last Year: Not on file    Number of Places Lived in the Last Year: Not on file    Unstable Housing in the Last Year: Not on file       Nursing notes reviewed. ED Triage Vitals [12/03/21 0612]   Enc Vitals Group      BP 96/66      Pulse 118      Resp       Temp 97.5 °F (36.4 °C)      Temp Source Oral      SpO2       Weight       Height       Head Circumference       Peak Flow       Pain Score       Pain Loc       Pain Edu? Excl. in [address]? GENERAL:   There is no height or weight on file to calculate BMI. Awake, alert. Well developed, well nourished with no apparent distress. Nontoxic-appearing, non-ill-appearing. HENT:   Normocephalic, Atraumatic, no lacerations. No ENT exam due to PPE. EYES:   Conjunctiva normal,   Pupils equal round and reactive to light,   Extraocular movements normal.  NECK:  Trachea is midline. No stridor. CHEST:  Regular rate and regular rhythm, no murmurs/rubs/gallops,  normal S1/S2, chest wall non-tender. LUNGS:  No respiratory distress. No abdominal retractions, no sternal retractions  Clear to auscultation bilaterally, no wheezing, no rhochi, no rales  Speaking comfortably in full sentences  ABDOMEN:  Soft, distended, not fluid-filled, mild right lower quadrant tenderness to palpation,. No guarding. No rebound. No costovertebral angle tenderness to palpation. Diminished BS, no organomegaly, no abdominal masses  EXTREMITIES:  Moves extremities x4 with purpose. Normal range of motion, no edema,  No tenderness, no deformity,  distal pulses present and equal bilaterally. SKIN:  Warm, dry and intact. NEUROLOGIC:  Normal mental status. Moving all extremities to command. Alert and oriented x4  without focal motor deficit or gross sensory deficit. Normal speech.   PSYCHIATRIC:  Not anxious,  normal mood and affect,  Appropriate eye contact,  thoughts are linear and organized,  without delusions/hallucinations,  Not responding to internal stimuli,  responds appropriately to questions    LABS and DIAGNOSTIC RESULTS    RADIOLOGY  X-RAYS:  I have reviewed radiologic plain film image(s). ALL OTHER NON-PLAIN FILM IMAGES SUCH AS CT, ULTRASOUND AND MRI HAVE BEEN READ BY THE RADIOLOGIST. CT ABDOMEN PELVIS W IV CONTRAST Additional Contrast? None    (Results Pending)        LABS  No results found for this visit on 12/03/21. SCREENINGS  NIH Score     [facility]    Heart Score       PROCEDURES    MEDICAL DECISION MAKING    Procedures/interventions/images ordered for this visit  Orders Placed This Encounter   Procedures    CT ABDOMEN PELVIS W IV CONTRAST Additional Contrast? None    CBC Auto Differential    Comprehensive Metabolic Panel w/ Reflex to MG    Lipase    Urinalysis, reflex to microscopic    EKG 12 Lead    Saline lock IV       Medications ordered for this visit  Orders Placed This Encounter   Medications    0.9 % sodium chloride bolus    ondansetron (ZOFRAN) injection 4 mg       ED course notes for this visit       I evaluated the patient in room 12/12      I have signed out [facility] Emergency Department care to my colleague, Dr. Piort Farias. We discussed the pertinent history, physical exam, completed/pending test results (if applicable) and current treatment plan. Please refer to his/her chart for the patients remaining Emergency Department course and final disposition. Final Impression    1. Right lower quadrant abdominal pain    2. Nausea and vomiting, intractability of vomiting not specified, unspecified vomiting type        Blood pressure 96/66, pulse 118, temperature 97.5 °F (36.4 °C), temperature source Oral, not currently breastfeeding. Pt was seen during the [place] 19 pandemic. Appropriate PPE worn by this writer during patient encounters.  Pt seen during a time with constrained hospital bed capacity and other potential inpatient and outpatient resources were constrained due to the viral pandemic. The note was completed using Dragon voice recognition transcription. Every effort was made to ensure accuracy; however, inadvertent transcription errors may be present despite my best efforts to edit errors.     Samm Patel MD  [address] [place] [address] MD Ddee  12/03/21 7051

## 2021-12-03 NOTE — ED NOTES
Pt stable for DC to home with son. Reviewed DC instructions, follow up; pt and son verbalized understanding and pt exited unit via wheelchair under no duress.       Brian Holguin RN  12/03/21 0067

## 2021-12-03 NOTE — ED PROVIDER NOTES
I assumed care of this patient at shift change from Dr. Roger Banerjee. Please see her ED note for further details of the patient's presentation. I assumed care with lab work and CT scan pending. Her lab work does show a lactic acid of 3. Otherwise her labs appear to be baseline with no leukocytosis. She has slight hyperglycemia but no sign of DKA. CT scan here shows a questionable partial small bowel obstruction. She does have a ventral hernia. On exam however this hernia is easily reducible and soft. She has been given IV fluids here. She has not had any nausea or vomiting here. The patient actually tells us now that she has not had any nausea or vomiting at home, only pain. Dr. Rose Martin was consulted with general surgery and he evaluated the patient personally in the ER. He has reviewed her CT and examined the patient and does not feel that this represents a bowel obstruction. He is comfortable with the patient going home and following up in the clinic. Repeat lactic acid was done after IV fluids and it has trended down. She is tolerating p.o. here. She is comfortable going home. I have given her close follow-up instructions and strict return precautions. She remains afebrile and hemodynamically stable here.   Results for orders placed or performed during the hospital encounter of 12/03/21   CBC Auto Differential   Result Value Ref Range    WBC 10.3 4.0 - 11.0 K/uL    RBC 4.93 4.00 - 5.20 M/uL    Hemoglobin 14.7 12.0 - 16.0 g/dL    Hematocrit 45.3 36.0 - 48.0 %    MCV 91.9 80.0 - 100.0 fL    MCH 29.8 26.0 - 34.0 pg    MCHC 32.4 31.0 - 36.0 g/dL    RDW 14.8 12.4 - 15.4 %    Platelets 135 261 - 505 K/uL    MPV 9.5 5.0 - 10.5 fL    Neutrophils % 87.2 %    Lymphocytes % 6.8 %    Monocytes % 5.7 %    Eosinophils % 0.1 %    Basophils % 0.2 %    Neutrophils Absolute 9.0 (H) 1.7 - 7.7 K/uL    Lymphocytes Absolute 0.7 (L) 1.0 - 5.1 K/uL    Monocytes Absolute 0.6 0.0 - 1.3 K/uL    Eosinophils Absolute 0.0 0.0 - 0.6 K/uL    Basophils Absolute 0.0 0.0 - 0.2 K/uL   Comprehensive Metabolic Panel w/ Reflex to MG   Result Value Ref Range    Sodium 136 136 - 145 mmol/L    Potassium reflex Magnesium 3.9 3.5 - 5.1 mmol/L    Chloride 100 99 - 110 mmol/L    CO2 24 21 - 32 mmol/L    Anion Gap 12 3 - 16    Glucose 238 (H) 70 - 99 mg/dL    BUN 18 7 - 20 mg/dL    CREATININE 0.7 0.6 - 1.2 mg/dL    GFR Non-African American >60 >60    GFR African American >60 >60    Calcium 9.5 8.3 - 10.6 mg/dL    Total Protein 6.5 6.4 - 8.2 g/dL    Albumin 3.7 3.4 - 5.0 g/dL    Albumin/Globulin Ratio 1.3 1.1 - 2.2    Total Bilirubin 1.5 (H) 0.0 - 1.0 mg/dL    Alkaline Phosphatase 163 (H) 40 - 129 U/L    ALT 34 10 - 40 U/L    AST 34 15 - 37 U/L   Lipase   Result Value Ref Range    Lipase 21.0 13.0 - 60.0 U/L   Urinalysis, reflex to microscopic   Result Value Ref Range    Color, UA Yellow Straw/Yellow    Clarity, UA SL CLOUDY (A) Clear    Glucose, Ur 250 (A) Negative mg/dL    Bilirubin Urine Negative Negative    Ketones, Urine Negative Negative mg/dL    Specific Gravity, UA >=1.030 1.005 - 1.030    Blood, Urine TRACE-INTACT (A) Negative    pH, UA 5.0 5.0 - 8.0    Protein, UA Negative Negative mg/dL    Urobilinogen, Urine 0.2 <2.0 E.U./dL    Nitrite, Urine Negative Negative    Leukocyte Esterase, Urine TRACE (A) Negative    Microscopic Examination YES     Urine Type NotGiven    Lactate, Sepsis   Result Value Ref Range    Lactic Acid, Sepsis 2.7 (H) 0.4 - 1.9 mmol/L   Lactate, Sepsis   Result Value Ref Range    Lactic Acid, Sepsis 3.0 (H) 0.4 - 1.9 mmol/L   Protime-INR   Result Value Ref Range    Protime 33.9 (H) 9.9 - 12.7 sec    INR 2.87 (H) 0.88 - 1.12   APTT   Result Value Ref Range    aPTT 38.0 26.2 - 38.6 sec   Troponin   Result Value Ref Range    Troponin <0.01 <0.01 ng/mL   Microscopic Urinalysis   Result Value Ref Range    Hyaline Casts, UA 0-2 0 - 2 /LPF    Mucus, UA Rare (A) None Seen /LPF    WBC, UA 3-5 0 - 5 /HPF    RBC, UA 0-2 0 - 4 /HPF Epithelial Cells, UA 11-20 (A) 0 - 5 /HPF    Bacteria, UA 2+ (A) None Seen /HPF    Crystals, UA 3+ Ca. Oxalate (A) None Seen /HPF         CT ABDOMEN PELVIS W IV CONTRAST Additional Contrast? None   Final Result   Dilated small bowel loops in the upper abdomen, suggesting partial small   bowel obstruction, secondary to true transition point in ventral anterior   abdominal wall hernia. 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 This   is unchanged compared to 2016      Mild injection of the fat surrounds the bladder. Recommend correlation with   urinalysis to exclude cystitis             EKG  The Ekg interpreted by myself  atrial fibrillation with a rate of 109  Axis is   Left axis deviation  QTc is  normal  Intervals and Durations are unremarkable. No specific ST-T wave changes appreciated. No evidence of acute ischemia. No significant change from prior EKG dated April 17, 2021.         Ovi Rolle MD  12/03/21 1239       Ovi Rolle MD  12/03/21 1230

## 2021-12-03 NOTE — ED NOTES
Writer called general surgery for consult at this time.  Page sent to Dr. Merced Vargas RN  12/03/21 3111

## 2021-12-03 NOTE — CONSULTS
Department of General Surgery Consult    PATIENT NAME: Waylon Friedman   YOB: 1935    ADMISSION DATE: 12/3/2021  6:05 AM      TODAY'S DATE: 12/3/2021    Reason for Consult: Possible bowel obstruction    Chief Complaint: Abdominal pain    Requesting Physician: Candis Cordova    HISTORY OF PRESENT ILLNESS:              The patient is a 82 y.o. female who presents with abdominal pain. She denies vomiting. She states she was a bit nauseated but feels better now. Her bowels have been working normally. The pain is right over an abdominal wall hernia. She states she has chronic pain here but it was worse the past few days. Past Medical History:        Diagnosis Date    [facility] ([facility]     Cancer ([facility]     skin cancer 2014; colon 2015    Clostridium difficile diarrhea 06/25/2015    per physicians notes; negative on 6/25/15    Clostridium difficile infection 12/09/2016    COVID-19 01/13/2021    Diabetes mellitus ([facility]     Hyperlipidemia     Hypertension        Past Surgical History:        Procedure Laterality Date    ABDOMEN SURGERY Right 10/15/2015    right colectomy, pain ball    ABDOMEN SURGERY  12/06/2016    trasverse colectomy    ANGIOPLASTY      right foot.  CHOLECYSTECTOMY  03/02/2021    : LAPAROSCOPIC CHOLECYSTECTOMY     CHOLECYSTECTOMY, LAPAROSCOPIC N/A 3/2/2021    LAPAROSCOPIC CHOLECYSTECTOMY performed by Alondra Rivera MD at [address] COLONOSCOPY  2015    ascending colon mass, diverticulosis, hemorrhoids    COLONOSCOPY  11/08/2016    Transverse colon mass; Diverticulosis;  Hemorrhoids    HERNIA REPAIR      x2    OTHER SURGICAL HISTORY Left 9/29/2014    excision of basal cell skin cancer Left face with full thickness skin graph from abdomen    SKIN GRAFT      UPPER GASTROINTESTINAL ENDOSCOPY      VASCULAR SURGERY         Current Medications:   Current Facility-Administered Medications: ondansetron (ZOFRAN) injection 4 mg, 4 mg, IntraVENous, Q30 Min PRN  morphine (PF) injection 2 mg, 2 mg, IntraVENous, Q30 Min PRN  Prior to Admission medications    Medication Sig Start Date End Date Taking? Authorizing Provider   furosemide (LASIX) 40 MG tablet Take 1 tablet by mouth daily 7/22/21  Yes Lalo Arevalo MD   potassium chloride (KLOR-CON M) 20 MEQ extended release tablet Take 1 tablet by mouth daily 7/22/21  Yes Lalo Arevalo MD   atorvastatin (LIPITOR) 10 MG tablet Take 1 tablet by mouth daily 7/7/21  Yes Lalo Arevalo MD   metoprolol tartrate (LOPRESSOR) 50 MG tablet Take 1 tablet by mouth 2 times daily  Patient taking differently: Take 25 mg by mouth 2 times daily  4/8/21  Yes Lalo Arevalo MD   glyBURIDE (DIABETA) 5 MG tablet Take 7.5 mg by mouth daily (with breakfast). Yes Historical Provider, MD   warfarin (COUMADIN) 2.5 MG tablet Take 3 tablets by mouth daily for 3 days, THEN 2 tablets daily for 1 day. Then see the coumadin clinic for blood check and further dosing. 3/25/21 7/22/21  DESIRAE Simpson        Allergies:  Iohexol, Clindamycin/lincomycin, Metronidazole, Pantoprazole sodium, and Vancomycin    Social History:   TOBACCO:   reports that she has never smoked. She has never used smokeless tobacco.  ETOH:   reports no history of alcohol use. DRUGS:   reports no history of drug use. Family History:        Problem Relation Age of Onset   [employer] Cancer Mother     Cancer Father        REVIEW OF SYSTEMS:  CONSTITUTIONAL:  negative  HEENT:  negative  RESPIRATORY:  negative  CARDIOVASCULAR:  negative  GASTROINTESTINAL:  negative except for nausea and abdominal pain  GENITOURINARY:  negative  HEMATOLOGIC/LYMPHATIC:  negative  NEUROLOGICAL:  Negative  * All other ROS reviewed and negative. PHYSICAL EXAM:  VITALS:  /80   Pulse 118   Temp 97.5 °F (36.4 °C) (Oral)   SpO2 97%   24HR INTAKE/OUTPUT:    No intake/output data recorded. No intake/output data recorded.       CONSTITUTIONAL:  alert, no apparent distress and moderately obese  EYES:  PERRL, sclera clear  ENT:  Normocephalic,atraumatic, without obvious abnormality  NECK:  supple, symmetrical, trachea midline  LUNGS: Resp effort easy and unlabored, clear to auscultation  CARDIOVASCULAR:  NO JVD, irregularly irregular rhythm and no murmur noted  ABDOMEN:  scars noted large midline scar, normal bowel sounds, soft, obese, tenderness noted in the epigastric region, voluntary guarding absent, no masses palpated and hernia present which is soft, mildly tender, and easily reducible  MUSCULOSKELETAL: No clubbing or cyanosis, 1+ pitting edema lower extremities  NEUROLOGIC:  Mental Status Exam:  Level of Alertness:   awake  PSYCHIATRIC:   person, place, time  SKIN:  no bruising or bleeding, normal skin color, texture, turgor and no redness, warmth, or swelling    DATA:    CBC:   Recent Labs     12/03/21  0655   WBC 10.3   HGB 14.7   HCT 45.3        BMP:    Recent Labs     12/03/21  0655      K 3.9      CO2 24   BUN 18   CREATININE 0.7   GLUCOSE 238*     Hepatic:   Recent Labs     12/03/21  0655   AST 34   ALT 34   BILITOT 1.5*   ALKPHOS 163*     Mag:    No results for input(s): MG in the last 72 hours. Phos:   No results for input(s): PHOS in the last 72 hours. INR:   Recent Labs     12/03/21  0655   INR 2.87*       Radiology Review: Images personally reviewed by me. CT shows some dilated loops of bowel in the upper abdomen with concern for transition at the abdominal wall hernia      IMPRESSION/RECOMMENDATIONS:    Abdominal pain secondary to abdominal hernia. This is not incarcerated and likely not the cause of any type of obstruction. If she can tolerate oral intake, she is fine from our standpoint to go home. She had an appointment scheduled with Dr. Nito Brennan but canceled. I would recommend she follow-up with him for further evaluation.     Electronically signed by Ashley Aceves MD     [address] [facility]

## 2021-12-08 ENCOUNTER — OFFICE VISIT (OUTPATIENT)
Dept: SURGERY | Age: 86
End: 2021-12-08

## 2021-12-08 VITALS
TEMPERATURE: 97.5 F | SYSTOLIC BLOOD PRESSURE: 128 MMHG | BODY MASS INDEX: 33.05 KG/M2 | WEIGHT: 198.4 LBS | HEIGHT: 65 IN | DIASTOLIC BLOOD PRESSURE: 62 MMHG

## 2021-12-08 DIAGNOSIS — K43.2 VENTRAL INCISIONAL HERNIA: Primary | ICD-10-CM

## 2021-12-08 PROCEDURE — 99214 OFFICE O/P EST MOD 30 MIN: CPT | Performed by: SURGERY

## 2021-12-08 RX ORDER — SODIUM CHLORIDE 0.9 % (FLUSH) 0.9 %
5-40 SYRINGE (ML) INJECTION EVERY 12 HOURS SCHEDULED
Status: CANCELLED | OUTPATIENT
Start: 2021-12-08

## 2021-12-08 RX ORDER — SODIUM CHLORIDE 9 MG/ML
25 INJECTION, SOLUTION INTRAVENOUS PRN
Status: CANCELLED | OUTPATIENT
Start: 2021-12-08

## 2021-12-08 RX ORDER — SODIUM CHLORIDE 0.9 % (FLUSH) 0.9 %
5-40 SYRINGE (ML) INJECTION PRN
Status: CANCELLED | OUTPATIENT
Start: 2021-12-08

## 2021-12-09 ENCOUNTER — TELEPHONE (OUTPATIENT)
Dept: SURGERY | Age: 86
End: 2021-12-09

## 2021-12-09 DIAGNOSIS — Z01.818 PRE-OP TESTING: Primary | ICD-10-CM

## 2021-12-09 DIAGNOSIS — K43.0 INCARCERATED INCISIONAL HERNIA: ICD-10-CM

## 2021-12-09 DIAGNOSIS — K43.6 INCARCERATED VENTRAL HERNIA: ICD-10-CM

## 2021-12-13 ENCOUNTER — TELEPHONE (OUTPATIENT)
Dept: CARDIOLOGY CLINIC | Age: 86
End: 2021-12-13

## 2021-12-13 NOTE — TELEPHONE ENCOUNTER
We received cardiac clearance from Dr. Constantino Gold and ok to hold Coumadin. I called and spoke to son and informed him. He is on hipaa.

## 2021-12-13 NOTE — TELEPHONE ENCOUNTER
Ok to send cardiac clearance for intermediate cardiac risk  May stop warfarin 5 days before surgery and resume when safe after surgery.

## 2021-12-13 NOTE — TELEPHONE ENCOUNTER
Nicole Veras, 1935    Cardiac Risk Assessment    What type of procedure are you having? HERNIA REPAIR WITH MESH-POSS OPEN PROCEDURE. GEN-ANESTHESIA-2 HRS    When is your procedure scheduled for?  12.21.21    Medications to be stopped. COUMADIN 4 DAYS PRIOR TO PROCEDURE    What physician is performing your procedure? Otilia Bowers    Phone Number:   [phone]    Fax number to send the letter:   [phone]    Cardiologist:   Jeanette Vu    Last Appointment:   7.22.21    Assessment:  1. Permanent atrial fibrillation ([address]    2. Nonrheumatic aortic valve stenosis    3. Benign essential HTN    4. Mixed hyperlipidemia     [id number] Score for Atrial Fibrillation Stroke Risk    Risk   Factors   Component Value   C CHF Yes 1   H HTN Yes 1   A2 Age >= 76 Yes,  (82 y.o.) 2   D DM Yes 1   S2 Prior Stroke/TIA No 0   V Vascular Disease No 0   A Age 74-69 No,  (82 y.o.) 0   Sc Sex female 1     [id number]  Score   6   Score last updated 2/26/21 4:40 PM EST     Click here for a link to the UpToDate guideline \"Atrial Fibrillation: Anticoagulation therapy to prevent embolization        Marina Daniels PCP manages her protime levels     Plan:  Labs reviewed in epic and discussed with patient   1. Meds reviewed. 2 Refills as warranted   2. No need for cardiac testing at this time. 3. Stable and will continue present medical regimen. 4. She is on Continue other  current medications  Lasix potassium statin  Recheck K level   5.  Follow up with me in 8  months       She has not had covid vaccine and prefers not to receive vaccine

## 2021-12-15 ENCOUNTER — HOSPITAL ENCOUNTER (OUTPATIENT)
Age: 86
Discharge: HOME | End: 2021-12-15

## 2021-12-15 PROCEDURE — U0005 INFEC AGEN DETEC AMPLI PROBE: HCPCS

## 2021-12-15 PROCEDURE — U0003 INFECTIOUS AGENT DETECTION BY NUCLEIC ACID (DNA OR RNA); SEVERE ACUTE RESPIRATORY SYNDROME CORONAVIRUS 2 (SARS-COV-2) (CORONAVIRUS DISEASE [COVID-19]), AMPLIFIED PROBE TECHNIQUE, MAKING USE OF HIGH THROUGHPUT TECHNOLOGIES AS DESCRIBED BY CMS-2020-01-R: HCPCS

## 2021-12-16 LAB — SARS-COV-2: NOT DETECTED

## 2021-12-18 NOTE — PROGRESS NOTES
[facility]      S:   Patient presents for follow up of ventral hernia. She reports it bothers her some but does not impact her daily activities. O:   Comfortable. No distress. Abdomen soft. Upper midline  ventral hernia. A:     Encounter Diagnosis   Name Primary?  Ventral incisional hernia Yes            P:   She ha comorbid conditions and has mild symptoms. I recommend observation at present. Follow up as needed.

## 2021-12-20 ENCOUNTER — ANESTHESIA EVENT (OUTPATIENT)
Dept: OPERATING ROOM | Age: 86
End: 2021-12-20

## 2021-12-20 ENCOUNTER — TELEPHONE (OUTPATIENT)
Dept: SURGERY | Age: 86
End: 2021-12-20

## 2021-12-20 NOTE — PROGRESS NOTES
PRE OP INSTRUCTION SHEET   1. Do not eat or drink anything after 12 midnight  prior to surgery. This includes no water, chewing gum or mints. 2. Take the following pills will a small sip of water (see MAR)                                        3. Aspirin, Ibuprofen, Advil, Naproxen, Vitamin E, fish oil and other Anti-inflammatory products should be stopped for 5 days before surgery or as directed by your physician. 4. Check with your Doctor regarding stopping Plavix, Coumadin, Lovenox, Fragmin or other blood thinners   5. Do not smoke, and do not drink any alcoholic beverages 24 hours prior to surgery. This includes NA Beer. 6. You may brush your teeth and gargle the morning of surgery. DO NOT SWALLOW WATER   7. You MUST make arrangements for a responsible adult to take you home after your surgery. You will not be allowed to leave alone or drive yourself home. It is strongly suggested someone stay with you the first 24 hrs. Your surgery will be cancelled if you do not have a ride home. 8. A parent/legal guardian must accompany a child scheduled for surgery and plan to stay at the hospital until the child is discharged. Please do not bring other children with you. 9. Please wear simple, loose fitting clothing to the hospital.  Dangelo Kaye not bring valuables (money, credit cards, checkbooks, etc.) Do not wear any makeup (including no eye makeup) or nail polish on your fingers or toes. 10. DO NOT wear any jewelry or piercings on day of surgery. All body piercing jewelry must be removed. 11. If you have dentures,glasses, or contacts they will be removed before going to the OR; we will provide you a container. 12. Please see your family doctor/and cardiologist for a history & physical and/or concerning medications. Bring any test results/reports from your physician's office. Have history and labs faxed to [phone].  Remember to bring Blood Bank bracelet on the day of surgery. 14. If you have a Living Will and Durable Power of  for Healthcare, please bring in a copy. 13. Notify your Surgeon if you develop any illness between now and surgery  time, cough, cold, fever, sore throat, nausea, vomiting, etc.  Please notify your surgeon if you experience dizziness, shortness of breath or blurred vision between now & the time of your surgery   16. DO NOT shave your operative site 96 hours prior to surgery. For face & neck surgery, men may use an electric razor 48 hours prior to surgery. 17. Shower with _x__Antibacterial soap (x_chlorhexidine for total joint  Pt's) shower two times before surgery.(the morning of and the night before. 18. To provide excellent care visitors will be limited to one in the room at any given time.   Please call pre admission testing if you any further questions [phone] or [phone]

## 2021-12-20 NOTE — TELEPHONE ENCOUNTER
Family member dropped off cookies and a card for Dr. Antelmo Washington. There was $20.00 kelly in card. Per Dr. Antelmo Washington, apply the $20.00 to her account/bill. I gave the kelly to Efrem Martinez, registrar, and explained. She posted the money towards her account.

## 2021-12-20 NOTE — PROGRESS NOTES
Preoperative Screening for Elective Surgery/Invasive Procedures While COVID-19 present in the community     Have you had any of the following symptoms?no  o Fever, chills  o Cough  o Shortness of breath  o Muscle aches/pain  o Diarrhea  o Abdominal pain, nausea, vomiting  o Loss or decrease in taste and / or smell   Risk of Exposure  o Have you recently been hospitalized for COVID-19 or flu-like illness, if so when?no  o Recently diagnosed with COVID-19, if so when?no  o Recently tested for COVID-19, if so when?yes 12/15/21 for surgery  o Have you been in close contact with a person or family member who currently has or recently had COVID-23? If yes, when and in what context?no  o Do you live with anybody who in the last 14 days has had fever, chills, shortness of breath, muscle aches, flu-like illness?no  o Do you have any close contacts or family members who are currently in the hospital for COVID-19 or flu-like illness? If yes, assess recent close contact with this person. no    Indicate if the patient has a positive screen by answering yes to one or more of the above questions. Patients who test positive or screen positive prior to surgery or on the day of surgery should be evaluated in conjunction with the surgeon/proceduralist/anesthesiologist to determine the urgency of the procedure.

## 2021-12-21 ENCOUNTER — ANESTHESIA (OUTPATIENT)
Dept: OPERATING ROOM | Age: 86
End: 2021-12-21

## 2021-12-21 ENCOUNTER — HOSPITAL ENCOUNTER (OUTPATIENT)
Age: 86
Setting detail: OUTPATIENT SURGERY
Discharge: HOME | End: 2021-12-21
Attending: SURGERY | Admitting: SURGERY

## 2021-12-21 VITALS
HEIGHT: 65 IN | OXYGEN SATURATION: 93 % | DIASTOLIC BLOOD PRESSURE: 65 MMHG | SYSTOLIC BLOOD PRESSURE: 117 MMHG | HEART RATE: 79 BPM | TEMPERATURE: 97.2 F | BODY MASS INDEX: 32.99 KG/M2 | WEIGHT: 198 LBS | RESPIRATION RATE: 13 BRPM

## 2021-12-21 VITALS
DIASTOLIC BLOOD PRESSURE: 74 MMHG | RESPIRATION RATE: 14 BRPM | SYSTOLIC BLOOD PRESSURE: 134 MMHG | OXYGEN SATURATION: 94 % | TEMPERATURE: 96.6 F

## 2021-12-21 DIAGNOSIS — K43.0 INCARCERATED INCISIONAL HERNIA: Primary | ICD-10-CM

## 2021-12-21 LAB
GLUCOSE BLD-MCNC: 157 MG/DL (ref 70–99)
GLUCOSE BLD-MCNC: 190 MG/DL (ref 70–99)
PERFORMED ON: ABNORMAL
PERFORMED ON: ABNORMAL

## 2021-12-21 PROCEDURE — 3700000000 HC ANESTHESIA ATTENDED CARE: Performed by: SURGERY

## 2021-12-21 PROCEDURE — 6370000000 HC RX 637 (ALT 250 FOR IP): Performed by: ANESTHESIOLOGY

## 2021-12-21 PROCEDURE — 7100000001 HC PACU RECOVERY - ADDTL 15 MIN: Performed by: SURGERY

## 2021-12-21 PROCEDURE — 3700000001 HC ADD 15 MINUTES (ANESTHESIA): Performed by: SURGERY

## 2021-12-21 PROCEDURE — 2500000003 HC RX 250 WO HCPCS: Performed by: SURGERY

## 2021-12-21 PROCEDURE — 7100000000 HC PACU RECOVERY - FIRST 15 MIN: Performed by: SURGERY

## 2021-12-21 PROCEDURE — 6360000002 HC RX W HCPCS: Performed by: SURGERY

## 2021-12-21 PROCEDURE — 2720000010 HC SURG SUPPLY STERILE: Performed by: SURGERY

## 2021-12-21 PROCEDURE — 3600000004 HC SURGERY LEVEL 4 BASE: Performed by: SURGERY

## 2021-12-21 PROCEDURE — C1781 MESH (IMPLANTABLE): HCPCS | Performed by: SURGERY

## 2021-12-21 PROCEDURE — 49655 PR LAP, INCISIONAL HERNIA REPAIR,INCARCERATED: CPT | Performed by: SURGERY

## 2021-12-21 PROCEDURE — 3600000014 HC SURGERY LEVEL 4 ADDTL 15MIN: Performed by: SURGERY

## 2021-12-21 PROCEDURE — 2580000003 HC RX 258: Performed by: SURGERY

## 2021-12-21 PROCEDURE — 6360000002 HC RX W HCPCS: Performed by: NURSE ANESTHETIST, CERTIFIED REGISTERED

## 2021-12-21 PROCEDURE — C1713 ANCHOR/SCREW BN/BN,TIS/BN: HCPCS | Performed by: SURGERY

## 2021-12-21 PROCEDURE — 2580000003 HC RX 258: Performed by: NURSE ANESTHETIST, CERTIFIED REGISTERED

## 2021-12-21 PROCEDURE — 2709999900 HC NON-CHARGEABLE SUPPLY: Performed by: SURGERY

## 2021-12-21 PROCEDURE — 2500000003 HC RX 250 WO HCPCS: Performed by: NURSE ANESTHETIST, CERTIFIED REGISTERED

## 2021-12-21 PROCEDURE — 2580000003 HC RX 258: Performed by: ANESTHESIOLOGY

## 2021-12-21 PROCEDURE — 6360000002 HC RX W HCPCS: Performed by: ANESTHESIOLOGY

## 2021-12-21 PROCEDURE — 7100000011 HC PHASE II RECOVERY - ADDTL 15 MIN: Performed by: SURGERY

## 2021-12-21 PROCEDURE — 7100000010 HC PHASE II RECOVERY - FIRST 15 MIN: Performed by: SURGERY

## 2021-12-21 DEVICE — DEVICE FIX L37CM PEEK SMOOTH CANN 30 ABSRB FAST FOR LAP: Type: IMPLANTABLE DEVICE | Site: ABDOMEN | Status: FUNCTIONAL

## 2021-12-21 DEVICE — MESH HERN DIA4.5IN CIR W/ ECHO PS POS SYS VENTRALIGHT ST: Type: IMPLANTABLE DEVICE | Site: ABDOMEN | Status: FUNCTIONAL

## 2021-12-21 DEVICE — SYSTEM PERM FIX L37CM 15 FAST CAPSUR: Type: IMPLANTABLE DEVICE | Site: ABDOMEN | Status: FUNCTIONAL

## 2021-12-21 RX ORDER — PROPOFOL 10 MG/ML
INJECTION, EMULSION INTRAVENOUS PRN
Status: DISCONTINUED | OUTPATIENT
Start: 2021-12-21 | End: 2021-12-21 | Stop reason: SDUPTHER

## 2021-12-21 RX ORDER — LIDOCAINE HYDROCHLORIDE 10 MG/ML
1 INJECTION, SOLUTION EPIDURAL; INFILTRATION; INTRACAUDAL; PERINEURAL
Status: DISCONTINUED | OUTPATIENT
Start: 2021-12-21 | End: 2021-12-21 | Stop reason: HOSPADM

## 2021-12-21 RX ORDER — MORPHINE SULFATE 2 MG/ML
2 INJECTION, SOLUTION INTRAMUSCULAR; INTRAVENOUS EVERY 5 MIN PRN
Status: DISCONTINUED | OUTPATIENT
Start: 2021-12-21 | End: 2021-12-21 | Stop reason: HOSPADM

## 2021-12-21 RX ORDER — SODIUM CHLORIDE 0.9 % (FLUSH) 0.9 %
10 SYRINGE (ML) INJECTION EVERY 12 HOURS SCHEDULED
Status: DISCONTINUED | OUTPATIENT
Start: 2021-12-21 | End: 2021-12-21 | Stop reason: HOSPADM

## 2021-12-21 RX ORDER — OXYCODONE HYDROCHLORIDE AND ACETAMINOPHEN 5; 325 MG/1; MG/1
1 TABLET ORAL PRN
Status: COMPLETED | OUTPATIENT
Start: 2021-12-21 | End: 2021-12-21

## 2021-12-21 RX ORDER — LIDOCAINE HYDROCHLORIDE 20 MG/ML
INJECTION, SOLUTION INFILTRATION; PERINEURAL PRN
Status: DISCONTINUED | OUTPATIENT
Start: 2021-12-21 | End: 2021-12-21 | Stop reason: SDUPTHER

## 2021-12-21 RX ORDER — BUPIVACAINE HYDROCHLORIDE 5 MG/ML
INJECTION, SOLUTION EPIDURAL; INTRACAUDAL PRN
Status: DISCONTINUED | OUTPATIENT
Start: 2021-12-21 | End: 2021-12-21 | Stop reason: ALTCHOICE

## 2021-12-21 RX ORDER — ROCURONIUM BROMIDE 10 MG/ML
INJECTION, SOLUTION INTRAVENOUS PRN
Status: DISCONTINUED | OUTPATIENT
Start: 2021-12-21 | End: 2021-12-21 | Stop reason: SDUPTHER

## 2021-12-21 RX ORDER — OXYCODONE HYDROCHLORIDE 5 MG/1
5 TABLET ORAL EVERY 6 HOURS PRN
Qty: 24 TABLET | Refills: 0 | Status: SHIPPED | OUTPATIENT
Start: 2021-12-21 | End: 2021-12-28

## 2021-12-21 RX ORDER — LABETALOL HYDROCHLORIDE 5 MG/ML
5 INJECTION, SOLUTION INTRAVENOUS EVERY 10 MIN PRN
Status: DISCONTINUED | OUTPATIENT
Start: 2021-12-21 | End: 2021-12-21 | Stop reason: HOSPADM

## 2021-12-21 RX ORDER — PROMETHAZINE HYDROCHLORIDE 25 MG/ML
6.25 INJECTION, SOLUTION INTRAMUSCULAR; INTRAVENOUS
Status: DISCONTINUED | OUTPATIENT
Start: 2021-12-21 | End: 2021-12-21 | Stop reason: HOSPADM

## 2021-12-21 RX ORDER — LEVOFLOXACIN 5 MG/ML
500 INJECTION, SOLUTION INTRAVENOUS ONCE
Status: DISCONTINUED | OUTPATIENT
Start: 2021-12-21 | End: 2021-12-21

## 2021-12-21 RX ORDER — HYDRALAZINE HYDROCHLORIDE 20 MG/ML
5 INJECTION INTRAMUSCULAR; INTRAVENOUS EVERY 10 MIN PRN
Status: DISCONTINUED | OUTPATIENT
Start: 2021-12-21 | End: 2021-12-21 | Stop reason: HOSPADM

## 2021-12-21 RX ORDER — ONDANSETRON 2 MG/ML
4 INJECTION INTRAMUSCULAR; INTRAVENOUS
Status: COMPLETED | OUTPATIENT
Start: 2021-12-21 | End: 2021-12-21

## 2021-12-21 RX ORDER — MORPHINE SULFATE 2 MG/ML
1 INJECTION, SOLUTION INTRAMUSCULAR; INTRAVENOUS EVERY 5 MIN PRN
Status: DISCONTINUED | OUTPATIENT
Start: 2021-12-21 | End: 2021-12-21 | Stop reason: HOSPADM

## 2021-12-21 RX ORDER — DIPHENHYDRAMINE HYDROCHLORIDE 50 MG/ML
12.5 INJECTION INTRAMUSCULAR; INTRAVENOUS
Status: DISCONTINUED | OUTPATIENT
Start: 2021-12-21 | End: 2021-12-21 | Stop reason: HOSPADM

## 2021-12-21 RX ORDER — CEFAZOLIN SODIUM 1 G/3ML
INJECTION, POWDER, FOR SOLUTION INTRAMUSCULAR; INTRAVENOUS PRN
Status: DISCONTINUED | OUTPATIENT
Start: 2021-12-21 | End: 2021-12-21 | Stop reason: SDUPTHER

## 2021-12-21 RX ORDER — OXYCODONE HYDROCHLORIDE AND ACETAMINOPHEN 5; 325 MG/1; MG/1
2 TABLET ORAL PRN
Status: COMPLETED | OUTPATIENT
Start: 2021-12-21 | End: 2021-12-21

## 2021-12-21 RX ORDER — SODIUM CHLORIDE 0.9 % (FLUSH) 0.9 %
10 SYRINGE (ML) INJECTION PRN
Status: DISCONTINUED | OUTPATIENT
Start: 2021-12-21 | End: 2021-12-21 | Stop reason: HOSPADM

## 2021-12-21 RX ORDER — MEPERIDINE HYDROCHLORIDE 25 MG/ML
12.5 INJECTION INTRAMUSCULAR; INTRAVENOUS; SUBCUTANEOUS EVERY 5 MIN PRN
Status: DISCONTINUED | OUTPATIENT
Start: 2021-12-21 | End: 2021-12-21 | Stop reason: HOSPADM

## 2021-12-21 RX ORDER — FENTANYL CITRATE 50 UG/ML
INJECTION, SOLUTION INTRAMUSCULAR; INTRAVENOUS PRN
Status: DISCONTINUED | OUTPATIENT
Start: 2021-12-21 | End: 2021-12-21 | Stop reason: SDUPTHER

## 2021-12-21 RX ORDER — MAGNESIUM HYDROXIDE 1200 MG/15ML
LIQUID ORAL CONTINUOUS PRN
Status: COMPLETED | OUTPATIENT
Start: 2021-12-21 | End: 2021-12-21

## 2021-12-21 RX ORDER — ONDANSETRON 2 MG/ML
INJECTION INTRAMUSCULAR; INTRAVENOUS PRN
Status: DISCONTINUED | OUTPATIENT
Start: 2021-12-21 | End: 2021-12-21 | Stop reason: SDUPTHER

## 2021-12-21 RX ORDER — SODIUM CHLORIDE, SODIUM LACTATE, POTASSIUM CHLORIDE, CALCIUM CHLORIDE 600; 310; 30; 20 MG/100ML; MG/100ML; MG/100ML; MG/100ML
INJECTION, SOLUTION INTRAVENOUS CONTINUOUS
Status: DISCONTINUED | OUTPATIENT
Start: 2021-12-21 | End: 2021-12-21 | Stop reason: HOSPADM

## 2021-12-21 RX ORDER — SODIUM CHLORIDE, SODIUM LACTATE, POTASSIUM CHLORIDE, CALCIUM CHLORIDE 600; 310; 30; 20 MG/100ML; MG/100ML; MG/100ML; MG/100ML
INJECTION, SOLUTION INTRAVENOUS CONTINUOUS PRN
Status: DISCONTINUED | OUTPATIENT
Start: 2021-12-21 | End: 2021-12-21 | Stop reason: SDUPTHER

## 2021-12-21 RX ORDER — SODIUM CHLORIDE 9 MG/ML
25 INJECTION, SOLUTION INTRAVENOUS PRN
Status: DISCONTINUED | OUTPATIENT
Start: 2021-12-21 | End: 2021-12-21 | Stop reason: HOSPADM

## 2021-12-21 RX ADMIN — ROCURONIUM BROMIDE 10 MG: 10 INJECTION, SOLUTION INTRAVENOUS at 08:56

## 2021-12-21 RX ADMIN — SODIUM CHLORIDE, POTASSIUM CHLORIDE, SODIUM LACTATE AND CALCIUM CHLORIDE: 600; 310; 30; 20 INJECTION, SOLUTION INTRAVENOUS at 07:37

## 2021-12-21 RX ADMIN — HYDROMORPHONE HYDROCHLORIDE 0.25 MG: 1 INJECTION, SOLUTION INTRAMUSCULAR; INTRAVENOUS; SUBCUTANEOUS at 10:03

## 2021-12-21 RX ADMIN — LEVOFLOXACIN 500 MG: 500 INJECTION, SOLUTION INTRAVENOUS at 07:40

## 2021-12-21 RX ADMIN — ROCURONIUM BROMIDE 50 MG: 10 INJECTION, SOLUTION INTRAVENOUS at 08:25

## 2021-12-21 RX ADMIN — FENTANYL CITRATE 25 MCG: 50 INJECTION INTRAMUSCULAR; INTRAVENOUS at 09:24

## 2021-12-21 RX ADMIN — SODIUM CHLORIDE, POTASSIUM CHLORIDE, SODIUM LACTATE AND CALCIUM CHLORIDE: 600; 310; 30; 20 INJECTION, SOLUTION INTRAVENOUS at 08:20

## 2021-12-21 RX ADMIN — PHENYLEPHRINE HYDROCHLORIDE 100 MCG: 10 INJECTION INTRAVENOUS at 08:25

## 2021-12-21 RX ADMIN — ENOXAPARIN SODIUM 40 MG: 100 INJECTION SUBCUTANEOUS at 07:37

## 2021-12-21 RX ADMIN — LIDOCAINE HYDROCHLORIDE 20 MG: 20 INJECTION, SOLUTION INFILTRATION; PERINEURAL at 08:25

## 2021-12-21 RX ADMIN — PROPOFOL 80 MG: 10 INJECTION, EMULSION INTRAVENOUS at 08:25

## 2021-12-21 RX ADMIN — CEFAZOLIN 2000 MG: 330 INJECTION, POWDER, FOR SOLUTION INTRAMUSCULAR; INTRAVENOUS at 08:25

## 2021-12-21 RX ADMIN — OXYCODONE HYDROCHLORIDE AND ACETAMINOPHEN 1 TABLET: 5; 325 TABLET ORAL at 10:36

## 2021-12-21 RX ADMIN — ONDANSETRON HYDROCHLORIDE 4 MG: 2 INJECTION, SOLUTION INTRAMUSCULAR; INTRAVENOUS at 10:51

## 2021-12-21 RX ADMIN — FENTANYL CITRATE 25 MCG: 50 INJECTION INTRAMUSCULAR; INTRAVENOUS at 09:21

## 2021-12-21 RX ADMIN — ONDANSETRON HYDROCHLORIDE 4 MG: 2 INJECTION, SOLUTION INTRAMUSCULAR; INTRAVENOUS at 09:22

## 2021-12-21 RX ADMIN — SUGAMMADEX 200 MG: 100 INJECTION, SOLUTION INTRAVENOUS at 09:27

## 2021-12-21 RX ADMIN — FENTANYL CITRATE 50 MCG: 50 INJECTION INTRAMUSCULAR; INTRAVENOUS at 09:33

## 2021-12-21 RX ADMIN — PHENYLEPHRINE HYDROCHLORIDE 100 MCG: 10 INJECTION INTRAVENOUS at 09:04

## 2021-12-21 ASSESSMENT — PAIN SCALES - GENERAL
PAINLEVEL_OUTOF10: 4
PAINLEVEL_OUTOF10: 0
PAINLEVEL_OUTOF10: 4

## 2021-12-21 ASSESSMENT — PULMONARY FUNCTION TESTS
PIF_VALUE: 27
PIF_VALUE: 28
PIF_VALUE: 29
PIF_VALUE: 4
PIF_VALUE: 28
PIF_VALUE: 27
PIF_VALUE: 19
PIF_VALUE: 1
PIF_VALUE: 28
PIF_VALUE: 19
PIF_VALUE: 28
PIF_VALUE: 28
PIF_VALUE: 27
PIF_VALUE: 28
PIF_VALUE: 20
PIF_VALUE: 28
PIF_VALUE: 28
PIF_VALUE: 27
PIF_VALUE: 27
PIF_VALUE: 28
PIF_VALUE: 27
PIF_VALUE: 18
PIF_VALUE: 29
PIF_VALUE: 28
PIF_VALUE: 28
PIF_VALUE: 27
PIF_VALUE: 29
PIF_VALUE: 27
PIF_VALUE: 27
PIF_VALUE: 1
PIF_VALUE: 17
PIF_VALUE: 27
PIF_VALUE: 28
PIF_VALUE: 27
PIF_VALUE: 27
PIF_VALUE: 0
PIF_VALUE: 6
PIF_VALUE: 28
PIF_VALUE: 26
PIF_VALUE: 18
PIF_VALUE: 27
PIF_VALUE: 0
PIF_VALUE: 3
PIF_VALUE: 19
PIF_VALUE: 7
PIF_VALUE: 29
PIF_VALUE: 22
PIF_VALUE: 26
PIF_VALUE: 17
PIF_VALUE: 19
PIF_VALUE: 27
PIF_VALUE: 28
PIF_VALUE: 21
PIF_VALUE: 26
PIF_VALUE: 18
PIF_VALUE: 27
PIF_VALUE: 28
PIF_VALUE: 25
PIF_VALUE: 27
PIF_VALUE: 2
PIF_VALUE: 19
PIF_VALUE: 15
PIF_VALUE: 27
PIF_VALUE: 19
PIF_VALUE: 1
PIF_VALUE: 1
PIF_VALUE: 18
PIF_VALUE: 4
PIF_VALUE: 27
PIF_VALUE: 27
PIF_VALUE: 28

## 2021-12-21 ASSESSMENT — PAIN - FUNCTIONAL ASSESSMENT: PAIN_FUNCTIONAL_ASSESSMENT: 0-10

## 2021-12-21 ASSESSMENT — PAIN DESCRIPTION - LOCATION: LOCATION: ABDOMEN

## 2021-12-21 ASSESSMENT — PAIN DESCRIPTION - DESCRIPTORS: DESCRIPTORS: DISCOMFORT

## 2021-12-21 ASSESSMENT — PAIN DESCRIPTION - PAIN TYPE: TYPE: SURGICAL PAIN

## 2021-12-21 NOTE — PROGRESS NOTES
Patient admitted from OR to PACU. Bedside report received. Patient immediately hooked up to heart monitor. Renate Rodrigues RN Products Recommended: mineral based sunscreens. General Sunscreen Counseling: I recommended a broad spectrum sunscreen with a SPF of 30 or higher.  I explained that SPF 30 sunscreens block approximately 97 percent of the sun's harmful rays.  Sunscreens should be applied at least 15 minutes prior to expected sun exposure and then every 2 hours after that as long as sun exposure continues. If swimming or exercising sunscreen should be reapplied every 45 minutes to an hour after getting wet or sweating.  One ounce, or the equivalent of a shot glass full of sunscreen, is adequate to protect the skin not covered by a bathing suit. I also recommended a lip balm with a sunscreen as well. Sun protective clothing can be used in lieu of sunscreen but must be worn the entire time you are exposed to the sun's rays. Detail Level: Generalized

## 2021-12-21 NOTE — ANESTHESIA POSTPROCEDURE EVALUATION
Department of Anesthesiology  Postprocedure Note    Patient: Bao Cleveland  MRN: [id number]  YOB: 1935  Date of evaluation: 12/21/2021  Time:  11:50 AM     Procedure Summary     Date: 12/21/21 Room / Location: [place] [address] / [facility]    Anesthesia Start: Cassandra Masood Anesthesia Stop: 4821    Procedure: LAPAROSCOPIC VENTRAL HERNIA WITH MESH (N/A Abdomen) Diagnosis: (INCARCERATED VENTRAL INCISIONAL HERNIA)    Surgeons: Chely Morris MD Responsible Provider: Melissa Mayer MD    Anesthesia Type: general ASA Status: 3          Anesthesia Type: general    Luis Alberto Phase I: Luis Alberto Score: 9    Luis Alberto Phase II: Luis Alberto Score: 10    Last vitals: Reviewed and per EMR flowsheets. Anesthesia Post Evaluation    Patient location during evaluation: PACU  Patient participation: complete - patient participated  Level of consciousness: awake and alert  Airway patency: patent  Nausea & Vomiting: no nausea and no vomiting  Complications: no  Cardiovascular status: blood pressure returned to baseline  Respiratory status: acceptable  Hydration status: euvolemic  Comments: VSS on transfer to phase 2 recovery. No anesthetic complications.

## 2021-12-21 NOTE — ANESTHESIA PRE PROCEDURE
Department of Anesthesiology  Preprocedure Note       Name:  Charisse Berumen   Age:  82 y.o.  :  1935                                          MRN:  [id number]         Date:  2021      Surgeon: Lonna Frankel):  Kiarra Hyman MD    Procedure: Procedure(s):  LAPAROSCOPIC VENTRAL HERNIA WITH MESH, POSSIBLE OPEN PROCEDURE    Medications prior to admission:   Prior to Admission medications    Medication Sig Start Date End Date Taking? Authorizing Provider   furosemide (LASIX) 40 MG tablet Take 1 tablet by mouth daily 21  Yes Nery Li MD   potassium chloride (KLOR-CON M) 20 MEQ extended release tablet Take 1 tablet by mouth daily 21  Yes Nery Li MD   atorvastatin (LIPITOR) 10 MG tablet Take 1 tablet by mouth daily 21  Yes Nery Li MD   metoprolol tartrate (LOPRESSOR) 50 MG tablet Take 1 tablet by mouth 2 times daily  Patient taking differently: Take 25 mg by mouth 2 times daily  21  Yes Nery Li MD   warfarin (COUMADIN) 2.5 MG tablet Take 3 tablets by mouth daily for 3 days, THEN 2 tablets daily for 1 day. Then see the coumadin clinic for blood check and further dosing. 3/25/21 12/20/21 Yes DESIRAE Howard   glyBURIDE (DIABETA) 5 MG tablet Take 7.5 mg by mouth daily (with breakfast).    Yes Historical Provider, MD       Current medications:    Current Facility-Administered Medications   Medication Dose Route Frequency Provider Last Rate Last Admin    lactated ringers infusion   IntraVENous Continuous Tanner Horvath  mL/hr at 21 0737 New Bag at 21 0737    sodium chloride flush 0.9 % injection 10 mL  10 mL IntraVENous 2 times per day Tanner Horvath MD        sodium chloride flush 0.9 % injection 10 mL  10 mL IntraVENous PRN Tanner Horvath MD        0.9 % sodium chloride infusion  25 mL IntraVENous PRN Tanner Horvath MD        lidocaine PF 1 % injection 1 mL  1 mL IntraDERmal Once PRN Tanner Horvath MD        enoxaparin (LOVENOX) injection 40 mg  40 mg SubCUTAneous Daily Yolanda Ortega MD   40 mg at 12/21/21 0737    levoFLOXacin (LEVAQUIN) 500 MG/100ML infusion 500 mg  500 mg IntraVENous Once Yolanda Ortega  mL/hr at 12/21/21 0740 500 mg at 12/21/21 0740       Allergies:     Allergies   Allergen Reactions    Iohexol Other (See Comments)     Pt gets chest pains during and post injection     Clindamycin/Lincomycin Diarrhea    Metronidazole Photosensitivity    Pantoprazole Sodium     Vancomycin      Son said unknown reaction       Problem List:    Patient Active Problem List   Diagnosis Code    Ischemia of lower extremity I99.8    DM2 (diabetes mellitus, type 2) ([facility] E11.9    History of fasciotomy Z98.890    Mass of colon K63.89    Anemia D64.9    Iron deficiency anemia due to chronic blood loss D50.0    Chest pain R07.9    Colonic mass K63.89    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32    Acute pulmonary edema (HCC) J81.0    Pleural effusion, bilateral J90    Abdominal pain R10.9    Non-intractable vomiting R11.10    Anticoagulated on Coumadin Z79.01    Enteritis K52.9    Incarcerated ventral hernia K43.6    Incarcerated incisional hernia K43.0       Past Medical History:        Diagnosis Date    A-fib ([facility]     Cancer ([facility]     skin cancer 2014; colon 2015    Clostridium difficile diarrhea 06/25/2015    per physicians notes; negative on 6/25/15    Clostridium difficile infection 12/09/2016    COVID-19 01/13/2021    Diabetes mellitus ([address]     Hyperlipidemia     Hypertension        Past Surgical History:        Procedure Laterality Date    ABDOMEN SURGERY Right 10/15/2015    right colectomy, pain ball    ABDOMEN SURGERY  12/06/2016    trasverse colectomy    ANGIOPLASTY      right foot.  CHOLECYSTECTOMY  03/02/2021    : LAPAROSCOPIC CHOLECYSTECTOMY     CHOLECYSTECTOMY, LAPAROSCOPIC N/A 3/2/2021    LAPAROSCOPIC CHOLECYSTECTOMY performed by Justin Kiser MD at [address] COLONOSCOPY  2015    ascending colon mass, diverticulosis, hemorrhoids    COLONOSCOPY  11/08/2016    Transverse colon mass; Diverticulosis; Hemorrhoids    HERNIA REPAIR      x2    OTHER SURGICAL HISTORY Left 9/29/2014    excision of basal cell skin cancer Left face with full thickness skin graph from abdomen    SKIN GRAFT      UPPER GASTROINTESTINAL ENDOSCOPY      VASCULAR SURGERY         Social History:    Social History     Tobacco Use    Smoking status: Never Smoker    Smokeless tobacco: Never Used   Substance Use Topics    Alcohol use:  No                                Counseling given: Not Answered      Vital Signs (Current):   Vitals:    12/20/21 0950 12/21/21 0710 12/21/21 0715   BP:  (!) 146/95    Pulse:   95   Resp:  13    Temp:  97.3 °F (36.3 °C)    TempSrc:  Temporal    SpO2:  99%    Weight: 198 lb (89.8 kg) 198 lb (89.8 kg)    Height: 5' 5\" (1.651 m) 5' 5\" (1.651 m)                                               BP Readings from Last 3 Encounters:   12/21/21 (!) 146/95   12/08/21 128/62   12/03/21 123/80       NPO Status: Time of last liquid consumption: 1730                        Time of last solid consumption: 1730                        Date of last liquid consumption: 12/20/21                        Date of last solid food consumption: 12/20/21    BMI:   Wt Readings from Last 3 Encounters:   12/21/21 198 lb (89.8 kg)   12/08/21 198 lb 6.4 oz (90 kg)   11/17/21 197 lb 3.2 oz (89.4 kg)     Body mass index is 32.95 kg/m².     CBC:   Lab Results   Component Value Date    WBC 10.3 12/03/2021    RBC 4.93 12/03/2021    HGB 14.7 12/03/2021    HCT 45.3 12/03/2021    MCV 91.9 12/03/2021    RDW 14.8 12/03/2021     12/03/2021       CMP:   Lab Results   Component Value Date     12/03/2021    K 3.9 12/03/2021     12/03/2021    CO2 24 12/03/2021    BUN 18 12/03/2021    CREATININE 0.7 12/03/2021    GFRAA >60 12/03/2021    AGRATIO 1.3 12/03/2021    LABGLOM >60 12/03/2021    GLUCOSE 238 12/03/2021    PROT 6.5 12/03/2021    CALCIUM 9.5 12/03/2021    BILITOT 1.5 12/03/2021    ALKPHOS 163 12/03/2021    AST 34 12/03/2021    ALT 34 12/03/2021       POC Tests:   Recent Labs     12/21/21  0715   POCGLU 157*       Coags:   Lab Results   Component Value Date    PROTIME 33.9 12/03/2021    INR 2.87 12/03/2021    APTT 38.0 12/03/2021       HCG (If Applicable): No results found for: PREGTESTUR, PREGSERUM, HCG, HCGQUANT     ABGs:   Lab Results   Component Value Date    PHART 7.346 06/20/2015    PO2ART 72 06/20/2015    ETH8DQB 29 06/20/2015    QDC6WBZ 15.6 06/20/2015    BEART -9 06/20/2015    R7INCHZG 94 06/20/2015        Type & Screen (If Applicable):  No results found for: LABABO, LABRH    Drug/Infectious Status (If Applicable):  No results found for: HIV, HEPCAB    COVID-19 Screening (If Applicable):   Lab Results   Component Value Date    COVID19 Not Detected 12/15/2021    COVID19 DETECTED 01/13/2021           Anesthesia Evaluation   no history of anesthetic complications:   Airway: Mallampati: II  TM distance: <3 FB   Neck ROM: limited  Mouth opening: > = 3 FB Dental:    (+) edentulous      Pulmonary:Negative Pulmonary ROS                              Cardiovascular:    (+) hypertension:, valvular problems/murmurs: AI and AS, dysrhythmias: atrial fibrillation, CHF:, Neuro/Psych:   (+) neuromuscular disease:,             GI/Hepatic/Renal: Neg GI/Hepatic/Renal ROS       (-) GERD       Endo/Other:    (+) DiabetesType II DM, , .                 Abdominal:             Vascular:   + PVD, aortic or cerebral, . Other Findings:        TTE procedure:ECHOCARDIOGRAM COMPLETE 2D W DOPPLER W COLOR. Procedure Date  Date: 02/18/2021 Start: 07:58 AM     Study Location: [address] [place] [address] - Echo Lab  Technical Quality: Adequate visualization     Indications:Atrial fibrillation.     Patient Status: Routine     Height: 65 inches Weight: 197 pounds BSA: 1.97 m2 BMI: 32.78 kg/m2     BP: 131/69 mmHg      Conclusions      Summary   Normal left ventricular systolic function with an estimated ejection   fraction of 55-60%. No regional wall motion abnormalities are seen. There is moderate concentric left ventricular hypertrophy. The left atrium is mildly dilated. Mild posterior mitral annular calcification is present. Mild mitral regurgitation. Moderate aortic stenosis and moderate aortic regurgitation is present. Normal systolic pulmonary artery pressure (SPAP) estimated at 31 mmHg (RA   pressure 3 mmHg). Signature      ------------------------------------------------------------------   Electronically signed by Myrna Adame MD, [facility] (Interpreting     Anesthesia Plan      general     ASA 3     (Pt agrees to risks, benefits and alternatives of GETA. Questions answered. Willing to proceed with plan.)  Induction: intravenous. Anesthetic plan and risks discussed with patient.                       Paty Michael MD   12/21/2021

## 2021-12-21 NOTE — PROGRESS NOTES
[facility]      HPI:  Patient is 82y.o. year old female seen at request of ISADORA Rojas CNP. She   reports pain in epigastric area. It is pressure-like and sharp. It is described as severe. She had to go to the ED. Other associated symptoms are bloating/abdominal distension and nausea. These symptoms have been present for  months . The pain seems to have started with an unknown event. The pain does not radiate to the back. She   admits to seeing a bulge. She has not had previous hernia repair. Past Medical History:   Diagnosis Date    A-fib ([facility]     Cancer ([facility]     skin cancer 2014; colon 2015    Clostridium difficile diarrhea 06/25/2015    per physicians notes; negative on 6/25/15    Clostridium difficile infection 12/09/2016    COVID-19 01/13/2021    Diabetes mellitus ([facility]     Hyperlipidemia     Hypertension        Past Surgical History:   Procedure Laterality Date    ABDOMEN SURGERY Right 10/15/2015    right colectomy, pain ball    ABDOMEN SURGERY  12/06/2016    trasverse colectomy    ANGIOPLASTY      right foot.  CHOLECYSTECTOMY  03/02/2021    : LAPAROSCOPIC CHOLECYSTECTOMY     CHOLECYSTECTOMY, LAPAROSCOPIC N/A 3/2/2021    LAPAROSCOPIC CHOLECYSTECTOMY performed by Kun Marshall MD at [address] COLONOSCOPY  2015    ascending colon mass, diverticulosis, hemorrhoids    COLONOSCOPY  11/08/2016    Transverse colon mass; Diverticulosis; Hemorrhoids    HERNIA REPAIR      x2    OTHER SURGICAL HISTORY Left 9/29/2014    excision of basal cell skin cancer Left face with full thickness skin graph from abdomen    SKIN GRAFT      UPPER GASTROINTESTINAL ENDOSCOPY      VASCULAR SURGERY         No current facility-administered medications on file prior to visit.      Current Outpatient Medications on File Prior to Visit   Medication Sig Dispense Refill    furosemide (LASIX) 40 MG tablet Take 1 tablet by mouth daily 90 tablet 3    potassium chloride (KLOR-CON M) 20 MEQ extended release tablet Take 1 tablet by mouth daily 90 tablet 3    atorvastatin (LIPITOR) 10 MG tablet Take 1 tablet by mouth daily 90 tablet 3    metoprolol tartrate (LOPRESSOR) 50 MG tablet Take 1 tablet by mouth 2 times daily (Patient taking differently: Take 25 mg by mouth 2 times daily ) 180 tablet 3    glyBURIDE (DIABETA) 5 MG tablet Take 7.5 mg by mouth daily (with breakfast).  warfarin (COUMADIN) 2.5 MG tablet Take 3 tablets by mouth daily for 3 days, THEN 2 tablets daily for 1 day. Then see the coumadin clinic for blood check and further dosing. 60 tablet 0       Allergies   Allergen Reactions    Iohexol Other (See Comments)     Pt gets chest pains during and post injection     Clindamycin/Lincomycin Diarrhea    Metronidazole Photosensitivity    Pantoprazole Sodium     Vancomycin      Son said unknown reaction       Social History     Socioeconomic History    Marital status:      Spouse name: Not on file    Number of children: Not on file    Years of education: Not on file    Highest education level: Not on file   Occupational History    Not on file   Tobacco Use    Smoking status: Never Smoker    Smokeless tobacco: Never Used   Vaping Use    Vaping Use: Never used   Substance and Sexual Activity    Alcohol use: No    Drug use: No    Sexual activity: Not Currently   Other Topics Concern    Not on file   Social History Narrative    Not on file     Social Determinants of Health     Financial Resource Strain:     Difficulty of Paying Living Expenses: Not on file   Food Insecurity:     Worried About Running Out of Food in the Last Year: Not on file    Alvaro of Food in the Last Year: Not on file   Transportation Needs:     Lack of Transportation (Medical): Not on file    Lack of Transportation (Non-Medical):  Not on file   Physical Activity:     Days of Exercise per Week: Not on file    Minutes of Exercise per Session: Not on file Stress:     Feeling of Stress : Not on file   Social Connections:     Frequency of Communication with Friends and Family: Not on file    Frequency of Social Gatherings with Friends and Family: Not on file    Attends Confucianist Services: Not on file    Active Member of [address] [place] [address] [employer] or Organizations: Not on file    Attends Club or Organization Meetings: Not on file    Marital Status: Not on file   Intimate Partner Violence:     Fear of Current or Ex-Partner: Not on file    Emotionally Abused: Not on file    Physically Abused: Not on file    Sexually Abused: Not on file   Housing Stability:     Unable to Pay for Housing in the Last Year: Not on file    Number of [place] in the Last Year: Not on file    Unstable Housing in the Last Year: Not on file       Family History   Problem Relation Age of Onset    Cancer Mother     Cancer Father        ROS: She reports no complaints related to the eyes, ears , nose throat or mouth. She denies weight loss. No chest pain. No SOB. No urinary complaints. No musculoskeletal complaints. No skin rashes. No neurologic deficits. No bleeding tendencies. GI complaints include upper abdominal pain. Physical Exam:  Vitals:    12/08/21 1525   BP: 128/62   Temp: 97.5 °F (36.4 °C)   198#    General:  Comfortable. No distress. Eyes:  No scleral icterus  Ears:  Normal  Nose:  Normal  Mouth:  Mucous membranes moist  Respiratory: Lungs CTA. No accessory muscle use. Heart:  Regular rhythm  Abdomen:  Soft. Non distended. Ventral hernia present. Mild tenderness. Musculoskeletal:  No abnormal movements. ROM extremities normal.  Skin:  No rashes. Neurologic:  No focal deficits. Psychiatric:  AAA. O x 3.    Radiographic studies:  CT with ventral hernia with incarcerated SB.        ASSESSMENT:  1. Ventral incisional hernia            PLAN:  The diagnosis and recommended procedure were explained. Questions answered. Prepare for hernia surgery.   She is having more frequent and more intense symptoms. Will get cardiac clearance and proceed with ventral hernia repair given symptoms and imaging findings.      Dmitry Amin MD

## 2021-12-21 NOTE — H&P
[facility] GENERAL SURGERY      The H&P was reviewed, the patient was examined, and no change has occurred in the patient's condition since the H&P was completed. The indications for the procedure were reviewed, and any questions were answered. I updated the progress note from 12/8/2021 which is the H&P.     Vitals:    12/20/21 0950 12/21/21 0710 12/21/21 0715   BP:  (!) 146/95    Pulse:   95   Resp:  13    Temp:  97.3 °F (36.3 °C)    TempSrc:  Temporal    SpO2:  99%    Weight: 198 lb (89.8 kg) 198 lb (89.8 kg)    Height: 5' 5\" (1.651 m) 5' 5\" (1.651 m)

## 2021-12-21 NOTE — BRIEF OP NOTE
Brief Postoperative Note      Patient: Noah Quiroz  YOB: 1935  MRN: [id number]    Date of Procedure: 12/21/2021    Pre-Op Diagnosis: INCARCERATED VENTRAL INCISIONAL HERNIA    Post-Op Diagnosis: Same       Procedure(s):  LAPAROSCOPIC VENTRAL HERNIA WITH MESH    Surgeon(s):  Beronica Dumont MD    Assistant:  Surgical Assistant: Elana Escalona    Anesthesia: General    Estimated Blood Loss (mL): Minimal    Complications: None    Specimens:   * No specimens in log *    Implants:  Implant Name Type Inv. Item Serial No.  Lot No. LRB No. Used Action   MESH COLTON DIA4. 5IN CIR W/ ECHO PS POS SYS VENTRALIGHT ST  MESH COLTON DIA4. 5IN CIR W/ ECHO PS POS SYS VENTRALIGHT ST  BARD DAVOL-WD [id number] N/A 1 Implanted   DEVICE FIX L37CM PEEK SMOOTH RAJWINDER 30 ABSRB FAST FOR LAP  DEVICE FIX L37CM PEEK SMOOTH RAJWINDER 30 ABSRB FAST FOR LAP  BARD DAVOL-WD [id number] N/A 1 Implanted   SYSTEM PERM FIX L37CM 15 FAST CAPSUR  SYSTEM PERM FIX L37CM 15 FAST CAPSUR  BARD DAVOL-WD [id number] N/A 1 Implanted         Drains: * No LDAs found *    Findings: As above    Electronically signed by Delia Aguilar MD on 12/21/2021 at 9:34 AM

## 2021-12-21 NOTE — PROGRESS NOTES
Discharge instructions given to patient and patients son. Both deny any questions at this time. Robyn Turner RN

## 2021-12-21 NOTE — PROGRESS NOTES
Patient discharged via wheelchair in stable condition with all belongings to private car. Denise Fenton RN

## 2021-12-27 NOTE — OP NOTE
[address]                 [address] [address]                                OPERATIVE REPORT    PATIENT NAME: Lucius Interiano                      :        1935  MED REC NO:   [id number]                          ROOM:  ACCOUNT NO:   [de-identified]                           ADMIT DATE: 2021  PROVIDER:     Naye Quevedo MD    DATE OF PROCEDURE:  2021    OPERATION PERFORMED:  Laparoscopic repair of incarcerated ventral  incisional hernia with implantation of 49-inch diameter Ventralight ST  mesh. PREOPERATIVE DIAGNOSIS:  Incarcerated ventral incisional hernia. POSTOPERATIVE DIAGNOSIS:  Incarcerated ventral incisional hernia. SURGEON:  Naye Quevedo MD    ANESTHESIA:  General.    COMPLICATIONS:  None. ESTIMATED BLOOD LOSS:  Less than 50 mL. INDICATIONS FOR OPERATION:  An 82-year-old female with an upper midline  ventral incisional hernia. Previous imaging showed small bowel loop  incarcerated. We were going to follow a course of observation given  that the patient's comorbid conditions combined with her age warranted a  cautious approach. The patient _____ recently, however, was in the  Emergency Department with partial small bowel obstruction with  transition at the ventral hernia. I recommended operative repair. The  risks and benefits were explained. The patient understood them,  accepted them and elected to proceed. DESCRIPTION OF OPERATION:  The patient was brought to the operating  room. General anesthesia was induced. She was prepped and draped in  usual surgical sterile fashion. A 5 mm left subcostal incision was  made. Camera inside of a trocar was used to visualize the layers of the  abdominal wall as we went through them. Pneumoperitoneum was  established. Under direct vision, a 5 mm ports were placed in both  lower quadrants and a 12 mm port in the right subcostal area.   There was  significant anterior abdominal wall adhesions of omentum as well as a  densely adherent loop of small bowel. Slow, meticulous dissection  allowed me to take down all of the anterior abdominal wall omental  adhesions as well as separate the small bowel loop of the anterior  abdominal wall. Small bowel loop was partially within the hernia sac as  well. We cleared off the anterior abdominal wall completely. A  4-1/2-inch diameter Ventralight ST mesh is going to cover the defect  with excellent affixation and overlap. The mesh was brought into the  abdomen. The balloon was inflated. The mesh was brought up to the  abdominal wall. I used the CapSure tacking device and tacked the mesh  every 1-1.5 cm circumferentially around the mesh. Multiple tacks were  placed in the midportion of mesh. The balloon was deflated and removed. Hemostasis was confirmed. The abdomen was deflated. The fascia at the  12 mm port site was reapproximated with 0 Vicryl suture. Local  anesthetic was infiltrated. A 4-0 Vicryl was used to reapproximate the  skin at all the incisions. Benzoin, Steri-Strip dressing were placed. DISPOSITION:  The patient tolerated the procedure without any acute  complication.         Marvin Brandt MD    D: 12/26/2021 17:49:03       T: 12/26/2021 17:52:49     [email or web address]  Job#: [id number]     Doc#: [id number]    CC:

## 2022-01-05 ENCOUNTER — OFFICE VISIT (OUTPATIENT)
Dept: SURGERY | Age: 87
End: 2022-01-05

## 2022-01-05 VITALS
HEIGHT: 65 IN | DIASTOLIC BLOOD PRESSURE: 73 MMHG | TEMPERATURE: 96.9 F | HEART RATE: 109 BPM | WEIGHT: 200.8 LBS | BODY MASS INDEX: 33.45 KG/M2 | SYSTOLIC BLOOD PRESSURE: 134 MMHG

## 2022-01-05 DIAGNOSIS — Z09 SURGICAL FOLLOW-UP CARE: Primary | ICD-10-CM

## 2022-01-05 PROCEDURE — 99024 POSTOP FOLLOW-UP VISIT: CPT | Performed by: SURGERY

## 2022-02-10 NOTE — PROGRESS NOTES
[facility] GENERAL SURGERY      S:   Patient presents s/p lap ventral hernia repair 2 weeks  ago. She reports improvement. O:   Comfortable         Incision sites healing well. A:   S/P above    P:   Follow up as needed.

## 2022-03-24 ENCOUNTER — OFFICE VISIT (OUTPATIENT)
Dept: CARDIOLOGY CLINIC | Age: 87
End: 2022-03-24

## 2022-03-24 VITALS
SYSTOLIC BLOOD PRESSURE: 104 MMHG | DIASTOLIC BLOOD PRESSURE: 54 MMHG | OXYGEN SATURATION: 98 % | BODY MASS INDEX: 33.66 KG/M2 | HEIGHT: 65 IN | WEIGHT: 202 LBS | HEART RATE: 70 BPM

## 2022-03-24 DIAGNOSIS — I50.32 CHRONIC DIASTOLIC CONGESTIVE HEART FAILURE (HCC): Primary | ICD-10-CM

## 2022-03-24 DIAGNOSIS — I48.21 PERMANENT ATRIAL FIBRILLATION (HCC): ICD-10-CM

## 2022-03-24 DIAGNOSIS — E11.9 TYPE 2 DIABETES MELLITUS WITHOUT COMPLICATION, WITHOUT LONG-TERM CURRENT USE OF INSULIN (HCC): ICD-10-CM

## 2022-03-24 DIAGNOSIS — I10 BENIGN ESSENTIAL HTN: ICD-10-CM

## 2022-03-24 DIAGNOSIS — Z79.899 MEDICATION MANAGEMENT: ICD-10-CM

## 2022-03-24 DIAGNOSIS — I35.9 AORTIC VALVE DISEASE: ICD-10-CM

## 2022-03-24 DIAGNOSIS — E78.2 MIXED HYPERLIPIDEMIA: ICD-10-CM

## 2022-03-24 PROCEDURE — 99214 OFFICE O/P EST MOD 30 MIN: CPT | Performed by: INTERNAL MEDICINE

## 2022-03-24 RX ORDER — ATORVASTATIN CALCIUM 10 MG/1
10 TABLET, FILM COATED ORAL DAILY
Qty: 90 TABLET | Refills: 3 | Status: SHIPPED | OUTPATIENT
Start: 2022-03-24

## 2022-03-24 RX ORDER — FUROSEMIDE 40 MG/1
40 TABLET ORAL DAILY
Qty: 90 TABLET | Refills: 3 | Status: SHIPPED | OUTPATIENT
Start: 2022-03-24

## 2022-03-24 NOTE — LETTER
[address] [address]  Phone: [phone]  Fax: [phone]    Reno Rodriguez MD    March 28, 2022     ISADORA Johnston - CNP  [address] [facility] [address]    Patient: Shaq Phillips   MR Number: [id number]   YOB: 1935   Date of Visit: 3/24/2022       Dear Michael Romero: Thank you for referring Shaq Phillips to me for evaluation/treatment. Below are the relevant portions of my assessment and plan of care. If you have questions, please do not hesitate to call me. I look forward to following Virginia Stone along with you.     Sincerely,      Reno Rodriguez MD

## 2022-03-24 NOTE — PATIENT INSTRUCTIONS
Plan:  Labs reviewed in epic and discussed with patient   1. Current medications reviewed. No changes at this time. Refills given as warranted. 2. Call to schedule an Echocardiogram to look at heart size and strength   - This test is an ultrasound of your heart just like when you see an ultrasound that a woman has when she is pregnant.  -The test records the movement of your heart valves and chambers.  -It evaluates heart valves, chamber enlargement, abnormal openings, or any fluid in the sac surrounding the heart.    3. Have blood work complete with ISADORA Ortiz CNP   -CBC, CMP, TSH, and fasting lipids (8 hours) ordered.   -if he has already done blood work please have them fax the results to [phone]    Follow up with me in 8 months

## 2022-03-24 NOTE — PROGRESS NOTES
[address] Office Note  3/24/2022     Subjective:  Ms. Carol Vu is here today for cardiology follow up of permanent  Afib, moderate aortic stenosis and aortic insufficiency PAD Hyperlipidemia hypertension and diastolic CHF. C/o No cardiac complaints today. [place]:    Since last OV, she had incarcerated ventral incisional hernia repair 12/21/21   Today, she reports she is doing good. Patient denies current edema, chest pain, sob, palpitations, dizziness or syncope. Patient is taking all cardiac medications as prescribed and tolerates them well. Patient is not vaccinated against Covid and is not interested. Patient educated regarding the benefits and low risk of side effects. PMH  Admitted 4/17-4/19/21 Acute Enteritis  - CT interval worsening of the mid to distal jejunal surrounding mesenteric fat stranding - proximal to small bowel ostomy site  Hx of Colon Cancer s/p ileotransverse colectomy with ileal distal transverse anastomosis  - Admitted to Med Surg  -  Continued Cipro, flagyl IV, IVF  She presented to the hospital 2/17/21 for elective cholecystectomy but was found to be afib rvr. She had echo during hospitalization  Which revealed EF 55-60%. Mild MR. Moderate AS and moderate AR. She came back to hospital 3/24/21-3/25/21 with symptoms of CHF Acute on chronic dCHF    Afib, HLD, PAD, s/p thrombectomy and fasciotomy on 6/20/15, then underwent angiogram w/ angioplasty on 6/22/15, DM2. Acute renal failure recovered     Review of Systems:         12 point ROS negative in all areas as listed below except as in [address], EENT, pulmonary, GI, , Musculoskeletal, skin, neurological, hematological, endocrine, Psychiatric    Reviewed past medical history, social, and family history. reports that she has never smoked. She has never used smokeless tobacco. She reports that she does not drink alcohol or use drugs.    MOM- Brain cancer, no heart disease   DAD- prostate cancer, no heart dry  Neck:  Negative for JVD and Carotid Bruits. Chest:  Clear to auscultation, respiration easy  Cardiovascular:  irreg ireg  S2 normal, Aortic stenosis murmur, no rub or thrill. Extremities:No edema, no clubbing, cyanosis,  Pedal pulses are intact  Neuro: intact    Medications:   Outpatient Encounter Medications as of 3/24/2022   Medication Sig Dispense Refill    furosemide (LASIX) 40 MG tablet Take 1 tablet by mouth daily 90 tablet 3    atorvastatin (LIPITOR) 10 MG tablet Take 1 tablet by mouth daily 90 tablet 3    metoprolol tartrate (LOPRESSOR) 25 MG tablet Take 1 tablet by mouth 2 times daily 180 tablet 3    potassium chloride (KLOR-CON M) 20 MEQ extended release tablet Take 1 tablet by mouth daily 90 tablet 3    warfarin (COUMADIN) 2.5 MG tablet Take 3 tablets by mouth daily for 3 days, THEN 2 tablets daily for 1 day. Then see the coumadin clinic for blood check and further dosing. 60 tablet 0    glyBURIDE (DIABETA) 5 MG tablet Take 7.5 mg by mouth daily (with breakfast).  [DISCONTINUED] furosemide (LASIX) 40 MG tablet Take 1 tablet by mouth daily 90 tablet 3    [DISCONTINUED] atorvastatin (LIPITOR) 10 MG tablet Take 1 tablet by mouth daily 90 tablet 3    [DISCONTINUED] metoprolol tartrate (LOPRESSOR) 50 MG tablet Take 1 tablet by mouth 2 times daily (Patient taking differently: Take 25 mg by mouth 2 times daily ) 180 tablet 3     No facility-administered encounter medications on file as of 3/24/2022. Lab Data:  CBC: No results for input(s): WBC, HGB, HCT, MCV, PLT in the last 72 hours. BMP: No results for input(s): NA, K, CL, CO2, PHOS, BUN, CREATININE, CA in the last 72 hours. LIVER PROFILE: No results for input(s): AST, ALT, LIPASE, BILIDIR, BILITOT, ALKPHOS in the last 72 hours. Invalid input(s):   AMYLASE,  ALB  LIPID:   Lab Results   Component Value Date    CHOL 80 03/25/2021    CHOL 120 10/13/2015     Lab Results   Component Value Date    TRIG 138 03/25/2021    TRIG 209 (H) appearance. No evidence of pelvic free fluid is seen. Multifocal calcified uterine leiomyomata are seen. Peritoneum/Retroperitoneum: No evidence of retroperitoneal or intraperitoneal lymphadenopathy is identified. No evidence of intraperitoneal free fluid is seen. Bones/Soft Tissues: The bones, skeletal muscle bundles, fascial planes and subcutaneous soft tissues are unremarkable in appearance     EKG 3/24/21  Atrial fibrillationLow voltage QRSCannot rule out Anterior infarct (cited on or before 17-FEB-2021)Nonspecific ST abnormalityAbnormal ECGWhen compared with ECG of 17-FEB-2021 07:13,Criteria for Inferior infarct are no longer PresentConfirmed by KAYLEE WIGGINS, Yumiko Dickerson ([id number]) on 3/24/2021 7:39:30 AM    CTPA 3/24/21  FINDINGS: Pulmonary Arteries: Pulmonary arteries are adequately opacified for evaluation. No evidence of intraluminal filling defect to suggest pulmonary embolism. Main pulmonary artery is normal in caliber. Mediastinum: Cardiomegaly. Significant coronary calcifications. Aortic valve calcifications. Few mitral annular calcifications. No concerning adenopathy. The heart and pericardium demonstrate no acute abnormality. There is no acute abnormality of the thoracic aorta. Lungs/pleura: Mild dependent atelectasis bilaterally. Moderate bilateral pleural effusions. No pneumothorax. Interlobular septal thickening. Few bilateral small ground-glass opacities. Calcified pulmonary nodules and calcified left hilar adenopathy compatible with prior granulomatous disease. Upper Abdomen: Limited imaging including the abdomen. The very top of the liver dome is seen and appears unremarkable. Soft Tissues/Bones: No acute bone or soft tissue abnormality. Confluent anterolateral vertebral body syndesmophyte formation compatible with DISH. No evidence of pulmonary embolism. Cardiomegaly, bilateral pleural effusions and mild pulmonary edema compatible with fluid overload and/or CHF.    Mild dependent airspace disease bilaterally most likely represents atelectasis. CXR 3/24/21  Cardiomegaly, pulmonary vascular congestion and small bilateral pleural effusions compatible with fluid overload and/or CHF. Bibasilar airspace disease. Pneumonia can not be excluded. ECHO 2/18/21   Normal left ventricular systolic function with an estimated ejection   fraction of 55-60%. No regional wall motion abnormalities are seen. There is moderate concentric left ventricular hypertrophy. The left atrium is mildly dilated. Mild posterior mitral annular calcification is present. Mild mitral regurgitation. Moderate aortic stenosis and moderate aortic regurgitation is present. Normal systolic pulmonary artery pressure (SPAP) estimated at 31 mmHg (RA   pressure 3 mmHg). CXR 2/17/21   Pulmonary vascular congestion with no pulmonary edema or focal infiltrate       EKG 2/17/20   Atrial fibrillation with rapid ventricular responseLeft axis deviationLow voltage QRSInferior infarct , age undeterminedCannot rule out Anterior infarct , age undeterminedAbnormal ECGWhen compared with ECG of 01-JAN-2021 04:53,Atrial fibrillation has replaced Sinus rhythmVent. rate has increased BY  51 BPMMinimal criteria for Anterior infarct are now PresentConfirmed by Gladys Hyde MD, [address] ([id number]) on 2/17/2021 7:26:24 AM     EKG 1/1/21   Normal sinus rhythmWhen compared with ECG of 12/23/16, low voltage QRS now present. Confirmed by Shelley Otero ([id number]) on 1/1/2021 11:49:00 AM     Assessment:  1. Chronic diastolic congestive heart failure ([address]    2. Benign essential HTN    3. Mixed hyperlipidemia    4. Permanent atrial fibrillation ([address]    5. Type 2 diabetes mellitus without complication, without long-term current use of insulin ([address]    6. Aortic valve disease    7.  Medication management     [id number] Score for Atrial Fibrillation Stroke Risk   Risk   Factors  Component Value   C CHF Yes 1   H HTN Yes 1   A2 Age >= 76 Yes,  (82 y.o.) 2   D DM Yes 1   S2 Prior Stroke/TIA No 0   V Vascular Disease No 0   A Age 74-69 No,  (82 y.o.) 0   Sc Sex female 1    [id number]  Score  6   Score last updated 2/26/21 3:98 PM EST    Click here for a link to the UpToDate guideline \"Atrial Fibrillation: Anticoagulation therapy to prevent embolization       Sridhar Gamble PCP manages her protime levels    Plan:  Labs reviewed in epic and discussed with patient   1. Current medications reviewed. No changes at this time. Refills given as warranted. She is on statin lasix metoprolol potassium and warfarin  2. Call to schedule an Echocardiogram to look at Aortic valve heart size and strength   - This test is an ultrasound of your heart just like when you see an ultrasound that a woman has when she is pregnant.  -The test records the movement of your heart valves and chambers.  -It evaluates heart valves, chamber enlargement, abnormal openings, or any fluid in the sac surrounding the heart. 3. Have blood work complete with ISADORA Thomas CNP   -CBC, CMP, TSH, and fasting lipids (8 hours) ordered.   -if he has already done blood work please have them fax the results to [phone]    Follow up with me in 8 months    This note is scribed in the presence of Dr. Capri Doty MD by Alisa Sweet RN.  I, Dr. Gary Nova, personally performed the services described in this documentation, as scribed by the above signed scribe in my presence. It is both accurate and complete to my knowledge. I agree with the details independently gathered by the clinical support staff, while the remaining scribed note accurately describes my personal service to the patient. QUALITY MEASURES  1. Tobacco Cessation Counseling: NA   2. Retake of BP if >140/90: NA   3. Documentation to PCP/referring for new patient:  Sent to PCP at close of office visit  4. CAD patient on anti-platelet: NA   5. CAD patient on STATIN therapy: HLD- Lipitor  6.  Patient with CHF and aFib on anticoagulation: Afib- warfarin          Joana Murdock MD, MD 3/24/2022 2:18 PM

## 2022-04-21 ENCOUNTER — HOSPITAL ENCOUNTER (OUTPATIENT)
Dept: NON INVASIVE DIAGNOSTICS | Age: 87
Discharge: HOME | End: 2022-04-21

## 2022-04-21 DIAGNOSIS — I35.9 AORTIC VALVE DISEASE: ICD-10-CM

## 2022-04-21 LAB
LV EF: 58 %
LVEF MODALITY: NORMAL

## 2022-04-21 PROCEDURE — 93306 TTE W/DOPPLER COMPLETE: CPT

## 2022-11-28 ENCOUNTER — OFFICE VISIT (OUTPATIENT)
Dept: CARDIOLOGY CLINIC | Age: 87
End: 2022-11-28

## 2022-11-28 VITALS
HEART RATE: 85 BPM | OXYGEN SATURATION: 97 % | WEIGHT: 211.8 LBS | HEIGHT: 65 IN | SYSTOLIC BLOOD PRESSURE: 118 MMHG | BODY MASS INDEX: 35.29 KG/M2 | DIASTOLIC BLOOD PRESSURE: 60 MMHG

## 2022-11-28 DIAGNOSIS — E78.2 MIXED HYPERLIPIDEMIA: ICD-10-CM

## 2022-11-28 DIAGNOSIS — I73.9 PAD (PERIPHERAL ARTERY DISEASE) (HCC): ICD-10-CM

## 2022-11-28 DIAGNOSIS — I48.20 CHRONIC A-FIB (HCC): Primary | ICD-10-CM

## 2022-11-28 DIAGNOSIS — I35.0 NONRHEUMATIC AORTIC VALVE STENOSIS: ICD-10-CM

## 2022-11-28 DIAGNOSIS — I50.32 CHRONIC DIASTOLIC CONGESTIVE HEART FAILURE (HCC): ICD-10-CM

## 2022-11-28 DIAGNOSIS — I10 BENIGN ESSENTIAL HTN: ICD-10-CM

## 2022-11-28 PROCEDURE — 3074F SYST BP LT 130 MM HG: CPT | Performed by: INTERNAL MEDICINE

## 2022-11-28 PROCEDURE — 3078F DIAST BP <80 MM HG: CPT | Performed by: INTERNAL MEDICINE

## 2022-11-28 PROCEDURE — 99214 OFFICE O/P EST MOD 30 MIN: CPT | Performed by: INTERNAL MEDICINE

## 2022-11-28 PROCEDURE — 1123F ACP DISCUSS/DSCN MKR DOCD: CPT | Performed by: INTERNAL MEDICINE

## 2022-11-28 NOTE — PROGRESS NOTES
[address] Office Note  11/28/2022     Subjective:  Ms. Elaine Allen is here today for cardiology follow up of permanent  Afib, moderate aortic stenosis and aortic insufficiency PAD Hyperlipidemia hypertension and diastolic CHF. C/o No cardiac complaints today. [place]:    Today, she reports doing well. Her son is present. Her other son passed away on Friday from cancer. She also has lost two of her daughters over the past 1 1/2 years. Patient denies chest pain, sob, palpitations, dizziness or syncope. She is compliant with medications and tolerates well. Dr. Grace Juárez has been managing her INR. Patient is not vaccinated against Covid and is not interested. Patient educated regarding the benefits and low risk of side effects. PMH  Afib, HLD, PAD, s/p thrombectomy and fasciotomy on 6/20/15, then underwent angiogram w/ angioplasty on 6/22/15, DM2. Acute renal failure recovered  She presented to the hospital 2/17/21 for elective cholecystectomy but was found to be afib rvr. She had echo during hospitalization  Which revealed EF 55-60%. Mild MR. Moderate AS and moderate AR. She came back to hospital 3/24/21-3/25/21 with symptoms of CHF Acute on chronic dCHF  Admitted 4/17-4/19/21 Acute Enteritis  - CT interval worsening of the mid to distal jejunal surrounding mesenteric fat stranding - proximal to small bowel ostomy site  Hx of Colon Cancer s/p ileotransverse colectomy with ileal distal transverse anastomosis  - Admitted to Med Surg  -  Continued Cipro, flagyl IV, IVF    She had incarcerated ventral incisional hernia repair 12/21/21      Review of Systems:         12 point ROS negative in all areas as listed below except as in [place]  Constitutional, EENT, pulmonary, GI, , Musculoskeletal, skin, neurological, hematological, endocrine, Psychiatric    Reviewed past medical history, social, and family history. reports that she has never smoked.  She has never used smokeless tobacco. She reports that she does not drink alcohol or use drugs. MOM- Brain cancer, no heart disease   DAD- prostate cancer, no heart disease   Past Medical History:   Diagnosis Date    A-fib ([facility]     Cancer ([facility]     skin cancer 2014; colon 2015    Clostridium difficile diarrhea 06/25/2015    per physicians notes; negative on 6/25/15    Clostridium difficile infection 12/09/2016    COVID-19 01/13/2021    Diabetes mellitus ([facility]     Hyperlipidemia     Hypertension      Past Surgical History:   Procedure Laterality Date    ABDOMEN SURGERY Right 10/15/2015    right colectomy, pain ball    ABDOMEN SURGERY  12/06/2016    trasverse colectomy    ANGIOPLASTY      right foot. CHOLECYSTECTOMY  03/02/2021    : LAPAROSCOPIC CHOLECYSTECTOMY     CHOLECYSTECTOMY, LAPAROSCOPIC N/A 3/2/2021    LAPAROSCOPIC CHOLECYSTECTOMY performed by Dwaine Kowalski MD at [facility] 21 2015    ascending colon mass, diverticulosis, hemorrhoids    COLONOSCOPY  11/08/2016    Transverse colon mass; Diverticulosis; Hemorrhoids    HERNIA REPAIR      x2    OTHER SURGICAL HISTORY Left 9/29/2014    excision of basal cell skin cancer Left face with full thickness skin graph from abdomen    SKIN GRAFT      UPPER GASTROINTESTINAL ENDOSCOPY      VASCULAR SURGERY      VENTRAL HERNIA REPAIR N/A 12/21/2021    LAPAROSCOPIC VENTRAL HERNIA WITH MESH performed by Dwaine Kowalski MD at [facility] OR       Objective:   /60   Pulse 85   Ht 5' 5\" (1.651 m)   Wt 211 lb 12.8 oz (96.1 kg)   SpO2 97%   BMI 35.25 kg/m²     Wt Readings from Last 3 Encounters:   11/28/22 211 lb 12.8 oz (96.1 kg)   03/24/22 202 lb (91.6 kg)   01/05/22 200 lb 12.8 oz (91.1 kg)       Physical Exam:  General: No Respiratory distress, appears well developed and well nourished.    Eyes:  Sclera nonicteric  Nose/Sinuses:  negative findings: nose shows no deformity, asymmetry, or inflammation, nasal mucosa normal, septum midline with no perforation or bleeding  Back:  no pain to palpation  Joint:  no active joint inflammation  Musculoskeletal:  negative  Skin:  Warm and dry  Neck:  Negative for JVD and Carotid Bruits. Chest:  Clear to auscultation, respiration easy  Cardiovascular:  irreg ireg 84 bpm, S2 normal, Aortic stenosis murmur, no rub or thrill. Extremities: 1+ BLE edema, no clubbing, cyanosis,  Pedal pulses are intact  Neuro: intact    Medications:   Outpatient Encounter Medications as of 11/28/2022   Medication Sig Dispense Refill    furosemide (LASIX) 40 MG tablet Take 1 tablet by mouth daily 90 tablet 3    atorvastatin (LIPITOR) 10 MG tablet Take 1 tablet by mouth daily 90 tablet 3    metoprolol tartrate (LOPRESSOR) 25 MG tablet Take 1 tablet by mouth 2 times daily 180 tablet 3    potassium chloride (KLOR-CON M) 20 MEQ extended release tablet Take 1 tablet by mouth daily 90 tablet 3    glyBURIDE (DIABETA) 5 MG tablet Take 7.5 mg by mouth daily (with breakfast). warfarin (COUMADIN) 2.5 MG tablet Take 3 tablets by mouth daily for 3 days, THEN 2 tablets daily for 1 day. Then see the coumadin clinic for blood check and further dosing. 60 tablet 0     No facility-administered encounter medications on file as of 11/28/2022. Lab Data:    LIPID:   Lab Results   Component Value Date    CHOL 80 03/25/2021    CHOL 120 10/13/2015     Lab Results   Component Value Date    TRIG 138 03/25/2021    TRIG 209 (H) 10/13/2015     Lab Results   Component Value Date    HDL 37 (L) 03/25/2021    HDL 35 (L) 10/13/2015     Lab Results   Component Value Date    LDLCALC 15 03/25/2021    LDLCALC 43 10/13/2015     Lab Results   Component Value Date    LABVLDL 28 03/25/2021    LABVLDL 42 10/13/2015     10/25/2022 LABS in Care Everywhere  , K 3.9, BUN/Cr 25/0.81, Ca 9.5, Total Bilirubin 2.1, AST 18. ALT 14, total Protein 6.2, WBC 7.5, RBC 4.62, H/H 13.8/42. 4.       A1C:   Lab Results   Component Value Date    LABA1C 6.7 02/17/2021     BNP:  No results for input(s): BNP in the last 72 hours.    IMAGING:   I have reviewed the following tests and documented in this encounter as follows: Discussed with patient and son. Echo 4/21/2022  Small left ventricular cavity with mild concentric left ventricular hypertrophy. Left ventricular systolic function is normal with ejection fraction estimated at 55-60 %. No regional wall motion abnormalities are noted. Elevated left ventricular diastolic filling pressure. Right ventricular systolic function is mildly reduced. Mild bi-atrial dilation. Right ventricular systolic function is mildly reduced . Moderate to severe aortic stenosis is present. Mild-to-moderate aortic regurgitation is present. Mild mitral regurgitation. Mild to moderate tricuspid regurgitation. Normal systolic pulmonary artery pressure (SPAP) estimated at 29 mmHg (RA pressure 3 mmHg). Compared to the prior study performed 2-, degree of aortic stenosis has progressed with increase velocity/gradient, now  moderate-severe. The appearance of the valve suggests possibly severe stenosis under-estimated by doppler findings- further study  advised (GLENROY, cMRI). EKG 12/3/2021  Atrial fibrillation with rapid ventricular response  Left axis deviation  Low voltage QRS  Possible Anterolateral infarct and inferior infarct  Nonspecific ST abnormality    EKG 4/17/21  Atrial fibrillation with rapid ventricular responseLow voltage QRSCannot rule out Anterior infarct , age undeterminedAbnormal ECGNo previous ECGs availableConfirmed by Kolby Elena ([id number]) on 4/17/2021 3:10:41 PM    CT abdomen/pelvis 4/17/21  Abdomen/Pelvis: Lower chest: The lung bases are well aerated. Pleural surfaces are unremarkable and no evidence of pleural effusion is identified. Organs: Scattered punctate calcifications are seen within the splenic parenchyma. Mid distal jejunal region mesenteric fat stranding is noted. The gallbladder is surgically absent.   The liver, spleen, pancreas, adrenal glands, kidneys, are unremarkable in appearance. GI/Bowel: Mid right ventral abdominal wall small bowel ostomy site is noted without evidence of acute complication identified. Mild omental fat herniation via the ostomy site is seen. Small hiatal hernia is present. The stomach is unremarkable without wall thickening or distention. Severe scattered diverticula visualized within the colon without evidence of adjacent inflammatory change within the mesenteric fat identified. Bowel loops are unremarkable in appearance without evidence of obstruction, distension or mucosal thickening. Pelvis: The urinary bladder is minimally distended but grossly unremarkable in appearance. No evidence of pelvic free fluid is seen. Multifocal calcified uterine leiomyomata are seen. Peritoneum/Retroperitoneum: No evidence of retroperitoneal or intraperitoneal lymphadenopathy is identified. No evidence of intraperitoneal free fluid is seen. Bones/Soft Tissues: The bones, skeletal muscle bundles, fascial planes and subcutaneous soft tissues are unremarkable in appearance     EKG 3/24/21  Atrial fibrillationLow voltage QRSCannot rule out Anterior infarct (cited on or before 17-FEB-2021)Nonspecific ST abnormalityAbnormal ECGWhen compared with ECG of 17-FEB-2021 07:13,Criteria for Inferior infarct are no longer PresentConfirmed by KAYLEE WIGGINS, Abby Blunt ([id number]) on 3/24/2021 7:39:30 AM    CTPA 3/24/21  FINDINGS: Pulmonary Arteries: Pulmonary arteries are adequately opacified for evaluation. No evidence of intraluminal filling defect to suggest pulmonary embolism. Main pulmonary artery is normal in caliber. Mediastinum: Cardiomegaly. Significant coronary calcifications. Aortic valve calcifications. Few mitral annular calcifications. No concerning adenopathy. The heart and pericardium demonstrate no acute abnormality. There is no acute abnormality of the thoracic aorta. Lungs/pleura: Mild dependent atelectasis bilaterally.   Moderate bilateral pleural effusions. No pneumothorax. Interlobular septal thickening. Few bilateral small ground-glass opacities. Calcified pulmonary nodules and calcified left hilar adenopathy compatible with prior granulomatous disease. Upper Abdomen: Limited imaging including the abdomen. The very top of the liver dome is seen and appears unremarkable. Soft Tissues/Bones: No acute bone or soft tissue abnormality. Confluent anterolateral vertebral body syndesmophyte formation compatible with DISH. No evidence of pulmonary embolism. Cardiomegaly, bilateral pleural effusions and mild pulmonary edema compatible with fluid overload and/or CHF. Mild dependent airspace disease bilaterally most likely represents atelectasis. CXR 3/24/21  Cardiomegaly, pulmonary vascular congestion and small bilateral pleural effusions compatible with fluid overload and/or CHF. Bibasilar airspace disease. Pneumonia can not be excluded. ECHO 2/18/21   Normal left ventricular systolic function with an estimated ejection   fraction of 55-60%. No regional wall motion abnormalities are seen. There is moderate concentric left ventricular hypertrophy. The left atrium is mildly dilated. Mild posterior mitral annular calcification is present. Mild mitral regurgitation. Moderate aortic stenosis and moderate aortic regurgitation is present. Normal systolic pulmonary artery pressure (SPAP) estimated at 31 mmHg (RA   pressure 3 mmHg). CXR 2/17/21   Pulmonary vascular congestion with no pulmonary edema or focal infiltrate       EKG 2/17/20   Atrial fibrillation with rapid ventricular responseLeft axis deviationLow voltage QRSInferior infarct , age undeterminedCannot rule out Anterior infarct , age undeterminedAbnormal ECGWhen compared with ECG of 01-JAN-2021 04:53,Atrial fibrillation has replaced Sinus rhythmVent.  rate has increased BY  51 BPMMinimal criteria for Anterior infarct are now PresentConfirmed by MALACHI WIGGINS, [address] ([id number]) on 2/17/2021 7:26:24 AM     EKG 1/1/21   Normal sinus rhythmWhen compared with ECG of 12/23/16, low voltage QRS now present. Confirmed by Kelli Ibrahim ([id number]) on 1/1/2021 11:49:00 AM     Assessment/Plan:  1. Chronic a-fib ([address]    2. Chronic diastolic congestive heart failure ([address]    3. Nonrheumatic aortic valve stenosis    4. Mixed hyperlipidemia    5. Benign essential HTN    6. PAD (peripheral artery disease) (HCC)        Aortic Valve disease  -Echo 4/21/22 EF=55-60%, mod to severe AS with mild to mod regurgitation  PAF  - [id number] Score 6-continue on warfarin (PT managed by Frutoso Cushing, APRN - CNP  -Currently in   Chronic dCHF  -Continue on Lasix 40 mg daily and Potassium chloride 20 MEQ daily  HLD  -Labs from 3/25/21 in Epic reviewed with patient  HTN  -Continue on Metoprolol Tartrate 25 mg BID  PAD    Labs from 10/25/2022 reviewed with patient=, K 3.9, BUN/Cr 25/0.81, Ca 9.5, Total Bilirubin 2.1, AST 18. ALT 14, total Protein 6.2, WBC 7.5, RBC 4.62, H/H 13.8/42. 4. [id number] Score for Atrial Fibrillation Stroke Risk   Risk   Factors  Component Value   C CHF Yes 1   H HTN Yes 1   A2 Age >= 76 Yes,  (82 y.o.) 2   D DM Yes 1   S2 Prior Stroke/TIA No 0   V Vascular Disease No 0   A Age 74-69 No,  (82 y.o.) 0   Sc Sex female 1    [id number]  Score  6   Score last updated 2/26/21 5:93 PM EST    Click here for a link to the UpToDate guideline \"Atrial Fibrillation: Anticoagulation therapy to prevent embolization       Marcus David PCP manages her protime levels    Plan:  Labs reviewed in epic and discussed with patient   1. Current medications reviewed. No changes at this time. QUALITY MEASURES  1. Tobacco Cessation Counseling: NA   2. Retake of BP if >140/90: NA   3. Documentation to PCP/referring for new patient:  Sent to PCP at close of office visit  4. CAD patient on anti-platelet: NA   5. CAD patient on STATIN therapy: HLD- Lipitor  6.  Patient with CHF and aFib on anticoagulation: Afib- warfarin          This note was scribed in the presence of Anatoliy Maloney MD by Corey Heaton RN.       ***    Anatoliy Maloney MD  [place] [address]  [phone] [facility]  [phone] [facility]

## 2022-11-28 NOTE — LETTER
[address] [address]  Phone: [phone]  Fax: [phone]    Chapincito Winslow MD    November 28, 2022     ISADORA Montes - CNP  [address] [facility] [address] [place] [address]    Patient: Izabella Cage   MR Number: [id number]   YOB: 1935   Date of Visit: 11/28/2022       Dear Jaime Aguirre: Thank you for referring Izabella Cage to me for evaluation/treatment. Below are the relevant portions of my assessment and plan of care. If you have questions, please do not hesitate to call me. I look forward to following Phylicia Kovacs along with you.     Sincerely,      Chapincito Winslow MD

## 2022-11-28 NOTE — PROGRESS NOTES
[facility], [place], operated by [facility] Office Note  2022     Subjective:  Ms. Sotero Gilliland is here today for cardiology follow up of permanent  Afib, moderate to severe aortic stenosis and aortic insufficiency PAD Hyperlipidemia hypertension and diastolic CHF. C/o No cardiac complaints today. [place]:    Today, she reports feeling well. Patient denies chest pain, sob, palpitations, dizziness or syncope. She is compliant with medications and tolerates well. She notes that her son passed away from cancer on Friday and she has to make  arrangements. She has also lost two daughters in the past year and half. She feels low. Another son is with her and supportive. Patient is not vaccinated against Covid and is not interested. Patient educated regarding the benefits and low risk of side effects. PMH  Afib, HLD, PAD, s/p thrombectomy and fasciotomy on 6/20/15, then underwent angiogram w/ angioplasty on 6/22/15, DM2. Acute renal failure recovered  She presented to the hospital 21 for elective cholecystectomy but was found to be afib rvr. She had echo during hospitalization  Which revealed EF 55-60%. Mild MR. Moderate AS and moderate AR. She came back to hospital 3/24/21-3/25/21 with symptoms of CHF Acute on chronic dCHF  Admitted -21 Acute Enteritis  - CT interval worsening of the mid to distal jejunal surrounding mesenteric fat stranding - proximal to small bowel ostomy site  Hx of Colon Cancer s/p ileotransverse colectomy with ileal distal transverse anastomosis  - Admitted to Med Surg  -  Continued Cipro, flagyl IV, IVF   she had incarcerated ventral incisional hernia repair 21    Review of Systems:         12 point ROS negative in all areas as listed below except as in [place]  Constitutional, EENT, pulmonary, GI, , Musculoskeletal, skin, neurological, hematological, endocrine, Psychiatric    Reviewed past medical history, social, and family history. reports that she has never smoked.  She has never used smokeless tobacco. She reports that she does not drink alcohol or use drugs. MOM- Brain cancer, no heart disease   DAD- prostate cancer, no heart disease   Past Medical History:   Diagnosis Date    A-fib ([facility] [address]     Cancer ([facility] [address]     skin cancer 2014; colon 2015    Clostridium difficile diarrhea 06/25/2015    per physicians notes; negative on 6/25/15    Clostridium difficile infection 12/09/2016    COVID-19 01/13/2021    Diabetes mellitus ([facility] [address]     Hyperlipidemia     Hypertension      Past Surgical History:   Procedure Laterality Date    ABDOMEN SURGERY Right 10/15/2015    right colectomy, pain ball    ABDOMEN SURGERY  12/06/2016    trasverse colectomy    ANGIOPLASTY      right foot. CHOLECYSTECTOMY  03/02/2021    : LAPAROSCOPIC CHOLECYSTECTOMY     CHOLECYSTECTOMY, LAPAROSCOPIC N/A 3/2/2021    LAPAROSCOPIC CHOLECYSTECTOMY performed by Prashant Nino MD at [facility] 21 2015    ascending colon mass, diverticulosis, hemorrhoids    COLONOSCOPY  11/08/2016    Transverse colon mass; Diverticulosis; Hemorrhoids    HERNIA REPAIR      x2    OTHER SURGICAL HISTORY Left 9/29/2014    excision of basal cell skin cancer Left face with full thickness skin graph from abdomen    SKIN GRAFT      UPPER GASTROINTESTINAL ENDOSCOPY      VASCULAR SURGERY      VENTRAL HERNIA REPAIR N/A 12/21/2021    LAPAROSCOPIC VENTRAL HERNIA WITH MESH performed by Prashant Nino MD at [facility] OR       Objective:   /60   Pulse 85   Ht 5' 5\" (1.651 m)   Wt 211 lb 12.8 oz (96.1 kg)   SpO2 97%   BMI 35.25 kg/m²     Wt Readings from Last 3 Encounters:   11/28/22 211 lb 12.8 oz (96.1 kg)   03/24/22 202 lb (91.6 kg)   01/05/22 200 lb 12.8 oz (91.1 kg)       Physical Exam:  General: No Respiratory distress, appears well developed and well nourished.    Eyes:  Sclera nonicteric  Nose/Sinuses:  negative findings: nose shows no deformity, asymmetry, or inflammation, nasal mucosa normal, septum midline with no perforation or bleeding  Back:  no pain to palpation  Joint:  no active joint inflammation  Musculoskeletal:  negative  Skin:  Warm and dry  Neck:  Negative for JVD and Carotid Bruits. Chest:  Clear to auscultation, respiration easy  Cardiovascular:  irreg ireg  S2 normal, Aortic stenosis murmur, no rub or thrill. Extremities:No edema, no clubbing, cyanosis,  Pedal pulses are intact  Neuro: intact    Medications:   Outpatient Encounter Medications as of 11/28/2022   Medication Sig Dispense Refill    furosemide (LASIX) 40 MG tablet Take 1 tablet by mouth daily 90 tablet 3    atorvastatin (LIPITOR) 10 MG tablet Take 1 tablet by mouth daily 90 tablet 3    metoprolol tartrate (LOPRESSOR) 25 MG tablet Take 1 tablet by mouth 2 times daily 180 tablet 3    potassium chloride (KLOR-CON M) 20 MEQ extended release tablet Take 1 tablet by mouth daily 90 tablet 3    glyBURIDE (DIABETA) 5 MG tablet Take 7.5 mg by mouth daily (with breakfast). warfarin (COUMADIN) 2.5 MG tablet Take 3 tablets by mouth daily for 3 days, THEN 2 tablets daily for 1 day. Then see the coumadin clinic for blood check and further dosing. 60 tablet 0     No facility-administered encounter medications on file as of 11/28/2022.         Lab Data:  Lab Results   Component Value Date     12/03/2021    K 3.9 12/03/2021     12/03/2021    CO2 24 12/03/2021    BUN 18 12/03/2021    CREATININE 0.7 12/03/2021    GLUCOSE 238 (H) 12/03/2021    CALCIUM 9.5 12/03/2021    PROT 6.5 12/03/2021    LABALBU 3.7 12/03/2021    BILITOT 1.5 (H) 12/03/2021    ALKPHOS 163 (H) 12/03/2021    AST 34 12/03/2021    ALT 34 12/03/2021    LABGLOM >60 12/03/2021    GFRAA >60 12/03/2021    AGRATIO 1.3 12/03/2021    GLOB 2.7 04/17/2021     Lab Results   Component Value Date    WBC 10.3 12/03/2021    HGB 14.7 12/03/2021    HCT 45.3 12/03/2021    MCV 91.9 12/03/2021     12/03/2021     Hemoglobin A1C   Date Value Ref Range Status   02/17/2021 6.7 See comment % Final     Comment:     Comment:  Diagnosis of Diabetes: > or = 6.5%  Increased risk of diabetes (Prediabetes): 5.7-6.4%  Glycemic Control: Nonpregnant Adults: <7.0%                    Pregnant: <6.0%           LIPID:   Lab Results   Component Value Date    CHOL 80 03/25/2021    CHOL 120 10/13/2015     Lab Results   Component Value Date    TRIG 138 03/25/2021    TRIG 209 (H) 10/13/2015     Lab Results   Component Value Date    HDL 37 (L) 03/25/2021    HDL 35 (L) 10/13/2015     Lab Results   Component Value Date    LDLCALC 15 03/25/2021    LDLCALC 43 10/13/2015     Lab Results   Component Value Date    LABVLDL 28 03/25/2021    LABVLDL 42 10/13/2015     No results found for: CHOLHDLRATIO  PT/INR:   No results for input(s): PROTIME, INR in the last 72 hours. A1C:   Lab Results   Component Value Date    LABA1C 6.7 02/17/2021     BNP:  No results for input(s): BNP in the last 72 hours. [facility]S [facility] EVERYWHERE 10/25/2022:  , K 3.9, Chl 111, BUN/Cr 25/0.81, Glucose 103, ALT 14, AST 18, Total Bilirubin 2.1, H/H 13.8/42. 4. IMAGING:   I have reviewed the following tests and documented in this encounter as follows: Discussed with patient and son. EKG 4/17/21  Atrial fibrillation with rapid ventricular responseLow voltage QRSCannot rule out Anterior infarct , age undeterminedAbnormal ECGNo previous ECGs availableConfirmed by Maximus Todd ([id number]) on 4/17/2021 3:10:41 PM    CT abdomen/pelvis 4/17/21  Abdomen/Pelvis: Lower chest: The lung bases are well aerated. Pleural surfaces are unremarkable and no evidence of pleural effusion is identified. Organs: Scattered punctate calcifications are seen within the splenic parenchyma. Mid distal jejunal region mesenteric fat stranding is noted. The gallbladder is surgically absent. The liver, spleen, pancreas, adrenal glands, kidneys, are unremarkable in appearance.   GI/Bowel: Mid right ventral abdominal wall small bowel ostomy site is noted without evidence of acute complication identified. Mild omental fat herniation via the ostomy site is seen. Small hiatal hernia is present. The stomach is unremarkable without wall thickening or distention. Severe scattered diverticula visualized within the colon without evidence of adjacent inflammatory change within the mesenteric fat identified. Bowel loops are unremarkable in appearance without evidence of obstruction, distension or mucosal thickening. Pelvis: The urinary bladder is minimally distended but grossly unremarkable in appearance. No evidence of pelvic free fluid is seen. Multifocal calcified uterine leiomyomata are seen. Peritoneum/Retroperitoneum: No evidence of retroperitoneal or intraperitoneal lymphadenopathy is identified. No evidence of intraperitoneal free fluid is seen. Bones/Soft Tissues: The bones, skeletal muscle bundles, fascial planes and subcutaneous soft tissues are unremarkable in appearance     EKG 3/24/21  Atrial fibrillationLow voltage QRSCannot rule out Anterior infarct (cited on or before 17-FEB-2021)Nonspecific ST abnormalityAbnormal ECGWhen compared with ECG of 17-FEB-2021 07:13,Criteria for Inferior infarct are no longer PresentConfirmed by KAYLEE WIGGINS, Abby Blunt ([id number]) on 3/24/2021 7:39:30 AM    CTPA 3/24/21  FINDINGS: Pulmonary Arteries: Pulmonary arteries are adequately opacified for evaluation. No evidence of intraluminal filling defect to suggest pulmonary embolism. Main pulmonary artery is normal in caliber. Mediastinum: Cardiomegaly. Significant coronary calcifications. Aortic valve calcifications. Few mitral annular calcifications. No concerning adenopathy. The heart and pericardium demonstrate no acute abnormality. There is no acute abnormality of the thoracic aorta. Lungs/pleura: Mild dependent atelectasis bilaterally. Moderate bilateral pleural effusions. No pneumothorax. Interlobular septal thickening. Few bilateral small ground-glass opacities.   Calcified pulmonary nodules and calcified left hilar adenopathy compatible with prior granulomatous disease. Upper Abdomen: Limited imaging including the abdomen. The very top of the liver dome is seen and appears unremarkable. Soft Tissues/Bones: No acute bone or soft tissue abnormality. Confluent anterolateral vertebral body syndesmophyte formation compatible with DISH. No evidence of pulmonary embolism. Cardiomegaly, bilateral pleural effusions and mild pulmonary edema compatible with fluid overload and/or CHF. Mild dependent airspace disease bilaterally most likely represents atelectasis. CXR 3/24/21  Cardiomegaly, pulmonary vascular congestion and small bilateral pleural effusions compatible with fluid overload and/or CHF. Bibasilar airspace disease. Pneumonia can not be excluded. ECHO 2/18/21   Normal left ventricular systolic function with an estimated ejection   fraction of 55-60%. No regional wall motion abnormalities are seen. There is moderate concentric left ventricular hypertrophy. The left atrium is mildly dilated. Mild posterior mitral annular calcification is present. Mild mitral regurgitation. Moderate aortic stenosis and moderate aortic regurgitation is present. Normal systolic pulmonary artery pressure (SPAP) estimated at 31 mmHg (RA   pressure 3 mmHg). CXR 2/17/21   Pulmonary vascular congestion with no pulmonary edema or focal infiltrate       EKG 2/17/20   Atrial fibrillation with rapid ventricular responseLeft axis deviationLow voltage QRSInferior infarct , age undeterminedCannot rule out Anterior infarct , age undeterminedAbnormal ECGWhen compared with ECG of 01-JAN-2021 04:53,Atrial fibrillation has replaced Sinus rhythmVent. rate has increased BY  51 BPMMinimal criteria for Anterior infarct are now PresentConfirmed by Courtney Raymond MD, [address] ([id number]) on 2/17/2021 7:26:24 AM     EKG 1/1/21   Normal sinus rhythmWhen compared with ECG of 12/23/16, low voltage QRS now present. Confirmed by Tereza Wells ([id number]) on 1/1/2021 11:49:00 AM     Assessment/plan:  1. Chronic a-fib (HCC) rate is controlled  with metoprolol   -Continue Warfarin (Dr. Alberto Banks manages INR)  -[id number] Score 6   2. Chronic diastolic congestive heart failure (HCC)   -Continue Lasix 40 mg daily and potassium 20 meq   3. Nonrheumatic aortic valve stenosis no symptoms continue to monitor   4. Mixed hyperlipidemia   -Continue Atorvastatin 10 mg nightly  -Labs from 3/25/2021 reviewed with patient   5. Benign essential HTN BP controlled    6. PAD (peripheral artery disease) (HCC) no symptoms          [id number] Score for Atrial Fibrillation Stroke Risk   Risk   Factors  Component Value   C CHF Yes 1   H HTN Yes 1   A2 Age >= 76 Yes,  (82 y.o.) 2   D DM Yes 1   S2 Prior Stroke/TIA No 0   V Vascular Disease No 0   A Age 74-69 No,  (82 y.o.) 0   Sc Sex female 1    [id number]  Score  6   Score last updated 2/26/21 2:88 PM EST    Click here for a link to the UpToDate guideline \"Atrial Fibrillation: Anticoagulation therapy to prevent embolization       Luci Liz PCP manages her protime levels    Plan:  Labs reviewed in epic and discussed with patient   Patient needs lipids CBC CMP TSH requesting Luci Liz to please have them in office where she prefers and share a copy with me. QUALITY MEASURES  1. Tobacco Cessation Counseling: NA   2. Retake of BP if >140/90: NA   3. Documentation to PCP/referring for new patient:  Sent to PCP at close of office visit  4. CAD patient on anti-platelet: NA   5. CAD patient on STATIN therapy: HLD- Lipitor  6. Patient with CHF and aFib on anticoagulation: Afib- warfarin          This note was scribed in the presence of Najma Webb MD by Amie Hills RN. I, Dr. Catrachito Baca, personally performed the services described in this documentation, as scribed by the above signed scribe in my presence. It is both accurate and complete to my knowledge.  I agree with the details independently gathered by the clinical support staff, while the remaining scribed note accurately describes my personal service to the patient.       Liliana Solis MD  [facility]  [phone] [place] office  [phone] [facility]

## 2023-02-14 ENCOUNTER — ANTI-COAG VISIT (OUTPATIENT)
Dept: PHARMACY | Age: 88
End: 2023-02-14

## 2023-02-14 DIAGNOSIS — I48.20 CHRONIC A-FIB (HCC): Primary | ICD-10-CM

## 2023-02-14 NOTE — PROGRESS NOTES
Patient is managed by PCP Shine Chaudhry, will close anticoagulation episode.   Maria D Holloway, PharmD 3:30 PM EST 2/14/23

## 2023-04-26 ENCOUNTER — OFFICE VISIT (OUTPATIENT)
Dept: CARDIOLOGY CLINIC | Age: 88
End: 2023-04-26

## 2023-04-26 VITALS
OXYGEN SATURATION: 98 % | HEART RATE: 114 BPM | WEIGHT: 215.4 LBS | BODY MASS INDEX: 35.89 KG/M2 | SYSTOLIC BLOOD PRESSURE: 136 MMHG | DIASTOLIC BLOOD PRESSURE: 74 MMHG | HEIGHT: 65 IN

## 2023-04-26 DIAGNOSIS — I35.0 NONRHEUMATIC AORTIC VALVE STENOSIS: Primary | ICD-10-CM

## 2023-04-26 DIAGNOSIS — D68.69 SECONDARY HYPERCOAGULABLE STATE (HCC): ICD-10-CM

## 2023-04-26 DIAGNOSIS — E78.00 PURE HYPERCHOLESTEROLEMIA: ICD-10-CM

## 2023-04-26 DIAGNOSIS — I10 BENIGN ESSENTIAL HTN: ICD-10-CM

## 2023-04-26 DIAGNOSIS — I48.20 CHRONIC A-FIB (HCC): ICD-10-CM

## 2023-04-26 DIAGNOSIS — I50.32 CHRONIC DIASTOLIC CONGESTIVE HEART FAILURE (HCC): ICD-10-CM

## 2023-04-26 DIAGNOSIS — Z79.01 ANTICOAGULATED ON COUMADIN: ICD-10-CM

## 2023-04-26 DIAGNOSIS — I35.1 NONRHEUMATIC AORTIC VALVE INSUFFICIENCY: ICD-10-CM

## 2023-04-26 DIAGNOSIS — I73.9 PAD (PERIPHERAL ARTERY DISEASE) (HCC): ICD-10-CM

## 2023-04-26 PROBLEM — I50.9 NEW ONSET OF CONGESTIVE HEART FAILURE (HCC): Status: RESOLVED | Noted: 2021-03-24 | Resolved: 2023-04-26

## 2023-04-26 PROCEDURE — 3078F DIAST BP <80 MM HG: CPT | Performed by: INTERNAL MEDICINE

## 2023-04-26 PROCEDURE — 1123F ACP DISCUSS/DSCN MKR DOCD: CPT | Performed by: INTERNAL MEDICINE

## 2023-04-26 PROCEDURE — 99213 OFFICE O/P EST LOW 20 MIN: CPT | Performed by: INTERNAL MEDICINE

## 2023-04-26 PROCEDURE — 3075F SYST BP GE 130 - 139MM HG: CPT | Performed by: INTERNAL MEDICINE

## 2023-04-26 RX ORDER — METOPROLOL TARTRATE 50 MG/1
50 TABLET, FILM COATED ORAL 2 TIMES DAILY
Qty: 180 TABLET | Refills: 2 | Status: SHIPPED | OUTPATIENT
Start: 2023-04-26

## 2023-04-26 NOTE — PROGRESS NOTES
Peritoneum/Retroperitoneum: No evidence of retroperitoneal or intraperitoneal lymphadenopathy is identified. No evidence of intraperitoneal free fluid is seen. Bones/Soft Tissues: The bones, skeletal muscle bundles, fascial planes and subcutaneous soft tissues are unremarkable in appearance     EKG 3/24/21  Atrial fibrillationLow voltage QRSCannot rule out Anterior infarct (cited on or before 17-FEB-2021)Nonspecific ST abnormalityAbnormal ECGWhen compared with ECG of 17-FEB-2021 07:13,Criteria for Inferior infarct are no longer PresentConfirmed by KAYLEE WIGGINS, Shaun Avina ([id number]) on 3/24/2021 7:39:30 AM    CTPA 3/24/21  FINDINGS: Pulmonary Arteries: Pulmonary arteries are adequately opacified for evaluation. No evidence of intraluminal filling defect to suggest pulmonary embolism. Main pulmonary artery is normal in caliber. Mediastinum: Cardiomegaly. Significant coronary calcifications. Aortic valve calcifications. Few mitral annular calcifications. No concerning adenopathy. The heart and pericardium demonstrate no acute abnormality. There is no acute abnormality of the thoracic aorta. Lungs/pleura: Mild dependent atelectasis bilaterally. Moderate bilateral pleural effusions. No pneumothorax. Interlobular septal thickening. Few bilateral small ground-glass opacities. Calcified pulmonary nodules and calcified left hilar adenopathy compatible with prior granulomatous disease. Upper Abdomen: Limited imaging including the abdomen. The very top of the liver dome is seen and appears unremarkable. Soft Tissues/Bones: No acute bone or soft tissue abnormality. Confluent anterolateral vertebral body syndesmophyte formation compatible with DISH. No evidence of pulmonary embolism. Cardiomegaly, bilateral pleural effusions and mild pulmonary edema compatible with fluid overload and/or CHF. Mild dependent airspace disease bilaterally most likely represents atelectasis.      CXR 3/24/21  Cardiomegaly, pulmonary

## 2023-04-26 NOTE — PATIENT INSTRUCTIONS
PLAN:  Increase the metoprolol to 50 mg twice daily     You can double up on the 25 mg's for now   Continue all other medications  Recommend trying to walk as much as you can. No cardiac testing warranted at this time  Follow up in 6 months   We will call [address] and have labs faxed to us.

## 2023-05-05 ENCOUNTER — TELEPHONE (OUTPATIENT)
Dept: CARDIOLOGY CLINIC | Age: 88
End: 2023-05-05

## 2023-05-05 NOTE — TELEPHONE ENCOUNTER
LISA with Wilfrid Peters at [facility] office regarding metoprolol dosing.

## 2023-05-05 NOTE — TELEPHONE ENCOUNTER
YVON Merino's office called stating pt was present at their office for an appt today and she stated that YVON Rainey already prescribed pt Metoprolol and the office stated the pt informed them that RKG prescribed metoprolol 50mg BID. So with the two providers prescribing this the pt ended up taking 100mg BID. Please advise and contact pt with what she needs to be doing and please contact YVON Dunbar office with an updated dose at [phone].

## 2023-05-05 NOTE — TELEPHONE ENCOUNTER
Attempted to contact NP office. Unable to reach anyone. Will try again later. Spoke with pt son Luiza Sparks per hippa and relayed RKG message. Luiza Sparks states he will make sure she is only taking 50 mg. Nusrat Griffin MD  [facility] Staff 44 minutes ago (1:31 PM)     RG  She is on metoprolol 50 mg BID   Pharmacy should not have filled same drug twice   Please  call patient and office of [facility] in [place]  to correct the dose.

## 2023-09-02 ENCOUNTER — HOSPITAL ENCOUNTER (EMERGENCY)
Age: 88
Discharge: HOME | End: 2023-09-02
Attending: STUDENT IN AN ORGANIZED HEALTH CARE EDUCATION/TRAINING PROGRAM
Payer: MEDICARE

## 2023-09-02 VITALS
HEART RATE: 90 BPM | HEIGHT: 65 IN | SYSTOLIC BLOOD PRESSURE: 140 MMHG | OXYGEN SATURATION: 93 % | DIASTOLIC BLOOD PRESSURE: 74 MMHG | RESPIRATION RATE: 18 BRPM | WEIGHT: 220 LBS | BODY MASS INDEX: 36.65 KG/M2 | TEMPERATURE: 98.3 F

## 2023-09-02 DIAGNOSIS — R53.83 FATIGUE, UNSPECIFIED TYPE: Primary | ICD-10-CM

## 2023-09-02 LAB
ALBUMIN SERPL-MCNC: 3.7 G/DL (ref 3.4–5)
ALBUMIN/GLOB SERPL: 1.3 {RATIO} (ref 1.1–2.2)
ALP SERPL-CCNC: 118 U/L (ref 40–129)
ALT SERPL-CCNC: 15 U/L (ref 10–40)
ANION GAP SERPL CALCULATED.3IONS-SCNC: 10 MMOL/L (ref 3–16)
AST SERPL-CCNC: 25 U/L (ref 15–37)
BACTERIA URNS QL MICRO: ABNORMAL /HPF
BASOPHILS # BLD: 0.1 K/UL (ref 0–0.2)
BASOPHILS NFR BLD: 0.9 %
BILIRUB SERPL-MCNC: 1.5 MG/DL (ref 0–1)
BILIRUB UR QL STRIP.AUTO: NEGATIVE
BUN SERPL-MCNC: 19 MG/DL (ref 7–20)
CALCIUM SERPL-MCNC: 9.1 MG/DL (ref 8.3–10.6)
CHLORIDE SERPL-SCNC: 107 MMOL/L (ref 99–110)
CLARITY UR: CLEAR
CO2 SERPL-SCNC: 25 MMOL/L (ref 21–32)
COLOR UR: YELLOW
CREAT SERPL-MCNC: 0.8 MG/DL (ref 0.6–1.2)
DEPRECATED RDW RBC AUTO: 14.9 % (ref 12.4–15.4)
EKG ATRIAL RATE: 91 BPM
EKG DIAGNOSIS: NORMAL
EKG Q-T INTERVAL: 326 MS
EKG QRS DURATION: 84 MS
EKG QTC CALCULATION (BAZETT): 427 MS
EKG R AXIS: -35 DEGREES
EKG T AXIS: 47 DEGREES
EKG VENTRICULAR RATE: 103 BPM
EOSINOPHIL # BLD: 0.1 K/UL (ref 0–0.6)
EOSINOPHIL NFR BLD: 1.9 %
EPI CELLS #/AREA URNS HPF: ABNORMAL /HPF (ref 0–5)
GFR SERPLBLD CREATININE-BSD FMLA CKD-EPI: >60 ML/MIN/{1.73_M2}
GLUCOSE SERPL-MCNC: 178 MG/DL (ref 70–99)
GLUCOSE UR STRIP.AUTO-MCNC: NEGATIVE MG/DL
HCT VFR BLD AUTO: 38.9 % (ref 36–48)
HGB BLD-MCNC: 13.1 G/DL (ref 12–16)
HGB UR QL STRIP.AUTO: ABNORMAL
HYALINE CASTS #/AREA URNS LPF: ABNORMAL /LPF (ref 0–2)
KETONES UR STRIP.AUTO-MCNC: NEGATIVE MG/DL
LACTATE BLDV-SCNC: 2 MMOL/L (ref 0.4–1.9)
LEUKOCYTE ESTERASE UR QL STRIP.AUTO: NEGATIVE
LYMPHOCYTES # BLD: 1.2 K/UL (ref 1–5.1)
LYMPHOCYTES NFR BLD: 16.9 %
MCH RBC QN AUTO: 30.6 PG (ref 26–34)
MCHC RBC AUTO-ENTMCNC: 33.6 G/DL (ref 31–36)
MCV RBC AUTO: 91 FL (ref 80–100)
MONOCYTES # BLD: 0.6 K/UL (ref 0–1.3)
MONOCYTES NFR BLD: 9 %
NEUTROPHILS # BLD: 5.1 K/UL (ref 1.7–7.7)
NEUTROPHILS NFR BLD: 71.3 %
NITRITE UR QL STRIP.AUTO: NEGATIVE
NT-PROBNP SERPL-MCNC: 1236 PG/ML (ref 0–449)
PH UR STRIP.AUTO: 5.5 [PH] (ref 5–8)
PLATELET # BLD AUTO: 164 K/UL (ref 135–450)
PMV BLD AUTO: 9.2 FL (ref 5–10.5)
POTASSIUM SERPL-SCNC: 4.8 MMOL/L (ref 3.5–5.1)
PROT SERPL-MCNC: 6.5 G/DL (ref 6.4–8.2)
PROT UR STRIP.AUTO-MCNC: NEGATIVE MG/DL
RBC # BLD AUTO: 4.27 M/UL (ref 4–5.2)
RBC #/AREA URNS HPF: ABNORMAL /HPF (ref 0–4)
SODIUM SERPL-SCNC: 142 MMOL/L (ref 136–145)
SP GR UR STRIP.AUTO: 1.02 (ref 1–1.03)
TROPONIN, HIGH SENSITIVITY: 13 NG/L (ref 0–14)
TROPONIN, HIGH SENSITIVITY: 14 NG/L (ref 0–14)
UA COMPLETE W REFLEX CULTURE PNL UR: ABNORMAL
UA DIPSTICK W REFLEX MICRO PNL UR: YES
URN SPEC COLLECT METH UR: ABNORMAL
UROBILINOGEN UR STRIP-ACNC: 0.2 E.U./DL
WBC # BLD AUTO: 7.1 K/UL (ref 4–11)
WBC #/AREA URNS HPF: ABNORMAL /HPF (ref 0–5)

## 2023-09-02 PROCEDURE — 83605 ASSAY OF LACTIC ACID: CPT

## 2023-09-02 PROCEDURE — 36415 COLL VENOUS BLD VENIPUNCTURE: CPT

## 2023-09-02 PROCEDURE — 84484 ASSAY OF TROPONIN QUANT: CPT

## 2023-09-02 PROCEDURE — 83880 ASSAY OF NATRIURETIC PEPTIDE: CPT

## 2023-09-02 PROCEDURE — 93005 ELECTROCARDIOGRAM TRACING: CPT | Performed by: STUDENT IN AN ORGANIZED HEALTH CARE EDUCATION/TRAINING PROGRAM

## 2023-09-02 PROCEDURE — 93010 ELECTROCARDIOGRAM REPORT: CPT | Performed by: INTERNAL MEDICINE

## 2023-09-02 PROCEDURE — 85025 COMPLETE CBC W/AUTO DIFF WBC: CPT

## 2023-09-02 PROCEDURE — 99284 EMERGENCY DEPT VISIT MOD MDM: CPT

## 2023-09-02 PROCEDURE — 80053 COMPREHEN METABOLIC PANEL: CPT

## 2023-09-02 PROCEDURE — 81001 URINALYSIS AUTO W/SCOPE: CPT

## 2023-09-02 RX ORDER — METOPROLOL TARTRATE 5 MG/5ML
5 INJECTION INTRAVENOUS ONCE
Status: DISCONTINUED | OUTPATIENT
Start: 2023-09-02 | End: 2023-09-02

## 2023-09-02 ASSESSMENT — LIFESTYLE VARIABLES
HOW OFTEN DO YOU HAVE A DRINK CONTAINING ALCOHOL: NEVER
HOW MANY STANDARD DRINKS CONTAINING ALCOHOL DO YOU HAVE ON A TYPICAL DAY: PATIENT DOES NOT DRINK

## 2023-09-02 NOTE — DISCHARGE INSTRUCTIONS
Continue take your medications as prescribed. Follow-up with your primary care doctor next 2 days for reevaluation of your symptoms. Return if you develop any new or worsening symptoms as discussed.

## 2023-11-08 ENCOUNTER — OFFICE VISIT (OUTPATIENT)
Dept: CARDIOLOGY CLINIC | Age: 88
End: 2023-11-08

## 2023-11-08 VITALS
SYSTOLIC BLOOD PRESSURE: 122 MMHG | HEIGHT: 65 IN | BODY MASS INDEX: 36.19 KG/M2 | HEART RATE: 76 BPM | WEIGHT: 217.2 LBS | DIASTOLIC BLOOD PRESSURE: 77 MMHG | OXYGEN SATURATION: 97 %

## 2023-11-08 DIAGNOSIS — I10 BENIGN ESSENTIAL HTN: ICD-10-CM

## 2023-11-08 DIAGNOSIS — I73.9 PAD (PERIPHERAL ARTERY DISEASE) (HCC): ICD-10-CM

## 2023-11-08 DIAGNOSIS — E78.00 PURE HYPERCHOLESTEROLEMIA: ICD-10-CM

## 2023-11-08 DIAGNOSIS — I50.32 CHRONIC DIASTOLIC CONGESTIVE HEART FAILURE (HCC): ICD-10-CM

## 2023-11-08 DIAGNOSIS — I48.21 PERMANENT ATRIAL FIBRILLATION (HCC): Primary | ICD-10-CM

## 2023-11-08 DIAGNOSIS — I35.0 NONRHEUMATIC AORTIC VALVE STENOSIS: ICD-10-CM

## 2023-11-08 PROCEDURE — 99214 OFFICE O/P EST MOD 30 MIN: CPT | Performed by: INTERNAL MEDICINE

## 2023-11-08 PROCEDURE — 1123F ACP DISCUSS/DSCN MKR DOCD: CPT | Performed by: INTERNAL MEDICINE

## 2023-11-08 NOTE — PROGRESS NOTES
[facility] Office Note  11/8/2023     Subjective:  Ms. Aurelia Mcdonnell is here today for cardiology follow up of permanent Afib, High cholesterol, HBP moderate to severe aortic stenosis and aortic insufficiency and diastolic CHF. PCP Sreedhar Naidu manages INR  C/o ED in September for  not feeling well she is always in AFib  NT proBNP was high no new meds or adjustment was made    [place]:    ED 9/2/23 presented to ED for fatigue. She was in AFib RVR, got one dose of metoprolol and heart rate dropped to 80's    Today, 11/8/23 her son is present. She presents with a rolling walker. She reports she just didn't feel good in September 2nd 2023  when she went to the hospital.  She was not short of breath. She has scratches on her legs from her bad toenail. Patient denies current edema, chest pain, sob, palpitations, dizziness or syncope. Patient is taking all cardiac medications as prescribed and tolerates them well. Patient is not vaccinated against Covid and is not interested. Patient educated regarding the benefits and low risk of side effects. PMH  She lost her other son in November 2022 and lost 2 daughters prior to him. Afib, HLD, PAD, s/p thrombectomy and fasciotomy on 6/20/15, then underwent angiogram w/ angioplasty on 6/22/15, DM2. Acute renal failure recovered  She presented to the hospital 2/17/21 for elective cholecystectomy but was found to be afib rvr. She had echo during hospitalization  Which revealed EF 55-60%. Mild MR. Moderate AS and moderate AR.    She came back to hospital 3/24/21-3/25/21 with symptoms of CHF Acute on chronic dCHF  Admitted 4/17-4/19/21 Acute Enteritis  - CT interval worsening of the mid to distal jejunal surrounding mesenteric fat stranding - proximal to small bowel ostomy site  Hx of Colon Cancer s/p ileotransverse colectomy with ileal distal transverse anastomosis  - Admitted to Med Surg  -  Continued Cipro, flagyl IV, IVF   she had incarcerated ventral

## 2023-11-08 NOTE — PATIENT INSTRUCTIONS
PLAN:  Labs reviewed in epic and discussed with patient. Current medications reviewed. Refills given as warranted. Recommend getting your toenails cut  Your heart rate needs to be between  bpm  No cardiac testing at this time.     Follow up with me in 6 months

## 2023-11-10 ENCOUNTER — HOSPITAL ENCOUNTER (OUTPATIENT)
Dept: ULTRASOUND IMAGING | Age: 88
Discharge: HOME | End: 2023-11-10

## 2023-11-10 DIAGNOSIS — R17 ELEVATED BILIRUBIN: ICD-10-CM

## 2023-11-10 PROCEDURE — 76705 ECHO EXAM OF ABDOMEN: CPT

## 2024-04-23 NOTE — PROGRESS NOTES
[facility] Office Note  5/9/2024     Subjective:  Ms. Solomon is here today for cardiology follow up of permanent Afib, High cholesterol, HBP moderate to severe aortic stenosis and aortic insufficiency and diastolic CHF.  PCP Hussein Merino manages INR  C/o weak     [place]:    Today, 05/09/24 She presents with a rolling walker. Son is present. She reports she is doing okay. She reports walking distances takes a lot out of her. Today walking up to the office made her short of breath and weak. States she doesn't leave the house much. Patient denies current edema, chest pain, shortness of breath, palpitations, dizziness or syncope. Patient is taking all cardiac medications as prescribed and tolerates them well.     Patient is not vaccinated against Covid and is not interested. Patient educated regarding the benefits and low risk of side effects.    [facility]  She lost her other son in November 2022 and lost 2 daughters prior to him.    Afib, HLD, PAD, s/p thrombectomy and fasciotomy on 6/20/15, then underwent angiogram w/ angioplasty on 6/22/15, DM2. Acute renal failure recovered  She presented to the hospital 2/17/21 for elective cholecystectomy but was found to be afib rvr. She had echo during hospitalization  Which revealed EF 55-60%. Mild MR. Moderate AS and moderate AR.   She came back to hospital 3/24/21-3/25/21 with symptoms of CHF Acute on chronic dCHF  Admitted 4/17-4/19/21 Acute Enteritis  - CT interval worsening of the mid to distal jejunal surrounding mesenteric fat stranding - proximal to small bowel ostomy site  Hx of Colon Cancer s/p ileotransverse colectomy with ileal distal transverse anastomosis  - Admitted to Med Surg  -  Continued Cipro, flagyl IV, IVF   she had incarcerated ventral incisional hernia repair 12/21/21  OV 11/28/22 - doing well. Recently lost son to Cancer and lost 2 daughters in past 1.5 years.     Review of Systems:         12 point ROS negative in all areas as listed below

## 2024-04-23 NOTE — PROGRESS NOTES
Patient directly admitted from OR. Lungs clear. BS hypoactive. Hernia noted to abdomen. No IV site noted. , a-fib now. NPO per cardiology until seen. No acute distress noted. Call light in reach. Will continue to monitor. Normal for race

## 2024-05-09 ENCOUNTER — HOSPITAL ENCOUNTER (OUTPATIENT)
Age: 89
Discharge: HOME | End: 2024-05-09
Payer: MEDICARE

## 2024-05-09 ENCOUNTER — OFFICE VISIT (OUTPATIENT)
Dept: CARDIOLOGY CLINIC | Age: 89
End: 2024-05-09

## 2024-05-09 VITALS
OXYGEN SATURATION: 97 % | HEART RATE: 140 BPM | SYSTOLIC BLOOD PRESSURE: 112 MMHG | HEIGHT: 65 IN | DIASTOLIC BLOOD PRESSURE: 70 MMHG | WEIGHT: 216 LBS | BODY MASS INDEX: 35.99 KG/M2

## 2024-05-09 DIAGNOSIS — Z79.899 MEDICATION MANAGEMENT: ICD-10-CM

## 2024-05-09 DIAGNOSIS — I50.32 CHRONIC DIASTOLIC CONGESTIVE HEART FAILURE (HCC): ICD-10-CM

## 2024-05-09 DIAGNOSIS — I35.1 NONRHEUMATIC AORTIC VALVE INSUFFICIENCY: ICD-10-CM

## 2024-05-09 DIAGNOSIS — I48.21 PERMANENT ATRIAL FIBRILLATION (HCC): Primary | ICD-10-CM

## 2024-05-09 DIAGNOSIS — R06.02 SHORTNESS OF BREATH: ICD-10-CM

## 2024-05-09 DIAGNOSIS — E78.2 MIXED HYPERLIPIDEMIA: ICD-10-CM

## 2024-05-09 DIAGNOSIS — I73.9 PAD (PERIPHERAL ARTERY DISEASE) (HCC): ICD-10-CM

## 2024-05-09 DIAGNOSIS — I10 BENIGN ESSENTIAL HTN: ICD-10-CM

## 2024-05-09 DIAGNOSIS — I35.0 NONRHEUMATIC AORTIC VALVE STENOSIS: ICD-10-CM

## 2024-05-09 PROBLEM — R10.9 ABDOMINAL PAIN: Status: RESOLVED | Noted: 2021-04-17 | Resolved: 2024-05-09

## 2024-05-09 LAB
ALBUMIN SERPL-MCNC: 4 G/DL (ref 3.4–5)
ALBUMIN/GLOB SERPL: 1.4 {RATIO} (ref 1.1–2.2)
ALP SERPL-CCNC: 126 U/L (ref 40–129)
ALT SERPL-CCNC: 13 U/L (ref 10–40)
ANION GAP SERPL CALCULATED.3IONS-SCNC: 13 MMOL/L (ref 3–16)
AST SERPL-CCNC: 17 U/L (ref 15–37)
BASOPHILS # BLD: 0.1 K/UL (ref 0–0.2)
BASOPHILS NFR BLD: 0.6 %
BILIRUB SERPL-MCNC: 2.4 MG/DL (ref 0–1)
BUN SERPL-MCNC: 21 MG/DL (ref 7–20)
CALCIUM SERPL-MCNC: 9.6 MG/DL (ref 8.3–10.6)
CHLORIDE SERPL-SCNC: 109 MMOL/L (ref 99–110)
CHOLEST SERPL-MCNC: 108 MG/DL (ref 0–199)
CO2 SERPL-SCNC: 24 MMOL/L (ref 21–32)
CREAT SERPL-MCNC: 0.8 MG/DL (ref 0.6–1.2)
DEPRECATED RDW RBC AUTO: 14.8 % (ref 12.4–15.4)
EOSINOPHIL # BLD: 0.1 K/UL (ref 0–0.6)
EOSINOPHIL NFR BLD: 0.9 %
GFR SERPLBLD CREATININE-BSD FMLA CKD-EPI: 71 ML/MIN/{1.73_M2}
GLUCOSE SERPL-MCNC: 154 MG/DL (ref 70–99)
HCT VFR BLD AUTO: 40.2 % (ref 36–48)
HDLC SERPL-MCNC: 45 MG/DL (ref 40–60)
HGB BLD-MCNC: 13.5 G/DL (ref 12–16)
LDLC SERPL CALC-MCNC: 25 MG/DL
LYMPHOCYTES # BLD: 1.1 K/UL (ref 1–5.1)
LYMPHOCYTES NFR BLD: 10.2 %
MCH RBC QN AUTO: 30.7 PG (ref 26–34)
MCHC RBC AUTO-ENTMCNC: 33.7 G/DL (ref 31–36)
MCV RBC AUTO: 91.2 FL (ref 80–100)
MONOCYTES # BLD: 0.8 K/UL (ref 0–1.3)
MONOCYTES NFR BLD: 7.2 %
NEUTROPHILS # BLD: 8.5 K/UL (ref 1.7–7.7)
NEUTROPHILS NFR BLD: 81.1 %
PLATELET # BLD AUTO: 170 K/UL (ref 135–450)
PMV BLD AUTO: 9.9 FL (ref 5–10.5)
POTASSIUM SERPL-SCNC: 4.1 MMOL/L (ref 3.5–5.1)
PROT SERPL-MCNC: 6.8 G/DL (ref 6.4–8.2)
RBC # BLD AUTO: 4.41 M/UL (ref 4–5.2)
SODIUM SERPL-SCNC: 146 MMOL/L (ref 136–145)
TRIGL SERPL-MCNC: 189 MG/DL (ref 0–150)
TSH SERPL DL<=0.005 MIU/L-ACNC: 2.35 UIU/ML (ref 0.27–4.2)
VLDLC SERPL CALC-MCNC: 38 MG/DL
WBC # BLD AUTO: 10.5 K/UL (ref 4–11)

## 2024-05-09 PROCEDURE — 80061 LIPID PANEL: CPT

## 2024-05-09 PROCEDURE — 85025 COMPLETE CBC W/AUTO DIFF WBC: CPT

## 2024-05-09 PROCEDURE — 99214 OFFICE O/P EST MOD 30 MIN: CPT | Performed by: INTERNAL MEDICINE

## 2024-05-09 PROCEDURE — 93000 ELECTROCARDIOGRAM COMPLETE: CPT | Performed by: INTERNAL MEDICINE

## 2024-05-09 PROCEDURE — 1123F ACP DISCUSS/DSCN MKR DOCD: CPT | Performed by: INTERNAL MEDICINE

## 2024-05-09 PROCEDURE — 80053 COMPREHEN METABOLIC PANEL: CPT

## 2024-05-09 PROCEDURE — 84443 ASSAY THYROID STIM HORMONE: CPT

## 2024-05-09 PROCEDURE — 36415 COLL VENOUS BLD VENIPUNCTURE: CPT

## 2024-05-09 RX ORDER — POTASSIUM CHLORIDE 20 MEQ/1
20 TABLET, EXTENDED RELEASE ORAL DAILY
Qty: 90 TABLET | Refills: 3 | Status: SHIPPED | OUTPATIENT
Start: 2024-05-09

## 2024-05-09 RX ORDER — ATORVASTATIN CALCIUM 10 MG/1
10 TABLET, FILM COATED ORAL DAILY
Qty: 90 TABLET | Refills: 3 | Status: SHIPPED | OUTPATIENT
Start: 2024-05-09

## 2024-05-09 RX ORDER — METOPROLOL TARTRATE 50 MG/1
50 TABLET, FILM COATED ORAL 2 TIMES DAILY
Qty: 180 TABLET | Refills: 3 | Status: SHIPPED | OUTPATIENT
Start: 2024-05-09

## 2024-05-09 RX ORDER — FUROSEMIDE 40 MG/1
40 TABLET ORAL DAILY
Qty: 90 TABLET | Refills: 3 | Status: SHIPPED | OUTPATIENT
Start: 2024-05-09

## 2024-05-09 NOTE — PATIENT INSTRUCTIONS
Labs reviewed in epic and discussed with patient.  Current medications reviewed.  Refills given as warranted.  Recommend monitoring your blood pressure and heart rate daily.   Obtain echo  Please obtain the following labs;-LIPID FASTING, CBC, CMP, TSH    Your provider has ordered testing for further evaluation.  An order/prescription has been included in your paper work.   To schedule outpatient testing, contact Central Scheduling by calling [facility] ([phone]).

## 2024-05-10 ENCOUNTER — TELEPHONE (OUTPATIENT)
Dept: CARDIOLOGY CLINIC | Age: 89
End: 2024-05-10

## 2024-05-10 NOTE — TELEPHONE ENCOUNTER
----- Message from Mickey Lewis MD sent at 5/10/2024  3:48 PM EDT -----  Blood test is ok.  ----- Message -----  From: [facility] Incoming Lab Results From Soft (Epic Adt)  Sent: 5/9/2024   3:46 PM EDT  To: Mickey Lewis MD

## 2025-02-03 ENCOUNTER — HOSPITAL ENCOUNTER (INPATIENT)
Age: 89
LOS: 9 days | Discharge: NURSING, REHAB OR LONG-TERM CARE | DRG: 291 | End: 2025-02-12
Attending: EMERGENCY MEDICINE | Admitting: INTERNAL MEDICINE
Payer: MEDICARE

## 2025-02-03 ENCOUNTER — APPOINTMENT (OUTPATIENT)
Dept: GENERAL RADIOLOGY | Age: 89
DRG: 291 | End: 2025-02-03
Payer: MEDICARE

## 2025-02-03 DIAGNOSIS — I48.91 ATRIAL FIBRILLATION, UNSPECIFIED TYPE (HCC): ICD-10-CM

## 2025-02-03 DIAGNOSIS — Z79.01 ANTICOAGULATED ON WARFARIN: ICD-10-CM

## 2025-02-03 DIAGNOSIS — E87.20 LACTIC ACIDOSIS: ICD-10-CM

## 2025-02-03 DIAGNOSIS — J96.01 ACUTE RESPIRATORY FAILURE WITH HYPOXIA: ICD-10-CM

## 2025-02-03 DIAGNOSIS — E83.42 HYPOMAGNESEMIA: ICD-10-CM

## 2025-02-03 DIAGNOSIS — J18.9 PNEUMONIA OF RIGHT LOWER LOBE DUE TO INFECTIOUS ORGANISM: Primary | ICD-10-CM

## 2025-02-03 DIAGNOSIS — I48.91 ATRIAL FIBRILLATION WITH RAPID VENTRICULAR RESPONSE (HCC): ICD-10-CM

## 2025-02-03 LAB
ALBUMIN SERPL-MCNC: 3.9 G/DL (ref 3.4–5)
ALBUMIN/GLOB SERPL: 1.5 {RATIO} (ref 1.1–2.2)
ALP SERPL-CCNC: 127 U/L (ref 40–129)
ALT SERPL-CCNC: 12 U/L (ref 10–40)
AMORPH SED URNS QL MICRO: ABNORMAL /HPF
ANION GAP SERPL CALCULATED.3IONS-SCNC: 14 MMOL/L (ref 3–16)
AST SERPL-CCNC: 18 U/L (ref 15–37)
BACTERIA URNS QL MICRO: ABNORMAL /HPF
BASE EXCESS BLDV CALC-SCNC: -4.9 MMOL/L (ref -3–3)
BASOPHILS # BLD: 0 K/UL (ref 0–0.2)
BASOPHILS NFR BLD: 0.2 %
BILIRUB SERPL-MCNC: 2.6 MG/DL (ref 0–1)
BILIRUB UR QL STRIP.AUTO: NEGATIVE
BUN SERPL-MCNC: 15 MG/DL (ref 7–20)
CALCIUM SERPL-MCNC: 9 MG/DL (ref 8.3–10.6)
CHLORIDE SERPL-SCNC: 106 MMOL/L (ref 99–110)
CLARITY UR: CLEAR
CO2 BLDV-SCNC: 20 MMOL/L
CO2 SERPL-SCNC: 22 MMOL/L (ref 21–32)
COHGB MFR BLDV: 2.2 % (ref 0–1.5)
COLOR UR: YELLOW
CREAT SERPL-MCNC: 0.8 MG/DL (ref 0.6–1.2)
DEPRECATED RDW RBC AUTO: 15.8 % (ref 12.4–15.4)
EOSINOPHIL # BLD: 0.1 K/UL (ref 0–0.6)
EOSINOPHIL NFR BLD: 0.5 %
EPI CELLS #/AREA URNS HPF: ABNORMAL /HPF (ref 0–5)
FLUAV RNA UPPER RESP QL NAA+PROBE: NEGATIVE
FLUBV AG NPH QL: NEGATIVE
GFR SERPLBLD CREATININE-BSD FMLA CKD-EPI: 71 ML/MIN/{1.73_M2}
GLUCOSE BLD-MCNC: 235 MG/DL (ref 70–99)
GLUCOSE SERPL-MCNC: 216 MG/DL (ref 70–99)
GLUCOSE UR STRIP.AUTO-MCNC: 100 MG/DL
HCO3 BLDV-SCNC: 19.2 MMOL/L (ref 23–29)
HCT VFR BLD AUTO: 38.3 % (ref 36–48)
HGB BLD-MCNC: 12.4 G/DL (ref 12–16)
HGB UR QL STRIP.AUTO: ABNORMAL
INR PPP: 2.61 (ref 0.85–1.15)
KETONES UR STRIP.AUTO-MCNC: ABNORMAL MG/DL
LACTATE BLDV-SCNC: 1.9 MMOL/L (ref 0.4–2)
LACTATE BLDV-SCNC: 2.6 MMOL/L (ref 0.4–2)
LEUKOCYTE ESTERASE UR QL STRIP.AUTO: ABNORMAL
LYMPHOCYTES # BLD: 0.2 K/UL (ref 1–5.1)
LYMPHOCYTES NFR BLD: 2.1 %
MAGNESIUM SERPL-MCNC: 1.6 MG/DL (ref 1.8–2.4)
MCH RBC QN AUTO: 29.7 PG (ref 26–34)
MCHC RBC AUTO-ENTMCNC: 32.5 G/DL (ref 31–36)
MCV RBC AUTO: 91.5 FL (ref 80–100)
METHGB MFR BLDV: 0.3 %
MONOCYTES # BLD: 0.6 K/UL (ref 0–1.3)
MONOCYTES NFR BLD: 6 %
NEUTROPHILS # BLD: 9.7 K/UL (ref 1.7–7.7)
NEUTROPHILS NFR BLD: 91.2 %
NITRITE UR QL STRIP.AUTO: NEGATIVE
NT-PROBNP SERPL-MCNC: 3138 PG/ML (ref 0–449)
O2 CT VFR BLDV CALC: 14 VOL %
O2 THERAPY: ABNORMAL
PCO2 BLDV: 32.9 MMHG (ref 40–50)
PERFORMED ON: ABNORMAL
PH BLDV: 7.38 [PH] (ref 7.35–7.45)
PH UR STRIP.AUTO: 5.5 [PH] (ref 5–8)
PLATELET # BLD AUTO: 172 K/UL (ref 135–450)
PMV BLD AUTO: 9.3 FL (ref 5–10.5)
PO2 BLDV: 41.7 MMHG (ref 25–40)
POTASSIUM SERPL-SCNC: 3.9 MMOL/L (ref 3.5–5.1)
PROT SERPL-MCNC: 6.5 G/DL (ref 6.4–8.2)
PROT UR STRIP.AUTO-MCNC: NEGATIVE MG/DL
PROTHROMBIN TIME: 27.8 SEC (ref 11.9–14.9)
RBC # BLD AUTO: 4.19 M/UL (ref 4–5.2)
RBC #/AREA URNS HPF: ABNORMAL /HPF (ref 0–4)
SAO2 % BLDV: 77 %
SARS-COV-2 RDRP RESP QL NAA+PROBE: NOT DETECTED
SODIUM SERPL-SCNC: 142 MMOL/L (ref 136–145)
SP GR UR STRIP.AUTO: 1.02 (ref 1–1.03)
TROPONIN, HIGH SENSITIVITY: 19 NG/L (ref 0–14)
TROPONIN, HIGH SENSITIVITY: 20 NG/L (ref 0–14)
UA COMPLETE W REFLEX CULTURE PNL UR: ABNORMAL
UA DIPSTICK W REFLEX MICRO PNL UR: YES
URN SPEC COLLECT METH UR: ABNORMAL
UROBILINOGEN UR STRIP-ACNC: 0.2 E.U./DL
WBC # BLD AUTO: 10.7 K/UL (ref 4–11)
WBC #/AREA URNS HPF: ABNORMAL /HPF (ref 0–5)

## 2025-02-03 PROCEDURE — 2060000000 HC ICU INTERMEDIATE R&B

## 2025-02-03 PROCEDURE — 83880 ASSAY OF NATRIURETIC PEPTIDE: CPT

## 2025-02-03 PROCEDURE — 84484 ASSAY OF TROPONIN QUANT: CPT

## 2025-02-03 PROCEDURE — 80053 COMPREHEN METABOLIC PANEL: CPT

## 2025-02-03 PROCEDURE — 6370000000 HC RX 637 (ALT 250 FOR IP): Performed by: STUDENT IN AN ORGANIZED HEALTH CARE EDUCATION/TRAINING PROGRAM

## 2025-02-03 PROCEDURE — 85610 PROTHROMBIN TIME: CPT

## 2025-02-03 PROCEDURE — 82803 BLOOD GASES ANY COMBINATION: CPT

## 2025-02-03 PROCEDURE — 83605 ASSAY OF LACTIC ACID: CPT

## 2025-02-03 PROCEDURE — 2700000000 HC OXYGEN THERAPY PER DAY

## 2025-02-03 PROCEDURE — 81001 URINALYSIS AUTO W/SCOPE: CPT

## 2025-02-03 PROCEDURE — 87449 NOS EACH ORGANISM AG IA: CPT

## 2025-02-03 PROCEDURE — 83735 ASSAY OF MAGNESIUM: CPT

## 2025-02-03 PROCEDURE — 93005 ELECTROCARDIOGRAM TRACING: CPT | Performed by: EMERGENCY MEDICINE

## 2025-02-03 PROCEDURE — 6370000000 HC RX 637 (ALT 250 FOR IP): Performed by: NURSE PRACTITIONER

## 2025-02-03 PROCEDURE — 87804 INFLUENZA ASSAY W/OPTIC: CPT

## 2025-02-03 PROCEDURE — 94761 N-INVAS EAR/PLS OXIMETRY MLT: CPT

## 2025-02-03 PROCEDURE — 36415 COLL VENOUS BLD VENIPUNCTURE: CPT

## 2025-02-03 PROCEDURE — 6370000000 HC RX 637 (ALT 250 FOR IP): Performed by: INTERNAL MEDICINE

## 2025-02-03 PROCEDURE — 2500000003 HC RX 250 WO HCPCS: Performed by: NURSE PRACTITIONER

## 2025-02-03 PROCEDURE — 85025 COMPLETE CBC W/AUTO DIFF WBC: CPT

## 2025-02-03 PROCEDURE — 87635 SARS-COV-2 COVID-19 AMP PRB: CPT

## 2025-02-03 PROCEDURE — 6360000002 HC RX W HCPCS: Performed by: EMERGENCY MEDICINE

## 2025-02-03 PROCEDURE — 71045 X-RAY EXAM CHEST 1 VIEW: CPT

## 2025-02-03 PROCEDURE — 87040 BLOOD CULTURE FOR BACTERIA: CPT

## 2025-02-03 PROCEDURE — 2580000003 HC RX 258: Performed by: EMERGENCY MEDICINE

## 2025-02-03 PROCEDURE — 99285 EMERGENCY DEPT VISIT HI MDM: CPT

## 2025-02-03 RX ORDER — INSULIN GLARGINE 100 [IU]/ML
5 INJECTION, SOLUTION SUBCUTANEOUS NIGHTLY
Status: DISCONTINUED | OUTPATIENT
Start: 2025-02-03 | End: 2025-02-08

## 2025-02-03 RX ORDER — MAGNESIUM SULFATE 1 G/100ML
1000 INJECTION INTRAVENOUS ONCE
Status: COMPLETED | OUTPATIENT
Start: 2025-02-03 | End: 2025-02-03

## 2025-02-03 RX ORDER — AMLODIPINE BESYLATE 5 MG/1
5 TABLET ORAL DAILY
Status: ON HOLD | COMMUNITY
End: 2025-02-12 | Stop reason: HOSPADM

## 2025-02-03 RX ORDER — ATORVASTATIN CALCIUM 10 MG/1
10 TABLET, FILM COATED ORAL DAILY
Status: DISCONTINUED | OUTPATIENT
Start: 2025-02-04 | End: 2025-02-12 | Stop reason: HOSPADM

## 2025-02-03 RX ORDER — METOPROLOL TARTRATE 50 MG
50 TABLET ORAL 2 TIMES DAILY
Status: DISCONTINUED | OUTPATIENT
Start: 2025-02-03 | End: 2025-02-04

## 2025-02-03 RX ORDER — DEXTROSE MONOHYDRATE 100 MG/ML
INJECTION, SOLUTION INTRAVENOUS CONTINUOUS PRN
Status: DISCONTINUED | OUTPATIENT
Start: 2025-02-03 | End: 2025-02-03

## 2025-02-03 RX ORDER — GLUCAGON 1 MG/ML
1 KIT INJECTION PRN
Status: DISCONTINUED | OUTPATIENT
Start: 2025-02-03 | End: 2025-02-12 | Stop reason: HOSPADM

## 2025-02-03 RX ORDER — POTASSIUM CHLORIDE 1500 MG/1
20 TABLET, EXTENDED RELEASE ORAL DAILY
Status: DISCONTINUED | OUTPATIENT
Start: 2025-02-04 | End: 2025-02-11

## 2025-02-03 RX ORDER — GLUCAGON 1 MG/ML
1 KIT INJECTION PRN
Status: DISCONTINUED | OUTPATIENT
Start: 2025-02-03 | End: 2025-02-03

## 2025-02-03 RX ORDER — WARFARIN SODIUM 7.5 MG/1
7.5 TABLET ORAL
Status: COMPLETED | OUTPATIENT
Start: 2025-02-03 | End: 2025-02-03

## 2025-02-03 RX ORDER — IPRATROPIUM BROMIDE AND ALBUTEROL SULFATE 2.5; .5 MG/3ML; MG/3ML
1 SOLUTION RESPIRATORY (INHALATION)
Status: DISCONTINUED | OUTPATIENT
Start: 2025-02-04 | End: 2025-02-03

## 2025-02-03 RX ORDER — 0.9 % SODIUM CHLORIDE 0.9 %
500 INTRAVENOUS SOLUTION INTRAVENOUS ONCE
Status: COMPLETED | OUTPATIENT
Start: 2025-02-03 | End: 2025-02-03

## 2025-02-03 RX ORDER — AMLODIPINE BESYLATE 5 MG/1
5 TABLET ORAL DAILY
Status: DISCONTINUED | OUTPATIENT
Start: 2025-02-04 | End: 2025-02-11

## 2025-02-03 RX ORDER — GUAIFENESIN 600 MG/1
600 TABLET, EXTENDED RELEASE ORAL 2 TIMES DAILY
Status: DISCONTINUED | OUTPATIENT
Start: 2025-02-03 | End: 2025-02-12 | Stop reason: HOSPADM

## 2025-02-03 RX ORDER — INSULIN LISPRO 100 [IU]/ML
0-4 INJECTION, SOLUTION INTRAVENOUS; SUBCUTANEOUS
Status: DISCONTINUED | OUTPATIENT
Start: 2025-02-03 | End: 2025-02-09

## 2025-02-03 RX ORDER — ONDANSETRON 2 MG/ML
4 INJECTION INTRAMUSCULAR; INTRAVENOUS ONCE
Status: COMPLETED | OUTPATIENT
Start: 2025-02-03 | End: 2025-02-03

## 2025-02-03 RX ORDER — ONDANSETRON 4 MG/1
4 TABLET, ORALLY DISINTEGRATING ORAL EVERY 8 HOURS PRN
Status: DISCONTINUED | OUTPATIENT
Start: 2025-02-03 | End: 2025-02-12 | Stop reason: HOSPADM

## 2025-02-03 RX ORDER — POLYETHYLENE GLYCOL 3350 17 G/17G
17 POWDER, FOR SOLUTION ORAL DAILY PRN
Status: DISCONTINUED | OUTPATIENT
Start: 2025-02-03 | End: 2025-02-12 | Stop reason: HOSPADM

## 2025-02-03 RX ORDER — WARFARIN SODIUM 5 MG/1
5 TABLET ORAL
COMMUNITY

## 2025-02-03 RX ORDER — INSULIN LISPRO 100 [IU]/ML
0-4 INJECTION, SOLUTION INTRAVENOUS; SUBCUTANEOUS
Status: DISCONTINUED | OUTPATIENT
Start: 2025-02-03 | End: 2025-02-04 | Stop reason: SDUPTHER

## 2025-02-03 RX ORDER — SODIUM CHLORIDE 0.9 % (FLUSH) 0.9 %
5-40 SYRINGE (ML) INJECTION PRN
Status: DISCONTINUED | OUTPATIENT
Start: 2025-02-03 | End: 2025-02-12 | Stop reason: HOSPADM

## 2025-02-03 RX ORDER — FUROSEMIDE 40 MG/1
40 TABLET ORAL DAILY
Status: DISCONTINUED | OUTPATIENT
Start: 2025-02-04 | End: 2025-02-07

## 2025-02-03 RX ORDER — SODIUM CHLORIDE 0.9 % (FLUSH) 0.9 %
5-40 SYRINGE (ML) INJECTION EVERY 12 HOURS SCHEDULED
Status: DISCONTINUED | OUTPATIENT
Start: 2025-02-03 | End: 2025-02-12 | Stop reason: HOSPADM

## 2025-02-03 RX ORDER — ACETAMINOPHEN 650 MG/1
650 SUPPOSITORY RECTAL EVERY 6 HOURS PRN
Status: DISCONTINUED | OUTPATIENT
Start: 2025-02-03 | End: 2025-02-12 | Stop reason: HOSPADM

## 2025-02-03 RX ORDER — SODIUM CHLORIDE 9 MG/ML
INJECTION, SOLUTION INTRAVENOUS PRN
Status: DISCONTINUED | OUTPATIENT
Start: 2025-02-03 | End: 2025-02-12 | Stop reason: HOSPADM

## 2025-02-03 RX ORDER — ACETAMINOPHEN 325 MG/1
650 TABLET ORAL EVERY 6 HOURS PRN
Status: DISCONTINUED | OUTPATIENT
Start: 2025-02-03 | End: 2025-02-12 | Stop reason: HOSPADM

## 2025-02-03 RX ORDER — ONDANSETRON 2 MG/ML
4 INJECTION INTRAMUSCULAR; INTRAVENOUS EVERY 6 HOURS PRN
Status: DISCONTINUED | OUTPATIENT
Start: 2025-02-03 | End: 2025-02-12 | Stop reason: HOSPADM

## 2025-02-03 RX ORDER — DEXTROSE MONOHYDRATE 100 MG/ML
INJECTION, SOLUTION INTRAVENOUS CONTINUOUS PRN
Status: DISCONTINUED | OUTPATIENT
Start: 2025-02-03 | End: 2025-02-12 | Stop reason: HOSPADM

## 2025-02-03 RX ORDER — IPRATROPIUM BROMIDE AND ALBUTEROL SULFATE 2.5; .5 MG/3ML; MG/3ML
1 SOLUTION RESPIRATORY (INHALATION) EVERY 4 HOURS PRN
Status: DISCONTINUED | OUTPATIENT
Start: 2025-02-03 | End: 2025-02-12 | Stop reason: HOSPADM

## 2025-02-03 RX ORDER — ENOXAPARIN SODIUM 100 MG/ML
40 INJECTION SUBCUTANEOUS DAILY
Status: DISCONTINUED | OUTPATIENT
Start: 2025-02-03 | End: 2025-02-03

## 2025-02-03 RX ADMIN — ONDANSETRON 4 MG: 2 INJECTION INTRAMUSCULAR; INTRAVENOUS at 16:51

## 2025-02-03 RX ADMIN — MAGNESIUM SULFATE IN DEXTROSE 1000 MG: 10 INJECTION, SOLUTION INTRAVENOUS at 19:05

## 2025-02-03 RX ADMIN — METOPROLOL TARTRATE 50 MG: 50 TABLET, FILM COATED ORAL at 22:11

## 2025-02-03 RX ADMIN — SODIUM CHLORIDE, PRESERVATIVE FREE 10 ML: 5 INJECTION INTRAVENOUS at 22:12

## 2025-02-03 RX ADMIN — SODIUM CHLORIDE 500 ML: 9 INJECTION, SOLUTION INTRAVENOUS at 15:13

## 2025-02-03 RX ADMIN — INSULIN GLARGINE 5 UNITS: 100 INJECTION, SOLUTION SUBCUTANEOUS at 22:11

## 2025-02-03 RX ADMIN — INSULIN LISPRO 1 UNITS: 100 INJECTION, SOLUTION INTRAVENOUS; SUBCUTANEOUS at 22:11

## 2025-02-03 RX ADMIN — SODIUM CHLORIDE 1000 MG: 900 INJECTION INTRAVENOUS at 16:52

## 2025-02-03 RX ADMIN — WARFARIN SODIUM 7.5 MG: 7.5 TABLET ORAL at 23:18

## 2025-02-03 RX ADMIN — AZITHROMYCIN MONOHYDRATE 500 MG: 500 INJECTION, POWDER, LYOPHILIZED, FOR SOLUTION INTRAVENOUS at 17:33

## 2025-02-03 RX ADMIN — GUAIFENESIN 600 MG: 600 TABLET, EXTENDED RELEASE ORAL at 22:11

## 2025-02-03 ASSESSMENT — PAIN SCALES - GENERAL: PAINLEVEL_OUTOF10: 6

## 2025-02-03 ASSESSMENT — PAIN DESCRIPTION - DESCRIPTORS: DESCRIPTORS: ACHING

## 2025-02-03 NOTE — ED NOTES
Changed patient's attends provided elda care and hooked up a purewick at this time.  Alda Dent RN

## 2025-02-03 NOTE — ED TRIAGE NOTES
Patient presented via squad for general fatigue and cough that started last night.  She states she didn't have an appetite this morning and just feels terrible.  Son states she was up all night coughing.  Patient has history of A-fib and is currently in a-fib at this time.  Bedside monitor in place.  Patient on room air.  Alda Dent RN

## 2025-02-03 NOTE — ED PROVIDER NOTES
2/3/25 0118)   potassium chloride (KLOR-CON M) extended release tablet 20 mEq (20 mEq Oral Given 2/4/25 0151)   furosemide (LASIX) injection 40 mg (40 mg IntraVENous Given 2/4/25 0151)   warfarin (COUMADIN) tablet 7.5 mg (7.5 mg Oral Given 2/4/25 1816)   magnesium sulfate 1000 mg in dextrose 5% 100 mL IVPB (0 mg IntraVENous Stopped 2/4/25 1042)   oseltamivir 6mg/ml (TAMIFLU) suspension 75 mg (75 mg Oral Given 2/6/25 1058)     Patient was evaluated due to feeling bad since yesterday and now having cough with decreased appetite and nausea.  I am considering COVID, influenza, bacterial pneumonia, acute coronary syndrome, electrolyte abnormality, amongst other pathology.  She was in atrial fibrillation with rapid ventricular response on arrival but does report taking her Coumadin as prescribed.  I did order IV fluids since she did appear dehydrated.  X-ray was concerning for pneumonia and therefore I did start her on IV Rocephin along with IV azithromycin.  Upon repeat assessment she reported some improvement but still is not feeling well.  She would benefit from further evaluation in the hospital and therefore I did page the hospitalist for admission.  She was stable for admission, and benefit of admission outweighs the risk.  She agreed with this plan.        I was the primary provider for the patient.      The patient tolerated their visit well.   The patient and / or the family were informed of the results of any tests, a time was given to answer questions.    FINAL IMPRESSION      1. Pneumonia of right lower lobe due to infectious organism    2. Atrial fibrillation with rapid ventricular response (HCC)    3. Lactic acidosis    4. Hypomagnesemia    5. Anticoagulated on warfarin    6. Acute respiratory failure with hypoxia    7. Atrial fibrillation, unspecified type (HCC)          DISPOSITION/PLAN   DISPOSITION Admitted 02/03/2025 04:52:47 PM               PATIENT REFERRED TO:  No follow-up provider

## 2025-02-04 ENCOUNTER — APPOINTMENT (OUTPATIENT)
Age: 89
DRG: 291 | End: 2025-02-04
Attending: STUDENT IN AN ORGANIZED HEALTH CARE EDUCATION/TRAINING PROGRAM
Payer: MEDICARE

## 2025-02-04 PROBLEM — R09.02 HYPOXIA: Status: ACTIVE | Noted: 2025-02-04

## 2025-02-04 PROBLEM — I35.0 NONRHEUMATIC AORTIC VALVE STENOSIS: Status: RESOLVED | Noted: 2021-02-24 | Resolved: 2025-02-04

## 2025-02-04 PROBLEM — I35.1 NONRHEUMATIC AORTIC VALVE INSUFFICIENCY: Status: RESOLVED | Noted: 2021-02-24 | Resolved: 2025-02-04

## 2025-02-04 PROBLEM — Z90.49 HISTORY OF COLECTOMY: Status: ACTIVE | Noted: 2025-02-04

## 2025-02-04 PROBLEM — J18.9 PNEUMONIA: Status: ACTIVE | Noted: 2025-02-04

## 2025-02-04 PROBLEM — J96.01 ACUTE RESPIRATORY FAILURE WITH HYPOXIA: Status: ACTIVE | Noted: 2025-02-04

## 2025-02-04 PROBLEM — K43.6 INCARCERATED VENTRAL HERNIA: Status: RESOLVED | Noted: 2021-12-09 | Resolved: 2025-02-04

## 2025-02-04 PROBLEM — K43.0 INCARCERATED INCISIONAL HERNIA: Status: RESOLVED | Noted: 2021-12-09 | Resolved: 2025-02-04

## 2025-02-04 PROBLEM — Z98.890 HISTORY OF ABDOMINAL SURGERY: Status: ACTIVE | Noted: 2025-02-04

## 2025-02-04 PROBLEM — Z86.19 HISTORY OF CLOSTRIDIOIDES DIFFICILE COLITIS: Status: ACTIVE | Noted: 2025-02-04

## 2025-02-04 PROBLEM — E66.9 OBESE: Status: ACTIVE | Noted: 2025-02-04

## 2025-02-04 PROBLEM — I48.21 PERMANENT ATRIAL FIBRILLATION (HCC): Status: RESOLVED | Noted: 2021-02-17 | Resolved: 2025-02-04

## 2025-02-04 LAB
ANION GAP SERPL CALCULATED.3IONS-SCNC: 9 MMOL/L (ref 3–16)
BASOPHILS # BLD: 0.1 K/UL (ref 0–0.2)
BASOPHILS NFR BLD: 0.6 %
BUN SERPL-MCNC: 15 MG/DL (ref 7–20)
CALCIUM SERPL-MCNC: 8 MG/DL (ref 8.3–10.6)
CHLORIDE SERPL-SCNC: 110 MMOL/L (ref 99–110)
CO2 SERPL-SCNC: 23 MMOL/L (ref 21–32)
CREAT SERPL-MCNC: 0.8 MG/DL (ref 0.6–1.2)
DEPRECATED RDW RBC AUTO: 15.4 % (ref 12.4–15.4)
EKG DIAGNOSIS: NORMAL
EKG Q-T INTERVAL: 320 MS
EKG QRS DURATION: 84 MS
EKG QTC CALCULATION (BAZETT): 428 MS
EKG R AXIS: -32 DEGREES
EKG T AXIS: 69 DEGREES
EKG VENTRICULAR RATE: 108 BPM
EOSINOPHIL # BLD: 0 K/UL (ref 0–0.6)
EOSINOPHIL NFR BLD: 0.1 %
EST. AVERAGE GLUCOSE BLD GHB EST-MCNC: 142.7 MG/DL
FERRITIN SERPL IA-MCNC: 282 NG/ML (ref 15–150)
GFR SERPLBLD CREATININE-BSD FMLA CKD-EPI: 71 ML/MIN/{1.73_M2}
GLUCOSE BLD-MCNC: 136 MG/DL (ref 70–99)
GLUCOSE BLD-MCNC: 177 MG/DL (ref 70–99)
GLUCOSE BLD-MCNC: 184 MG/DL (ref 70–99)
GLUCOSE SERPL-MCNC: 176 MG/DL (ref 70–99)
HBA1C MFR BLD: 6.6 %
HCT VFR BLD AUTO: 35 % (ref 36–48)
HGB BLD-MCNC: 11.5 G/DL (ref 12–16)
INR PPP: 2.48 (ref 0.85–1.15)
IRON SATN MFR SERPL: 9 % (ref 15–50)
IRON SERPL-MCNC: 19 UG/DL (ref 37–145)
LEGIONELLA AG UR QL: NORMAL
LYMPHOCYTES # BLD: 0.2 K/UL (ref 1–5.1)
LYMPHOCYTES NFR BLD: 2.5 %
MAGNESIUM SERPL-MCNC: 1.95 MG/DL (ref 1.8–2.4)
MCH RBC QN AUTO: 29.9 PG (ref 26–34)
MCHC RBC AUTO-ENTMCNC: 33 G/DL (ref 31–36)
MCV RBC AUTO: 90.6 FL (ref 80–100)
MONOCYTES # BLD: 0.7 K/UL (ref 0–1.3)
MONOCYTES NFR BLD: 8.5 %
NEUTROPHILS # BLD: 7.1 K/UL (ref 1.7–7.7)
NEUTROPHILS NFR BLD: 88.3 %
PERFORMED ON: ABNORMAL
PLATELET # BLD AUTO: 145 K/UL (ref 135–450)
PMV BLD AUTO: 9.4 FL (ref 5–10.5)
POTASSIUM SERPL-SCNC: 3.2 MMOL/L (ref 3.5–5.1)
PROCALCITONIN SERPL IA-MCNC: 0.07 NG/ML (ref 0–0.15)
PROTHROMBIN TIME: 26.8 SEC (ref 11.9–14.9)
RBC # BLD AUTO: 3.86 M/UL (ref 4–5.2)
S PNEUM AG UR QL: NORMAL
SODIUM SERPL-SCNC: 142 MMOL/L (ref 136–145)
TIBC SERPL-MCNC: 217 UG/DL (ref 260–445)
TSH SERPL DL<=0.005 MIU/L-ACNC: 1.36 UIU/ML (ref 0.27–4.2)
WBC # BLD AUTO: 8.1 K/UL (ref 4–11)

## 2025-02-04 PROCEDURE — 83540 ASSAY OF IRON: CPT

## 2025-02-04 PROCEDURE — 85610 PROTHROMBIN TIME: CPT

## 2025-02-04 PROCEDURE — 97162 PT EVAL MOD COMPLEX 30 MIN: CPT

## 2025-02-04 PROCEDURE — 85025 COMPLETE CBC W/AUTO DIFF WBC: CPT

## 2025-02-04 PROCEDURE — 36415 COLL VENOUS BLD VENIPUNCTURE: CPT

## 2025-02-04 PROCEDURE — 97166 OT EVAL MOD COMPLEX 45 MIN: CPT

## 2025-02-04 PROCEDURE — 80048 BASIC METABOLIC PNL TOTAL CA: CPT

## 2025-02-04 PROCEDURE — 6360000002 HC RX W HCPCS: Performed by: STUDENT IN AN ORGANIZED HEALTH CARE EDUCATION/TRAINING PROGRAM

## 2025-02-04 PROCEDURE — 99222 1ST HOSP IP/OBS MODERATE 55: CPT | Performed by: STUDENT IN AN ORGANIZED HEALTH CARE EDUCATION/TRAINING PROGRAM

## 2025-02-04 PROCEDURE — 84443 ASSAY THYROID STIM HORMONE: CPT

## 2025-02-04 PROCEDURE — 94761 N-INVAS EAR/PLS OXIMETRY MLT: CPT

## 2025-02-04 PROCEDURE — 6370000000 HC RX 637 (ALT 250 FOR IP): Performed by: STUDENT IN AN ORGANIZED HEALTH CARE EDUCATION/TRAINING PROGRAM

## 2025-02-04 PROCEDURE — 2580000003 HC RX 258: Performed by: NURSE PRACTITIONER

## 2025-02-04 PROCEDURE — 97535 SELF CARE MNGMENT TRAINING: CPT

## 2025-02-04 PROCEDURE — 6360000002 HC RX W HCPCS: Performed by: INTERNAL MEDICINE

## 2025-02-04 PROCEDURE — 6360000002 HC RX W HCPCS: Performed by: NURSE PRACTITIONER

## 2025-02-04 PROCEDURE — 2500000003 HC RX 250 WO HCPCS: Performed by: NURSE PRACTITIONER

## 2025-02-04 PROCEDURE — 83036 HEMOGLOBIN GLYCOSYLATED A1C: CPT

## 2025-02-04 PROCEDURE — 97530 THERAPEUTIC ACTIVITIES: CPT

## 2025-02-04 PROCEDURE — 84145 PROCALCITONIN (PCT): CPT

## 2025-02-04 PROCEDURE — 99233 SBSQ HOSP IP/OBS HIGH 50: CPT | Performed by: INTERNAL MEDICINE

## 2025-02-04 PROCEDURE — 83550 IRON BINDING TEST: CPT

## 2025-02-04 PROCEDURE — 83735 ASSAY OF MAGNESIUM: CPT

## 2025-02-04 PROCEDURE — 2060000000 HC ICU INTERMEDIATE R&B

## 2025-02-04 PROCEDURE — 6370000000 HC RX 637 (ALT 250 FOR IP): Performed by: INTERNAL MEDICINE

## 2025-02-04 PROCEDURE — 6370000000 HC RX 637 (ALT 250 FOR IP): Performed by: NURSE PRACTITIONER

## 2025-02-04 PROCEDURE — 2700000000 HC OXYGEN THERAPY PER DAY

## 2025-02-04 PROCEDURE — 94640 AIRWAY INHALATION TREATMENT: CPT

## 2025-02-04 PROCEDURE — 93010 ELECTROCARDIOGRAM REPORT: CPT | Performed by: INTERNAL MEDICINE

## 2025-02-04 PROCEDURE — 82728 ASSAY OF FERRITIN: CPT

## 2025-02-04 RX ORDER — FUROSEMIDE 10 MG/ML
40 INJECTION INTRAMUSCULAR; INTRAVENOUS 2 TIMES DAILY
Status: DISCONTINUED | OUTPATIENT
Start: 2025-02-04 | End: 2025-02-07

## 2025-02-04 RX ORDER — POTASSIUM CHLORIDE 7.45 MG/ML
10 INJECTION INTRAVENOUS PRN
Status: ACTIVE | OUTPATIENT
Start: 2025-02-04 | End: 2025-02-11

## 2025-02-04 RX ORDER — FUROSEMIDE 10 MG/ML
40 INJECTION INTRAMUSCULAR; INTRAVENOUS ONCE
Status: COMPLETED | OUTPATIENT
Start: 2025-02-04 | End: 2025-02-04

## 2025-02-04 RX ORDER — MAGNESIUM SULFATE 1 G/100ML
1000 INJECTION INTRAVENOUS ONCE
Status: COMPLETED | OUTPATIENT
Start: 2025-02-04 | End: 2025-02-04

## 2025-02-04 RX ORDER — POTASSIUM CHLORIDE 1500 MG/1
20 TABLET, EXTENDED RELEASE ORAL ONCE
Status: COMPLETED | OUTPATIENT
Start: 2025-02-04 | End: 2025-02-04

## 2025-02-04 RX ORDER — WARFARIN SODIUM 7.5 MG/1
7.5 TABLET ORAL
Status: COMPLETED | OUTPATIENT
Start: 2025-02-04 | End: 2025-02-04

## 2025-02-04 RX ORDER — POTASSIUM CHLORIDE 1500 MG/1
40 TABLET, EXTENDED RELEASE ORAL PRN
Status: DISPENSED | OUTPATIENT
Start: 2025-02-04 | End: 2025-02-11

## 2025-02-04 RX ORDER — METOPROLOL TARTRATE 50 MG
50 TABLET ORAL 3 TIMES DAILY
Status: DISCONTINUED | OUTPATIENT
Start: 2025-02-04 | End: 2025-02-07

## 2025-02-04 RX ORDER — METOPROLOL TARTRATE 1 MG/ML
5 INJECTION, SOLUTION INTRAVENOUS EVERY 6 HOURS PRN
Status: DISCONTINUED | OUTPATIENT
Start: 2025-02-04 | End: 2025-02-12 | Stop reason: HOSPADM

## 2025-02-04 RX ORDER — POTASSIUM CHLORIDE 750 MG/1
10 TABLET, EXTENDED RELEASE ORAL 2 TIMES DAILY
Status: DISCONTINUED | OUTPATIENT
Start: 2025-02-04 | End: 2025-02-11

## 2025-02-04 RX ORDER — POTASSIUM CHLORIDE 7.45 MG/ML
10 INJECTION INTRAVENOUS PRN
Status: DISPENSED | OUTPATIENT
Start: 2025-02-04 | End: 2025-02-11

## 2025-02-04 RX ORDER — POTASSIUM CHLORIDE 29.8 MG/ML
20 INJECTION INTRAVENOUS PRN
Status: DISPENSED | OUTPATIENT
Start: 2025-02-04 | End: 2025-02-11

## 2025-02-04 RX ADMIN — POTASSIUM CHLORIDE 10 MEQ: 7.46 INJECTION, SOLUTION INTRAVENOUS at 12:43

## 2025-02-04 RX ADMIN — POTASSIUM CHLORIDE 10 MEQ: 750 TABLET, EXTENDED RELEASE ORAL at 20:00

## 2025-02-04 RX ADMIN — AZITHROMYCIN MONOHYDRATE 500 MG: 500 INJECTION, POWDER, LYOPHILIZED, FOR SOLUTION INTRAVENOUS at 09:43

## 2025-02-04 RX ADMIN — INSULIN GLARGINE 5 UNITS: 100 INJECTION, SOLUTION SUBCUTANEOUS at 20:07

## 2025-02-04 RX ADMIN — POTASSIUM CHLORIDE 10 MEQ: 7.46 INJECTION, SOLUTION INTRAVENOUS at 10:45

## 2025-02-04 RX ADMIN — POTASSIUM CHLORIDE 20 MEQ: 1500 TABLET, EXTENDED RELEASE ORAL at 01:51

## 2025-02-04 RX ADMIN — METOPROLOL TARTRATE 50 MG: 50 TABLET, FILM COATED ORAL at 13:51

## 2025-02-04 RX ADMIN — SODIUM CHLORIDE, PRESERVATIVE FREE 10 ML: 5 INJECTION INTRAVENOUS at 09:52

## 2025-02-04 RX ADMIN — SODIUM CHLORIDE, PRESERVATIVE FREE 10 ML: 5 INJECTION INTRAVENOUS at 19:59

## 2025-02-04 RX ADMIN — GUAIFENESIN 600 MG: 600 TABLET, EXTENDED RELEASE ORAL at 20:00

## 2025-02-04 RX ADMIN — FUROSEMIDE 40 MG: 10 INJECTION, SOLUTION INTRAMUSCULAR; INTRAVENOUS at 01:51

## 2025-02-04 RX ADMIN — IPRATROPIUM BROMIDE AND ALBUTEROL SULFATE 1 DOSE: 2.5; .5 SOLUTION RESPIRATORY (INHALATION) at 10:29

## 2025-02-04 RX ADMIN — POTASSIUM CHLORIDE 10 MEQ: 7.46 INJECTION, SOLUTION INTRAVENOUS at 13:53

## 2025-02-04 RX ADMIN — METOPROLOL TARTRATE 50 MG: 50 TABLET, FILM COATED ORAL at 20:00

## 2025-02-04 RX ADMIN — WARFARIN SODIUM 7.5 MG: 7.5 TABLET ORAL at 18:16

## 2025-02-04 RX ADMIN — POTASSIUM CHLORIDE 10 MEQ: 7.46 INJECTION, SOLUTION INTRAVENOUS at 11:43

## 2025-02-04 RX ADMIN — ACETAMINOPHEN 650 MG: 325 TABLET ORAL at 16:22

## 2025-02-04 RX ADMIN — IPRATROPIUM BROMIDE AND ALBUTEROL SULFATE 1 DOSE: 2.5; .5 SOLUTION RESPIRATORY (INHALATION) at 18:56

## 2025-02-04 RX ADMIN — FUROSEMIDE 40 MG: 10 INJECTION, SOLUTION INTRAMUSCULAR; INTRAVENOUS at 18:16

## 2025-02-04 RX ADMIN — ONDANSETRON 4 MG: 2 INJECTION INTRAMUSCULAR; INTRAVENOUS at 09:10

## 2025-02-04 RX ADMIN — SODIUM CHLORIDE 1000 MG: 900 INJECTION INTRAVENOUS at 08:31

## 2025-02-04 RX ADMIN — MAGNESIUM SULFATE HEPTAHYDRATE 1000 MG: 1 INJECTION, SOLUTION INTRAVENOUS at 09:43

## 2025-02-04 NOTE — ACP (ADVANCE CARE PLANNING)
Advance Care Planning   General Advance Care Planning (ACP) Conversation    Date of Conversation: 2/3/2025  Conducted with: Patient with Decision Making Capacity  Other persons present: None    Healthcare Decision Maker:    Primary Decision Maker: Telly Solomon \"Anupam\" - Child - [phone]      Content/Action Overview:    Full Code    Susu Dukes RN

## 2025-02-04 NOTE — FLOWSHEET NOTE
02/04/25 0656   Vital Signs   Temp 99.3 °F (37.4 °C)   Temp Source Axillary  (Drank cold water)   Pulse 99   Heart Rate Source Monitor   Respirations 21   /68   MAP (Calculated) 83   BP Location Left upper arm   BP Method Automatic   Patient Position Right side   Oxygen Therapy   SpO2 94 %   O2 Device Nasal cannula   O2 Flow Rate (L/min) 2 L/min     Patient resting quietly in bed. Patient having nausea and vomiting this morning. PRN zofran given with minimal relief. Patient unable to tolerate PO meds at this time. Shift assessment complete, see flow sheet. Denies needs at this time. Call light in reach. Will monitor.

## 2025-02-04 NOTE — FLOWSHEET NOTE
02/04/25 1541   Vital Signs   Temp (!) 101 °F (38.3 °C)   Temp Source Oral   Pulse (!) 115   Heart Rate Source Monitor   Respirations 20   /75   MAP (Calculated) 90   BP Location Left upper arm   BP Method Automatic   Patient Position Semi fowlers   Oxygen Therapy   SpO2 94 %   O2 Device Nasal cannula   O2 Flow Rate (L/min) 2 L/min     Tylenol given for temp. Will monitor.

## 2025-02-04 NOTE — H&P
sodium chloride flush  5-40 mL IntraVENous 2 times per day    guaiFENesin  600 mg Oral BID    insulin lispro  0-4 Units SubCUTAneous 4x Daily AC & HS    cefTRIAXone (ROCEPHIN) IV  1,000 mg IntraVENous Q24H    azithromycin  500 mg IntraVENous Q24H    [Held by provider] amLODIPine  5 mg Oral Daily    insulin lispro  0-4 Units SubCUTAneous 4x Daily AC & HS    atorvastatin  10 mg Oral Daily    [Held by provider] furosemide  40 mg Oral Daily    metoprolol tartrate  50 mg Oral BID    potassium chloride  20 mEq Oral Daily    insulin glargine  5 Units SubCUTAneous Nightly    warfarin placeholder: dosing by pharmacy   Oral RX Placeholder     PRN Meds: perflutren lipid microspheres, sodium chloride flush, sodium chloride, ondansetron **OR** ondansetron, polyethylene glycol, acetaminophen **OR** acetaminophen, dextrose bolus **OR** dextrose bolus, glucose, glucagon (rDNA), dextrose, ipratropium 0.5 mg-albuterol 2.5 mg      Intake/Output Summary (Last 24 hours) at 2/4/2025 0125  Last data filed at 2/4/2025 0010  Gross per 24 hour   Intake 576.84 ml   Output --   Net 576.84 ml       Physical Exam Performed:      /73   Pulse (!) 107   Temp 99 °F (37.2 °C) (Oral)   Resp 18   Ht 1.651 m (5' 5\")   Wt 98 kg (216 lb)   SpO2 94%   BMI 35.94 kg/m²     General appearance: Nontoxic, Kashia elderly . No apparent distress, on 2 L  Respiratory:  Normal respiratory effort.  Basilar crackles, anterior wheeze  Cardiovascular: Irregularly irregular rhythm, intermittently rate controlled less than 110. No murmurs gallops rubs.  Abdomen:  Soft, non-tender, non-distended. Normoactive bowel sounds.  Musculoskelatal: Moderate edema. Normal ROM.  Neurologic:  Non-focal. A&O x3  Psychiatric:  Alert and oriented      Labs:  Personally reviewed and interpreted for clinical significance.     Recent Labs     02/03/25  1357   WBC 10.7   HGB 12.4   HCT 38.3        Recent Labs     02/03/25  1357      K 3.9      CO2 22

## 2025-02-04 NOTE — CARE COORDINATION
Case Management Assessment  Initial Evaluation    Date/Time of Evaluation: 2/4/2025 9:57 AM  Assessment Completed by: Susu Dukes RN    If patient is discharged prior to next notation, then this note serves as note for discharge by case management.    Patient Name: Mandie Solomon                   YOB: 1935  Diagnosis: Lactic acidosis [E87.20]  Hypomagnesemia [E83.42]  Atrial fibrillation with rapid ventricular response (HCC) [I48.91]  Atrial fibrillation with RVR (HCC) [I48.91]  Anticoagulated on warfarin [Z79.01]  Acute respiratory failure with hypoxia [J96.01]  Pneumonia of right lower lobe due to infectious organism [J18.9]                   Date / Time: 2/3/2025  1:39 PM    Patient Admission Status: Inpatient   Readmission Risk (Low < 19, Mod (19-27), High > 27): Readmission Risk Score: 12.7    Current PCP: Hussein Merino APRN - CNP  PCP verified by CM? Yes    Chart Reviewed: Yes      History Provided by: Patient  Patient Orientation: Alert and Oriented    Patient Cognition: Alert    Hospitalization in the last 30 days (Readmission):  No    If yes, Readmission Assessment in CM Navigator will be completed.    Advance Directives:      Code Status: Full Code   Patient's Primary Decision Maker is: Legal Next of Kin    Primary Decision Maker: NehaTelly \"Bill\" - Child - [phone]    Discharge Planning:    Patient lives with: Children Type of Home: House  Primary Care Giver: Family  Patient Support Systems include: Children   Current Financial resources: Medicare  Current community resources: None  Current services prior to admission: Durable Medical Equipment            Current DME: Walker, Wheelchair            Type of Home Care services:  None    ADLS  Prior functional level: Assistance with the following:, Shopping, Mobility, Cooking, Housework  Current functional level: Assistance with the following:, Shopping, Mobility, Cooking, Housework    PT AM-PAC:   /24  OT AM-PAC:

## 2025-02-04 NOTE — CARE COORDINATION
Met with the pt and pt's son at bedside. SNF list provided. Pt chose referral to [facility] for rehab and second choice is [facility]. Referral called to Kya Foote with [facility]. Awaiting call back. CM following.    Writer spoke with Kya Foote from [facility] and she can accept the pt when dc ready. Following.

## 2025-02-04 NOTE — ED NOTES
Anticoagulation Clinic Progress Note    Anticoagulation Summary  As of 10/6/2022    INR goal:  2.0-3.0   TTR:  58.7 % (1.6 y)   INR used for dosin.6 (10/6/2022)   Warfarin maintenance plan:  5 mg every Mon; 2.5 mg all other days   Weekly warfarin total:  20 mg   No change documented:  Marleny Black RPH   Plan last modified:  Marleny Black RPH (10/3/2022)   Next INR check:  10/13/2022   Priority:  High   Target end date:      Indications    PAF (paroxysmal atrial fibrillation) (HCC) (Resolved) [I48.0]             Anticoagulation Episode Summary     INR check location:      Preferred lab:      Send INR reminders to:  [facility] [employer] HOME TEST POOL    Comments:   Home Testing as of 21 *call only when out of range*      Anticoagulation Care Providers     Provider Role Specialty Phone number    Zenia Tinajero MD Referring Cardiology [phone]          Clinic Interview:  No pertinent clinical findings have been reported.    INR History:  Anticoagulation Monitoring 2022 10/3/2022 10/6/2022   INR 1.1 2.10 2.6   INR Date 2022 10/3/2022 10/6/2022   INR Goal 2.0-3.0 2.0-3.0 2.0-3.0   Trend Same Up Same   Last Week Total 18.75 mg 22.5 mg 25 mg   Next Week Total 22.5 mg 20 mg 20 mg   Sun 2.5 mg - 2.5 mg   Mon - 5 mg 5 mg   Tue - 2.5 mg 2.5 mg   Wed - 2.5 mg 2.5 mg   Thu 5 mg () - 2.5 mg   Fri 5 mg () - 2.5 mg   Sat 2.5 mg - 2.5 mg   Visit Report - - -   Some recent data might be hidden       Plan:  1. INR is therapeutic today- see above in Anticoagulation Summary.    Shani Phillip to continue their warfarin regimen- see above in Anticoagulation Summary.  2. Follow up in 1 week  3. Pt has agreed to only be called if INR out of range. They have been instructed to call if any changes in medications, doses, concerns, etc. Patient expresses understanding and has no further questions at this time.    Marleny Black RPH   Report given to Eliseo with [facility].  Pt left via stretcher with EMS. VSS. No further needs.     Report called to AARON Lopez at [facility].

## 2025-02-05 ENCOUNTER — APPOINTMENT (OUTPATIENT)
Dept: CT IMAGING | Age: 89
DRG: 291 | End: 2025-02-05
Payer: MEDICARE

## 2025-02-05 ENCOUNTER — APPOINTMENT (OUTPATIENT)
Dept: GENERAL RADIOLOGY | Age: 89
DRG: 291 | End: 2025-02-05
Payer: MEDICARE

## 2025-02-05 ENCOUNTER — APPOINTMENT (OUTPATIENT)
Age: 89
DRG: 291 | End: 2025-02-05
Attending: STUDENT IN AN ORGANIZED HEALTH CARE EDUCATION/TRAINING PROGRAM
Payer: MEDICARE

## 2025-02-05 PROBLEM — I35.0 MODERATE AORTIC STENOSIS: Status: ACTIVE | Noted: 2025-02-05

## 2025-02-05 LAB
ALBUMIN SERPL-MCNC: 3 G/DL (ref 3.4–5)
ALP SERPL-CCNC: 88 U/L (ref 40–129)
ALT SERPL-CCNC: 15 U/L (ref 10–40)
ANION GAP SERPL CALCULATED.3IONS-SCNC: 8 MMOL/L (ref 3–16)
AST SERPL-CCNC: 34 U/L (ref 15–37)
BASOPHILS # BLD: 0 K/UL (ref 0–0.2)
BASOPHILS NFR BLD: 0.3 %
BILIRUB DIRECT SERPL-MCNC: 0.6 MG/DL (ref 0–0.3)
BILIRUB INDIRECT SERPL-MCNC: 0.9 MG/DL (ref 0–1)
BILIRUB SERPL-MCNC: 1.5 MG/DL (ref 0–1)
BUN SERPL-MCNC: 24 MG/DL (ref 7–20)
CALCIUM SERPL-MCNC: 8.1 MG/DL (ref 8.3–10.6)
CHLORIDE SERPL-SCNC: 108 MMOL/L (ref 99–110)
CO2 SERPL-SCNC: 27 MMOL/L (ref 21–32)
CREAT SERPL-MCNC: 1 MG/DL (ref 0.6–1.2)
DEPRECATED RDW RBC AUTO: 15.9 % (ref 12.4–15.4)
ECHO AV AREA PEAK VELOCITY: 1 CM2
ECHO AV AREA VTI: 0.8 CM2
ECHO AV AREA/BSA PEAK VELOCITY: 0.5 CM2/M2
ECHO AV AREA/BSA VTI: 0.4 CM2/M2
ECHO AV MEAN GRADIENT: 28 MMHG
ECHO AV MEAN VELOCITY: 2.5 M/S
ECHO AV PEAK GRADIENT: 44 MMHG
ECHO AV PEAK VELOCITY: 3.3 M/S
ECHO AV VELOCITY RATIO: 0.27
ECHO AV VTI: 72.8 CM
ECHO BSA: 2.15 M2
ECHO IVC EXP: 2.1 CM
ECHO LV EF PHYSICIAN: 58 %
ECHO LVOT AREA: 3.5 CM2
ECHO LVOT AV VTI INDEX: 0.23
ECHO LVOT DIAM: 2.1 CM
ECHO LVOT MEAN GRADIENT: 2 MMHG
ECHO LVOT PEAK GRADIENT: 4 MMHG
ECHO LVOT PEAK VELOCITY: 0.9 M/S
ECHO LVOT STROKE VOLUME INDEX: 28.4 ML/M2
ECHO LVOT SV: 58.9 ML
ECHO LVOT VTI: 17 CM
EOSINOPHIL # BLD: 0 K/UL (ref 0–0.6)
EOSINOPHIL NFR BLD: 0.1 %
GFR SERPLBLD CREATININE-BSD FMLA CKD-EPI: 54 ML/MIN/{1.73_M2}
GLUCOSE BLD-MCNC: 105 MG/DL (ref 70–99)
GLUCOSE BLD-MCNC: 124 MG/DL (ref 70–99)
GLUCOSE BLD-MCNC: 132 MG/DL (ref 70–99)
GLUCOSE BLD-MCNC: 195 MG/DL (ref 70–99)
GLUCOSE BLD-MCNC: 99 MG/DL (ref 70–99)
GLUCOSE SERPL-MCNC: 99 MG/DL (ref 70–99)
HCT VFR BLD AUTO: 33.8 % (ref 36–48)
HGB BLD-MCNC: 11.3 G/DL (ref 12–16)
INR PPP: 2.79 (ref 0.85–1.15)
LYMPHOCYTES # BLD: 0.4 K/UL (ref 1–5.1)
LYMPHOCYTES NFR BLD: 5.3 %
MCH RBC QN AUTO: 30.1 PG (ref 26–34)
MCHC RBC AUTO-ENTMCNC: 33.5 G/DL (ref 31–36)
MCV RBC AUTO: 90 FL (ref 80–100)
MONOCYTES # BLD: 0.6 K/UL (ref 0–1.3)
MONOCYTES NFR BLD: 7.9 %
MRSA DNA SPEC QL NAA+PROBE: NORMAL
NEUTROPHILS # BLD: 6.7 K/UL (ref 1.7–7.7)
NEUTROPHILS NFR BLD: 86.4 %
PERFORMED ON: ABNORMAL
PERFORMED ON: NORMAL
PLATELET # BLD AUTO: 140 K/UL (ref 135–450)
PMV BLD AUTO: 9.7 FL (ref 5–10.5)
POTASSIUM SERPL-SCNC: 3.9 MMOL/L (ref 3.5–5.1)
PROT SERPL-MCNC: 5.6 G/DL (ref 6.4–8.2)
PROTHROMBIN TIME: 29.3 SEC (ref 11.9–14.9)
RBC # BLD AUTO: 3.76 M/UL (ref 4–5.2)
SODIUM SERPL-SCNC: 143 MMOL/L (ref 136–145)
WBC # BLD AUTO: 7.7 K/UL (ref 4–11)

## 2025-02-05 PROCEDURE — 93308 TTE F-UP OR LMTD: CPT | Performed by: INTERNAL MEDICINE

## 2025-02-05 PROCEDURE — 6360000002 HC RX W HCPCS: Performed by: STUDENT IN AN ORGANIZED HEALTH CARE EDUCATION/TRAINING PROGRAM

## 2025-02-05 PROCEDURE — 93325 DOPPLER ECHO COLOR FLOW MAPG: CPT | Performed by: INTERNAL MEDICINE

## 2025-02-05 PROCEDURE — 6370000000 HC RX 637 (ALT 250 FOR IP): Performed by: INTERNAL MEDICINE

## 2025-02-05 PROCEDURE — 87641 MR-STAPH DNA AMP PROBE: CPT

## 2025-02-05 PROCEDURE — 6370000000 HC RX 637 (ALT 250 FOR IP): Performed by: STUDENT IN AN ORGANIZED HEALTH CARE EDUCATION/TRAINING PROGRAM

## 2025-02-05 PROCEDURE — 6360000002 HC RX W HCPCS: Performed by: NURSE PRACTITIONER

## 2025-02-05 PROCEDURE — 0202U NFCT DS 22 TRGT SARS-COV-2: CPT

## 2025-02-05 PROCEDURE — 6370000000 HC RX 637 (ALT 250 FOR IP): Performed by: NURSE PRACTITIONER

## 2025-02-05 PROCEDURE — 80048 BASIC METABOLIC PNL TOTAL CA: CPT

## 2025-02-05 PROCEDURE — 99233 SBSQ HOSP IP/OBS HIGH 50: CPT | Performed by: INTERNAL MEDICINE

## 2025-02-05 PROCEDURE — 93321 DOPPLER ECHO F-UP/LMTD STD: CPT | Performed by: INTERNAL MEDICINE

## 2025-02-05 PROCEDURE — 74018 RADEX ABDOMEN 1 VIEW: CPT

## 2025-02-05 PROCEDURE — 2500000003 HC RX 250 WO HCPCS: Performed by: STUDENT IN AN ORGANIZED HEALTH CARE EDUCATION/TRAINING PROGRAM

## 2025-02-05 PROCEDURE — 85610 PROTHROMBIN TIME: CPT

## 2025-02-05 PROCEDURE — 94761 N-INVAS EAR/PLS OXIMETRY MLT: CPT

## 2025-02-05 PROCEDURE — 2500000003 HC RX 250 WO HCPCS: Performed by: INTERNAL MEDICINE

## 2025-02-05 PROCEDURE — 85025 COMPLETE CBC W/AUTO DIFF WBC: CPT

## 2025-02-05 PROCEDURE — 94669 MECHANICAL CHEST WALL OSCILL: CPT

## 2025-02-05 PROCEDURE — 2700000000 HC OXYGEN THERAPY PER DAY

## 2025-02-05 PROCEDURE — 99232 SBSQ HOSP IP/OBS MODERATE 35: CPT | Performed by: STUDENT IN AN ORGANIZED HEALTH CARE EDUCATION/TRAINING PROGRAM

## 2025-02-05 PROCEDURE — 2060000000 HC ICU INTERMEDIATE R&B

## 2025-02-05 PROCEDURE — 36415 COLL VENOUS BLD VENIPUNCTURE: CPT

## 2025-02-05 PROCEDURE — 71045 X-RAY EXAM CHEST 1 VIEW: CPT

## 2025-02-05 PROCEDURE — 2580000003 HC RX 258: Performed by: NURSE PRACTITIONER

## 2025-02-05 PROCEDURE — 93321 DOPPLER ECHO F-UP/LMTD STD: CPT

## 2025-02-05 PROCEDURE — 6360000002 HC RX W HCPCS: Performed by: INTERNAL MEDICINE

## 2025-02-05 PROCEDURE — 2580000003 HC RX 258: Performed by: INTERNAL MEDICINE

## 2025-02-05 PROCEDURE — 71250 CT THORAX DX C-: CPT

## 2025-02-05 PROCEDURE — 94640 AIRWAY INHALATION TREATMENT: CPT

## 2025-02-05 PROCEDURE — 80076 HEPATIC FUNCTION PANEL: CPT

## 2025-02-05 PROCEDURE — 2500000003 HC RX 250 WO HCPCS: Performed by: NURSE PRACTITIONER

## 2025-02-05 RX ORDER — METOPROLOL TARTRATE 1 MG/ML
5 INJECTION, SOLUTION INTRAVENOUS EVERY 6 HOURS
Status: DISCONTINUED | OUTPATIENT
Start: 2025-02-05 | End: 2025-02-07

## 2025-02-05 RX ORDER — PREDNISONE 20 MG/1
40 TABLET ORAL DAILY
Status: DISCONTINUED | OUTPATIENT
Start: 2025-02-05 | End: 2025-02-05

## 2025-02-05 RX ORDER — WARFARIN SODIUM 5 MG/1
5 TABLET ORAL
Status: DISCONTINUED | OUTPATIENT
Start: 2025-02-05 | End: 2025-02-05

## 2025-02-05 RX ORDER — LACTOBACILLUS RHAMNOSUS GG 10B CELL
1 CAPSULE ORAL
Status: DISCONTINUED | OUTPATIENT
Start: 2025-02-06 | End: 2025-02-12 | Stop reason: HOSPADM

## 2025-02-05 RX ORDER — IPRATROPIUM BROMIDE AND ALBUTEROL SULFATE 2.5; .5 MG/3ML; MG/3ML
1 SOLUTION RESPIRATORY (INHALATION)
Status: DISCONTINUED | OUTPATIENT
Start: 2025-02-05 | End: 2025-02-10

## 2025-02-05 RX ADMIN — AZITHROMYCIN MONOHYDRATE 500 MG: 500 INJECTION, POWDER, LYOPHILIZED, FOR SOLUTION INTRAVENOUS at 09:25

## 2025-02-05 RX ADMIN — SODIUM CHLORIDE, PRESERVATIVE FREE 10 ML: 5 INJECTION INTRAVENOUS at 08:50

## 2025-02-05 RX ADMIN — SODIUM CHLORIDE 1000 MG: 900 INJECTION INTRAVENOUS at 08:50

## 2025-02-05 RX ADMIN — METOPROLOL TARTRATE 5 MG: 5 INJECTION, SOLUTION INTRAVENOUS at 17:14

## 2025-02-05 RX ADMIN — IPRATROPIUM BROMIDE AND ALBUTEROL SULFATE 1 DOSE: .5; 2.5 SOLUTION RESPIRATORY (INHALATION) at 07:48

## 2025-02-05 RX ADMIN — ATORVASTATIN CALCIUM 10 MG: 10 TABLET, FILM COATED ORAL at 08:49

## 2025-02-05 RX ADMIN — POTASSIUM CHLORIDE 10 MEQ: 750 TABLET, EXTENDED RELEASE ORAL at 21:00

## 2025-02-05 RX ADMIN — GUAIFENESIN 600 MG: 600 TABLET, EXTENDED RELEASE ORAL at 08:50

## 2025-02-05 RX ADMIN — WATER 40 MG: 1 INJECTION INTRAMUSCULAR; INTRAVENOUS; SUBCUTANEOUS at 14:33

## 2025-02-05 RX ADMIN — GUAIFENESIN 600 MG: 600 TABLET, EXTENDED RELEASE ORAL at 21:00

## 2025-02-05 RX ADMIN — POTASSIUM CHLORIDE 10 MEQ: 750 TABLET, EXTENDED RELEASE ORAL at 08:49

## 2025-02-05 RX ADMIN — SODIUM CHLORIDE, PRESERVATIVE FREE 10 ML: 5 INJECTION INTRAVENOUS at 21:03

## 2025-02-05 RX ADMIN — IPRATROPIUM BROMIDE AND ALBUTEROL SULFATE 1 DOSE: 2.5; .5 SOLUTION RESPIRATORY (INHALATION) at 05:24

## 2025-02-05 RX ADMIN — FUROSEMIDE 40 MG: 10 INJECTION, SOLUTION INTRAMUSCULAR; INTRAVENOUS at 17:14

## 2025-02-05 RX ADMIN — FUROSEMIDE 40 MG: 10 INJECTION, SOLUTION INTRAMUSCULAR; INTRAVENOUS at 08:50

## 2025-02-05 RX ADMIN — IPRATROPIUM BROMIDE AND ALBUTEROL SULFATE 1 DOSE: .5; 2.5 SOLUTION RESPIRATORY (INHALATION) at 14:49

## 2025-02-05 RX ADMIN — METOPROLOL TARTRATE 50 MG: 50 TABLET, FILM COATED ORAL at 14:34

## 2025-02-05 RX ADMIN — IPRATROPIUM BROMIDE AND ALBUTEROL SULFATE 1 DOSE: .5; 2.5 SOLUTION RESPIRATORY (INHALATION) at 17:32

## 2025-02-05 RX ADMIN — METOPROLOL TARTRATE 50 MG: 50 TABLET, FILM COATED ORAL at 08:50

## 2025-02-05 RX ADMIN — METOPROLOL TARTRATE 5 MG: 5 INJECTION, SOLUTION INTRAVENOUS at 23:49

## 2025-02-05 RX ADMIN — FAMOTIDINE 20 MG: 10 INJECTION, SOLUTION INTRAVENOUS at 14:42

## 2025-02-05 NOTE — FLOWSHEET NOTE
02/05/25 0830   Vital Signs   Temp 99.5 °F (37.5 °C)   Temp Source Oral   Pulse (!) 123   Respirations 20   /75   MAP (Calculated) 95   BP Location Right upper arm   Oxygen Therapy   SpO2 90 %   O2 Device Nasal cannula   O2 Flow Rate (L/min) 2 L/min     Patient resting quietly in bed. Continues to have audible wheezes. Shift assessment complete, see flow sheet. Denies needs at this time. Call light in reach. Will monitor.

## 2025-02-06 PROBLEM — J11.1 FLU: Status: ACTIVE | Noted: 2025-02-06

## 2025-02-06 LAB
ANION GAP SERPL CALCULATED.3IONS-SCNC: 10 MMOL/L (ref 3–16)
BASOPHILS # BLD: 0 K/UL (ref 0–0.2)
BASOPHILS NFR BLD: 0.2 %
BUN SERPL-MCNC: 24 MG/DL (ref 7–20)
CALCIUM SERPL-MCNC: 8.1 MG/DL (ref 8.3–10.6)
CHLORIDE SERPL-SCNC: 108 MMOL/L (ref 99–110)
CO2 SERPL-SCNC: 27 MMOL/L (ref 21–32)
CREAT SERPL-MCNC: 0.8 MG/DL (ref 0.6–1.2)
DEPRECATED RDW RBC AUTO: 15.8 % (ref 12.4–15.4)
EOSINOPHIL # BLD: 0 K/UL (ref 0–0.6)
EOSINOPHIL NFR BLD: 0 %
GFR SERPLBLD CREATININE-BSD FMLA CKD-EPI: 71 ML/MIN/{1.73_M2}
GLUCOSE BLD-MCNC: 191 MG/DL (ref 70–99)
GLUCOSE BLD-MCNC: 200 MG/DL (ref 70–99)
GLUCOSE BLD-MCNC: 274 MG/DL (ref 70–99)
GLUCOSE BLD-MCNC: 318 MG/DL (ref 70–99)
GLUCOSE SERPL-MCNC: 186 MG/DL (ref 70–99)
HCT VFR BLD AUTO: 35.2 % (ref 36–48)
HGB BLD-MCNC: 11.7 G/DL (ref 12–16)
INR PPP: 3.65 (ref 0.85–1.15)
LYMPHOCYTES # BLD: 0.3 K/UL (ref 1–5.1)
LYMPHOCYTES NFR BLD: 4.6 %
MCH RBC QN AUTO: 29.9 PG (ref 26–34)
MCHC RBC AUTO-ENTMCNC: 33.3 G/DL (ref 31–36)
MCV RBC AUTO: 89.7 FL (ref 80–100)
MONOCYTES # BLD: 0.5 K/UL (ref 0–1.3)
MONOCYTES NFR BLD: 6.9 %
NEUTROPHILS # BLD: 6.7 K/UL (ref 1.7–7.7)
NEUTROPHILS NFR BLD: 88.3 %
ORGANISM: ABNORMAL
PERFORMED ON: ABNORMAL
PLATELET # BLD AUTO: 147 K/UL (ref 135–450)
PMV BLD AUTO: 9.3 FL (ref 5–10.5)
POTASSIUM SERPL-SCNC: 3.9 MMOL/L (ref 3.5–5.1)
PROTHROMBIN TIME: 36 SEC (ref 11.9–14.9)
RBC # BLD AUTO: 3.93 M/UL (ref 4–5.2)
REPORT: NORMAL
RESP PATH DNA+RNA PNL NPH NAA+NON-PROBE: ABNORMAL
SODIUM SERPL-SCNC: 145 MMOL/L (ref 136–145)
WBC # BLD AUTO: 7.6 K/UL (ref 4–11)

## 2025-02-06 PROCEDURE — 94640 AIRWAY INHALATION TREATMENT: CPT

## 2025-02-06 PROCEDURE — 36415 COLL VENOUS BLD VENIPUNCTURE: CPT

## 2025-02-06 PROCEDURE — 99233 SBSQ HOSP IP/OBS HIGH 50: CPT | Performed by: INTERNAL MEDICINE

## 2025-02-06 PROCEDURE — 85025 COMPLETE CBC W/AUTO DIFF WBC: CPT

## 2025-02-06 PROCEDURE — 2500000003 HC RX 250 WO HCPCS: Performed by: NURSE PRACTITIONER

## 2025-02-06 PROCEDURE — 2500000003 HC RX 250 WO HCPCS: Performed by: INTERNAL MEDICINE

## 2025-02-06 PROCEDURE — 94669 MECHANICAL CHEST WALL OSCILL: CPT

## 2025-02-06 PROCEDURE — 2700000000 HC OXYGEN THERAPY PER DAY

## 2025-02-06 PROCEDURE — 80048 BASIC METABOLIC PNL TOTAL CA: CPT

## 2025-02-06 PROCEDURE — 6360000002 HC RX W HCPCS: Performed by: STUDENT IN AN ORGANIZED HEALTH CARE EDUCATION/TRAINING PROGRAM

## 2025-02-06 PROCEDURE — 2060000000 HC ICU INTERMEDIATE R&B

## 2025-02-06 PROCEDURE — 2500000003 HC RX 250 WO HCPCS: Performed by: STUDENT IN AN ORGANIZED HEALTH CARE EDUCATION/TRAINING PROGRAM

## 2025-02-06 PROCEDURE — 92610 EVALUATE SWALLOWING FUNCTION: CPT

## 2025-02-06 PROCEDURE — 6370000000 HC RX 637 (ALT 250 FOR IP): Performed by: NURSE PRACTITIONER

## 2025-02-06 PROCEDURE — 85610 PROTHROMBIN TIME: CPT

## 2025-02-06 PROCEDURE — 94761 N-INVAS EAR/PLS OXIMETRY MLT: CPT

## 2025-02-06 PROCEDURE — 99232 SBSQ HOSP IP/OBS MODERATE 35: CPT | Performed by: STUDENT IN AN ORGANIZED HEALTH CARE EDUCATION/TRAINING PROGRAM

## 2025-02-06 PROCEDURE — 2580000003 HC RX 258: Performed by: INTERNAL MEDICINE

## 2025-02-06 PROCEDURE — 2580000003 HC RX 258

## 2025-02-06 PROCEDURE — 92526 ORAL FUNCTION THERAPY: CPT

## 2025-02-06 PROCEDURE — 6360000002 HC RX W HCPCS: Performed by: INTERNAL MEDICINE

## 2025-02-06 PROCEDURE — 6370000000 HC RX 637 (ALT 250 FOR IP): Performed by: STUDENT IN AN ORGANIZED HEALTH CARE EDUCATION/TRAINING PROGRAM

## 2025-02-06 PROCEDURE — 6370000000 HC RX 637 (ALT 250 FOR IP): Performed by: INTERNAL MEDICINE

## 2025-02-06 PROCEDURE — 2500000003 HC RX 250 WO HCPCS

## 2025-02-06 RX ORDER — SODIUM CHLORIDE 0.9 % (FLUSH) 0.9 %
5-40 SYRINGE (ML) INJECTION PRN
Status: CANCELLED | OUTPATIENT
Start: 2025-02-06

## 2025-02-06 RX ORDER — OSELTAMIVIR PHOSPHATE 6 MG/ML
30 FOR SUSPENSION ORAL 2 TIMES DAILY
Status: DISCONTINUED | OUTPATIENT
Start: 2025-02-06 | End: 2025-02-07

## 2025-02-06 RX ORDER — SODIUM CHLORIDE 9 MG/ML
INJECTION, SOLUTION INTRAVENOUS PRN
Status: CANCELLED | OUTPATIENT
Start: 2025-02-06

## 2025-02-06 RX ORDER — DIGOXIN 0.25 MG/ML
250 INJECTION INTRAMUSCULAR; INTRAVENOUS EVERY 6 HOURS
Status: COMPLETED | OUTPATIENT
Start: 2025-02-06 | End: 2025-02-06

## 2025-02-06 RX ORDER — SODIUM CHLORIDE 0.9 % (FLUSH) 0.9 %
5-40 SYRINGE (ML) INJECTION EVERY 12 HOURS SCHEDULED
Status: CANCELLED | OUTPATIENT
Start: 2025-02-06

## 2025-02-06 RX ORDER — OSELTAMIVIR PHOSPHATE 6 MG/ML
75 FOR SUSPENSION ORAL ONCE
Status: COMPLETED | OUTPATIENT
Start: 2025-02-06 | End: 2025-02-06

## 2025-02-06 RX ORDER — SODIUM CHLORIDE, SODIUM LACTATE, POTASSIUM CHLORIDE, CALCIUM CHLORIDE 600; 310; 30; 20 MG/100ML; MG/100ML; MG/100ML; MG/100ML
INJECTION, SOLUTION INTRAVENOUS CONTINUOUS
Status: CANCELLED | OUTPATIENT
Start: 2025-02-06

## 2025-02-06 RX ADMIN — AMPICILLIN SODIUM AND SULBACTAM SODIUM 3000 MG: 2; 1 INJECTION, POWDER, FOR SOLUTION INTRAMUSCULAR; INTRAVENOUS at 10:08

## 2025-02-06 RX ADMIN — IPRATROPIUM BROMIDE AND ALBUTEROL SULFATE 1 DOSE: .5; 2.5 SOLUTION RESPIRATORY (INHALATION) at 06:33

## 2025-02-06 RX ADMIN — DIGOXIN 250 MCG: 0.25 INJECTION INTRAMUSCULAR; INTRAVENOUS at 17:48

## 2025-02-06 RX ADMIN — SODIUM CHLORIDE, PRESERVATIVE FREE 10 ML: 5 INJECTION INTRAVENOUS at 09:59

## 2025-02-06 RX ADMIN — AMPICILLIN SODIUM AND SULBACTAM SODIUM 3000 MG: 2; 1 INJECTION, POWDER, FOR SOLUTION INTRAMUSCULAR; INTRAVENOUS at 15:32

## 2025-02-06 RX ADMIN — OSELTAMIVIR PHOSPHATE 30 MG: 6 POWDER, FOR SUSPENSION ORAL at 21:56

## 2025-02-06 RX ADMIN — IPRATROPIUM BROMIDE AND ALBUTEROL SULFATE 1 DOSE: .5; 2.5 SOLUTION RESPIRATORY (INHALATION) at 19:30

## 2025-02-06 RX ADMIN — FUROSEMIDE 40 MG: 10 INJECTION, SOLUTION INTRAMUSCULAR; INTRAVENOUS at 09:58

## 2025-02-06 RX ADMIN — GUAIFENESIN 600 MG: 600 TABLET, EXTENDED RELEASE ORAL at 21:55

## 2025-02-06 RX ADMIN — INSULIN LISPRO 3 UNITS: 100 INJECTION, SOLUTION INTRAVENOUS; SUBCUTANEOUS at 22:03

## 2025-02-06 RX ADMIN — METOPROLOL TARTRATE 5 MG: 5 INJECTION, SOLUTION INTRAVENOUS at 03:44

## 2025-02-06 RX ADMIN — DIGOXIN 250 MCG: 0.25 INJECTION INTRAMUSCULAR; INTRAVENOUS at 22:01

## 2025-02-06 RX ADMIN — IPRATROPIUM BROMIDE AND ALBUTEROL SULFATE 1 DOSE: .5; 2.5 SOLUTION RESPIRATORY (INHALATION) at 14:37

## 2025-02-06 RX ADMIN — IPRATROPIUM BROMIDE AND ALBUTEROL SULFATE 1 DOSE: .5; 2.5 SOLUTION RESPIRATORY (INHALATION) at 10:35

## 2025-02-06 RX ADMIN — WATER 40 MG: 1 INJECTION INTRAMUSCULAR; INTRAVENOUS; SUBCUTANEOUS at 09:58

## 2025-02-06 RX ADMIN — METOPROLOL TARTRATE 5 MG: 5 INJECTION, SOLUTION INTRAVENOUS at 17:48

## 2025-02-06 RX ADMIN — AMPICILLIN SODIUM AND SULBACTAM SODIUM 3000 MG: 2; 1 INJECTION, POWDER, FOR SOLUTION INTRAMUSCULAR; INTRAVENOUS at 21:55

## 2025-02-06 RX ADMIN — FUROSEMIDE 40 MG: 10 INJECTION, SOLUTION INTRAMUSCULAR; INTRAVENOUS at 17:48

## 2025-02-06 RX ADMIN — POTASSIUM CHLORIDE 10 MEQ: 750 TABLET, EXTENDED RELEASE ORAL at 21:55

## 2025-02-06 RX ADMIN — SODIUM CHLORIDE, PRESERVATIVE FREE 10 ML: 5 INJECTION INTRAVENOUS at 22:19

## 2025-02-06 RX ADMIN — DIGOXIN 250 MCG: 0.25 INJECTION INTRAMUSCULAR; INTRAVENOUS at 11:28

## 2025-02-06 RX ADMIN — METOPROLOL TARTRATE 5 MG: 5 INJECTION, SOLUTION INTRAVENOUS at 11:28

## 2025-02-06 RX ADMIN — Medication 75 MG: at 10:58

## 2025-02-06 RX ADMIN — METOPROLOL TARTRATE 5 MG: 5 INJECTION, SOLUTION INTRAVENOUS at 22:03

## 2025-02-06 RX ADMIN — Medication 20 MG: at 09:57

## 2025-02-06 NOTE — FLOWSHEET NOTE
02/06/25 0747   Vital Signs   Temp 98.5 °F (36.9 °C)   Temp Source Oral   Pulse (!) 107   Heart Rate Source Monitor   Respirations 18   /75   MAP (Calculated) 94   BP Location Left upper arm   BP Method Automatic   Patient Position Semi fowlers   Oxygen Therapy   SpO2 93 %   O2 Device Nasal cannula   O2 Flow Rate (L/min) 2 L/min     Assessment complete. Morning oral medications held due to patient being NPO.    Bedside Mobility Assessment Tool (BMAT):     Assessment Level 1- Sit and Shake    1. From a semi-reclined position, ask patient to sit up and rotate to a seated position at the side of the bed. Can use the bedrail.    2. Ask patient to reach out and grab your hand and shake making sure patient reaches across his/her midline.   Pass- Patient is able to come to a seated position, maintain core strength. Maintains seated balance while reaching across midline. Move on to Assessment Level 2.     Assessment Level 2- Stretch and Point   1. With patient in seated position at the side of the bed, have patient place both feet on the floor (or stool) with knees no higher than hips.    2. Ask patient to stretch one leg and straighten the knee, then bend the ankle/flex and point the toes. If appropriate, repeat with the other leg.   Pass- Patient is able to demonstrate appropriate quad strength on intended weight bearing limb(s). Move onto Assessment Level 3.     Assessment Level 3- Stand   1. Ask patient to elevate off the bed or chair (seated to standing) using an assistive device (cane, bedrail).    2. Patient should be able to raise buttocks off be and hold for a count of five. May repeat once.   Pass- Patient maintains standing stability for at least 5 seconds, proceed to assessment level 4.    Assessment Level 4- Walk   1. Ask patient to march in place at bedside.    2. Then ask patient to advance step and return each foot. Some medical conditions may render a patient from stepping backwards, use your best

## 2025-02-07 LAB
ANION GAP SERPL CALCULATED.3IONS-SCNC: 11 MMOL/L (ref 3–16)
BUN SERPL-MCNC: 21 MG/DL (ref 7–20)
CALCIUM SERPL-MCNC: 8 MG/DL (ref 8.3–10.6)
CHLORIDE SERPL-SCNC: 105 MMOL/L (ref 99–110)
CO2 SERPL-SCNC: 28 MMOL/L (ref 21–32)
CREAT SERPL-MCNC: 0.7 MG/DL (ref 0.6–1.2)
GFR SERPLBLD CREATININE-BSD FMLA CKD-EPI: 83 ML/MIN/{1.73_M2}
GLUCOSE BLD-MCNC: 203 MG/DL (ref 70–99)
GLUCOSE BLD-MCNC: 211 MG/DL (ref 70–99)
GLUCOSE BLD-MCNC: 261 MG/DL (ref 70–99)
GLUCOSE BLD-MCNC: 268 MG/DL (ref 70–99)
GLUCOSE SERPL-MCNC: 214 MG/DL (ref 70–99)
INR PPP: 4.42 (ref 0.85–1.15)
PERFORMED ON: ABNORMAL
POTASSIUM SERPL-SCNC: 3.7 MMOL/L (ref 3.5–5.1)
PROTHROMBIN TIME: 41.7 SEC (ref 11.9–14.9)
SODIUM SERPL-SCNC: 144 MMOL/L (ref 136–145)

## 2025-02-07 PROCEDURE — 2500000003 HC RX 250 WO HCPCS: Performed by: NURSE PRACTITIONER

## 2025-02-07 PROCEDURE — 6360000002 HC RX W HCPCS: Performed by: STUDENT IN AN ORGANIZED HEALTH CARE EDUCATION/TRAINING PROGRAM

## 2025-02-07 PROCEDURE — 6370000000 HC RX 637 (ALT 250 FOR IP): Performed by: INTERNAL MEDICINE

## 2025-02-07 PROCEDURE — 6360000002 HC RX W HCPCS: Performed by: INTERNAL MEDICINE

## 2025-02-07 PROCEDURE — 99233 SBSQ HOSP IP/OBS HIGH 50: CPT | Performed by: INTERNAL MEDICINE

## 2025-02-07 PROCEDURE — 85610 PROTHROMBIN TIME: CPT

## 2025-02-07 PROCEDURE — 6370000000 HC RX 637 (ALT 250 FOR IP): Performed by: STUDENT IN AN ORGANIZED HEALTH CARE EDUCATION/TRAINING PROGRAM

## 2025-02-07 PROCEDURE — 6370000000 HC RX 637 (ALT 250 FOR IP): Performed by: NURSE PRACTITIONER

## 2025-02-07 PROCEDURE — 94669 MECHANICAL CHEST WALL OSCILL: CPT

## 2025-02-07 PROCEDURE — 36415 COLL VENOUS BLD VENIPUNCTURE: CPT

## 2025-02-07 PROCEDURE — 2500000003 HC RX 250 WO HCPCS: Performed by: INTERNAL MEDICINE

## 2025-02-07 PROCEDURE — 94640 AIRWAY INHALATION TREATMENT: CPT

## 2025-02-07 PROCEDURE — 2060000000 HC ICU INTERMEDIATE R&B

## 2025-02-07 PROCEDURE — 97116 GAIT TRAINING THERAPY: CPT

## 2025-02-07 PROCEDURE — 2700000000 HC OXYGEN THERAPY PER DAY

## 2025-02-07 PROCEDURE — 2580000003 HC RX 258

## 2025-02-07 PROCEDURE — 80048 BASIC METABOLIC PNL TOTAL CA: CPT

## 2025-02-07 PROCEDURE — 2580000003 HC RX 258: Performed by: INTERNAL MEDICINE

## 2025-02-07 PROCEDURE — 94761 N-INVAS EAR/PLS OXIMETRY MLT: CPT

## 2025-02-07 PROCEDURE — 97530 THERAPEUTIC ACTIVITIES: CPT

## 2025-02-07 PROCEDURE — 99232 SBSQ HOSP IP/OBS MODERATE 35: CPT | Performed by: STUDENT IN AN ORGANIZED HEALTH CARE EDUCATION/TRAINING PROGRAM

## 2025-02-07 PROCEDURE — 2500000003 HC RX 250 WO HCPCS

## 2025-02-07 PROCEDURE — 2500000003 HC RX 250 WO HCPCS: Performed by: STUDENT IN AN ORGANIZED HEALTH CARE EDUCATION/TRAINING PROGRAM

## 2025-02-07 PROCEDURE — 97535 SELF CARE MNGMENT TRAINING: CPT

## 2025-02-07 PROCEDURE — 97110 THERAPEUTIC EXERCISES: CPT

## 2025-02-07 RX ORDER — OSELTAMIVIR PHOSPHATE 6 MG/ML
75 FOR SUSPENSION ORAL 2 TIMES DAILY
Status: COMPLETED | OUTPATIENT
Start: 2025-02-07 | End: 2025-02-10

## 2025-02-07 RX ORDER — DILTIAZEM HYDROCHLORIDE 180 MG/1
180 CAPSULE, COATED, EXTENDED RELEASE ORAL DAILY
Status: DISCONTINUED | OUTPATIENT
Start: 2025-02-07 | End: 2025-02-12 | Stop reason: HOSPADM

## 2025-02-07 RX ORDER — FUROSEMIDE 10 MG/ML
40 INJECTION INTRAMUSCULAR; INTRAVENOUS 2 TIMES DAILY
Status: DISCONTINUED | OUTPATIENT
Start: 2025-02-07 | End: 2025-02-11

## 2025-02-07 RX ADMIN — GUAIFENESIN 600 MG: 600 TABLET, EXTENDED RELEASE ORAL at 09:08

## 2025-02-07 RX ADMIN — AMPICILLIN SODIUM AND SULBACTAM SODIUM 3000 MG: 2; 1 INJECTION, POWDER, FOR SOLUTION INTRAMUSCULAR; INTRAVENOUS at 16:31

## 2025-02-07 RX ADMIN — IPRATROPIUM BROMIDE AND ALBUTEROL SULFATE 1 DOSE: 2.5; .5 SOLUTION RESPIRATORY (INHALATION) at 07:56

## 2025-02-07 RX ADMIN — ATORVASTATIN CALCIUM 10 MG: 10 TABLET, FILM COATED ORAL at 08:49

## 2025-02-07 RX ADMIN — IPRATROPIUM BROMIDE AND ALBUTEROL SULFATE 1 DOSE: .5; 2.5 SOLUTION RESPIRATORY (INHALATION) at 05:45

## 2025-02-07 RX ADMIN — SODIUM CHLORIDE, PRESERVATIVE FREE 10 ML: 5 INJECTION INTRAVENOUS at 08:57

## 2025-02-07 RX ADMIN — INSULIN LISPRO 2 UNITS: 100 INJECTION, SOLUTION INTRAVENOUS; SUBCUTANEOUS at 17:21

## 2025-02-07 RX ADMIN — GUAIFENESIN 600 MG: 600 TABLET, EXTENDED RELEASE ORAL at 21:33

## 2025-02-07 RX ADMIN — Medication 75 MG: at 21:32

## 2025-02-07 RX ADMIN — INSULIN LISPRO 1 UNITS: 100 INJECTION, SOLUTION INTRAVENOUS; SUBCUTANEOUS at 09:04

## 2025-02-07 RX ADMIN — DILTIAZEM HYDROCHLORIDE 180 MG: 180 CAPSULE, COATED, EXTENDED RELEASE ORAL at 08:58

## 2025-02-07 RX ADMIN — IPRATROPIUM BROMIDE AND ALBUTEROL SULFATE 1 DOSE: .5; 2.5 SOLUTION RESPIRATORY (INHALATION) at 19:30

## 2025-02-07 RX ADMIN — IPRATROPIUM BROMIDE AND ALBUTEROL SULFATE 1 DOSE: .5; 2.5 SOLUTION RESPIRATORY (INHALATION) at 11:10

## 2025-02-07 RX ADMIN — INSULIN LISPRO 1 UNITS: 100 INJECTION, SOLUTION INTRAVENOUS; SUBCUTANEOUS at 12:13

## 2025-02-07 RX ADMIN — IPRATROPIUM BROMIDE AND ALBUTEROL SULFATE 1 DOSE: .5; 2.5 SOLUTION RESPIRATORY (INHALATION) at 15:15

## 2025-02-07 RX ADMIN — Medication 1 CAPSULE: at 08:50

## 2025-02-07 RX ADMIN — AMPICILLIN SODIUM AND SULBACTAM SODIUM 3000 MG: 2; 1 INJECTION, POWDER, FOR SOLUTION INTRAMUSCULAR; INTRAVENOUS at 21:40

## 2025-02-07 RX ADMIN — WATER 40 MG: 1 INJECTION INTRAMUSCULAR; INTRAVENOUS; SUBCUTANEOUS at 08:55

## 2025-02-07 RX ADMIN — AMPICILLIN SODIUM AND SULBACTAM SODIUM 3000 MG: 2; 1 INJECTION, POWDER, FOR SOLUTION INTRAMUSCULAR; INTRAVENOUS at 03:33

## 2025-02-07 RX ADMIN — POTASSIUM CHLORIDE 10 MEQ: 750 TABLET, EXTENDED RELEASE ORAL at 21:32

## 2025-02-07 RX ADMIN — Medication 20 MG: at 21:31

## 2025-02-07 RX ADMIN — SODIUM CHLORIDE, PRESERVATIVE FREE 10 ML: 5 INJECTION INTRAVENOUS at 21:34

## 2025-02-07 RX ADMIN — INSULIN LISPRO 2 UNITS: 100 INJECTION, SOLUTION INTRAVENOUS; SUBCUTANEOUS at 21:31

## 2025-02-07 RX ADMIN — POTASSIUM CHLORIDE 10 MEQ: 750 TABLET, EXTENDED RELEASE ORAL at 09:00

## 2025-02-07 RX ADMIN — FUROSEMIDE 40 MG: 10 INJECTION, SOLUTION INTRAMUSCULAR; INTRAVENOUS at 17:21

## 2025-02-07 RX ADMIN — AMPICILLIN SODIUM AND SULBACTAM SODIUM 3000 MG: 2; 1 INJECTION, POWDER, FOR SOLUTION INTRAMUSCULAR; INTRAVENOUS at 09:04

## 2025-02-07 RX ADMIN — METOPROLOL TARTRATE 5 MG: 5 INJECTION, SOLUTION INTRAVENOUS at 03:28

## 2025-02-07 RX ADMIN — Medication 20 MG: at 08:52

## 2025-02-07 RX ADMIN — OSELTAMIVIR PHOSPHATE 30 MG: 6 POWDER, FOR SUSPENSION ORAL at 09:44

## 2025-02-07 RX ADMIN — FUROSEMIDE 40 MG: 10 INJECTION, SOLUTION INTRAMUSCULAR; INTRAVENOUS at 09:00

## 2025-02-07 NOTE — FLOWSHEET NOTE
02/06/25 2023   Vital Signs   Temp 98.3 °F (36.8 °C)   Temp Source Oral   Pulse 100   Heart Rate Source Monitor   Respirations 18   /71   MAP (Calculated) 93   BP Method Automatic   Patient Position Semi fowlers   Oxygen Therapy   SpO2 94 %   O2 Device Nasal cannula   O2 Flow Rate (L/min) 2 L/min

## 2025-02-08 LAB
ANION GAP SERPL CALCULATED.3IONS-SCNC: 10 MMOL/L (ref 3–16)
BACTERIA BLD CULT: NORMAL
BUN SERPL-MCNC: 22 MG/DL (ref 7–20)
CALCIUM SERPL-MCNC: 8.1 MG/DL (ref 8.3–10.6)
CHLORIDE SERPL-SCNC: 103 MMOL/L (ref 99–110)
CO2 SERPL-SCNC: 31 MMOL/L (ref 21–32)
CREAT SERPL-MCNC: 0.8 MG/DL (ref 0.6–1.2)
GFR SERPLBLD CREATININE-BSD FMLA CKD-EPI: 71 ML/MIN/{1.73_M2}
GLUCOSE BLD-MCNC: 222 MG/DL (ref 70–99)
GLUCOSE BLD-MCNC: 248 MG/DL (ref 70–99)
GLUCOSE BLD-MCNC: 296 MG/DL (ref 70–99)
GLUCOSE BLD-MCNC: 320 MG/DL (ref 70–99)
GLUCOSE SERPL-MCNC: 257 MG/DL (ref 70–99)
INR PPP: 4.67 (ref 0.85–1.15)
MAGNESIUM SERPL-MCNC: 2.04 MG/DL (ref 1.8–2.4)
PERFORMED ON: ABNORMAL
POTASSIUM SERPL-SCNC: 3.5 MMOL/L (ref 3.5–5.1)
PROTHROMBIN TIME: 43.4 SEC (ref 11.9–14.9)
SODIUM SERPL-SCNC: 144 MMOL/L (ref 136–145)

## 2025-02-08 PROCEDURE — 6370000000 HC RX 637 (ALT 250 FOR IP): Performed by: INTERNAL MEDICINE

## 2025-02-08 PROCEDURE — 2060000000 HC ICU INTERMEDIATE R&B

## 2025-02-08 PROCEDURE — 36415 COLL VENOUS BLD VENIPUNCTURE: CPT

## 2025-02-08 PROCEDURE — 2580000003 HC RX 258

## 2025-02-08 PROCEDURE — 2500000003 HC RX 250 WO HCPCS: Performed by: INTERNAL MEDICINE

## 2025-02-08 PROCEDURE — 99232 SBSQ HOSP IP/OBS MODERATE 35: CPT | Performed by: STUDENT IN AN ORGANIZED HEALTH CARE EDUCATION/TRAINING PROGRAM

## 2025-02-08 PROCEDURE — 99233 SBSQ HOSP IP/OBS HIGH 50: CPT | Performed by: INTERNAL MEDICINE

## 2025-02-08 PROCEDURE — 2700000000 HC OXYGEN THERAPY PER DAY

## 2025-02-08 PROCEDURE — 2580000003 HC RX 258: Performed by: INTERNAL MEDICINE

## 2025-02-08 PROCEDURE — 6360000002 HC RX W HCPCS: Performed by: STUDENT IN AN ORGANIZED HEALTH CARE EDUCATION/TRAINING PROGRAM

## 2025-02-08 PROCEDURE — 94669 MECHANICAL CHEST WALL OSCILL: CPT

## 2025-02-08 PROCEDURE — 2500000003 HC RX 250 WO HCPCS: Performed by: NURSE PRACTITIONER

## 2025-02-08 PROCEDURE — 6370000000 HC RX 637 (ALT 250 FOR IP): Performed by: STUDENT IN AN ORGANIZED HEALTH CARE EDUCATION/TRAINING PROGRAM

## 2025-02-08 PROCEDURE — 83735 ASSAY OF MAGNESIUM: CPT

## 2025-02-08 PROCEDURE — 94640 AIRWAY INHALATION TREATMENT: CPT

## 2025-02-08 PROCEDURE — 6370000000 HC RX 637 (ALT 250 FOR IP): Performed by: NURSE PRACTITIONER

## 2025-02-08 PROCEDURE — 2500000003 HC RX 250 WO HCPCS

## 2025-02-08 PROCEDURE — 85610 PROTHROMBIN TIME: CPT

## 2025-02-08 PROCEDURE — 94761 N-INVAS EAR/PLS OXIMETRY MLT: CPT

## 2025-02-08 PROCEDURE — 80048 BASIC METABOLIC PNL TOTAL CA: CPT

## 2025-02-08 PROCEDURE — 6360000002 HC RX W HCPCS: Performed by: INTERNAL MEDICINE

## 2025-02-08 RX ORDER — INSULIN GLARGINE 100 [IU]/ML
10 INJECTION, SOLUTION SUBCUTANEOUS NIGHTLY
Status: DISCONTINUED | OUTPATIENT
Start: 2025-02-08 | End: 2025-02-09

## 2025-02-08 RX ORDER — PREDNISONE 20 MG/1
40 TABLET ORAL DAILY
Status: DISCONTINUED | OUTPATIENT
Start: 2025-02-09 | End: 2025-02-09

## 2025-02-08 RX ADMIN — Medication 1 CAPSULE: at 08:23

## 2025-02-08 RX ADMIN — IPRATROPIUM BROMIDE AND ALBUTEROL SULFATE 1 DOSE: .5; 2.5 SOLUTION RESPIRATORY (INHALATION) at 19:26

## 2025-02-08 RX ADMIN — SODIUM CHLORIDE, PRESERVATIVE FREE 10 ML: 5 INJECTION INTRAVENOUS at 21:03

## 2025-02-08 RX ADMIN — IPRATROPIUM BROMIDE AND ALBUTEROL SULFATE 1 DOSE: .5; 2.5 SOLUTION RESPIRATORY (INHALATION) at 07:35

## 2025-02-08 RX ADMIN — POTASSIUM CHLORIDE 10 MEQ: 750 TABLET, EXTENDED RELEASE ORAL at 21:01

## 2025-02-08 RX ADMIN — FUROSEMIDE 40 MG: 10 INJECTION, SOLUTION INTRAMUSCULAR; INTRAVENOUS at 16:52

## 2025-02-08 RX ADMIN — GUAIFENESIN 600 MG: 600 TABLET, EXTENDED RELEASE ORAL at 08:23

## 2025-02-08 RX ADMIN — IPRATROPIUM BROMIDE AND ALBUTEROL SULFATE 1 DOSE: .5; 2.5 SOLUTION RESPIRATORY (INHALATION) at 11:36

## 2025-02-08 RX ADMIN — AMPICILLIN SODIUM AND SULBACTAM SODIUM 3000 MG: 2; 1 INJECTION, POWDER, FOR SOLUTION INTRAMUSCULAR; INTRAVENOUS at 04:02

## 2025-02-08 RX ADMIN — ATORVASTATIN CALCIUM 10 MG: 10 TABLET, FILM COATED ORAL at 08:23

## 2025-02-08 RX ADMIN — SODIUM CHLORIDE, PRESERVATIVE FREE 10 ML: 5 INJECTION INTRAVENOUS at 08:24

## 2025-02-08 RX ADMIN — INSULIN LISPRO 2 UNITS: 100 INJECTION, SOLUTION INTRAVENOUS; SUBCUTANEOUS at 16:52

## 2025-02-08 RX ADMIN — Medication 20 MG: at 08:23

## 2025-02-08 RX ADMIN — FUROSEMIDE 40 MG: 10 INJECTION, SOLUTION INTRAMUSCULAR; INTRAVENOUS at 08:24

## 2025-02-08 RX ADMIN — AMPICILLIN SODIUM AND SULBACTAM SODIUM 3000 MG: 2; 1 INJECTION, POWDER, FOR SOLUTION INTRAMUSCULAR; INTRAVENOUS at 21:16

## 2025-02-08 RX ADMIN — INSULIN LISPRO 1 UNITS: 100 INJECTION, SOLUTION INTRAVENOUS; SUBCUTANEOUS at 08:24

## 2025-02-08 RX ADMIN — Medication 75 MG: at 08:23

## 2025-02-08 RX ADMIN — AMPICILLIN SODIUM AND SULBACTAM SODIUM 3000 MG: 2; 1 INJECTION, POWDER, FOR SOLUTION INTRAMUSCULAR; INTRAVENOUS at 08:37

## 2025-02-08 RX ADMIN — AMPICILLIN SODIUM AND SULBACTAM SODIUM 3000 MG: 2; 1 INJECTION, POWDER, FOR SOLUTION INTRAMUSCULAR; INTRAVENOUS at 14:59

## 2025-02-08 RX ADMIN — Medication 20 MG: at 21:01

## 2025-02-08 RX ADMIN — GUAIFENESIN 600 MG: 600 TABLET, EXTENDED RELEASE ORAL at 21:01

## 2025-02-08 RX ADMIN — INSULIN LISPRO 1 UNITS: 100 INJECTION, SOLUTION INTRAVENOUS; SUBCUTANEOUS at 11:46

## 2025-02-08 RX ADMIN — Medication 75 MG: at 21:02

## 2025-02-08 RX ADMIN — INSULIN LISPRO 3 UNITS: 100 INJECTION, SOLUTION INTRAVENOUS; SUBCUTANEOUS at 21:01

## 2025-02-08 RX ADMIN — INSULIN GLARGINE 10 UNITS: 100 INJECTION, SOLUTION SUBCUTANEOUS at 21:01

## 2025-02-08 RX ADMIN — WATER 40 MG: 1 INJECTION INTRAMUSCULAR; INTRAVENOUS; SUBCUTANEOUS at 08:24

## 2025-02-08 RX ADMIN — DILTIAZEM HYDROCHLORIDE 180 MG: 180 CAPSULE, COATED, EXTENDED RELEASE ORAL at 08:23

## 2025-02-08 RX ADMIN — POTASSIUM CHLORIDE 10 MEQ: 750 TABLET, EXTENDED RELEASE ORAL at 08:23

## 2025-02-08 NOTE — FLOWSHEET NOTE
02/07/25 2359   Vital Signs   Temp 98.7 °F (37.1 °C)   Temp Source Oral   Pulse 80   Heart Rate Source Monitor   Respirations 16   /63   MAP (Calculated) 85   BP Location Left upper arm   BP Method Automatic   Patient Position Semi fowlers   Oxygen Therapy   SpO2 95 %   O2 Device Nasal cannula   O2 Flow Rate (L/min) 3 L/min     Pt reassessment complete.  No changes noted.  Pt is lying in bed with their eyes closed. Respirations are easy and even. Call light within reach bed in lowest position with the wheels locked. Will continue to monitor. Enedina Mejia RN

## 2025-02-08 NOTE — FLOWSHEET NOTE
02/07/25 2006   Vital Signs   Temp 99.1 °F (37.3 °C)   Temp Source Axillary   Pulse 100   Heart Rate Source Monitor   Respirations 18   BP (!) 125/54   MAP (Calculated) 78   BP Location Right upper arm   BP Method Automatic   Patient Position Semi fowlers   Oxygen Therapy   SpO2 95 %   O2 Device Nasal cannula   O2 Flow Rate (L/min) 3 L/min     Pt assessment complete.  Pt lying in bed quietly.  Lung sounds diminished with expiratory wheezes.  Pt on 02 3liters via nasal canula.  Pt Afib per monitor with HR of 100.  Bowers cath in place draining clear yellow urine.  Nightly medications given.  Pt repositioned for comfort.  No other needs at this time.  Call light within reach.  Bed exit alarm on.

## 2025-02-08 NOTE — FLOWSHEET NOTE
02/07/25 1635   Vital Signs   Temp 98.1 °F (36.7 °C)   Temp Source Oral   Pulse (!) 114   Heart Rate Source Monitor   Respirations 18   /76   MAP (Calculated) 91   BP Location Left upper arm   BP Method Automatic   Patient Position Up in chair   Oxygen Therapy   SpO2 97 %   O2 Device Nasal cannula   O2 Flow Rate (L/min) 3 L/min     Patient worked with OT up to chair, patient tolerated solid food today, no nausea or vomiting.  Wheezing is better this pm.  No additional needs expressed. Geno Campa, RN

## 2025-02-08 NOTE — FLOWSHEET NOTE
02/08/25 0711   Vital Signs   Temp 98.6 °F (37 °C)   Temp Source Oral   Pulse 93   Heart Rate Source Monitor   Respirations 16   BP (!) 142/68   MAP (Calculated) 93   BP Location Left upper arm   BP Method Automatic   Patient Position Semi fowlers   Oxygen Therapy   SpO2 95 %   O2 Device Nasal cannula   O2 Flow Rate (L/min) 3 L/min     Patient resting quietly in bed. No s/s of distress noted. Shift assessment complete, see flow sheet. Denies needs at this time. Call light in reach. Will monitor.

## 2025-02-09 LAB
ANION GAP SERPL CALCULATED.3IONS-SCNC: 11 MMOL/L (ref 3–16)
BASOPHILS # BLD: 0 K/UL (ref 0–0.2)
BASOPHILS NFR BLD: 0 %
BUN SERPL-MCNC: 23 MG/DL (ref 7–20)
CALCIUM SERPL-MCNC: 8 MG/DL (ref 8.3–10.6)
CHLORIDE SERPL-SCNC: 103 MMOL/L (ref 99–110)
CO2 SERPL-SCNC: 29 MMOL/L (ref 21–32)
CREAT SERPL-MCNC: 0.8 MG/DL (ref 0.6–1.2)
DEPRECATED RDW RBC AUTO: 15.7 % (ref 12.4–15.4)
EOSINOPHIL # BLD: 0 K/UL (ref 0–0.6)
EOSINOPHIL NFR BLD: 0 %
GFR SERPLBLD CREATININE-BSD FMLA CKD-EPI: 71 ML/MIN/{1.73_M2}
GLUCOSE BLD-MCNC: 243 MG/DL (ref 70–99)
GLUCOSE BLD-MCNC: 260 MG/DL (ref 70–99)
GLUCOSE BLD-MCNC: 264 MG/DL (ref 70–99)
GLUCOSE BLD-MCNC: 266 MG/DL (ref 70–99)
GLUCOSE BLD-MCNC: 353 MG/DL (ref 70–99)
GLUCOSE SERPL-MCNC: 253 MG/DL (ref 70–99)
HCT VFR BLD AUTO: 39.4 % (ref 36–48)
HGB BLD-MCNC: 12.8 G/DL (ref 12–16)
INR PPP: 4.26 (ref 0.85–1.15)
LYMPHOCYTES # BLD: 0.6 K/UL (ref 1–5.1)
LYMPHOCYTES NFR BLD: 5 %
MCH RBC QN AUTO: 29.3 PG (ref 26–34)
MCHC RBC AUTO-ENTMCNC: 32.5 G/DL (ref 31–36)
MCV RBC AUTO: 90 FL (ref 80–100)
MONOCYTES # BLD: 0.4 K/UL (ref 0–1.3)
MONOCYTES NFR BLD: 3 %
NEUTROPHILS # BLD: 10.9 K/UL (ref 1.7–7.7)
NEUTROPHILS NFR BLD: 92 %
PERFORMED ON: ABNORMAL
PLATELET # BLD AUTO: 167 K/UL (ref 135–450)
PLATELET BLD QL SMEAR: ADEQUATE
PMV BLD AUTO: 9.6 FL (ref 5–10.5)
POTASSIUM SERPL-SCNC: 3.8 MMOL/L (ref 3.5–5.1)
PROTHROMBIN TIME: 40.5 SEC (ref 11.9–14.9)
RBC # BLD AUTO: 4.38 M/UL (ref 4–5.2)
RBC MORPH BLD: NORMAL
SLIDE REVIEW: ABNORMAL
SODIUM SERPL-SCNC: 143 MMOL/L (ref 136–145)
WBC # BLD AUTO: 11.9 K/UL (ref 4–11)

## 2025-02-09 PROCEDURE — 6370000000 HC RX 637 (ALT 250 FOR IP): Performed by: NURSE PRACTITIONER

## 2025-02-09 PROCEDURE — 80048 BASIC METABOLIC PNL TOTAL CA: CPT

## 2025-02-09 PROCEDURE — 2500000003 HC RX 250 WO HCPCS

## 2025-02-09 PROCEDURE — 6370000000 HC RX 637 (ALT 250 FOR IP): Performed by: STUDENT IN AN ORGANIZED HEALTH CARE EDUCATION/TRAINING PROGRAM

## 2025-02-09 PROCEDURE — 2580000003 HC RX 258

## 2025-02-09 PROCEDURE — 99233 SBSQ HOSP IP/OBS HIGH 50: CPT | Performed by: INTERNAL MEDICINE

## 2025-02-09 PROCEDURE — 36415 COLL VENOUS BLD VENIPUNCTURE: CPT

## 2025-02-09 PROCEDURE — 85610 PROTHROMBIN TIME: CPT

## 2025-02-09 PROCEDURE — 99232 SBSQ HOSP IP/OBS MODERATE 35: CPT | Performed by: STUDENT IN AN ORGANIZED HEALTH CARE EDUCATION/TRAINING PROGRAM

## 2025-02-09 PROCEDURE — 6360000002 HC RX W HCPCS: Performed by: INTERNAL MEDICINE

## 2025-02-09 PROCEDURE — 6370000000 HC RX 637 (ALT 250 FOR IP): Performed by: INTERNAL MEDICINE

## 2025-02-09 PROCEDURE — 2580000003 HC RX 258: Performed by: INTERNAL MEDICINE

## 2025-02-09 PROCEDURE — 94640 AIRWAY INHALATION TREATMENT: CPT

## 2025-02-09 PROCEDURE — 94761 N-INVAS EAR/PLS OXIMETRY MLT: CPT

## 2025-02-09 PROCEDURE — 2060000000 HC ICU INTERMEDIATE R&B

## 2025-02-09 PROCEDURE — 2500000003 HC RX 250 WO HCPCS: Performed by: NURSE PRACTITIONER

## 2025-02-09 PROCEDURE — 6360000002 HC RX W HCPCS: Performed by: STUDENT IN AN ORGANIZED HEALTH CARE EDUCATION/TRAINING PROGRAM

## 2025-02-09 PROCEDURE — 2700000000 HC OXYGEN THERAPY PER DAY

## 2025-02-09 PROCEDURE — 94669 MECHANICAL CHEST WALL OSCILL: CPT

## 2025-02-09 PROCEDURE — 85025 COMPLETE CBC W/AUTO DIFF WBC: CPT

## 2025-02-09 PROCEDURE — 92526 ORAL FUNCTION THERAPY: CPT

## 2025-02-09 RX ORDER — INSULIN GLARGINE 100 [IU]/ML
15 INJECTION, SOLUTION SUBCUTANEOUS NIGHTLY
Status: DISCONTINUED | OUTPATIENT
Start: 2025-02-09 | End: 2025-02-12 | Stop reason: HOSPADM

## 2025-02-09 RX ORDER — INSULIN LISPRO 100 [IU]/ML
0-8 INJECTION, SOLUTION INTRAVENOUS; SUBCUTANEOUS
Status: DISCONTINUED | OUTPATIENT
Start: 2025-02-09 | End: 2025-02-12 | Stop reason: HOSPADM

## 2025-02-09 RX ADMIN — ATORVASTATIN CALCIUM 10 MG: 10 TABLET, FILM COATED ORAL at 08:02

## 2025-02-09 RX ADMIN — Medication 20 MG: at 20:22

## 2025-02-09 RX ADMIN — INSULIN LISPRO 4 UNITS: 100 INJECTION, SOLUTION INTRAVENOUS; SUBCUTANEOUS at 08:02

## 2025-02-09 RX ADMIN — IPRATROPIUM BROMIDE AND ALBUTEROL SULFATE 1 DOSE: .5; 2.5 SOLUTION RESPIRATORY (INHALATION) at 21:02

## 2025-02-09 RX ADMIN — AMPICILLIN SODIUM AND SULBACTAM SODIUM 3000 MG: 2; 1 INJECTION, POWDER, FOR SOLUTION INTRAMUSCULAR; INTRAVENOUS at 04:16

## 2025-02-09 RX ADMIN — Medication 75 MG: at 09:50

## 2025-02-09 RX ADMIN — Medication 75 MG: at 21:09

## 2025-02-09 RX ADMIN — GUAIFENESIN 600 MG: 600 TABLET, EXTENDED RELEASE ORAL at 08:02

## 2025-02-09 RX ADMIN — INSULIN GLARGINE 15 UNITS: 100 INJECTION, SOLUTION SUBCUTANEOUS at 20:22

## 2025-02-09 RX ADMIN — PREDNISONE 40 MG: 20 TABLET ORAL at 08:02

## 2025-02-09 RX ADMIN — POTASSIUM CHLORIDE 10 MEQ: 750 TABLET, EXTENDED RELEASE ORAL at 20:21

## 2025-02-09 RX ADMIN — AMPICILLIN SODIUM AND SULBACTAM SODIUM 3000 MG: 2; 1 INJECTION, POWDER, FOR SOLUTION INTRAMUSCULAR; INTRAVENOUS at 20:18

## 2025-02-09 RX ADMIN — FUROSEMIDE 40 MG: 10 INJECTION, SOLUTION INTRAMUSCULAR; INTRAVENOUS at 17:56

## 2025-02-09 RX ADMIN — FUROSEMIDE 40 MG: 10 INJECTION, SOLUTION INTRAMUSCULAR; INTRAVENOUS at 08:02

## 2025-02-09 RX ADMIN — IPRATROPIUM BROMIDE AND ALBUTEROL SULFATE 1 DOSE: .5; 2.5 SOLUTION RESPIRATORY (INHALATION) at 07:45

## 2025-02-09 RX ADMIN — Medication 20 MG: at 08:02

## 2025-02-09 RX ADMIN — INSULIN LISPRO 2 UNITS: 100 INJECTION, SOLUTION INTRAVENOUS; SUBCUTANEOUS at 17:56

## 2025-02-09 RX ADMIN — Medication 1 CAPSULE: at 08:02

## 2025-02-09 RX ADMIN — POTASSIUM CHLORIDE 10 MEQ: 750 TABLET, EXTENDED RELEASE ORAL at 08:02

## 2025-02-09 RX ADMIN — SODIUM CHLORIDE, PRESERVATIVE FREE 10 ML: 5 INJECTION INTRAVENOUS at 20:27

## 2025-02-09 RX ADMIN — INSULIN LISPRO 8 UNITS: 100 INJECTION, SOLUTION INTRAVENOUS; SUBCUTANEOUS at 20:22

## 2025-02-09 RX ADMIN — IPRATROPIUM BROMIDE AND ALBUTEROL SULFATE 1 DOSE: .5; 2.5 SOLUTION RESPIRATORY (INHALATION) at 15:28

## 2025-02-09 RX ADMIN — AMPICILLIN SODIUM AND SULBACTAM SODIUM 3000 MG: 2; 1 INJECTION, POWDER, FOR SOLUTION INTRAMUSCULAR; INTRAVENOUS at 09:19

## 2025-02-09 RX ADMIN — IPRATROPIUM BROMIDE AND ALBUTEROL SULFATE 1 DOSE: .5; 2.5 SOLUTION RESPIRATORY (INHALATION) at 11:13

## 2025-02-09 RX ADMIN — DILTIAZEM HYDROCHLORIDE 180 MG: 180 CAPSULE, COATED, EXTENDED RELEASE ORAL at 08:02

## 2025-02-09 RX ADMIN — INSULIN LISPRO 4 UNITS: 100 INJECTION, SOLUTION INTRAVENOUS; SUBCUTANEOUS at 11:08

## 2025-02-09 RX ADMIN — SODIUM CHLORIDE, PRESERVATIVE FREE 10 ML: 5 INJECTION INTRAVENOUS at 08:02

## 2025-02-09 RX ADMIN — AMPICILLIN SODIUM AND SULBACTAM SODIUM 3000 MG: 2; 1 INJECTION, POWDER, FOR SOLUTION INTRAMUSCULAR; INTRAVENOUS at 14:31

## 2025-02-09 RX ADMIN — GUAIFENESIN 600 MG: 600 TABLET, EXTENDED RELEASE ORAL at 20:21

## 2025-02-09 ASSESSMENT — PAIN SCALES - GENERAL: PAINLEVEL_OUTOF10: 0

## 2025-02-09 NOTE — FLOWSHEET NOTE
02/09/25 0742   Vital Signs   Temp 98.2 °F (36.8 °C)   Temp Source Oral   Pulse 87   Heart Rate Source Monitor   Respirations 16   BP (!) 144/68   MAP (Calculated) 93   BP Location Left upper arm   BP Method Automatic   Patient Position Semi fowlers   Oxygen Therapy   SpO2 96 %   O2 Device Nasal cannula   O2 Flow Rate (L/min) 3 L/min     Patient resting quietly in bed. No s/s of distress noted. Shift assessment complete, see flow sheet. Denies needs at this time. Call light in reach. Will monitor.      yes

## 2025-02-09 NOTE — FLOWSHEET NOTE
02/08/25 2016   Vital Signs   Temp 98.8 °F (37.1 °C)   Temp Source Oral   Pulse (!) 106   Heart Rate Source Monitor   Respirations 20   /72   MAP (Calculated) 90   BP Location Left upper arm   BP Method Automatic   Patient Position Semi fowlers   Oxygen Therapy   SpO2 96 %   O2 Device Nasal cannula   O2 Flow Rate (L/min) 3 L/min     Pt assessment complete. Pt lying in bed quietly.  Lung sounds diminished.  Pt on 02 3liters via nasal canula.  Pt remains in Afib with HR in the low 100's.  Bowers cath in place draining clear yellow urine.  Nightly medications given.  Pt repositioned for comfort.  No other needs at this time.  Call light within reach.  Bed exit alarm on.

## 2025-02-09 NOTE — FLOWSHEET NOTE
02/08/25 2352   Vital Signs   Temp 98.7 °F (37.1 °C)   Temp Source Oral   Pulse 91   Heart Rate Source Monitor   Respirations 18   BP (!) 144/71   MAP (Calculated) 95   BP Location Left upper arm   BP Method Automatic   Patient Position Semi fowlers   Oxygen Therapy   SpO2 97 %   O2 Device Nasal cannula   O2 Flow Rate (L/min) 3 L/min     Pt reassessment complete.  No changes noted.  Call light within reach.  Bed exit alarm on.

## 2025-02-10 PROBLEM — E83.42 HYPOMAGNESEMIA: Status: ACTIVE | Noted: 2025-02-10

## 2025-02-10 LAB
ANION GAP SERPL CALCULATED.3IONS-SCNC: 12 MMOL/L (ref 3–16)
BUN SERPL-MCNC: 24 MG/DL (ref 7–20)
CALCIUM SERPL-MCNC: 7.9 MG/DL (ref 8.3–10.6)
CHLORIDE SERPL-SCNC: 103 MMOL/L (ref 99–110)
CO2 SERPL-SCNC: 29 MMOL/L (ref 21–32)
CREAT SERPL-MCNC: 0.8 MG/DL (ref 0.6–1.2)
GFR SERPLBLD CREATININE-BSD FMLA CKD-EPI: 71 ML/MIN/{1.73_M2}
GLUCOSE BLD-MCNC: 196 MG/DL (ref 70–99)
GLUCOSE BLD-MCNC: 233 MG/DL (ref 70–99)
GLUCOSE BLD-MCNC: 240 MG/DL (ref 70–99)
GLUCOSE BLD-MCNC: 248 MG/DL (ref 70–99)
GLUCOSE BLD-MCNC: 268 MG/DL (ref 70–99)
GLUCOSE SERPL-MCNC: 246 MG/DL (ref 70–99)
INR PPP: 3.77 (ref 0.85–1.15)
PERFORMED ON: ABNORMAL
POTASSIUM SERPL-SCNC: 3.6 MMOL/L (ref 3.5–5.1)
PROTHROMBIN TIME: 36.9 SEC (ref 11.9–14.9)
SODIUM SERPL-SCNC: 144 MMOL/L (ref 136–145)

## 2025-02-10 PROCEDURE — 51701 INSERT BLADDER CATHETER: CPT

## 2025-02-10 PROCEDURE — 80048 BASIC METABOLIC PNL TOTAL CA: CPT

## 2025-02-10 PROCEDURE — 2500000003 HC RX 250 WO HCPCS

## 2025-02-10 PROCEDURE — 94669 MECHANICAL CHEST WALL OSCILL: CPT

## 2025-02-10 PROCEDURE — 6370000000 HC RX 637 (ALT 250 FOR IP): Performed by: STUDENT IN AN ORGANIZED HEALTH CARE EDUCATION/TRAINING PROGRAM

## 2025-02-10 PROCEDURE — 6370000000 HC RX 637 (ALT 250 FOR IP): Performed by: INTERNAL MEDICINE

## 2025-02-10 PROCEDURE — 6360000002 HC RX W HCPCS: Performed by: STUDENT IN AN ORGANIZED HEALTH CARE EDUCATION/TRAINING PROGRAM

## 2025-02-10 PROCEDURE — 2500000003 HC RX 250 WO HCPCS: Performed by: NURSE PRACTITIONER

## 2025-02-10 PROCEDURE — 99233 SBSQ HOSP IP/OBS HIGH 50: CPT | Performed by: INTERNAL MEDICINE

## 2025-02-10 PROCEDURE — 97110 THERAPEUTIC EXERCISES: CPT

## 2025-02-10 PROCEDURE — 97530 THERAPEUTIC ACTIVITIES: CPT

## 2025-02-10 PROCEDURE — 2580000003 HC RX 258

## 2025-02-10 PROCEDURE — 6370000000 HC RX 637 (ALT 250 FOR IP): Performed by: NURSE PRACTITIONER

## 2025-02-10 PROCEDURE — 94640 AIRWAY INHALATION TREATMENT: CPT

## 2025-02-10 PROCEDURE — 2060000000 HC ICU INTERMEDIATE R&B

## 2025-02-10 PROCEDURE — 85610 PROTHROMBIN TIME: CPT

## 2025-02-10 PROCEDURE — 36415 COLL VENOUS BLD VENIPUNCTURE: CPT

## 2025-02-10 PROCEDURE — 94761 N-INVAS EAR/PLS OXIMETRY MLT: CPT

## 2025-02-10 PROCEDURE — 2700000000 HC OXYGEN THERAPY PER DAY

## 2025-02-10 RX ORDER — GLIPIZIDE 5 MG/1
2.5 TABLET ORAL
Status: DISCONTINUED | OUTPATIENT
Start: 2025-02-10 | End: 2025-02-12 | Stop reason: HOSPADM

## 2025-02-10 RX ORDER — IPRATROPIUM BROMIDE AND ALBUTEROL SULFATE 2.5; .5 MG/3ML; MG/3ML
1 SOLUTION RESPIRATORY (INHALATION)
Status: DISCONTINUED | OUTPATIENT
Start: 2025-02-10 | End: 2025-02-12 | Stop reason: HOSPADM

## 2025-02-10 RX ADMIN — FUROSEMIDE 40 MG: 10 INJECTION, SOLUTION INTRAMUSCULAR; INTRAVENOUS at 07:58

## 2025-02-10 RX ADMIN — GLIPIZIDE 2.5 MG: 5 TABLET ORAL at 14:01

## 2025-02-10 RX ADMIN — Medication 75 MG: at 07:57

## 2025-02-10 RX ADMIN — GUAIFENESIN 600 MG: 600 TABLET, EXTENDED RELEASE ORAL at 21:38

## 2025-02-10 RX ADMIN — Medication 20 MG: at 07:58

## 2025-02-10 RX ADMIN — POTASSIUM CHLORIDE 10 MEQ: 750 TABLET, EXTENDED RELEASE ORAL at 20:50

## 2025-02-10 RX ADMIN — ATORVASTATIN CALCIUM 10 MG: 10 TABLET, FILM COATED ORAL at 07:58

## 2025-02-10 RX ADMIN — INSULIN LISPRO 4 UNITS: 100 INJECTION, SOLUTION INTRAVENOUS; SUBCUTANEOUS at 16:31

## 2025-02-10 RX ADMIN — FUROSEMIDE 40 MG: 10 INJECTION, SOLUTION INTRAMUSCULAR; INTRAVENOUS at 16:31

## 2025-02-10 RX ADMIN — SODIUM CHLORIDE, PRESERVATIVE FREE 10 ML: 5 INJECTION INTRAVENOUS at 21:38

## 2025-02-10 RX ADMIN — POTASSIUM CHLORIDE 10 MEQ: 750 TABLET, EXTENDED RELEASE ORAL at 07:57

## 2025-02-10 RX ADMIN — DILTIAZEM HYDROCHLORIDE 180 MG: 180 CAPSULE, COATED, EXTENDED RELEASE ORAL at 07:58

## 2025-02-10 RX ADMIN — GUAIFENESIN 600 MG: 600 TABLET, EXTENDED RELEASE ORAL at 07:57

## 2025-02-10 RX ADMIN — IPRATROPIUM BROMIDE AND ALBUTEROL SULFATE 1 DOSE: .5; 2.5 SOLUTION RESPIRATORY (INHALATION) at 07:06

## 2025-02-10 RX ADMIN — Medication 75 MG: at 21:37

## 2025-02-10 RX ADMIN — SODIUM CHLORIDE, PRESERVATIVE FREE 10 ML: 5 INJECTION INTRAVENOUS at 07:58

## 2025-02-10 RX ADMIN — Medication 1 CAPSULE: at 07:58

## 2025-02-10 RX ADMIN — Medication 20 MG: at 20:50

## 2025-02-10 RX ADMIN — INSULIN LISPRO 2 UNITS: 100 INJECTION, SOLUTION INTRAVENOUS; SUBCUTANEOUS at 11:31

## 2025-02-10 RX ADMIN — INSULIN LISPRO 2 UNITS: 100 INJECTION, SOLUTION INTRAVENOUS; SUBCUTANEOUS at 07:57

## 2025-02-10 RX ADMIN — IPRATROPIUM BROMIDE AND ALBUTEROL SULFATE 1 DOSE: 2.5; .5 SOLUTION RESPIRATORY (INHALATION) at 21:14

## 2025-02-10 NOTE — FLOWSHEET NOTE
02/10/25 0713   Vital Signs   Temp 97.9 °F (36.6 °C)   Temp Source Oral   Pulse 89   Respirations 16   BP (!) 144/69   MAP (Calculated) 94   BP Location Left upper arm   Oxygen Therapy   SpO2 96 %   O2 Device Nasal cannula   O2 Flow Rate (L/min) 2 L/min     Patient resting quietly in bed. No s/s of distress noted. Shift assessment complete, see flow sheet. Denies needs at this time. Call light in reach. Will monitor.

## 2025-02-10 NOTE — FLOWSHEET NOTE
02/09/25 2014   Vital Signs   Temp 98.3 °F (36.8 °C)   Temp Source Axillary   Pulse 81   Heart Rate Source Monitor   Respirations 18   /62   MAP (Calculated) 79   BP Location Left upper arm   BP Method Automatic   Patient Position Semi timothywlers   Pain Assessment   Pain Assessment 0-10   Pain Level 0   Patient's Stated Pain Goal 0 - No pain   Opioid-Induced Sedation   POSS Score 1   RASS   [place] Agitation Sedation Scale (RASS) 0   Oxygen Therapy   SpO2 97 %   O2 Device Nasal cannula   O2 Flow Rate (L/min) 2 L/min     Assessment done and in flowsheets.  Patient is alert and oriented x4.  O2 at 2L/min - wheezes on both lungs  Bowers catheter with draining yellowish urine.  Call light within reach.

## 2025-02-11 LAB
ANION GAP SERPL CALCULATED.3IONS-SCNC: 11 MMOL/L (ref 3–16)
ANION GAP SERPL CALCULATED.3IONS-SCNC: 12 MMOL/L (ref 3–16)
BUN SERPL-MCNC: 24 MG/DL (ref 7–20)
BUN SERPL-MCNC: 25 MG/DL (ref 7–20)
CALCIUM SERPL-MCNC: 7.4 MG/DL (ref 8.3–10.6)
CALCIUM SERPL-MCNC: 7.6 MG/DL (ref 8.3–10.6)
CHLORIDE SERPL-SCNC: 100 MMOL/L (ref 99–110)
CHLORIDE SERPL-SCNC: 103 MMOL/L (ref 99–110)
CO2 SERPL-SCNC: 29 MMOL/L (ref 21–32)
CO2 SERPL-SCNC: 29 MMOL/L (ref 21–32)
CREAT SERPL-MCNC: 0.7 MG/DL (ref 0.6–1.2)
CREAT SERPL-MCNC: 0.8 MG/DL (ref 0.6–1.2)
GFR SERPLBLD CREATININE-BSD FMLA CKD-EPI: 71 ML/MIN/{1.73_M2}
GFR SERPLBLD CREATININE-BSD FMLA CKD-EPI: 83 ML/MIN/{1.73_M2}
GLUCOSE BLD-MCNC: 113 MG/DL (ref 70–99)
GLUCOSE BLD-MCNC: 162 MG/DL (ref 70–99)
GLUCOSE BLD-MCNC: 170 MG/DL (ref 70–99)
GLUCOSE BLD-MCNC: 183 MG/DL (ref 70–99)
GLUCOSE SERPL-MCNC: 116 MG/DL (ref 70–99)
GLUCOSE SERPL-MCNC: 199 MG/DL (ref 70–99)
INR PPP: 2.89 (ref 0.85–1.15)
MAGNESIUM SERPL-MCNC: 1.91 MG/DL (ref 1.8–2.4)
PERFORMED ON: ABNORMAL
POTASSIUM SERPL-SCNC: 2.8 MMOL/L (ref 3.5–5.1)
POTASSIUM SERPL-SCNC: 4.3 MMOL/L (ref 3.5–5.1)
PROTHROMBIN TIME: 30.1 SEC (ref 11.9–14.9)
SODIUM SERPL-SCNC: 140 MMOL/L (ref 136–145)
SODIUM SERPL-SCNC: 144 MMOL/L (ref 136–145)

## 2025-02-11 PROCEDURE — 6370000000 HC RX 637 (ALT 250 FOR IP): Performed by: INTERNAL MEDICINE

## 2025-02-11 PROCEDURE — 99232 SBSQ HOSP IP/OBS MODERATE 35: CPT | Performed by: INTERNAL MEDICINE

## 2025-02-11 PROCEDURE — 94761 N-INVAS EAR/PLS OXIMETRY MLT: CPT

## 2025-02-11 PROCEDURE — 2500000003 HC RX 250 WO HCPCS

## 2025-02-11 PROCEDURE — 2580000003 HC RX 258

## 2025-02-11 PROCEDURE — 6360000002 HC RX W HCPCS: Performed by: STUDENT IN AN ORGANIZED HEALTH CARE EDUCATION/TRAINING PROGRAM

## 2025-02-11 PROCEDURE — 51701 INSERT BLADDER CATHETER: CPT

## 2025-02-11 PROCEDURE — 6370000000 HC RX 637 (ALT 250 FOR IP): Performed by: STUDENT IN AN ORGANIZED HEALTH CARE EDUCATION/TRAINING PROGRAM

## 2025-02-11 PROCEDURE — 85610 PROTHROMBIN TIME: CPT

## 2025-02-11 PROCEDURE — 6370000000 HC RX 637 (ALT 250 FOR IP): Performed by: NURSE PRACTITIONER

## 2025-02-11 PROCEDURE — 2060000000 HC ICU INTERMEDIATE R&B

## 2025-02-11 PROCEDURE — 94669 MECHANICAL CHEST WALL OSCILL: CPT

## 2025-02-11 PROCEDURE — 36415 COLL VENOUS BLD VENIPUNCTURE: CPT

## 2025-02-11 PROCEDURE — 2700000000 HC OXYGEN THERAPY PER DAY

## 2025-02-11 PROCEDURE — 2500000003 HC RX 250 WO HCPCS: Performed by: NURSE PRACTITIONER

## 2025-02-11 PROCEDURE — 99233 SBSQ HOSP IP/OBS HIGH 50: CPT | Performed by: INTERNAL MEDICINE

## 2025-02-11 PROCEDURE — 92526 ORAL FUNCTION THERAPY: CPT

## 2025-02-11 PROCEDURE — 83735 ASSAY OF MAGNESIUM: CPT

## 2025-02-11 PROCEDURE — 80048 BASIC METABOLIC PNL TOTAL CA: CPT

## 2025-02-11 PROCEDURE — 94640 AIRWAY INHALATION TREATMENT: CPT

## 2025-02-11 RX ORDER — FUROSEMIDE 40 MG/1
40 TABLET ORAL DAILY
Status: DISCONTINUED | OUTPATIENT
Start: 2025-02-11 | End: 2025-02-12 | Stop reason: HOSPADM

## 2025-02-11 RX ORDER — METOPROLOL TARTRATE 50 MG
50 TABLET ORAL 2 TIMES DAILY
Status: DISCONTINUED | OUTPATIENT
Start: 2025-02-11 | End: 2025-02-12 | Stop reason: HOSPADM

## 2025-02-11 RX ORDER — WARFARIN SODIUM 7.5 MG/1
7.5 TABLET ORAL
Status: COMPLETED | OUTPATIENT
Start: 2025-02-11 | End: 2025-02-11

## 2025-02-11 RX ADMIN — POTASSIUM BICARBONATE 40 MEQ: 782 TABLET, EFFERVESCENT ORAL at 08:55

## 2025-02-11 RX ADMIN — WARFARIN SODIUM 7.5 MG: 7.5 TABLET ORAL at 17:36

## 2025-02-11 RX ADMIN — FUROSEMIDE 40 MG: 10 INJECTION, SOLUTION INTRAMUSCULAR; INTRAVENOUS at 08:57

## 2025-02-11 RX ADMIN — IPRATROPIUM BROMIDE AND ALBUTEROL SULFATE 1 DOSE: 2.5; .5 SOLUTION RESPIRATORY (INHALATION) at 21:56

## 2025-02-11 RX ADMIN — SODIUM CHLORIDE, PRESERVATIVE FREE 10 ML: 5 INJECTION INTRAVENOUS at 20:54

## 2025-02-11 RX ADMIN — Medication 20 MG: at 08:56

## 2025-02-11 RX ADMIN — IPRATROPIUM BROMIDE AND ALBUTEROL SULFATE 1 DOSE: 2.5; .5 SOLUTION RESPIRATORY (INHALATION) at 09:03

## 2025-02-11 RX ADMIN — INSULIN LISPRO 2 UNITS: 100 INJECTION, SOLUTION INTRAVENOUS; SUBCUTANEOUS at 17:35

## 2025-02-11 RX ADMIN — Medication 1 CAPSULE: at 08:56

## 2025-02-11 RX ADMIN — GLIPIZIDE 2.5 MG: 5 TABLET ORAL at 17:36

## 2025-02-11 RX ADMIN — INSULIN GLARGINE 15 UNITS: 100 INJECTION, SOLUTION SUBCUTANEOUS at 20:55

## 2025-02-11 RX ADMIN — GUAIFENESIN 600 MG: 600 TABLET, EXTENDED RELEASE ORAL at 20:56

## 2025-02-11 RX ADMIN — POTASSIUM BICARBONATE 40 MEQ: 782 TABLET, EFFERVESCENT ORAL at 12:19

## 2025-02-11 RX ADMIN — DILTIAZEM HYDROCHLORIDE 180 MG: 180 CAPSULE, COATED, EXTENDED RELEASE ORAL at 08:56

## 2025-02-11 RX ADMIN — GUAIFENESIN 600 MG: 600 TABLET, EXTENDED RELEASE ORAL at 08:56

## 2025-02-11 RX ADMIN — Medication 20 MG: at 21:02

## 2025-02-11 RX ADMIN — FUROSEMIDE 40 MG: 40 TABLET ORAL at 12:09

## 2025-02-11 RX ADMIN — SODIUM CHLORIDE, PRESERVATIVE FREE 10 ML: 5 INJECTION INTRAVENOUS at 09:02

## 2025-02-11 RX ADMIN — METOPROLOL TARTRATE 50 MG: 50 TABLET, FILM COATED ORAL at 12:09

## 2025-02-11 RX ADMIN — ATORVASTATIN CALCIUM 10 MG: 10 TABLET, FILM COATED ORAL at 08:56

## 2025-02-11 RX ADMIN — GLIPIZIDE 2.5 MG: 5 TABLET ORAL at 05:39

## 2025-02-11 NOTE — FLOWSHEET NOTE
02/10/25 2045   Vital Signs   Temp 98 °F (36.7 °C)   Pulse 79   Respirations 16   /72   MAP (Calculated) 91   BP Location Left upper arm   BP Method Automatic     Pt in bed medications given call light within reach , no questions or concerns at this time. No urine output noted at this time.

## 2025-02-11 NOTE — FLOWSHEET NOTE
02/11/25 0725   Vital Signs   Temp 98.5 °F (36.9 °C)   Temp Source Oral   Pulse 89   Heart Rate Source Monitor   Respirations 17   /68   MAP (Calculated) 85   BP Location Left upper arm   BP Method Automatic   Patient Position Semi fowlers   Oxygen Therapy   SpO2 98 %   O2 Device Nasal cannula   O2 Flow Rate (L/min) 2 L/min     AM assessment completed. Pt resting quietly in bed and denies any pain or needs at this time. Gauthier intact. Told in report by night shift RN that pt unable to urinate on her own last night and had to be straight cathed 4 times for retention. The 4th time gauthier left in place by hospitalist order.    Xray Wrist 3 Views, Right

## 2025-02-11 NOTE — CARE COORDINATION
12:51 PM  JOSE R reviewed recent PT/OT note and observed continued recommendations for SNF.     JOSE R met with pt at bedside to discuss SNF rec again. Pt appeared very unsure of how to proceed. JOSE R discussed PT/OT note, pt's mobility needs, and the likelihood that pt's mobility needs will not be met at home. Pt stated she would be agreeable to a SNF close to home. JOSE R stated she could make a referral to [facility]. Pt in agreement. JOSE R explained that [place] often does not have beds open and asked for places for additional referrals. Pt stated she was unsure and asked JOSE R to call her son.     2:25 PM  JOSE R called and spoke with pt's son, Anupam. Anupam stated he would like for his mother to go to a SNF as he no longer feels like he would be able to help pt at home as needed. Anupam stated he would be open for pt to be re-referred to [facility] or [facility].     JOSE R called and spoke with Yenny @ [facility]. Yenny stated there are no open beds right now.    JOSE R called and LVM for AmyLyoshi @ [facility] to make re-referral.    2:34 PM  JOSE R received call back from AmyLyoshi. Andreia stated [facility] doesn't have any open beds for a few days, but there is possibly one open @ [facility] for tomorrow. Andreia stated she is waiting for a call back from her staff and will follow up with [facility] ASAP.     3:41 PM  JOSE R received call from AmyLyoshi @ [facility]. Andreia stated they would be able to accept pt tomorrow in a semi private room at [facility].     JOSE R called and spoke with pt's son, Anupam, and updated about [facility] accepting pt. Bill agreeable. Bill stated he spoke with pt about going to SNF and that it is needed. Bill stated pt is now on board.     4:07 PM  JOSE R met with pt at bedside and updated about [facility] acceptance. Pt in agreement.     JOSE R called and LVM for Zeinab @ [facility] to update about new DC plan and no longer needing HC.

## 2025-02-11 NOTE — CONSULTS
Gastroenterology Consult Note    Patient:   Mandie Solomon   :    1935   Facility:   [facility]   Referring/PCP: Hussein Merino APRN - CNP  Date:     2025  Consultant:   ISADORA Bailey CNP      Chief Complaint   Patient presents with    Illness     Fatigue that started at home last night.  Cough and loss of appetite this morning.  Has appt next door for today but didn't feel like going.  Squad called.  Afib history and currently in afib 110 - 120 heart rate.         History of Present illness   Patient is an 88 yo female with pmx of A.fib, DM, colon cancer s/p right hemicolectomy (), CCY, PAD s/p thrombectomy, HF, HTN, and HLD who presented to ED 2/3 with c/o generalized fatigue and shortness of breath. She was initially found to be in A.fib RVR. She was prescribed Coumadin per Cardiology. Her chest x-ray was suggestive of pneumonia and has required 2 L oxygen support. She denies wearing oxygen as baseline. She began having nausea and vomiting for the past two days. She reportedly had dark brown emesis today. She has only been able to tolerate clears. She reports some mild left lower quadrant tenderness. She has had low grade fever. She reports her last BM was today. She denies diarrhea, constipation, melena, and hematochezia. Her last colonoscopy was  with polyp removed and diverticulosis. She underwent EUS  with extensive multifocal branch type IPMN. She denies alcohol and tobacco use.     Past Medical History:   Diagnosis Date    A-fib (HCC)     Cancer (HCC)     skin cancer ; colon     Chronic anticoagulation     Clostridium difficile diarrhea 2015    per physicians notes; negative on 6/25/15    Clostridium difficile infection 2016    COVID-19 2021    Diabetes mellitus (HCC)     Hyperlipidemia     Hypertension      Past Surgical History:   Procedure Laterality Date    ABDOMEN SURGERY Right 10/15/2015    right colectomy, pain ball    
  Pharmacy Note  Warfarin Consult  Dx: AFib  Goal INR range 2-3   Home Warfarin dose: 10mg SIX days a week, 2.5mg on Wednesdays      Date  INR  Warfarin    2/3                   2.61                 7.5mg     Recommend Warfarin 7.5mg tonight x1.  Daily INR ordered.  Rx will continue to manage therapy per consult order.  Discontinued Px lovenox since INR therapeutic on admission.  Brian Roque RPH PharmD 2/3/2025 10:55 PM    
[facility]  Palliative Medicine Consultation Note      Date Of Admission:2/3/2025  Date of consult: 02/11/25  Seen by PC in the past:  No    Recommendations:        Writer met with the pt at bedside to introduce palliative/hospice options and had a voluntary discussion related to advance care planning. Pt's goal is rehab stay and tentative plan is [facility]. Pt agreeable to HTP to follow at [place] and referral sent to April with [facility] for HTP program.     BILL Pichardo aware of above plan.     1. Goals of Care/Advanced Care planning/Code status: Limited x 4 no answers  2. Pain: Denies  3. SOB:  Denies, currently on 1 L O2  4. Disposition: to rehab and then home with HTP to follow    Reason for Consult:         [x]  Goals of Care  []  Code Status Discussion/Advanced Care Planning   []  Psychosocial/Family Support  []  Symptom Management  []  Other (Specify)    Requesting Physician: Dr. Mcconnell    CHIEF COMPLAINT:  Atrial fibrillation with RVR    History Obtained From:  patient, electronic medical record    History of Present Illness:         Mandie Solomon is a 89 y.o. female with PMH of (see below list) who presented with fatigue and cough, decreased appetite, Lactic acidosis, Hypomagnesemia, A-fibrillation with RVR, Anticoagulated on warfarin, Acute respiratory failure with hypoxia, and Pneumonia of right lower lobe, Influenza positive. Pt with pmh of colon cancer. Pt lives home with sonAnupam and plan continues rehab stay prior to returning home with HTP to follow along.    Subjective:         Past Medical History:        Diagnosis Date    A-fib (HCC)     Cancer (HCC)     skin cancer 2014; colon 2015    Chronic anticoagulation     Clostridium difficile diarrhea 06/25/2015    per physicians notes; negative on 6/25/15    Clostridium difficile infection 12/09/2016    COVID-19 01/13/2021    Diabetes mellitus (HCC)     Hyperlipidemia     Hypertension        Past Surgical History:        Procedure 
GFRAA >60 2021 06:55 AM    AGRATIO 1.5 2025 01:57 PM    LABGLOM 71 2025 04:35 AM    LABGLOM 54 10/18/2023 12:00 AM    GLUCOSE 176 2025 04:35 AM    CALCIUM 8.0 2025 04:35 AM    BILITOT 2.6 2025 01:57 PM    ALKPHOS 127 2025 01:57 PM    AST 18 2025 01:57 PM    ALT 12 2025 01:57 PM     PT/INR:  No results found for: \"PTINR\"  HgBA1c:  Lab Results   Component Value Date    LABA1C 7.2 2024     Lab Results   Component Value Date    CKTOTAL 1,458 (H) 2015    TROPONINI <0.01 2021       Lab Results   Component Value Date    CHOL 108 2024    CHOL 119 2023    CHOL 80 2021     Lab Results   Component Value Date    TRIG 189 (H) 2024    TRIG 218 2023    TRIG 138 2021     Lab Results   Component Value Date    HDL 45 2024    HDL 44 2023    HDL 37 (L) 2021     No components found for: \"LDLCHOLESTEROL\", \"LDLCALC\"  Lab Results   Component Value Date    VLDL 38 2024    VLDL 34 2023    VLDL 28 2021     No results found for: \"CHOLHDLRATIO\"     Cardiac Data:     EK/3/2025: A-fib with RVR rate 108 bpm, nonspecific ST abnormality    Telemetry personally reviewed: A-fib rates 90s low 100s    ECHO 22  Summary  Small left ventricular cavity with mild concentric left ventricular hypertrophy.  Left ventricular systolic function is normal with ejection fraction estimated at 55-60 %.  No regional wall motion abnormalities are noted.  Elevated left ventricular diastolic filling pressure.  Right ventricular systolic function is mildly reduced.  Mild bi-atrial dilation.  Right ventricular systolic function is mildly reduced .  Moderate to severe aortic stenosis is present.  Mild-to-moderate aortic regurgitation is present.  Mild mitral regurgitation.  Mild to moderate tricuspid regurgitation.  Normal systolic pulmonary artery pressure (SPAP) estimated at 29 mmHg (RA pressure 3 mmHg).     ECHO 21

## 2025-02-12 VITALS
BODY MASS INDEX: 34.91 KG/M2 | DIASTOLIC BLOOD PRESSURE: 66 MMHG | HEIGHT: 65 IN | TEMPERATURE: 97.2 F | HEART RATE: 62 BPM | OXYGEN SATURATION: 94 % | WEIGHT: 209.56 LBS | RESPIRATION RATE: 16 BRPM | SYSTOLIC BLOOD PRESSURE: 112 MMHG

## 2025-02-12 LAB
GLUCOSE BLD-MCNC: 117 MG/DL (ref 70–99)
GLUCOSE BLD-MCNC: 193 MG/DL (ref 70–99)
GLUCOSE BLD-MCNC: 211 MG/DL (ref 70–99)
INR PPP: 2.23 (ref 0.85–1.15)
PERFORMED ON: ABNORMAL
PROTHROMBIN TIME: 24.7 SEC (ref 11.9–14.9)

## 2025-02-12 PROCEDURE — 85610 PROTHROMBIN TIME: CPT

## 2025-02-12 PROCEDURE — 97110 THERAPEUTIC EXERCISES: CPT

## 2025-02-12 PROCEDURE — 94669 MECHANICAL CHEST WALL OSCILL: CPT

## 2025-02-12 PROCEDURE — 94761 N-INVAS EAR/PLS OXIMETRY MLT: CPT

## 2025-02-12 PROCEDURE — 6370000000 HC RX 637 (ALT 250 FOR IP): Performed by: STUDENT IN AN ORGANIZED HEALTH CARE EDUCATION/TRAINING PROGRAM

## 2025-02-12 PROCEDURE — 6370000000 HC RX 637 (ALT 250 FOR IP): Performed by: INTERNAL MEDICINE

## 2025-02-12 PROCEDURE — 97535 SELF CARE MNGMENT TRAINING: CPT

## 2025-02-12 PROCEDURE — 51702 INSERT TEMP BLADDER CATH: CPT

## 2025-02-12 PROCEDURE — 2500000003 HC RX 250 WO HCPCS

## 2025-02-12 PROCEDURE — 36415 COLL VENOUS BLD VENIPUNCTURE: CPT

## 2025-02-12 PROCEDURE — 97530 THERAPEUTIC ACTIVITIES: CPT

## 2025-02-12 PROCEDURE — 2500000003 HC RX 250 WO HCPCS: Performed by: NURSE PRACTITIONER

## 2025-02-12 PROCEDURE — 6370000000 HC RX 637 (ALT 250 FOR IP): Performed by: NURSE PRACTITIONER

## 2025-02-12 PROCEDURE — 2580000003 HC RX 258

## 2025-02-12 PROCEDURE — 2700000000 HC OXYGEN THERAPY PER DAY

## 2025-02-12 PROCEDURE — 94640 AIRWAY INHALATION TREATMENT: CPT

## 2025-02-12 RX ORDER — DILTIAZEM HYDROCHLORIDE 180 MG/1
180 CAPSULE, COATED, EXTENDED RELEASE ORAL DAILY
Qty: 30 CAPSULE | Refills: 3
Start: 2025-02-13

## 2025-02-12 RX ORDER — WARFARIN SODIUM 7.5 MG/1
7.5 TABLET ORAL
Status: COMPLETED | OUTPATIENT
Start: 2025-02-12 | End: 2025-02-12

## 2025-02-12 RX ORDER — GUAIFENESIN 600 MG/1
600 TABLET, EXTENDED RELEASE ORAL 2 TIMES DAILY
Qty: 20 TABLET | Refills: 0
Start: 2025-02-12

## 2025-02-12 RX ORDER — LACTOBACILLUS RHAMNOSUS GG 10B CELL
1 CAPSULE ORAL
Qty: 20 CAPSULE | Refills: 0
Start: 2025-02-13

## 2025-02-12 RX ADMIN — SODIUM CHLORIDE, PRESERVATIVE FREE 10 ML: 5 INJECTION INTRAVENOUS at 07:37

## 2025-02-12 RX ADMIN — ATORVASTATIN CALCIUM 10 MG: 10 TABLET, FILM COATED ORAL at 07:38

## 2025-02-12 RX ADMIN — FUROSEMIDE 40 MG: 40 TABLET ORAL at 07:38

## 2025-02-12 RX ADMIN — INSULIN LISPRO 2 UNITS: 100 INJECTION, SOLUTION INTRAVENOUS; SUBCUTANEOUS at 10:52

## 2025-02-12 RX ADMIN — IPRATROPIUM BROMIDE AND ALBUTEROL SULFATE 1 DOSE: 2.5; .5 SOLUTION RESPIRATORY (INHALATION) at 09:06

## 2025-02-12 RX ADMIN — WARFARIN SODIUM 7.5 MG: 7.5 TABLET ORAL at 17:58

## 2025-02-12 RX ADMIN — METOPROLOL TARTRATE 50 MG: 50 TABLET, FILM COATED ORAL at 07:38

## 2025-02-12 RX ADMIN — ACETAMINOPHEN 650 MG: 325 TABLET ORAL at 10:52

## 2025-02-12 RX ADMIN — GUAIFENESIN 600 MG: 600 TABLET, EXTENDED RELEASE ORAL at 07:38

## 2025-02-12 RX ADMIN — Medication 1 CAPSULE: at 07:38

## 2025-02-12 RX ADMIN — Medication 20 MG: at 07:38

## 2025-02-12 RX ADMIN — INSULIN LISPRO 2 UNITS: 100 INJECTION, SOLUTION INTRAVENOUS; SUBCUTANEOUS at 17:58

## 2025-02-12 RX ADMIN — GLIPIZIDE 2.5 MG: 5 TABLET ORAL at 06:20

## 2025-02-12 RX ADMIN — DILTIAZEM HYDROCHLORIDE 180 MG: 180 CAPSULE, COATED, EXTENDED RELEASE ORAL at 07:38

## 2025-02-12 RX ADMIN — GLIPIZIDE 2.5 MG: 5 TABLET ORAL at 16:13

## 2025-02-12 NOTE — FLOWSHEET NOTE
02/12/25 1100   Oxygen Therapy   SpO2 97 %   O2 Device None (Room air)     Bowers removed by Dr. Mcconnell order for voiding trial.

## 2025-02-12 NOTE — FLOWSHEET NOTE
02/12/25 0000   Vital Signs   Temp 97.6 °F (36.4 °C)   Temp Source Oral   Pulse 82   Heart Rate Source Monitor   /63   MAP (Calculated) 82   BP Location Left upper arm   BP Method Automatic   Patient Position Semi fowlers   Oxygen Therapy   SpO2 96 %   O2 Device Nasal cannula   O2 Flow Rate (L/min) 1 L/min

## 2025-02-12 NOTE — DISCHARGE SUMMARY
as: KLOR-CON M  Take 1 tablet by mouth daily     Tradjenta 5 MG tablet  Generic drug: linagliptin     * warfarin 5 MG tablet  Commonly known as: COUMADIN     * warfarin 2.5 MG tablet  Commonly known as: Coumadin  Take 3 tablets by mouth daily for 3 days, THEN 2 tablets daily for 1 day. Then see the coumadin clinic for blood check and further dosing.  Start taking on: March 25, 2021           * This list has 2 medication(s) that are the same as other medications prescribed for you. Read the directions carefully, and ask your doctor or other care provider to review them with you.                STOP taking these medications      amLODIPine 5 MG tablet  Commonly known as: NORVASC     metoprolol tartrate 50 MG tablet  Commonly known as: LOPRESSOR               Where to Get Your Medications        Information about where to get these medications is not yet available    Ask your nurse or doctor about these medications  dilTIAZem 180 MG extended release capsule  guaiFENesin 600 MG extended release tablet  lactobacillus capsule           Discharge Condition/Location: Stable    Follow Up:  Follow up with PCP.        ***

## 2025-02-12 NOTE — PROGRESS NOTES
PROGRESS NOTE  S:89 yrs Patient  admitted on 2/3/2025 with Lactic acidosis [E87.20]  Hypomagnesemia [E83.42]  Atrial fibrillation with rapid ventricular response (HCC) [I48.91]  Atrial fibrillation with RVR (HCC) [I48.91]  Anticoagulated on warfarin [Z79.01]  Acute respiratory failure with hypoxia [J96.01]  Pneumonia of right lower lobe due to infectious organism [J18.9] .  Today, she remains on 2 L NC. She reports she wants to eat and go home. She has not had further vomiting per nursing. No further signs of bleeding noted.     Exam:   Vitals:    02/06/25 1035   BP:    Pulse:    Resp:    Temp:    SpO2: 95%   Generalized: alert, no acute distress  HEENT: sclera clear, anicteric  Neck supple, trachea midline  Heart: A.fib  Abdomen: soft, ND, NT  Extremities: BLE +1 pitting edema     Medications: Reviewed    Labs:  CBC:   Recent Labs     02/04/25 0435 02/05/25 0443 02/06/25  0504   WBC 8.1 7.7 7.6   HGB 11.5* 11.3* 11.7*   HCT 35.0* 33.8* 35.2*   MCV 90.6 90.0 89.7    140 147     BMP:   Recent Labs     02/04/25 0435 02/05/25  0444 02/06/25  0504    143 145   K 3.2* 3.9 3.9    108 108   CO2 23 27 27   BUN 15 24* 24*   CREATININE 0.8 1.0 0.8     PT/INR:   Recent Labs     02/04/25 0435 02/05/25  0443 02/06/25  0504   INR 2.48* 2.79* 3.65*     Imaging  CT chest/abd/pelvis wo con, 2/5  Bilateral pleural effusions.  Bilateral consolidations.  Mild bilateral hydronephrosis.  The bladder is largely distended and trabeculated.   The previously noted pancreatic cysts are not as well evaluated without  the use of intravenous contrast. However, no large mass is seen.Rectocele.  Negative for bowel obstruction.    Attending Supervising Physician's Attestation Statement  The patient is a 89 y.o. female. I have performed a history and physical examination of the patient. I discussed the case with NIRAJ Rodriguez    I reviewed the patient's Past Medical History, Past 
                                     PROGRESS NOTE  S:89 yrs Patient  admitted on 2/3/2025 with Lactic acidosis [E87.20]  Hypomagnesemia [E83.42]  Atrial fibrillation with rapid ventricular response (HCC) [I48.91]  Atrial fibrillation with RVR (HCC) [I48.91]  Anticoagulated on warfarin [Z79.01]  Acute respiratory failure with hypoxia [J96.01]  Pneumonia of right lower lobe due to infectious organism [J18.9] .  Today, she reports sore throat with eating. No further vomiting or signs of bleeding noted.     Exam:   Vitals:    02/07/25 0758   BP:    Pulse:    Resp:    Temp:    SpO2: 96%   Generalized: alert, no acute distress  HEENT: sclera clear, anicteric  Neck supple, trachea midline  Heart: A.fib  Abdomen: soft, ND, NT  Extremities: BLE +1 pitting edema     Medications: Reviewed    Labs:  BMP:   Recent Labs     02/05/25  0444 02/06/25  0504 02/07/25  0456    145 144   K 3.9 3.9 3.7    108 105   CO2 27 27 28   BUN 24* 24* 21*   CREATININE 1.0 0.8 0.7     PT/INR:   Recent Labs     02/05/25  0443 02/06/25  0504 02/07/25  0456   INR 2.79* 3.65* 4.42*   Attending Supervising Physician's Attestation Statement  The patient is a 89 y.o. female. I have performed a history and physical examination of the patient. I discussed the case with NIRAJ Rodriguez    I reviewed the patient's Past Medical History, Past Surgical History, Medications, and Allergies.     Physical Exam:  Vitals:    02/07/25 1111 02/07/25 1150 02/07/25 1517 02/07/25 1635   BP:  125/69  121/76   Pulse:  78  (!) 114   Resp:  18  18   Temp:  98.1 °F (36.7 °C)  98.1 °F (36.7 °C)   TempSrc:  Oral  Oral   SpO2: 97% 93% 97% 97%   Weight:       Height:           Physical Examination:   General- in mild distress and chronically ill appearing  Mental status - alert, oriented to person, place, and time  Eyes - sclera anicteric  Neck - supple, no significant adenopathy  Heart - irregularly irregular  Abdomen - soft, NT, ND  Extremities - pedal edema 
      CARDIOLOGY Progress Note        Patient Name: Mandie Solomon  Date of admission: 2/3/2025  1:39 PM  Admission Dx: Lactic acidosis [E87.20]  Hypomagnesemia [E83.42]  Atrial fibrillation with rapid ventricular response (HCC) [I48.91]  Atrial fibrillation with RVR (HCC) [I48.91]  Anticoagulated on warfarin [Z79.01]  Acute respiratory failure with hypoxia [J96.01]  Pneumonia of right lower lobe due to infectious organism [J18.9]  Requesting Physician: Miley White DO  Primary Care physician: Hussein Merino, APRN - CNP    Reason for Consultation/Chief Complaint: \" Atrial fibrillation with RVR\"    Subjective:  Patient seen and examined.  Patient reports her breathing is close to her baseline and denies any chest pain.  Wheezing is much improved.  A-fib rate controlled in the 80s.  Patient diuresed 2 L with net -1.3 L last 24 hours.  Net -6.7 L since admission.    GI has been consulted.  They note coffee-ground emesis.  EGD on hold given supratherapeutic INR.      History of Present Illness:     Mandie Solomon is a 89 y.o. patient with past with history of permanent A-fib,  moderate to severe aortic stenosis and AI, HFpEF, PAD status post thrombectomy with fasciotomy in 2015, subsequent angiogram with angioplasty in 2015, dyslipidemia, hypertension, and T2DM who presented to the hospital with complaints of generalized fatigue, poor appetite and malaise for 1 day.  Reports cough for the last day.  She was brought to the ER by her son and found to be in intermittent A-fib with RVR.  Patient initially was on room air but required 2 L due to O2 saturations drop below 90%.  Patient son at bedside.  Patient son reports he had been feeling unwell and then his mother began feeling unwell about a day prior to admission.  Congestion, cough, malaise, poor appetite for about a day.  Increased lower extremity edema but unknown how long this has been going on.  Patient denies chest pain, reports some shortness of breath.  Denies 
      CARDIOLOGY Progress Note        Patient Name: Mandie Solomon  Date of admission: 2/3/2025  1:39 PM  Admission Dx: Lactic acidosis [E87.20]  Hypomagnesemia [E83.42]  Atrial fibrillation with rapid ventricular response (HCC) [I48.91]  Atrial fibrillation with RVR (HCC) [I48.91]  Anticoagulated on warfarin [Z79.01]  Acute respiratory failure with hypoxia [J96.01]  Pneumonia of right lower lobe due to infectious organism [J18.9]  Requesting Physician: Miley White DO  Primary Care physician: Hussein Merino, APRN - CNP    Reason for Consultation/Chief Complaint: \" Atrial fibrillation with RVR\"    Subjective:  Patient seen and examined.  Patient reports she is feeling better.  Denies any chest pain.  A-fib rate controlled in the 80s to low 100s.  Diuresed 1.1 L, net -5.3 L since admission.    GI has been consulted.  They note coffee-ground emesis.  EGD on hold given supratherapeutic INR.      History of Present Illness:     Mandie Solomon is a 89 y.o. patient with past with history of permanent A-fib,  moderate to severe aortic stenosis and AI, HFpEF, PAD status post thrombectomy with fasciotomy in 2015, subsequent angiogram with angioplasty in 2015, dyslipidemia, hypertension, and T2DM who presented to the hospital with complaints of generalized fatigue, poor appetite and malaise for 1 day.  Reports cough for the last day.  She was brought to the ER by her son and found to be in intermittent A-fib with RVR.  Patient initially was on room air but required 2 L due to O2 saturations drop below 90%.  Patient son at bedside.  Patient son reports he had been feeling unwell and then his mother began feeling unwell about a day prior to admission.  Congestion, cough, malaise, poor appetite for about a day.  Increased lower extremity edema but unknown how long this has been going on.  Patient denies chest pain, reports some shortness of breath.  Denies palpitations, near-syncope or lore syncope.  Patient reports she 
   02/06/25 1900   RT Protocol   History Pulmonary Disease 0   Respiratory pattern 4   Breath sounds 6   Cough 0   Indications for Bronchodilator Therapy Wheezing associated with pulm disorder   Bronchodilator Assessment Score 10     RT Inhaler-Nebulizer Bronchodilator Protocol Note    There is a bronchodilator order in the chart from a provider indicating to follow the RT Bronchodilator Protocol and there is an “Initiate RT Inhaler-Nebulizer Bronchodilator Protocol” order as well (see protocol at bottom of note).    CXR Findings:  XR CHEST PORTABLE    Result Date: 2/5/2025  Increasing bibasilar atelectasis versus pneumonia.       The findings from the last RT Protocol Assessment were as follows:   History Pulmonary Disease: None or smoker <15 pack years  Respiratory Pattern: Mild dyspnea at rest, irregular pattern, or RR 21-25 bpm  Breath Sounds: Inspiratory and expiratory or bilateral wheezing and/or rhonchi  Cough: Strong, spontaneous, non-productive  Indication for Bronchodilator Therapy: Wheezing associated with pulm disorder  Bronchodilator Assessment Score: 10    Aerosolized bronchodilator medication orders have been revised according to the RT Inhaler-Nebulizer Bronchodilator Protocol below.    Respiratory Therapist to perform RT Therapy Protocol Assessment initially then follow the protocol.  Repeat RT Therapy Protocol Assessment PRN for score 0-3 or on second treatment, BID, and PRN for scores above 3.    No Indications - adjust the frequency to every 6 hours PRN wheezing or bronchospasm, if no treatments needed after 48 hours then discontinue using Per Protocol order mode.     If indication present, adjust the RT bronchodilator orders based on the Bronchodilator Assessment Score as indicated below.  Use Inhaler orders unless patient has one or more of the following: on home nebulizer, not able to hold breath for 10 seconds, is not alert and oriented, cannot activate and use MDI correctly, or respiratory 
   02/07/25 1900   RT Protocol   History Pulmonary Disease 0   Respiratory pattern 4   Breath sounds 6   Cough 0   Indications for Bronchodilator Therapy Wheezing associated with pulm disorder   Bronchodilator Assessment Score 10     RT Inhaler-Nebulizer Bronchodilator Protocol Note    There is a bronchodilator order in the chart from a provider indicating to follow the RT Bronchodilator Protocol and there is an “Initiate RT Inhaler-Nebulizer Bronchodilator Protocol” order as well (see protocol at bottom of note).    CXR Findings:  No results found.    The findings from the last RT Protocol Assessment were as follows:   History Pulmonary Disease: None or smoker <15 pack years  Respiratory Pattern: Mild dyspnea at rest, irregular pattern, or RR 21-25 bpm  Breath Sounds: Inspiratory and expiratory or bilateral wheezing and/or rhonchi  Cough: Strong, spontaneous, non-productive  Indication for Bronchodilator Therapy: Wheezing associated with pulm disorder  Bronchodilator Assessment Score: 10    Aerosolized bronchodilator medication orders have been revised according to the RT Inhaler-Nebulizer Bronchodilator Protocol below.    Respiratory Therapist to perform RT Therapy Protocol Assessment initially then follow the protocol.  Repeat RT Therapy Protocol Assessment PRN for score 0-3 or on second treatment, BID, and PRN for scores above 3.    No Indications - adjust the frequency to every 6 hours PRN wheezing or bronchospasm, if no treatments needed after 48 hours then discontinue using Per Protocol order mode.     If indication present, adjust the RT bronchodilator orders based on the Bronchodilator Assessment Score as indicated below.  Use Inhaler orders unless patient has one or more of the following: on home nebulizer, not able to hold breath for 10 seconds, is not alert and oriented, cannot activate and use MDI correctly, or respiratory rate 25 breaths per minute or more, then use the equivalent nebulizer order(s) 
   02/08/25 0700   RT Protocol   History Pulmonary Disease 0   Respiratory pattern 2   Breath sounds 4   Cough 0   Indications for Bronchodilator Therapy Wheezing associated with pulm disorder   Bronchodilator Assessment Score 6       
   02/08/25 1900   RT Protocol   History Pulmonary Disease 0   Respiratory pattern 2   Breath sounds 4   Cough 0   Indications for Bronchodilator Therapy Wheezing associated with pulm disorder   Bronchodilator Assessment Score 6     RT Inhaler-Nebulizer Bronchodilator Protocol Note    There is a bronchodilator order in the chart from a provider indicating to follow the RT Bronchodilator Protocol and there is an “Initiate RT Inhaler-Nebulizer Bronchodilator Protocol” order as well (see protocol at bottom of note).    CXR Findings:  No results found.    The findings from the last RT Protocol Assessment were as follows:   History Pulmonary Disease: None or smoker <15 pack years  Respiratory Pattern: Dyspnea on exertion or RR 21-25 bpm  Breath Sounds: Intermittent or unilateral wheezes  Cough: Strong, spontaneous, non-productive  Indication for Bronchodilator Therapy: Wheezing associated with pulm disorder  Bronchodilator Assessment Score: 6    Aerosolized bronchodilator medication orders have been revised according to the RT Inhaler-Nebulizer Bronchodilator Protocol below.    Respiratory Therapist to perform RT Therapy Protocol Assessment initially then follow the protocol.  Repeat RT Therapy Protocol Assessment PRN for score 0-3 or on second treatment, BID, and PRN for scores above 3.    No Indications - adjust the frequency to every 6 hours PRN wheezing or bronchospasm, if no treatments needed after 48 hours then discontinue using Per Protocol order mode.     If indication present, adjust the RT bronchodilator orders based on the Bronchodilator Assessment Score as indicated below.  Use Inhaler orders unless patient has one or more of the following: on home nebulizer, not able to hold breath for 10 seconds, is not alert and oriented, cannot activate and use MDI correctly, or respiratory rate 25 breaths per minute or more, then use the equivalent nebulizer order(s) with same Frequency and PRN reasons based on the 
   02/12/25 0910   RT Protocol   History Pulmonary Disease 0   Respiratory pattern 2   Breath sounds 2   Cough 0   Indications for Bronchodilator Therapy Decreased or absent breath sounds   Bronchodilator Assessment Score 4       
  4 Eyes Skin Assessment     The patient is being assess for   Admission    I agree that 2 RN's have performed a thorough Head to Toe Skin Assessment on the patient. ALL assessment sites listed below have been assessed.      Areas assessed for pressure by both nurses:   [x]   Head, Face, and Ears   [x]   Shoulders, Back, and Chest, Abdomen  [x]   Arms, Elbows, and Hands   [x]   Coccyx, Sacrum, and Ischium  [x]   Legs, Feet, and Heels    Scabs BLE, scratches over the abdomen        Skin Assessed Under all Medical Devices by both nurses:  O2 device tubing              All Mepilex Borders were peeled back and area peeked at by both nurses:  No: NA  Please list where Mepilex Borders are located:  NA             **SHARE this note so that the co-signing nurse is able to place an eSignature**    Co-signer eSignature: Electronically signed by Elis Bailey RN on 2/4/25 at 7:32 AM EST    Does the Patient have Skin Breakdown related to pressure?   NA      (Insert Photo hereNA)         P[place]yank Prevention initiated:  Yes   Wound Care Orders initiated:  NA      [facility] nurse consulted for Pressure Injury (Stage 3,4, Unstageable, DTI, NWPT, Complex wounds)and New or Established Ostomies:  NA      Primary Nurse eSignature: Electronically signed by Radha Valladares RN on 2/3/25 at 11:58 PM EST    
  Pharmacy Note  Warfarin Consult  Dx: AFib  Goal INR range 2-3   Home Warfarin dose: 10mg SIX days a week, 2.5mg on Wednesdays     Date                 INR                  Warfarin  2/3                   2.61                 7.5 mg   2/4                   2.48                 7.5 mg  2/5                   2.79                 Held  2/6                   3.65                 Not consulted  2/7                   4.42                 Not consulted  2/8                   4.67                 Not consulted     Recommend Warfarin hold tonight x1.  Daily INR ordered.  Rx will continue to manage therapy per consult order.  Dariel Jackson, PharmD  2/8/2025 6:00 AM  
  Pharmacy Note  Warfarin Consult  Dx: AFib  Goal INR range 2-3   Home Warfarin dose: 10mg SIX days a week, 2.5mg on Wednesdays     Date                 INR                  Warfarin  2/3                   2.61                 7.5 mg   2/4                   2.48                 7.5 mg  2/5                   2.79                 Held  2/6                   3.65                 Not consulted  2/7                   4.42                 Not consulted  2/8                   4.67                 Not consulted  2/9                   4.26                 Hold     Recommend Warfarin hold tonight x1.  Daily INR ordered.  Rx will continue to manage therapy per consult order.  Dariel Jackson, PharmD  2/9/2025 6:14 AM  
  Pharmacy Note  Warfarin Consult  Dx: AFib  Goal INR range 2-3   Home Warfarin dose: 10mg SIX days a week, 2.5mg on Wednesdays     Date                 INR                  Warfarin  2/3                   2.61                 7.5 mg   2/4                   2.48                 7.5 mg  2/5                   2.79                 Held  2/6                   3.65                 Not consulted  2/7                   4.42                 Not consulted  2/8                   4.67                 Not consulted  2/9                   4.26                 Hold  2/10                 3.77                 Hold     Recommend Warfarin Hold tonight x1.  Daily INR ordered.  Rx will continue to manage therapy per consult order.  Dariel Jackson, PharmD  2/10/2025 6:39 AM  
  Pharmacy Note  Warfarin Consult  Dx: AFib  Goal INR range 2-3   Home Warfarin dose: 10mg SIX days a week, 2.5mg on Wednesdays     Date                 INR                  Warfarin  2/3                   2.61                 7.5 mg   2/4                   2.48                 7.5 mg  2/5                   2.79                 Held  2/6                   3.65                 Not consulted  2/7                   4.42                 Not consulted  2/8                   4.67                 Not consulted  2/9                   4.26                 Hold  2/10                 3.77                 Hold  2/11                 2.89                 7.5 mg     Recommend Warfarin 7.5 mg tonight x1.  Daily INR ordered.  Rx will continue to manage therapy per consult order.  Dariel Jackson, PharmD  2/11/2025 6:15 AM  
  Pharmacy Note  Warfarin Consult  Dx: AFib  Goal INR range 2-3   Home Warfarin dose: 10mg SIX days a week, 2.5mg on Wednesdays     Date                 INR                  Warfarin  2/3                   2.61                 7.5 mg   2/4                   2.48                 7.5 mg  2/5                   2.79                 Held  2/6                   3.65                 Not consulted  2/7                   4.42                 Not consulted  2/8                   4.67                 Not consulted  2/9                   4.26                 Hold  2/10                 3.77                 Hold  2/11                 2.89                 7.5 mg  2/12                 2.23                 7.5 mg     Recommend Warfarin 10 mg tonight x1.  Daily INR ordered.  Rx will continue to manage therapy per consult order.  Dariel Jackson, PharmD  2/12/2025 6:04 AM  
  Physician Progress Note      PATIENT:               BROCK BUTTS  [facility] #:                  [id number]  :                       1935  ADMIT DATE:       2/3/2025 1:39 PM  DISCH DATE:  RESPONDING  PROVIDER #:        Miley White DO          QUERY TEXT:    Patient admitted with Atrial fibrillation with rapid ventricular response.   Noted documentation of \"Acute hypoxic respiratory failure on 2 L nasal   cannula\"- Cardiology notes . In order to support the diagnosis of acute   respiratory failure, please include additional clinical indicators in your   documentation.  Or please document if the diagnosis of acute respiratory   failure has been ruled out after further study.    The medical record reflects the following:  Risk Factors: PNA  Clinical Indicators: 2/3 \" Not initially requiring room air but her   saturations dipped and she required 2 L.  Normal respiratory effort.  Basilar   crackles, anterior wheeze\"; V/S 2/3 @ 16:05- O2 sat. =88% on room air, @16:07-   O2 sat. =93% on 2L via nasal cannula  Treatment: CXR, Oxygen therapy protocol, cont. O2 sat monitoring  Options provided:  -- Acute Respiratory Failure ruled out after study, Hypoxia ruled in  -- Acute Respiratory Failure as evidenced by, Please document evidence.  -- Other - I will add my own diagnosis  -- Disagree - Not applicable / Not valid  -- Disagree - Clinically unable to determine / Unknown  -- Refer to Clinical Documentation Reviewer    PROVIDER RESPONSE TEXT:    This patient is in acute respiratory failure as evidenced by Tachypnea,   increased wob, diffuse wheezing, O2 less than 90% on room air.    Query created by: Nae Diehl on 2025 11:08 AM      Electronically signed by:  Miley White DO 2025 9:27 AM          
  Speech Language Pathology  Attempt Note     Name: Mandie Solomon  : 1935  Medical Diagnosis: Lactic acidosis [E87.20]  Hypomagnesemia [E83.42]  Atrial fibrillation with rapid ventricular response (HCC) [I48.91]  Atrial fibrillation with RVR (HCC) [I48.91]  Anticoagulated on warfarin [Z79.01]  Acute respiratory failure with hypoxia [J96.01]  Pneumonia of right lower lobe due to infectious organism [J18.9]      SLP attempted to see pt for bedside swallow evaluation (BSE). Order acknowledged and chart reviewed for completion of eval. Evaluation / treatment unable to be completed as pt nauseous and actively vomiting when SLP arrives. SLP to re-attempt as pt's condition and schedule allows. RN aware. No charges.    Thank you,    Jo Ann Abraham M.A. SLP  Speech-Language Pathologist  [place] Lic #[id number]  [phone]    
  Speech Language Pathology  Attempt Note     Name: Mandie Solomon  : 1935  Medical Diagnosis: Lactic acidosis [E87.20]  Hypomagnesemia [E83.42]  Atrial fibrillation with rapid ventricular response (HCC) [I48.91]  Atrial fibrillation with RVR (HCC) [I48.91]  Anticoagulated on warfarin [Z79.01]  Acute respiratory failure with hypoxia [J96.01]  Pneumonia of right lower lobe due to infectious organism [J18.9]      SLP attempted to see pt for bedside swallow evaluation (BSE). Treatment unable to be completed due to pt with N/V again this afternoon. SLP to re-attempt as pt's condition and schedule allows. RN aware. No charges.    Thank you,    Jo Ann Abraham M.A. SLP  Speech-Language Pathologist  [place] Lic #[id number]  [phone]    
  Speech Language Pathology  Dysphagia Treatment/Follow-Up Note  [facility]    Recommendations:  Diet recommendation: IDDSI 6 Soft and Bite Sized Solids; IDDSI 0 Thin Liquids; Meds PO as tolerated  Instrumentation: Not clinically indicated at this time. Will continue to monitor  Risk management: upright for all intake, stay upright for at least 30 mins after intake, small bites/sips, distant supervision with intake, alternate bites/sips, slow rate of intake, general aspiration precautions, hold PO and contact SLP if s/s of aspiration or worsening respiratory status develop., and Control risk factors for aspiration PNA by completing oral care 3-4x/day and increasing physical mobility as is medically feasible    NAME:Mandie Solomon  : 1935 (89 y.o.)   MRN: [id number]  ROOM: /0307-01  ADMISSION DATE: 2/3/2025  PATIENT DIAGNOSIS(ES): Lactic acidosis [E87.20]  Hypomagnesemia [E83.42]  Atrial fibrillation with rapid ventricular response (HCC) [I48.91]  Atrial fibrillation with RVR (HCC) [I48.91]  Anticoagulated on warfarin [Z79.01]  Acute respiratory failure with hypoxia [J96.01]  Pneumonia of right lower lobe due to infectious organism [J18.9]  Allergies   Allergen Reactions    Iohexol Other (See Comments)     Pt gets chest pains during and post injection     Clindamycin/Lincomycin Diarrhea    Metronidazole Photosensitivity    Pantoprazole Sodium     Vancomycin      Son said unknown reaction       DATE ONSET: 2/3/2025    Pain: The patient does not complain of pain       Current Diet: ADULT DIET; Dysphagia - Soft and Bite Sized; 4 carb choices (60 gm/meal); Low Sodium (2 gm)      Diet Tolerance:  Patient tolerating current diet level without signs/symptoms of aspiration.     Dysphagia Treatment and Impressions:  Subjective: Pt seen in room at bedside with RN permission   Behavior / Cognition: alert and following directions appropriately  RN Report/Chart Review: RN denies s/s 
  Speech Language Pathology  Swallowing Disorders and Dysphagia    Dysphagia Treatment/Follow-Up Note  Facility/Department: [facility] [place] [facility]    Recommendations: SLP recommends to continue with IDDSI 6 Soft and Bite Sized Solids; IDDSI 0 Thin Liquids; Meds PO as tolerated  Risk Management: upright for all intake, stay upright for at least 30 mins after intake, small bites/sips, oral care 2-3x/day to reduce adverse affects in the event of aspiration, increase physical mobility as able, slow rate of intake, general aspiration precautions, and hold PO and contact SLP if s/s of aspiration or worsening respiratory status develop.     NAME:Mandie Solomon  : 1935 (89 y.o.)   MRN: [id number]  ROOM: 307/0307-01  ADMISSION DATE: 2/3/2025  PATIENT DIAGNOSIS(ES): Lactic acidosis [E87.20]  Hypomagnesemia [E83.42]  Atrial fibrillation with rapid ventricular response (HCC) [I48.91]  Atrial fibrillation with RVR (HCC) [I48.91]  Anticoagulated on warfarin [Z79.01]  Acute respiratory failure with hypoxia [J96.01]  Pneumonia of right lower lobe due to infectious organism [J18.9]  Allergies   Allergen Reactions    Iohexol Other (See Comments)     Pt gets chest pains during and post injection     Clindamycin/Lincomycin Diarrhea    Metronidazole Photosensitivity    Pantoprazole Sodium     Vancomycin      Son said unknown reaction       DATE ONSET: 2/3/2025    Pain: The patient does not complain of pain       Current Diet: ADULT DIET; Dysphagia - Soft and Bite Sized; 4 carb choices (60 gm/meal); Low Sodium (2 gm)      Dysphagia Treatment and Impressions:  Subjective: Pt seen in room at bedside with RN Brett) permission  Noteworthy events reported/Chart Review: No new concerns reported from RN  Patient tolerance to current diet and treatment: Pt reports her appetite has improved and she has no concerns re: swallowing  Pt looks much better than last time this SLP entered room to attempt to see pt last week      Respiratory 
/67   Pulse (!) 113   Temp 99.9 °F (37.7 °C) (Oral)   Resp 24   Ht 1.651 m (5' 5\")   Wt 101.2 kg (223 lb)   SpO2 93%   BMI 37.11 kg/m²     Patient alert and oriented resting in bed, watching tv. With telemetry. With O2 at 3 lpm via nasal cannula. Assessment completed. Respiration easy, even and unlabored. Patient tolerated night meds well. Call light and bedside table within reach. Bed at lowest position, locked, side rails x2, bed alarm on. Pt denies needs at this time.      
Bedside report given to AARON Castorena. Care transferred.     
Bedside report given to AARON Guerrero. Care transferred.     
Bedside report given to AARON Lopez. Care transferred.     
Bedside report given to AARON Toledo. Care transferred.     
[place] [facility]    Daily Progress Note for 2025 8:23 AM /0307-01  Mandie Solomon : 1935 Age: 89 y.o. Sex: female  Length of Stay:  4    Interval History:      CC: F/U Illness (Fatigue that started at home last night.  Cough and loss of appetite this morning.  Has appt next door for today but didn't feel like going.  Squad called.  Afib history and currently in afib 110 - 120 heart rate. )    Subjective:       More alert, tolerating diet today. Still feeling very tired, + frequent cough. No diarrhea. No constipation.     Objective:     Vitals:    25 0315 25 0547 25 0729 25 0758   BP: (!) 158/80  (!) 149/76    Pulse: 97  90    Resp: 18  18    Temp: 97.7 °F (36.5 °C)  97.9 °F (36.6 °C)    TempSrc: Oral  Oral    SpO2: 94% 95% 96% 96%   Weight: 101.5 kg (223 lb 12.8 oz)      Height:              Intake/Output Summary (Last 24 hours) at 2025 0823  Last data filed at 2025 0327  Gross per 24 hour   Intake 0 ml   Output 2950 ml   Net -2950 ml     Body mass index is 37.24 kg/m².    Physical Exam:  General: Cooperative, ill appearing but more alert  HEENT:  Head: normocephalic,atraumatic, anicteric sclera, clear conjunctiva  Neck: Normal size, Jugular venous pulsations: normal  Respiratory: +bilateral wheezing, appears to be breathing more comfortably  Heart: Regular rate and rhythm, S1, S2-normal, No murmurs  Abdomen: soft, nondistended, nontender, normoactive bowel sounds,  Neurological/Psych: Alert and oriented times three, no focal neurological deficits, Mood and affect appropriate.  Skin: No obvious rashes    Extremities:  no edema, Pedal pulses 2+ bilaterally    Scheduled Medications:  dilTIAZem, 180 mg, Daily  furosemide, 40 mg, BID  oseltamivir 6mg/ml, 30 mg, BID  ipratropium 0.5 mg-albuterol 2.5 mg, 1 Dose, 4x Daily RT  lactobacillus, 1 capsule, Daily with breakfast  ampicillin-sulbactam, 3,000 mg, Q6H  methylPREDNISolone, 40 mg, 
[place] [facility]    Daily Progress Note for 2025 7:51 AM /0307-01  Mandie Solomon : 1935 Age: 89 y.o. Sex: female  Length of Stay:  1    Interval History:      CC: F/U Illness (Fatigue that started at home last night.  Cough and loss of appetite this morning.  Has appt next door for today but didn't feel like going.  Squad called.  Afib history and currently in afib 110 - 120 heart rate. )    Subjective:       +nausea, no diarrhea or constipation . No vomiting. No wheezing.     Objective:     Vitals:    25 0149 25 0343 25 0525 25 0656   BP: 135/76  103/65 112/68   Pulse: 96  89 99   Resp: 22  20 21   Temp: 98.8 °F (37.1 °C)  99.1 °F (37.3 °C) 99.3 °F (37.4 °C)   TempSrc: Oral  Oral Axillary   SpO2: 96%  94% 94%   Weight:  101.3 kg (223 lb 6.4 oz)     Height:              Intake/Output Summary (Last 24 hours) at 2025 0751  Last data filed at 2025 0651  Gross per 24 hour   Intake 776.84 ml   Output 500 ml   Net 276.84 ml     Body mass index is 37.18 kg/m².    Physical Exam:  General: Cooperative, pleasant/Ill appearing, on  Nasal cannula  HEENT:  Head: normocephalic,atraumatic, anicteric sclera, clear conjunctiva  Neck: Normal size, Jugular venous pulsations: normal  Respiratory:unlabored breathing, clear to auscultation with no crackles, wheezes rhonchi  Heart: Regular rate and rhythm, S1, S2-normal, No murmurs  Abdomen: soft, nondistended, nontender, normoactive bowel sounds,  Neurological/Psych: Alert and oriented times three, no focal neurological deficits, Mood and affect appropriate.  Skin: No obvious rashes    Extremities:  no edema, Pedal pulses 2+ bilaterally    Scheduled Medications:  warfarin, 7.5 mg, Once  sodium chloride flush, 5-40 mL, 2 times per day  guaiFENesin, 600 mg, BID  insulin lispro, 0-4 Units, 4x Daily AC & HS  cefTRIAXone (ROCEPHIN) IV, 1,000 mg, Q24H  azithromycin, 500 mg, Q24H  [Held by provider] amLODIPine, 5 mg, 
Bowers catheter placed at this time.   
Bowers removed. Purewick placed. Will monitor.   
CT abdomen showing bilateral hydronephrosis as well as largely distended bladder.   PerfectServe sent to Dr. Narvaez, new order for gauthier catheter.   
CT on hold. RN will reach out to provider concerning Allergy to iohexol (spoke at 1545)  
[place] [facility]    Daily Progress Note for 2025 8:12 AM /0307-01  Mandie Solomon : 1935 Age: 89 y.o. Sex: female  Length of Stay:  3    Interval History:      CC: F/U Illness (Fatigue that started at home last night.  Cough and loss of appetite this morning.  Has appt next door for today but didn't feel like going.  Squad called.  Afib history and currently in afib 110 - 120 heart rate. )    Subjective:       Appears to be doing a little better today. Ms Solomon appears more alert and less fatigued today. CT abdomen pelvis yesterday showed mild hydronephrosis and distended bladder, gauthier was placed. Patient say currently she has not had more diarrhea. Has not vomited since I saw her yesterday, has been on CLD. She also tested positive for the flu. H1N1.     Objective:     Vitals:    25 0330 25 0353 25 0634 25 0747   BP: 138/79   131/75   Pulse: 100   (!) 107   Resp: 18   18   Temp: 97.6 °F (36.4 °C)   98.5 °F (36.9 °C)   TempSrc: Oral   Oral   SpO2: 94%  95% 93%   Weight:  101.7 kg (224 lb 1.6 oz)     Height:              Intake/Output Summary (Last 24 hours) at 2025 0812  Last data filed at 2025 0330  Gross per 24 hour   Intake 220 ml   Output 2600 ml   Net -2380 ml     Body mass index is 37.29 kg/m².    Physical Exam:  General: Cooperative, ill appearing but more alert  HEENT:  Head: normocephalic,atraumatic, anicteric sclera, clear conjunctiva  Neck: Normal size, Jugular venous pulsations: normal  Respiratory: +bilateral wheezing, appears to be breathing more comfortably  Heart: Regular rate and rhythm, S1, S2-normal, No murmurs  Abdomen: soft, nondistended, nontender, normoactive bowel sounds,  Neurological/Psych: Alert and oriented times three, no focal neurological deficits, Mood and affect appropriate.  Skin: No obvious rashes    Extremities:  no edema, Pedal pulses 2+ bilaterally    Scheduled Medications:  ipratropium 0.5 mg-albuterol 
Comprehensive Nutrition Assessment    Type and Reason for Visit:  Initial, LOS (LOS x 8 days)    Nutrition Recommendations/Plan:   Continue ADULT DIET; Dysphagia - Soft and Bite-Sized; 4 carb choices per meal; Low Sodium diet order.   Monitor appetite and po intake.   Monitor nutrition-related labs, bowel function (last documented BM was on 2/4/25), and weight trends.       Malnutrition Assessment:  Malnutrition Status:  At risk for malnutrition (02/11/25 1444)    Context:  Acute Illness     Findings of the 6 clinical characteristics of malnutrition:  Energy Intake:  Mild decrease in energy intake  Weight Loss:  Greater than 2% over 1 week (-8# or 3.5% weight loss since 2/4/25)     Body Fat Loss:  No body fat loss     Muscle Mass Loss:  No muscle mass loss    Fluid Accumulation:  No fluid accumulation     Strength:  Not Performed    Nutrition Assessment:    patient is nutritionally compromised - inadequate oral intake r/t inadequate protein-energy intake and cardiac dysfunction + endocrine dysfunction AEB poor po intake PTA, decreased appetite, and altered nutrition-related labs; she is improving from a nutritional standpoint AEB appetite has improved and patient is consuming po nutrition during admission; will continue ADULT DIET; Dysphagia - Soft and Bite-Sized; 4 carb choices per meal; Low Sodium diet order and monitor nutrition plan of care    Nutrition Related Findings:    patient is A & O x 4; patient presented with fatigue, cough, and poor appetite/po intake PTA; patient is from home where she lives with her son; patient is still weak and tired - SNF recommended at d/c; last documented BM was on 2/4/25; SLP is following patient and recommended soft and bite-sized consistency after f/u evaluation today Wound Type: None       Current Nutrition Intake & Therapies:    Average Meal Intake: 0%, 1-25%, 26-50%, %  Average Supplements Intake: None Ordered  ADULT DIET; Dysphagia - Soft and Bite Sized; 4 carb 
[place] [facility] Progress Note    Daily Progress Note for 2/10/2025 7:37 AM /0307-01  Mandie Solomon : 1935 Age: 89 y.o. Sex: female  Length of Stay:  7    Interval History:      CC: F/U Illness (Fatigue that started at home last night.  Cough and loss of appetite this morning.  Has appt next door for today but didn't feel like going.  Squad called.  Afib history and currently in afib 110 - 120 heart rate. )    Subjective:       Ms Solomon - seen up in bed, still on 2 L   Coughing up more , no sputum  No fevers  Mentation normal     Objective:     Vitals:    25 2306 02/10/25 0430 02/10/25 0706 02/10/25 0713   BP: 137/63 132/75  (!) 144/69   Pulse: 79 84 95 89   Resp: 18 18 16 16   Temp: 98.5 °F (36.9 °C) 97.5 °F (36.4 °C)  97.9 °F (36.6 °C)   TempSrc: Axillary Axillary  Oral   SpO2: 98% 96% 98% 96%   Weight:  97.6 kg (215 lb 3.2 oz)     Height:              Intake/Output Summary (Last 24 hours) at 2/10/2025 0737  Last data filed at 2/10/2025 0444  Gross per 24 hour   Intake --   Output 1575 ml   Net -1575 ml     Body mass index is 35.81 kg/m².    Physical Exam:      General:  elderly female Awake, alert and oriented. Appears to be not in any distress  Mucous Membranes:  Pink , anicteric  Neck: No JVD, no carotid bruit, no thyromegaly  Chest: diminished doreen with scattered crackles  Cardiovascular:  RRR S1S2 heard, no murmurs or gallops  Abdomen:  Soft, undistended, non tender, no organomegaly, BS present  Extremities: No edema or cyanosis. Distal pulses well felt  Neurological : grossly normal with gen weakness      Scheduled Medications:  ipratropium 0.5 mg-albuterol 2.5 mg, 1 Dose, BID RT  insulin glargine, 15 Units, Nightly  insulin lispro, 0-8 Units, 4x Daily AC & HS  dilTIAZem, 180 mg, Daily  furosemide, 40 mg, BID  oseltamivir 6mg/ml, 75 mg, BID  lactobacillus, 1 capsule, Daily with breakfast  famotidine (PEPCID) injection, 20 mg, BID  potassium chloride, 10 mEq, BID  sodium 
EOS report given to AARON Brown.  Care transferred at this time.    
HEART FAILURE CARE PLAN:    Comorbidities Reviewed: Yes   Patient has a past medical history of A-fib (HCC), Cancer (HCC), Chronic anticoagulation, Clostridium difficile diarrhea, Clostridium difficile infection, COVID-19, Diabetes mellitus (HCC), Hyperlipidemia, and Hypertension.     Weights Reviewed: Yes   Admission weight: 98 kg (216 lb)   Wt Readings from Last 3 Encounters:   02/10/25 97.6 kg (215 lb 3.2 oz)   05/09/24 98 kg (216 lb)   11/08/23 98.5 kg (217 lb 3.2 oz)     Intake & Output Reviewed: Yes     Intake/Output Summary (Last 24 hours) at 2/11/2025 0251  Last data filed at 2/11/2025 0210  Gross per 24 hour   Intake 240 ml   Output 2475 ml   Net -2235 ml       ECHOCARDIOGRAM Reviewed: Yes   Patient's Ejection Fraction (EF) is greater than 40%     Medications Reviewed: Yes   SCHEDULED HOSPITAL MEDICATIONS:   ipratropium 0.5 mg-albuterol 2.5 mg  1 Dose Inhalation BID RT    glipiZIDE  2.5 mg Oral BID AC    insulin glargine  15 Units SubCUTAneous Nightly    insulin lispro  0-8 Units SubCUTAneous 4x Daily AC & HS    dilTIAZem  180 mg Oral Daily    furosemide  40 mg IntraVENous BID    lactobacillus  1 capsule Oral Daily with breakfast    famotidine (PEPCID) injection  20 mg IntraVENous BID    potassium chloride  10 mEq Oral BID    sodium chloride flush  5-40 mL IntraVENous 2 times per day    guaiFENesin  600 mg Oral BID    [Held by provider] amLODIPine  5 mg Oral Daily    atorvastatin  10 mg Oral Daily    [Held by provider] potassium chloride  20 mEq Oral Daily     HOME MEDICATIONS:  Prior to Admission medications    Medication Sig Start Date End Date Taking? Authorizing Provider   linagliptin (TRADJENTA) 5 MG tablet Take 1 tablet by mouth daily 1/26/24  Yes Pardeep Maloney MD   warfarin (COUMADIN) 5 MG tablet Take 1 tablet by mouth 2 tablets daily \"as directed\"   Yes ProviderPardeep MD   amLODIPine (NORVASC) 5 MG tablet Take 1 tablet by mouth daily   Yes ProviderPardeep MD   atorvastatin 
Home meds sent to pharmacy. Geno Campa RN    
Inpatient Occupational Therapy Evaluation & Treatment    Unit: PCU  Date:  2/4/2025  Patient Name:    Mandie Solomon  Admitting diagnosis:  Lactic acidosis [E87.20]  Hypomagnesemia [E83.42]  Atrial fibrillation with rapid ventricular response (HCC) [I48.91]  Atrial fibrillation with RVR (HCC) [I48.91]  Anticoagulated on warfarin [Z79.01]  Acute respiratory failure with hypoxia [J96.01]  Pneumonia of right lower lobe due to infectious organism [J18.9]  Admit Date:  2/3/2025  Precautions/Restrictions/WB Status/ Lines/ Wounds/ Oxygen: Fall risk, Bed/chair alarm, Lines (IV, Supplemental O2 (2-3L), and external catheter), Telemetry, and Continuous pulse oximetry    Pt seen for cotreatment this date due to patient safety, patient endurance, complexity of condition, acute illness/injury, and limited functional status information    Treatment Time:  1428-3453  Treatment Number:  1  Timed Code Treatment Minutes: 50 minutes  Total Treatment Minutes:  60  minutes    Patient Goals for Therapy: \"go home\"          Discharge Recommendations: SNF  DME needs for discharge: Defer to facility       Therapy recommendations for staff:   Assist of 2 for transfers with use of rolling walker (RW) and gait belt to/from BSC  to/from chair    History of Present Illness: per Dr Lemus H&P 2/3/25:  \"Mandie Solomon is a 89 y.o. female with a history of abdominal procedures, PAD with ischemic limb, VTE, A-fib on warfarin, hypertension, poorly controlled DM2 who presented with generalized fatigue x 1 day, poor appetite and malaise.  Son endorsed she had been coughing all night.  Found to be in intermittent RVR with her A-fib, takes Coumadin.  Not initially requiring room air but her saturations dipped and she required 2 L.  Denies any fevers or chills.  Not sure of ill contacts.  Not had GI losses from nausea vomiting or diarrhea.  Admitted for further evaluation and care.     Vitals in the ED 98.8 Fahrenheit, pulse 120s, 133/68, new 2 
Inpatient Occupational Therapy Treatment    Unit: PCU  Date:  2/10/2025  Patient Name:    Mandie Solomon  Admitting diagnosis:  Lactic acidosis [E87.20]  Hypomagnesemia [E83.42]  Atrial fibrillation with rapid ventricular response (HCC) [I48.91]  Atrial fibrillation with RVR (HCC) [I48.91]  Anticoagulated on warfarin [Z79.01]  Acute respiratory failure with hypoxia [J96.01]  Pneumonia of right lower lobe due to infectious organism [J18.9]  Admit Date:  2/3/2025  Precautions/Restrictions/WB Status/ Lines/ Wounds/ Oxygen: Fall risk, Bed/chair alarm, Lines (IV, Supplemental O2 (2L), and external catheter), Telemetry, Continuous pulse oximetry, and Isolation Precautions: Contact Plus and Droplet        Pt seen for cotreatment this date due to patient safety, acute illness/injury, and staff recommendation    Treatment Time:  13:45-14:23  Treatment Number:  3  Timed Code Treatment Minutes: 38 minutes  Total Treatment Minutes: 38 minutes    Patient Goals for Therapy: \"to get better\"          Discharge Recommendations: SNF  DME needs for discharge: Defer to facility       Therapy recommendations for staff:   Assist of 2 for transfers with use of LYRIC STEDY to/from chair    History of Present Illness: per Dr Lemus H&P 2/3/25:  \"Mandie Solomon is a 89 y.o. female with a history of abdominal procedures, PAD with ischemic limb, VTE, A-fib on warfarin, hypertension, poorly controlled DM2 who presented with generalized fatigue x 1 day, poor appetite and malaise.  Son endorsed she had been coughing all night.  Found to be in intermittent RVR with her A-fib, takes Coumadin.  Not initially requiring room air but her saturations dipped and she required 2 L.  Denies any fevers or chills.  Not sure of ill contacts.  Not had GI losses from nausea vomiting or diarrhea.  Admitted for further evaluation and care.     Vitals in the ED 98.8 Fahrenheit, pulse 120s, 133/68, new 2 L.\"    AM-PAC Score: AM-PAC Inpatient Daily Activity Raw 
Inpatient Occupational Therapy Treatment    Unit: PCU  Date:  2/12/2025  Patient Name:    Mandie Solomon  Admitting diagnosis:  Lactic acidosis [E87.20]  Hypomagnesemia [E83.42]  Atrial fibrillation with rapid ventricular response (HCC) [I48.91]  Atrial fibrillation with RVR (HCC) [I48.91]  Anticoagulated on warfarin [Z79.01]  Acute respiratory failure with hypoxia [J96.01]  Pneumonia of right lower lobe due to infectious organism [J18.9]  Admit Date:  2/3/2025  Precautions/Restrictions/WB Status/ Lines/ Wounds/ Oxygen: Fall risk, Bed/chair alarm, Lines (IV and external catheter), Telemetry, Continuous pulse oximetry, and Isolation Precautions: Contact Plus and Droplet     Pt seen for cotreatment this date due to patient safety, acute illness/injury, and staff recommendation    Treatment Time:  11:00-11:38  Treatment Number:  4  Timed Code Treatment Minutes: 38 minutes  Total Treatment Minutes: 38 minutes    Patient Goals for Therapy: \"to get better\"          Discharge Recommendations: SNF  DME needs for discharge: Defer to facility       Therapy recommendations for staff:   Assist of 2 for transfers with use of rolling walker (RW) and gait belt to/from chair    History of Present Illness: per Dr Lemus H&P 2/3/25:  \"Mandie Solomon is a 89 y.o. female with a history of abdominal procedures, PAD with ischemic limb, VTE, A-fib on warfarin, hypertension, poorly controlled DM2 who presented with generalized fatigue x 1 day, poor appetite and malaise.  Son endorsed she had been coughing all night.  Found to be in intermittent RVR with her A-fib, takes Coumadin.  Not initially requiring room air but her saturations dipped and she required 2 L.  Denies any fevers or chills.  Not sure of ill contacts.  Not had GI losses from nausea vomiting or diarrhea.  Admitted for further evaluation and care.     Vitals in the ED 98.8 Fahrenheit, pulse 120s, 133/68, new 2 L.\"    AM-PAC Score: AM-PAC Inpatient Daily Activity Raw Score: 
Inpatient Physical Therapy Evaluation & Treatment    Unit: PCU  Date:  2/4/2025  Patient Name:    Mandie Solomon  Admitting diagnosis:  Lactic acidosis [E87.20]  Hypomagnesemia [E83.42]  Atrial fibrillation with rapid ventricular response (HCC) [I48.91]  Atrial fibrillation with RVR (HCC) [I48.91]  Anticoagulated on warfarin [Z79.01]  Acute respiratory failure with hypoxia [J96.01]  Pneumonia of right lower lobe due to infectious organism [J18.9]  Admit Date:  2/3/2025  Precautions/Restrictions/WB Status/ Lines/ Wounds/ Oxygen: Fall risk, Bed/chair alarm, Lines (IV, Supplemental O2 (2-3L), and external catheter), Telemetry, and Continuous pulse oximetry      Pt seen for cotreatment this date due to patient safety, acute illness/injury, and limited functional status information    Treatment Time:  0656-8818  Treatment Number:  1   Timed Code Treatment Minutes: 39 minutes  Total Treatment Minutes:  49  minutes    Patient Stated Goals for Therapy: \" No goals stated          Discharge Recommendations: SNF  DME needs for discharge: Defer to facility       Therapy recommendation for EMS Transport: requires transport by cot due to pt needs A x 2 for safe transfers    Therapy recommendations for staff:   Assist of 2 for transfers with use of rolling walker (RW) and gait belt to/from BSC  to/from chair    History of Present Illness:   per Dr Lemus H&P 2/3/25:  \"Mandie Solomon is a 89 y.o. female with a history of abdominal procedures, PAD with ischemic limb, VTE, A-fib on warfarin, hypertension, poorly controlled DM2 who presented with generalized fatigue x 1 day, poor appetite and malaise.  Son endorsed she had been coughing all night.  Found to be in intermittent RVR with her A-fib, takes Coumadin.  Not initially requiring room air but her saturations dipped and she required 2 L.  Denies any fevers or chills.  Not sure of ill contacts.  Not had GI losses from nausea vomiting or diarrhea. \"   A-fib RVR, pneumonia, new 2 L 
Inpatient Physical Therapy Treatment    Unit: PCU  Date:  2/10/2025  Patient Name:    Mandie Solomon  Admitting diagnosis:  Lactic acidosis [E87.20]  Hypomagnesemia [E83.42]  Atrial fibrillation with rapid ventricular response (HCC) [I48.91]  Atrial fibrillation with RVR (HCC) [I48.91]  Anticoagulated on warfarin [Z79.01]  Acute respiratory failure with hypoxia [J96.01]  Pneumonia of right lower lobe due to infectious organism [J18.9]  Admit Date:  2/3/2025  Precautions/Restrictions/WB Status/ Lines/ Wounds/ Oxygen: Fall risk, Bed/chair alarm, Lines (Supplemental O2 (4L) and external catheter), Telemetry, Continuous pulse oximetry, and Isolation Precautions: Droplet    Pt seen for cotreatment this date due to patient safety, acute illness/injury, and staff recommendation    Treatment Time: 13:36-14:00  Treatment Number:  3   Timed Code Treatment Minutes: 24 minutes  Total Treatment Minutes:  24  minutes    Patient Stated Goals for Therapy: No goals stated          Discharge Recommendations: SNF - strong recommendation  DME needs for discharge: Defer to facility       Therapy recommendation for EMS Transport: requires transport by cot due to pt needs A x 2 for safe transfers    Therapy recommendations for staff:   Assist of 2 for transfers with use of LYRIC STEDY and gait belt to/from BSC  to/from chair    History of Present Illness:   per Dr Lemus H&P 2/3/25:  \"Mandie Solomon is a 89 y.o. female with a history of abdominal procedures, PAD with ischemic limb, VTE, A-fib on warfarin, hypertension, poorly controlled DM2 who presented with generalized fatigue x 1 day, poor appetite and malaise.  Son endorsed she had been coughing all night.  Found to be in intermittent RVR with her A-fib, takes Coumadin.  Not initially requiring room air but her saturations dipped and she required 2 L.  Denies any fevers or chills.  Not sure of ill contacts.  Not had GI losses from nausea vomiting or diarrhea. \"   A-fib RVR, pneumonia, 
Inpatient Physical Therapy Treatment    Unit: PCU  Date:  2/12/2025  Patient Name:    Mandie Solomon  Admitting diagnosis:  Lactic acidosis [E87.20]  Hypomagnesemia [E83.42]  Atrial fibrillation with rapid ventricular response (HCC) [I48.91]  Atrial fibrillation with RVR (HCC) [I48.91]  Anticoagulated on warfarin [Z79.01]  Acute respiratory failure with hypoxia [J96.01]  Pneumonia of right lower lobe due to infectious organism [J18.9]  Admit Date:  2/3/2025  Precautions/Restrictions/WB Status/ Lines/ Wounds/ Oxygen: Fall risk, Bed/chair alarm, Lines (IV and external catheter), Telemetry, Continuous pulse oximetry, and Isolation Precautions: Droplet      Pt seen for cotreatment this date due to patient safety and acute illness/injury    Treatment Time:  1118-1141  Treatment Number:  4   Timed Code Treatment Minutes: 23 minutes  Total Treatment Minutes:  23  minutes    Patient Stated Goals for Therapy: \" To feel better. \"          Discharge Recommendations: SNF  DME needs for discharge: Defer to facility       Therapy recommendation for EMS Transport: can transport by wheelchair    Therapy recommendations for staff:   Assist of 2 for transfers with use of rolling walker (RW) and gait belt to/from BSC  to/from chair    History of Present Illness:   per Dr Lemus H&P 2/3/25:  \"Mandie Solomon is a 89 y.o. female with a history of abdominal procedures, PAD with ischemic limb, VTE, A-fib on warfarin, hypertension, poorly controlled DM2 who presented with generalized fatigue x 1 day, poor appetite and malaise.  Son endorsed she had been coughing all night.  Found to be in intermittent RVR with her A-fib, takes Coumadin.  Not initially requiring room air but her saturations dipped and she required 2 L.  Denies any fevers or chills.  Not sure of ill contacts.  Not had GI losses from nausea vomiting or diarrhea. \"   A-fib RVR, pneumonia, new 2 L      CXR 2/3/25  IMPRESSION:  Stable cardiomegaly.     Background of pulmonary 
Marlen from [facility] called unit to speak to RN. Report given to Marlen, transport to pick her up at 1700.  
Occupational Therapy  Attempted treatment this afternoon. Patient declined due to nausea and she does not want to get up before her xray. OT will follow up later this week as appropriate.       Digna Almaraz, OTR/L #[id number]    
Patient assisted to edge of bed. Patient requiring moderate assist just to get to edge of bed. Spoke with patient regarding plans to go home on discharge. Patient reports she does have a few steps to get into house but states she has two grown sons who can help her get inside. RN expressed concerns with patient being able to care for self at home. Patient has list of rehab facilities at bedside, RN encouraged patient to consider SNF and talk with her son.   
Patient continues to have audible wheezes. Dr. Samson would like patient to receive breathing treatment.   Call placed to RT, states patient has been refusing breathing treatments but will attempt again.   
Patient educated on discharge instructions as well as new medications use, dosage, administration and possible side effects.  Patient verified knowledge. IV removed without difficulty and dry dressing in place. Telemetry monitor removed and returned to [facility]. Pt left facility [facility] to [facility] with all of their personal belongings.    
Patient has yet to void post gauthier removal.   Bladder scan shows 368mL.   PerfectServe sent to Dr. Mcconnell.    Received message back from Dr. Mcconnell to monitor patient for an additional 2hrs.   
Patient reports to RN that her home glyburide was brought in and sent to pharmacy by RN.   Patient asking if this medication can be ordered for her here as she is worried about her blood sugars being elevated. Message sent to Dr. White.   
Patient still very wheezy with sob after breathing treatment. Perfect serve sent to Dr. Estrada and notified of above. Agreeable for repeat CXR, ordered.   
Patient with episode of brown colored emesis. Dr. White aware, will monitor.   Patient reports she has not been able to eat for a few days as she continues to have nausea and vomiting.   
Pt K is 2.8 she has an PIV but says it is burning her too bad and wanted it stopped. MD perfect served to get poss Effer K or PO   
Pt care given to Sally. Pt in stable condition at this time.  
Pt has not urinated since taking out catheter at 1100. Pt to go to [facility] this afternoon. Spoke to Case management, transport set for 1700 today.  
Pt home med received from pharmacy and sent with pt to SNF.  
Pt is lying in bed with their eyes closed. Respirations are easy and even. Call light within reach bed in lowest position with the wheels locked. Will continue to monitor. Enedina Mejia RN     
Pt resting in bed and denies any pain or needs at this time. Will continue to monitor.  
Pt still has not urinated since taking gauthier out at 1100. Perfect serve sent to Dr. Mcconnell. See note below.    -Pt still has not urinated since taking the gauthier out at 1100. I bladder scanned her and she had a lot of pain when I did it. Bladder scan showed only 29 ml. Transport ryan be here at 1700 to take her to [facility].     Reply from Dr. Mcconnell at 5207- Check at 4 , replace gauthier if unable to void  
Pt still unable to urinate. She says she feels like she needs to but she cannot. Bowers inserted per Dr. Mcconnell orders. 250 ml yellow output collected. Florence secured to right thigh.  
RT Inhaler-Nebulizer Bronchodilator Protocol Note    There is a bronchodilator order in the chart from a provider indicating to follow the RT Bronchodilator Protocol and there is an “Initiate RT Inhaler-Nebulizer Bronchodilator Protocol” order as well (see protocol at bottom of note).    CXR Findings:  No results found.    The findings from the last RT Protocol Assessment were as follows:   History Pulmonary Disease: (P) None or smoker <15 pack years  Respiratory Pattern: (P) Dyspnea on exertion or RR 21-25 bpm  Breath Sounds: (P) Intermittent or unilateral wheezes  Cough: (P) Strong, spontaneous, non-productive  Indication for Bronchodilator Therapy: (P) Wheezing associated with pulm disorder  Bronchodilator Assessment Score: (P) 6    Aerosolized bronchodilator medication orders have been revised according to the RT Inhaler-Nebulizer Bronchodilator Protocol below.    Respiratory Therapist to perform RT Therapy Protocol Assessment initially then follow the protocol.  Repeat RT Therapy Protocol Assessment PRN for score 0-3 or on second treatment, BID, and PRN for scores above 3.    No Indications - adjust the frequency to every 6 hours PRN wheezing or bronchospasm, if no treatments needed after 48 hours then discontinue using Per Protocol order mode.     If indication present, adjust the RT bronchodilator orders based on the Bronchodilator Assessment Score as indicated below.  Use Inhaler orders unless patient has one or more of the following: on home nebulizer, not able to hold breath for 10 seconds, is not alert and oriented, cannot activate and use MDI correctly, or respiratory rate 25 breaths per minute or more, then use the equivalent nebulizer order(s) with same Frequency and PRN reasons based on the score.  If a patient is on this medication at home then do not decrease Frequency below that used at home.    0-3 - enter or revise RT bronchodilator order(s) to equivalent RT Bronchodilator order with Frequency of 
RT Inhaler-Nebulizer Bronchodilator Protocol Note    There is a bronchodilator order in the chart from a provider indicating to follow the RT Bronchodilator Protocol and there is an “Initiate RT Inhaler-Nebulizer Bronchodilator Protocol” order as well (see protocol at bottom of note).    CXR Findings:  No results found.    The findings from the last RT Protocol Assessment were as follows:   History Pulmonary Disease: (P) None or smoker <15 pack years  Respiratory Pattern: (P) Dyspnea on exertion or RR 21-25 bpm  Breath Sounds: (P) Slightly diminished and/or crackles  Cough: (P) Strong, spontaneous, non-productive  Indication for Bronchodilator Therapy: (P) Decreased or absent breath sounds  Bronchodilator Assessment Score: (P) 4    Aerosolized bronchodilator medication orders have been revised according to the RT Inhaler-Nebulizer Bronchodilator Protocol below.    Respiratory Therapist to perform RT Therapy Protocol Assessment initially then follow the protocol.  Repeat RT Therapy Protocol Assessment PRN for score 0-3 or on second treatment, BID, and PRN for scores above 3.    No Indications - adjust the frequency to every 6 hours PRN wheezing or bronchospasm, if no treatments needed after 48 hours then discontinue using Per Protocol order mode.     If indication present, adjust the RT bronchodilator orders based on the Bronchodilator Assessment Score as indicated below.  Use Inhaler orders unless patient has one or more of the following: on home nebulizer, not able to hold breath for 10 seconds, is not alert and oriented, cannot activate and use MDI correctly, or respiratory rate 25 breaths per minute or more, then use the equivalent nebulizer order(s) with same Frequency and PRN reasons based on the score.  If a patient is on this medication at home then do not decrease Frequency below that used at home.    0-3 - enter or revise RT bronchodilator order(s) to equivalent RT Bronchodilator order with Frequency of 
RT Inhaler-Nebulizer Bronchodilator Protocol Note    There is a bronchodilator order in the chart from a provider indicating to follow the RT Bronchodilator Protocol and there is an “Initiate RT Inhaler-Nebulizer Bronchodilator Protocol” order as well (see protocol at bottom of note).    CXR Findings:  No results found.    The findings from the last RT Protocol Assessment were as follows:   History Pulmonary Disease: (P) None or smoker <15 pack years  Respiratory Pattern: (P) Dyspnea on exertion or RR 21-25 bpm  Breath Sounds: (P) Slightly diminished and/or crackles  Cough: (P) Strong, spontaneous, non-productive  Indication for Bronchodilator Therapy: (P) Decreased or absent breath sounds  Bronchodilator Assessment Score: (P) 4    Aerosolized bronchodilator medication orders have been revised according to the RT Inhaler-Nebulizer Bronchodilator Protocol below.    Respiratory Therapist to perform RT Therapy Protocol Assessment initially then follow the protocol.  Repeat RT Therapy Protocol Assessment PRN for score 0-3 or on second treatment, BID, and PRN for scores above 3.    No Indications - adjust the frequency to every 6 hours PRN wheezing or bronchospasm, if no treatments needed after 48 hours then discontinue using Per Protocol order mode.     If indication present, adjust the RT bronchodilator orders based on the Bronchodilator Assessment Score as indicated below.  Use Inhaler orders unless patient has one or more of the following: on home nebulizer, not able to hold breath for 10 seconds, is not alert and oriented, cannot activate and use MDI correctly, or respiratory rate 25 breaths per minute or more, then use the equivalent nebulizer order(s) with same Frequency and PRN reasons based on the score.  If a patient is on this medication at home then do not decrease Frequency below that used at home.    0-3 - enter or revise RT bronchodilator order(s) to equivalent RT Bronchodilator order with Frequency of 
RT Inhaler-Nebulizer Bronchodilator Protocol Note    There is a bronchodilator order in the chart from a provider indicating to follow the RT Bronchodilator Protocol and there is an “Initiate RT Inhaler-Nebulizer Bronchodilator Protocol” order as well (see protocol at bottom of note).    CXR Findings:  No results found.    The findings from the last RT Protocol Assessment were as follows:   History Pulmonary Disease: None or smoker <15 pack years  Respiratory Pattern: Dyspnea on exertion or RR 21-25 bpm  Breath Sounds: Intermittent or unilateral wheezes  Cough: Strong, spontaneous, non-productive  Indication for Bronchodilator Therapy: Wheezing associated with pulm disorder  Bronchodilator Assessment Score: 6    Aerosolized bronchodilator medication orders have been revised according to the RT Inhaler-Nebulizer Bronchodilator Protocol below.    Respiratory Therapist to perform RT Therapy Protocol Assessment initially then follow the protocol.  Repeat RT Therapy Protocol Assessment PRN for score 0-3 or on second treatment, BID, and PRN for scores above 3.    No Indications - adjust the frequency to every 6 hours PRN wheezing or bronchospasm, if no treatments needed after 48 hours then discontinue using Per Protocol order mode.     If indication present, adjust the RT bronchodilator orders based on the Bronchodilator Assessment Score as indicated below.  Use Inhaler orders unless patient has one or more of the following: on home nebulizer, not able to hold breath for 10 seconds, is not alert and oriented, cannot activate and use MDI correctly, or respiratory rate 25 breaths per minute or more, then use the equivalent nebulizer order(s) with same Frequency and PRN reasons based on the score.  If a patient is on this medication at home then do not decrease Frequency below that used at home.    0-3 - enter or revise RT bronchodilator order(s) to equivalent RT Bronchodilator order with Frequency of every 4 hours PRN for 
RT Inhaler-Nebulizer Bronchodilator Protocol Note    There is a bronchodilator order in the chart from a provider indicating to follow the RT Bronchodilator Protocol and there is an “Initiate RT Inhaler-Nebulizer Bronchodilator Protocol” order as well (see protocol at bottom of note).    CXR Findings:  No results found.    The findings from the last RT Protocol Assessment were as follows:   History Pulmonary Disease: None or smoker <15 pack years  Respiratory Pattern: Dyspnea on exertion or RR 21-25 bpm  Breath Sounds: Slightly diminished and/or crackles  Cough: Strong, spontaneous, non-productive  Indication for Bronchodilator Therapy: Decreased or absent breath sounds  Bronchodilator Assessment Score: 4    Aerosolized bronchodilator medication orders have been revised according to the RT Inhaler-Nebulizer Bronchodilator Protocol below.    Respiratory Therapist to perform RT Therapy Protocol Assessment initially then follow the protocol.  Repeat RT Therapy Protocol Assessment PRN for score 0-3 or on second treatment, BID, and PRN for scores above 3.    No Indications - adjust the frequency to every 6 hours PRN wheezing or bronchospasm, if no treatments needed after 48 hours then discontinue using Per Protocol order mode.     If indication present, adjust the RT bronchodilator orders based on the Bronchodilator Assessment Score as indicated below.  Use Inhaler orders unless patient has one or more of the following: on home nebulizer, not able to hold breath for 10 seconds, is not alert and oriented, cannot activate and use MDI correctly, or respiratory rate 25 breaths per minute or more, then use the equivalent nebulizer order(s) with same Frequency and PRN reasons based on the score.  If a patient is on this medication at home then do not decrease Frequency below that used at home.    0-3 - enter or revise RT bronchodilator order(s) to equivalent RT Bronchodilator order with Frequency of every 4 hours PRN for 
RT Inhaler-Nebulizer Bronchodilator Protocol Note    There is a bronchodilator order in the chart from a provider indicating to follow the RT Bronchodilator Protocol and there is an “Initiate RT Inhaler-Nebulizer Bronchodilator Protocol” order as well (see protocol at bottom of note).    CXR Findings:  No results found.    The findings from the last RT Protocol Assessment were as follows:   History Pulmonary Disease: None or smoker <15 pack years  Respiratory Pattern: Dyspnea on exertion or RR 21-25 bpm  Breath Sounds: Slightly diminished and/or crackles  Cough: Strong, spontaneous, non-productive  Indication for Bronchodilator Therapy: Decreased or absent breath sounds  Bronchodilator Assessment Score: 4    Aerosolized bronchodilator medication orders have been revised according to the RT Inhaler-Nebulizer Bronchodilator Protocol below.    Respiratory Therapist to perform RT Therapy Protocol Assessment initially then follow the protocol.  Repeat RT Therapy Protocol Assessment PRN for score 0-3 or on second treatment, BID, and PRN for scores above 3.    No Indications - adjust the frequency to every 6 hours PRN wheezing or bronchospasm, if no treatments needed after 48 hours then discontinue using Per Protocol order mode.     If indication present, adjust the RT bronchodilator orders based on the Bronchodilator Assessment Score as indicated below.  Use Inhaler orders unless patient has one or more of the following: on home nebulizer, not able to hold breath for 10 seconds, is not alert and oriented, cannot activate and use MDI correctly, or respiratory rate 25 breaths per minute or more, then use the equivalent nebulizer order(s) with same Frequency and PRN reasons based on the score.  If a patient is on this medication at home then do not decrease Frequency below that used at home.    0-3 - enter or revise RT bronchodilator order(s) to equivalent RT Bronchodilator order with Frequency of every 4 hours PRN for 
RT Inhaler-Nebulizer Bronchodilator Protocol Note    There is a bronchodilator order in the chart from a provider indicating to follow the RT Bronchodilator Protocol and there is an “Initiate RT Inhaler-Nebulizer Bronchodilator Protocol” order as well (see protocol at bottom of note).    CXR Findings:  XR CHEST PORTABLE    Result Date: 2/3/2025  Stable cardiomegaly. Background of pulmonary vascular congestion. Right basilar airspace opacities which may be on the basis of atelectasis. Superimposed infection is difficult to definitively exclude.       The findings from the last RT Protocol Assessment were as follows:   History Pulmonary Disease: (P) None or smoker <15 pack years (unknown history)  Respiratory Pattern: (P) Regular pattern and RR 12-20 bpm  Breath Sounds: (P) Inspiratory and expiratory or bilateral wheezing and/or rhonchi  Cough: (P) Strong, spontaneous, non-productive  Indication for Bronchodilator Therapy: (P) Decreased or absent breath sounds  Bronchodilator Assessment Score: (P) 6    Aerosolized bronchodilator medication orders have been revised according to the RT Inhaler-Nebulizer Bronchodilator Protocol below.    Respiratory Therapist to perform RT Therapy Protocol Assessment initially then follow the protocol.  Repeat RT Therapy Protocol Assessment PRN for score 0-3 or on second treatment, BID, and PRN for scores above 3.    No Indications - adjust the frequency to every 6 hours PRN wheezing or bronchospasm, if no treatments needed after 48 hours then discontinue using Per Protocol order mode.     If indication present, adjust the RT bronchodilator orders based on the Bronchodilator Assessment Score as indicated below.  Use Inhaler orders unless patient has one or more of the following: on home nebulizer, not able to hold breath for 10 seconds, is not alert and oriented, cannot activate and use MDI correctly, or respiratory rate 25 breaths per minute or more, then use the equivalent nebulizer 
RT Inhaler-Nebulizer Bronchodilator Protocol Note    There is a bronchodilator order in the chart from a provider indicating to follow the RT Bronchodilator Protocol and there is an “Initiate RT Inhaler-Nebulizer Bronchodilator Protocol” order as well (see protocol at bottom of note).    CXR Findings:  XR CHEST PORTABLE    Result Date: 2/3/2025  Stable cardiomegaly. Background of pulmonary vascular congestion. Right basilar airspace opacities which may be on the basis of atelectasis. Superimposed infection is difficult to definitively exclude.       The findings from the last RT Protocol Assessment were as follows:   History Pulmonary Disease: Chronic pulmonary disease  Respiratory Pattern: Mild dyspnea at rest, irregular pattern, or RR 21-25 bpm  Breath Sounds: Inspiratory and expiratory or bilateral wheezing and/or rhonchi  Cough: Strong, spontaneous, non-productive  Indication for Bronchodilator Therapy: Wheezing associated with pulm disorder  Bronchodilator Assessment Score: 12    Aerosolized bronchodilator medication orders have been revised according to the RT Inhaler-Nebulizer Bronchodilator Protocol below.    Respiratory Therapist to perform RT Therapy Protocol Assessment initially then follow the protocol.  Repeat RT Therapy Protocol Assessment PRN for score 0-3 or on second treatment, BID, and PRN for scores above 3.    No Indications - adjust the frequency to every 6 hours PRN wheezing or bronchospasm, if no treatments needed after 48 hours then discontinue using Per Protocol order mode.     If indication present, adjust the RT bronchodilator orders based on the Bronchodilator Assessment Score as indicated below.  Use Inhaler orders unless patient has one or more of the following: on home nebulizer, not able to hold breath for 10 seconds, is not alert and oriented, cannot activate and use MDI correctly, or respiratory rate 25 breaths per minute or more, then use the equivalent nebulizer order(s) with same 
RT Inhaler-Nebulizer Bronchodilator Protocol Note    There is a bronchodilator order in the chart from a provider indicating to follow the RT Bronchodilator Protocol and there is an “Initiate RT Inhaler-Nebulizer Bronchodilator Protocol” order as well (see protocol at bottom of note).    CXR Findings:  XR CHEST PORTABLE    Result Date: 2/5/2025  Increasing bibasilar atelectasis versus pneumonia.       The findings from the last RT Protocol Assessment were as follows:   History Pulmonary Disease: (P) None or smoker <15 pack years  Respiratory Pattern: (P) Mild dyspnea at rest, irregular pattern, or RR 21-25 bpm  Breath Sounds: (P) Inspiratory and expiratory or bilateral wheezing and/or rhonchi  Cough: (P) Strong, spontaneous, non-productive  Indication for Bronchodilator Therapy: (P) Wheezing associated with pulm disorder  Bronchodilator Assessment Score: (P) 10    Aerosolized bronchodilator medication orders have been revised according to the RT Inhaler-Nebulizer Bronchodilator Protocol below.    Respiratory Therapist to perform RT Therapy Protocol Assessment initially then follow the protocol.  Repeat RT Therapy Protocol Assessment PRN for score 0-3 or on second treatment, BID, and PRN for scores above 3.    No Indications - adjust the frequency to every 6 hours PRN wheezing or bronchospasm, if no treatments needed after 48 hours then discontinue using Per Protocol order mode.     If indication present, adjust the RT bronchodilator orders based on the Bronchodilator Assessment Score as indicated below.  Use Inhaler orders unless patient has one or more of the following: on home nebulizer, not able to hold breath for 10 seconds, is not alert and oriented, cannot activate and use MDI correctly, or respiratory rate 25 breaths per minute or more, then use the equivalent nebulizer order(s) with same Frequency and PRN reasons based on the score.  If a patient is on this medication at home then do not decrease Frequency 
RT Inhaler-Nebulizer Bronchodilator Protocol Note    There is a bronchodilator order in the chart from a provider indicating to follow the RT Bronchodilator Protocol and there is an “Initiate RT Inhaler-Nebulizer Bronchodilator Protocol” order as well (see protocol at bottom of note).    CXR Findings:  XR CHEST PORTABLE    Result Date: 2/5/2025  Increasing bibasilar atelectasis versus pneumonia.       The findings from the last RT Protocol Assessment were as follows:   History Pulmonary Disease: (P) None or smoker <15 pack years  Respiratory Pattern: (P) Mild dyspnea at rest, irregular pattern, or RR 21-25 bpm  Breath Sounds: (P) Inspiratory and expiratory or bilateral wheezing and/or rhonchi  Cough: (P) Strong, spontaneous, non-productive  Indication for Bronchodilator Therapy: (P) Wheezing associated with pulm disorder  Bronchodilator Assessment Score: (P) 10    Aerosolized bronchodilator medication orders have been revised according to the RT Inhaler-Nebulizer Bronchodilator Protocol below.    Respiratory Therapist to perform RT Therapy Protocol Assessment initially then follow the protocol.  Repeat RT Therapy Protocol Assessment PRN for score 0-3 or on second treatment, BID, and PRN for scores above 3.    No Indications - adjust the frequency to every 6 hours PRN wheezing or bronchospasm, if no treatments needed after 48 hours then discontinue using Per Protocol order mode.     If indication present, adjust the RT bronchodilator orders based on the Bronchodilator Assessment Score as indicated below.  Use Inhaler orders unless patient has one or more of the following: on home nebulizer, not able to hold breath for 10 seconds, is not alert and oriented, cannot activate and use MDI correctly, or respiratory rate 25 breaths per minute or more, then use the equivalent nebulizer order(s) with same Frequency and PRN reasons based on the score.  If a patient is on this medication at home then do not decrease Frequency 
RT Inhaler-Nebulizer Bronchodilator Protocol Note    There is a bronchodilator order in the chart from a provider indicating to follow the RT Bronchodilator Protocol and there is an “Initiate RT Inhaler-Nebulizer Bronchodilator Protocol” order as well (see protocol at bottom of note).    CXR Findings:  XR CHEST PORTABLE    Result Date: 2/5/2025  Increasing bibasilar atelectasis versus pneumonia.       The findings from the last RT Protocol Assessment were as follows:   History Pulmonary Disease: None or smoker <15 pack years  Respiratory Pattern: Mild dyspnea at rest, irregular pattern, or RR 21-25 bpm  Breath Sounds: Inspiratory and expiratory or bilateral wheezing and/or rhonchi  Cough: Strong, spontaneous, non-productive  Indication for Bronchodilator Therapy: Wheezing associated with pulm disorder  Bronchodilator Assessment Score: 10    Aerosolized bronchodilator medication orders have been revised according to the RT Inhaler-Nebulizer Bronchodilator Protocol below.    Respiratory Therapist to perform RT Therapy Protocol Assessment initially then follow the protocol.  Repeat RT Therapy Protocol Assessment PRN for score 0-3 or on second treatment, BID, and PRN for scores above 3.    No Indications - adjust the frequency to every 6 hours PRN wheezing or bronchospasm, if no treatments needed after 48 hours then discontinue using Per Protocol order mode.     If indication present, adjust the RT bronchodilator orders based on the Bronchodilator Assessment Score as indicated below.  Use Inhaler orders unless patient has one or more of the following: on home nebulizer, not able to hold breath for 10 seconds, is not alert and oriented, cannot activate and use MDI correctly, or respiratory rate 25 breaths per minute or more, then use the equivalent nebulizer order(s) with same Frequency and PRN reasons based on the score.  If a patient is on this medication at home then do not decrease Frequency below that used at 
RT Inhaler-Nebulizer Bronchodilator Protocol Note    There is a bronchodilator order in the chart from a provider indicating to follow the RT Bronchodilator Protocol and there is an “Initiate RT Inhaler-Nebulizer Bronchodilator Protocol” order as well (see protocol at bottom of note).    CXR Findings:  XR CHEST PORTABLE    Result Date: 2/5/2025  Increasing bibasilar atelectasis versus pneumonia.     XR CHEST PORTABLE    Result Date: 2/3/2025  Stable cardiomegaly. Background of pulmonary vascular congestion. Right basilar airspace opacities which may be on the basis of atelectasis. Superimposed infection is difficult to definitively exclude.       The findings from the last RT Protocol Assessment were as follows:   History Pulmonary Disease: (P) None or smoker <15 pack years  Respiratory Pattern: (P) Mild dyspnea at rest, irregular pattern, or RR 21-25 bpm  Breath Sounds: (P) Inspiratory and expiratory or bilateral wheezing and/or rhonchi  Cough: (P) Strong, spontaneous, non-productive  Indication for Bronchodilator Therapy: (P) Wheezing associated with pulm disorder  Bronchodilator Assessment Score: (P) 10    Aerosolized bronchodilator medication orders have been revised according to the RT Inhaler-Nebulizer Bronchodilator Protocol below.    Respiratory Therapist to perform RT Therapy Protocol Assessment initially then follow the protocol.  Repeat RT Therapy Protocol Assessment PRN for score 0-3 or on second treatment, BID, and PRN for scores above 3.    No Indications - adjust the frequency to every 6 hours PRN wheezing or bronchospasm, if no treatments needed after 48 hours then discontinue using Per Protocol order mode.     If indication present, adjust the RT bronchodilator orders based on the Bronchodilator Assessment Score as indicated below.  Use Inhaler orders unless patient has one or more of the following: on home nebulizer, not able to hold breath for 10 seconds, is not alert and oriented, cannot activate 
Repeat bladder scan now showing 115mL after multiple scans.   Patient does display some discomfort in abdomen.   
[facility] Daily Progress Note      Admit Date:  2/3/2025    Subjective:  Ms. Solomon is seen for perm afib, initially RVR  Denies chest pain. She is on O2 1L/min  Normally at home on RA  No nausea or vomiting    ROS:  12 point ROS negative in all areas as listed below except as in [place]  Constitutional, EENT,  GI, , Musculoskeletal, skin, neurological, hematological, endocrine, Psychiatric    Past Medical History:   Diagnosis Date    A-fib (HCC)     Cancer (HCC)     skin cancer 2014; colon 2015    Chronic anticoagulation     Clostridium difficile diarrhea 06/25/2015    per physicians notes; negative on 6/25/15    Clostridium difficile infection 12/09/2016    COVID-19 01/13/2021    Diabetes mellitus (HCC)     Hyperlipidemia     Hypertension      Past Surgical History:   Procedure Laterality Date    ABDOMEN SURGERY Right 10/15/2015    right colectomy, pain ball    ABDOMEN SURGERY  12/06/2016    trasverse colectomy    ANGIOPLASTY      right foot.    CHOLECYSTECTOMY  03/02/2021    : LAPAROSCOPIC CHOLECYSTECTOMY     CHOLECYSTECTOMY, LAPAROSCOPIC N/A 3/2/2021    LAPAROSCOPIC CHOLECYSTECTOMY performed by Juan Gonzales MD at [facility] OR    COLON SURGERY      COLONOSCOPY  2015    ascending colon mass, diverticulosis, hemorrhoids    COLONOSCOPY  11/08/2016    Transverse colon mass; Diverticulosis; Hemorrhoids    HERNIA REPAIR      x2    OTHER SURGICAL HISTORY Left 9/29/2014    excision of basal cell skin cancer Left face with full thickness skin graph from abdomen    SKIN GRAFT      UPPER GASTROINTESTINAL ENDOSCOPY      VASCULAR SURGERY      VENTRAL HERNIA REPAIR N/A 12/21/2021    LAPAROSCOPIC VENTRAL HERNIA WITH MESH performed by Juan Gonzales MD at [facility] OR       Objective:   /71   Pulse 78   Temp 98.5 °F (36.9 °C) (Oral)   Resp 18   Ht 1.651 m (5' 5\")   Wt 97.6 kg (215 lb 3.2 oz)   SpO2 97%   BMI 35.81 kg/m²     Intake/Output Summary (Last 24 hours) at 2/11/2025 1017  Last data filed at 
[place] [facility]    Daily Progress Note for 2025 7:16 AM /0307-01  Mandie Solomon : 1935 Age: 89 y.o. Sex: female  Length of Stay:  9    Interval History:      CC: F/U Illness (Fatigue that started at home last night.  Cough and loss of appetite this morning.  Has appt next door for today but didn't feel like going.  Squad called.  Afib history and currently in afib 110 - 120 heart rate. )    Subjective:       Ms Solomon - seen up in bed, still on 1 L - likely to come off today   Still weak and tired but improving slowly     Good UOP with lasix- 10.7 L neg   No fevers  Mentation normal         Will do gauthier voiding trial     Objective:     Vitals:    25 2158 25 0000 25 0315 25 0615   BP:  120/63 108/61    Pulse: 77 82 74    Resp: 18  18    Temp:  97.6 °F (36.4 °C) 98 °F (36.7 °C)    TempSrc:  Oral Oral    SpO2: 98% 96% 93%    Weight:    95.1 kg (209 lb 9 oz)   Height:              Intake/Output Summary (Last 24 hours) at 2025 0716  Last data filed at 2025 0530  Gross per 24 hour   Intake 648 ml   Output 1300 ml   Net -652 ml     Body mass index is 34.87 kg/m².    Physical Exam:      General:  elderly female Awake, alert and oriented.  Fatigued   Appears to be not in any distress  Mucous Membranes:  Pink , anicteric  Neck: No JVD, no carotid bruit, no thyromegaly  Chest:improving doreen airentry  with improving  crackles  Cardiovascular:  RRR S1S2 heard, no murmurs or gallops  Abdomen:  Soft, obese, undistended, non tender, no organomegaly, BS present  Extremities: resolving edema to LE . Distal pulses well felt  Neurological : grossly normal with gen weakness      Scheduled Medications:  warfarin, 7.5 mg, Once  furosemide, 40 mg, Daily  metoprolol tartrate, 50 mg, BID  ipratropium 0.5 mg-albuterol 2.5 mg, 1 Dose, BID RT  glipiZIDE, 2.5 mg, BID AC  insulin glargine, 15 Units, Nightly  insulin lispro, 0-8 Units, 4x Daily AC & HS  dilTIAZem, 180 
[place] [facility]    Daily Progress Note for 2025 7:49 AM /0307-01  Mandie Solomon : 1935 Age: 89 y.o. Sex: female  Length of Stay:  2    Interval History:      CC: F/U Illness (Fatigue that started at home last night.  Cough and loss of appetite this morning.  Has appt next door for today but didn't feel like going.  Squad called.  Afib history and currently in afib 110 - 120 heart rate. )    Subjective:       Doing worse today, having more nausea, vomiting today, has some lower pelvic tenderness.     Objective:     Vitals:    25 0000 25 0247 25 0341 25 0524   BP: 112/69  115/62    Pulse: 90  94 (!) 110   Resp: 24  24 20   Temp: 99.2 °F (37.3 °C)  98.2 °F (36.8 °C)    TempSrc: Oral  Oral    SpO2: 93%  93% 92%   Weight:  101.6 kg (223 lb 14.4 oz)     Height:              Intake/Output Summary (Last 24 hours) at 2025 0749  Last data filed at 2025 0033  Gross per 24 hour   Intake 1076.97 ml   Output 200 ml   Net 876.97 ml     Body mass index is 37.26 kg/m².    Physical Exam:  General: Cooperative, pleasant/Ill appearing, on  Nasal cannula  HEENT:  Head: normocephalic,atraumatic, anicteric sclera, clear conjunctiva  Neck: Normal size, Jugular venous pulsations: normal  Respiratory:unlabored breathing, clear to auscultation with no crackles, wheezes rhonchi  Heart: Regular rate and rhythm, S1, S2-normal, No murmurs  Abdomen: soft, nondistended, nontender, normoactive bowel sounds,  Neurological/Psych: Alert and oriented times three, no focal neurological deficits, Mood and affect appropriate.  Skin: No obvious rashes    Extremities:  no edema, Pedal pulses 2+ bilaterally    Scheduled Medications:  ipratropium 0.5 mg-albuterol 2.5 mg, 1 Dose, 4x Daily RT  furosemide, 40 mg, BID  potassium chloride, 10 mEq, BID  metoprolol tartrate, 50 mg, TID  sodium chloride flush, 5-40 mL, 2 times per day  guaiFENesin, 600 mg, BID  cefTRIAXone (ROCEPHIN) IV, 
Shift assessment complete, morning medications given, patient resting with no complaints of pain, will continue to monitor. Geno Campa RN      
Shift report given to nurse Castorena at bedside. Patient care handed off in stable condition at this time. . Geno Campa RN      
SpO2 84% on RA. 2L O2 placed, SpO2 92%.  
Straight cath performed at this time. 650mL out.   
[facility] Progress Note    Daily Progress Note for 2025 7:11 AM /0307-01  Mandie Solomon : 1935 Age: 89 y.o. Sex: female  Length of Stay:  8    Interval History:      CC: F/U Illness (Fatigue that started at home last night.  Cough and loss of appetite this morning.  Has appt next door for today but didn't feel like going.  Squad called.  Afib history and currently in afib 110 - 120 heart rate. )    Subjective:       Ms Solomon - seen up in bed, still on 2 L   Still weak and tired   Failed gauthier voiding trial   Good UOP with lasix- 9.7 L neg     Coughing up more , no sputum  No fevers  Mentation normal     Objective:     Vitals:    02/10/25 2045 02/10/25 2114 02/10/25 2330 25 0400   BP: 129/72  112/65 (!) 114/54   Pulse: 79  79 86   Resp: 16  16 16   Temp: 98 °F (36.7 °C)  97.3 °F (36.3 °C) 98 °F (36.7 °C)   TempSrc:   Oral    SpO2:  95% 99%    Weight:       Height:              Intake/Output Summary (Last 24 hours) at 2025 0711  Last data filed at 2025 0210  Gross per 24 hour   Intake 240 ml   Output 1975 ml   Net -1735 ml     Body mass index is 35.81 kg/m².    Physical Exam:      General:  elderly female Awake, alert and oriented. Appears to be not in any distress  Mucous Membranes:  Pink , anicteric  Neck: No JVD, no carotid bruit, no thyromegaly  Chest: diminished doreen with improving  crackles  Cardiovascular:  RRR S1S2 heard, no murmurs or gallops  Abdomen:  Soft, obese, undistended, non tender, no organomegaly, BS present  Extremities: No edema or cyanosis. Distal pulses well felt  Neurological : grossly normal with gen weakness      Scheduled Medications:  warfarin, 7.5 mg, Once  ipratropium 0.5 mg-albuterol 2.5 mg, 1 Dose, BID RT  glipiZIDE, 2.5 mg, BID AC  insulin glargine, 15 Units, Nightly  insulin lispro, 0-8 Units, 4x Daily AC & HS  dilTIAZem, 180 mg, Daily  furosemide, 40 mg, BID  lactobacillus, 1 capsule, Daily with breakfast  famotidine (PEPCID) 
Transferred care to AARON Garces. Face to face bedside report given, no need voiced at this time.  Pt awake in bed.   No signs of distress noted.  Call light within reach.   Denies needs.    
Transferred care to AARON Garces. Face to face bedside report given, no need voiced at this time. Pt in bed with eyes closed.   No signs of distress noted.  Call light within reach.   Denies needs.    
Transferred care to AARON Garces. Face to face bedside report given, no need voiced at this time. Pt in bed with eyes closed.  No signs of distress noted.  Call light within reach.   Denies needs.     
[place] [facility]    Daily Progress Note for 2025 1:42 PM /0307-01  Mandie Solomon : 1935 Age: 89 y.o. Sex: female  Length of Stay:  6    Interval History:      CC: F/U Illness (Fatigue that started at home last night.  Cough and loss of appetite this morning.  Has appt next door for today but didn't feel like going.  Squad called.  Afib history and currently in afib 110 - 120 heart rate. )    Subjective:       Feels better, more conversational today. No wheezing today.     Objective:     Vitals:    25 1057 25 1113 25 1333 25 1334   BP: 106/70      Pulse: 91 90     Resp: 18 18     Temp: 97.5 °F (36.4 °C)      TempSrc: Oral      SpO2: 93% 92% (!) 84% 91%   Weight:       Height:              Intake/Output Summary (Last 24 hours) at 2025 1342  Last data filed at 2025 0900  Gross per 24 hour   Intake 222 ml   Output 1175 ml   Net -953 ml     Body mass index is 35.2 kg/m².    Physical Exam:  General: Cooperative, ill appearing but more alert  HEENT:  Head: normocephalic,atraumatic, anicteric sclera, clear conjunctiva  Neck: Normal size, Jugular venous pulsations: normal  Respiratory: +bilateral wheezing, appears to be breathing more comfortably  Heart: Regular rate and rhythm, S1, S2-normal, No murmurs  Abdomen: soft, nondistended, nontender, normoactive bowel sounds,  Neurological/Psych: Alert and oriented times three, no focal neurological deficits, Mood and affect appropriate.  Skin: No obvious rashes    Extremities:  no edema, Pedal pulses 2+ bilaterally    Scheduled Medications:  insulin glargine, 15 Units, Nightly  insulin lispro, 0-8 Units, 4x Daily AC & HS  ampicillin-sulbactam, 3,000 mg, Q6H  predniSONE, 40 mg, Daily  dilTIAZem, 180 mg, Daily  furosemide, 40 mg, BID  oseltamivir 6mg/ml, 75 mg, BID  ipratropium 0.5 mg-albuterol 2.5 mg, 1 Dose, 4x Daily RT  lactobacillus, 1 capsule, Daily with breakfast  famotidine (PEPCID) injection, 20 mg, 
Vitals:    02/10/25 0430   BP: 132/75   Pulse: 84   Resp: 18   Temp: 97.5 °F (36.4 °C)   SpO2: 96%     Patient is awake and oriented x4.  Purewick change and elda care done.  Call light within reach.   
Walked in to patient's room to check on patient, patient was very wheezy,   propped patient up in bed, encouraged DBE. RR 26, O2 sat 94 % on INO2 at 2 lpm. Respiratory therapist notified for breathing treatment.   
  Fail- Patient unable to demonstrate standing stability. Patient is MOBILITY LEVEL 3.     Assessment Level 4- Walk   1. Ask patient to march in place at bedside.    2. Then ask patient to advance step and return each foot. Some medical conditions may render a patient from stepping backwards, use your best clinical judgement.   Fail- Patient not able to complete tasks OR requires use of assistive device. Patient is MOBILITY LEVEL 3.       Mobility Level- 1    Patient is not able to demonstrated the ability to move from a reclining position to an upright position within the recliner. however patient is alert, oriented and able to provide informed consent   
Patient denies chest pain, reports some shortness of breath.  Denies palpitations, near-syncope or lore syncope.  Patient reports she missed some medications here recently.  Son reports that she is typically is adherent with taking medications.        Past Medical History:   has a past medical history of A-fib (HCC), Cancer (HCC), Chronic anticoagulation, Clostridium difficile diarrhea, Clostridium difficile infection, COVID-19, Diabetes mellitus (HCC), Hyperlipidemia, and Hypertension.    Surgical History:   has a past surgical history that includes other surgical history (Left, 9/29/2014); Upper gastrointestinal endoscopy; vascular surgery; Skin graft; Colonoscopy (2015); Colonoscopy (11/08/2016); Colon surgery; Cholecystectomy (03/02/2021); Cholecystectomy, laparoscopic (N/A, 3/2/2021); Abdomen surgery (Right, 10/15/2015); Abdomen surgery (12/06/2016); hernia repair; angioplasty; and ventral hernia repair (N/A, 12/21/2021).     Social History:   reports that she has never smoked. She has never used smokeless tobacco. She reports that she does not drink alcohol and does not use drugs.     Family History:  family history includes Cancer in her father and mother.      Home Medications:  Were reviewed and are listed in nursing record and/or below  Prior to Admission medications    Medication Sig Start Date End Date Taking? Authorizing Provider   linagliptin (TRADJENTA) 5 MG tablet Take 1 tablet by mouth daily 1/26/24  Yes ProviderPardeep MD   warfarin (COUMADIN) 5 MG tablet Take 1 tablet by mouth 2 tablets daily \"as directed\"   Yes ProviderPardeep MD   amLODIPine (NORVASC) 5 MG tablet Take 1 tablet by mouth daily   Yes Pardeep Maloney MD   atorvastatin (LIPITOR) 10 MG tablet Take 1 tablet by mouth daily 5/9/24  Yes Mickey Lewis MD   furosemide (LASIX) 40 MG tablet Take 1 tablet by mouth daily 5/9/24  Yes Mickey Lewis MD   metoprolol tartrate (LOPRESSOR) 50 MG tablet Take 1 tablet by mouth 2 
wheezing or increased work of breathing using Per Protocol order mode.        4-6 - enter or revise RT Bronchodilator order(s) to two equivalent RT bronchodilator orders with one order with BID Frequency and one order with Frequency of every 4 hours PRN wheezing or increased work of breathing using Per Protocol order mode.        7-10 - enter or revise RT Bronchodilator order(s) to two equivalent RT bronchodilator orders with one order with TID Frequency and one order with Frequency of every 4 hours PRN wheezing or increased work of breathing using Per Protocol order mode.       11-13 - enter or revise RT Bronchodilator order(s) to one equivalent RT bronchodilator order with QID Frequency and an Albuterol order with Frequency of every 4 hours PRN wheezing or increased work of breathing using Per Protocol order mode.      Greater than 13 - enter or revise RT Bronchodilator order(s) to one equivalent RT bronchodilator order with every 4 hours Frequency and an Albuterol order with Frequency of every 2 hours PRN wheezing or increased work of breathing using Per Protocol order mode.     RT to enter RT Home Evaluation for COPD & MDI Assessment order using Per Protocol order mode.    Electronically signed by Brielle Mcnally on 2/11/2025 at 9:05 AM  
INR 3.65 02/06/2025 05:04 AM       Test Review:        Radiology reviewed:     CT CHEST ABDOMEN PELVIS WO CONTRAST Additional Contrast? None   Final Result      XR ABDOMEN (KUB) (SINGLE AP VIEW)   Final Result   Nonobstructive bowel gas pattern.      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      Possible left renal calculus.         XR CHEST PORTABLE   Final Result   Increasing bibasilar atelectasis versus pneumonia.         XR CHEST PORTABLE   Final Result   Stable cardiomegaly.      Background of pulmonary vascular congestion.      Right basilar airspace opacities which may be on the basis of atelectasis.   Superimposed infection is difficult to definitively exclude.               Lab Results   Component Value Date    LABA1C 6.6 02/04/2025      Body mass index is 35.15 kg/m².       Impression/Plan:        A-fib RVR  Optimize K 4-4.5, mag 2-2.5.  BNP is 3100.  Chest x-ray shows stable cardiomegaly, background pulmonary vascular congestion RLL opacities cannot exclude superimposed infection.  Her EF 55-60 on echo 2022, obtain updated. Weight on admission is 216 pounds.    Cardiology consulted  INR trending up, hold warfarin for now, resume as appropriate    Acute on Chronic CHF\  Acute hypoxemic respiratory failure O2 less than 90% on room air  CT chest shows bilateral pleural effusions  -Continue IV lasix monitor closely for toxicity  - monitor electrolytes    Nausea/Vomiting - improving  Started yesterday, says she is not having vomiting now but asked for zofran  No abdominal pain or diarrhea originally but she did develop diarrhea and some lower pelvic discomfort later  Worse on 2/5, very ill appearing +diarrhea and vomiting. Downgrade to clears obtain CT scan wo contrast - pt has contrast allergy - showed urinary retention  Pepcid, noted protonix allergy?  GI consultation, appreciate recs  Stool tests ordered - monitor for further diarrhea  -Pt was neg flu on 
  Independent  Upper Body Dressing:                                                            Independent  Lower Body Dressing:                                                            Min A   Pt to demonstrate UE therapeutic exs x 15 reps with minimal cues     Rehabilitation Potential: Fair  Strengths for achieving goals include: Family Support and Pt cooperative   Barriers to achieving goals include:  Complexity of condition, Pain, and Weakness     Plan:  To be seen 3-5 x/ week while in acute care setting for therapeutic exercises/activities, bed mobility training, functional transfer training, family/patient education, ADL/IADL retraining, and energy conservation training.    Electronically signed by Wilber Wilson OT      If patient discharges from this facility prior to next visit, this note will serve as the Discharge Summary    
to/from stand: CGA  3). Bed to chair: CGA  4). CHF goal: N/A     To be met in 6 visits:  1).  Supine to/from sit: Min A   2).  Sit to/from stand: SBA  3).  Bed to chair: SBA  4).  Gait: Ambulate 50 ft.  with CGA and use of LRAD (least restrictive assistive device)  5).  Tolerate B LE exercises 3 sets of 10-15 reps  6).  Ascend/descend 4 steps with CGA with use of hand rail unilateral and LRAD (least restrictive assistive device)    Rehabilitation Potential: Fair to good  Strengths for achieving goals include:   Pt cooperative   Barriers to achieving goals include:    Complexity of condition, Chronicity of condition, and Weakness    Plan    To be seen 3-5 x/ week while in acute care setting for therapeutic exercises/activities, bed mobility training, functional transfer training, family/patient education, balance training, gait training, and stair training.    Signature: Yakov Freeman, PT     If patient discharges from this facility prior to next visit, this note will serve as the Discharge Summary.    CHF Education  N/A   
of Tx: 2-4x/wk    Individuals Consulted:   [x]  Patient     []  NP         [x]  RN   []  RD                   []  MD      []  Family Member                        []  PA    []  Other:      Safety Devices / Report:   [x]  All fall risk precautions in place []  Safety handoff completed with RN  []  Bed alarm in place  [x]  Left in bed     []  Chair alarm in place  []  Left in chair   [x]  Call light in reach   []  Other:                       Total Treatment Time / Charges       Time in Time out Total Time / units   Swallow Eval/Tx Time  8144  8987  23 min/2 units     Signature:  Pippa Stephenson M.A. CCC-SLP   Speech-Language Pathologist     
I have reinforced the need for patient directed risk factor modification.  All questions and concerns were addressed to the patient/family. Alternatives to my treatment were discussed.     Thank you for allowing us to participate in the care of Mandie Solomon. Please call me with any questions [phone].    Hussein Samson, DO   Cardiovascular Disease  [facility]  [phone] [place] [facility]  [phone] [place] Office  2/5/2025 4:51 PM      
Moderate aortic stenosis    Flu         I will address the patient's cardiac risk factors and adjusted pharmacologic treatment as needed. In addition, I have reinforced the need for patient directed risk factor modification.  All questions and concerns were addressed to the patient/family. Alternatives to my treatment were discussed.     Thank you for allowing us to participate in the care of Mandie AROLDO Solomon. Please call me with any questions [phone].    Hussein Samson, DO   Cardiovascular Disease  [facility]  [phone] [place] Office  [phone] [place] Office  2/7/2025 8:45 AM

## 2025-02-12 NOTE — FLOWSHEET NOTE
02/12/25 0315   Vital Signs   Temp 98 °F (36.7 °C)   Temp Source Oral   Pulse 74   Heart Rate Source Monitor   Respirations 18   /61   MAP (Calculated) 77   BP Location Left upper arm   BP Method Automatic   Patient Position Semi fowlers   Pain Assessment   Pain Assessment None - Denies Pain   Oxygen Therapy   SpO2 93 %   Pulse Oximeter Device Mode Intermittent   Pulse Oximeter Device Location Right;Finger   O2 Device Nasal cannula   Skin Assessment Clean, dry, & intact   O2 Flow Rate (L/min) 1 L/min   Oxygen Therapy Supplemental oxygen

## 2025-02-12 NOTE — CARE COORDINATION
Case Management Assessment            Discharge Note                    Date / Time of Note: 2/12/2025 12:27 PM                  Discharge Note Completed by: STEVO Mondragon    Patient Name: Mandie Solomon   YOB: 1935  Diagnosis: Lactic acidosis [E87.20]  Hypomagnesemia [E83.42]  Atrial fibrillation with rapid ventricular response (HCC) [I48.91]  Atrial fibrillation with RVR (HCC) [I48.91]  Anticoagulated on warfarin [Z79.01]  Acute respiratory failure with hypoxia [J96.01]  Pneumonia of right lower lobe due to infectious organism [J18.9]   Date / Time: 2/3/2025  1:39 PM    Current PCP: Hussein Merino APRN - CNP  Clinic patient: No    Hospitalization in the last 30 days: No       Advance Directives:  Code Status: Limited  [place] DNR form completed and on chart: No    Financial:  Payor: MEDICARE / Plan: MEDICARE PART A / Product Type: *No Product type* /      Pharmacy:    [facility] - [place], [place] - [address] -  [phone] - F [phone]  [address] [place] [address]  Phone: [phone] Fax: [phone]    [id number] - [place], [place] - [address] -  [phone] - F [phone]  [address] [address]  Phone: [phone] Fax: [phone]      Assistance purchasing medications?: Potential Assistance Purchasing Medications: No  Assistance provided by Case Management: None at this time    Does patient want to participate in local refill/ meds to beds program?: No    Meds To Beds General Rules:  1. Can ONLY be done Monday- Friday between 8:30am-5pm  2. Prescription(s) must be in pharmacy by 3pm to be filled same day  3.Copy of patient's insurance/ prescription drug card and patient face sheet must be sent along with the prescription(s)  4. Cost of Rx cannot be added to hospital bill. If financial assistance is needed, please contact unit  or ;  or

## 2025-02-12 NOTE — DISCHARGE INSTR - COC
1149        Elimination:  Continence:   Bowel: Yes  Bladder: No  Urinary Catheter:  yes    Colostomy/Ileostomy/Ileal Conduit: No       Date of Last BM: 2/12/2025    Intake/Output Summary (Last 24 hours) at 2/12/2025 1153  Last data filed at 2/12/2025 0530  Gross per 24 hour   Intake 412 ml   Output 1300 ml   Net -888 ml     I/O last 3 completed shifts:  In: 648 [P.O.:638; I.V.:10]  Out: 2475 [Urine:7005]    Safety Concerns:     At Risk for Falls    Impairments/Disabilities:      None    Nutrition Therapy:  Current Nutrition Therapy:   - Oral Diet:  Carb Control 4 carbs/meal (1800kcals/day), Dysphagia - Soft and Bite Sized, and Low Sodium (2gm)    Routes of Feeding: Oral  Liquids: Thin Liquids  Daily Fluid Restriction: no  Last Modified Barium Swallow with Video (Video Swallowing Test): not done    Treatments at the Time of Hospital Discharge:   Respiratory Treatments: na  Oxygen Therapy:  is not on home oxygen therapy.  Ventilator:    - No ventilator support    Rehab Therapies: Physical Therapy and Occupational Therapy  Weight Bearing Status/Restrictions: No weight bearing restrictions  Other Medical Equipment (for information only, NOT a DME order):  wheelchair, walker, bedside commode, and STEDY  Other Treatments: na    Patient's personal belongings (please select all that are sent with patient):  Sent with pt    RN SIGNATURE:  Electronically signed by AARON Chua on 2/12/25 at 12:33 PM EST    CASE MANAGEMENT/SOCIAL WORK SECTION    Inpatient Status Date: 2/3/25    Readmission Risk Assessment Score: 16%  [facility] RISK OF UNPLANNED READMISSION 2.0             15.7 Total Score        Discharging to Facility/ Agency   Name: [facility]  Address:  Phone: [phone]  Fax:    / signature: Electronically signed by STEVO Mondragon on 2/12/25 at 12:29 PM EST    PHYSICIAN SECTION    Prognosis: Good    Condition at Discharge: Stable    Rehab Potential (if transferring to Rehab): Good    Recommended

## 2025-02-12 NOTE — FLOWSHEET NOTE
02/11/25 1945   Vital Signs   Temp 98 °F (36.7 °C)   Temp Source Oral   Pulse 68   Heart Rate Source Monitor   /68   MAP (Calculated) 80   BP Location Left upper arm   BP Method Automatic   Patient Position Semi fowlers   Oxygen Therapy   SpO2 98 %   O2 Device Nasal cannula   O2 Flow Rate (L/min) 1 L/min     Pt resting comfortably in bed. Bed alarm on, call light within reach. No needs expressed at this time. Will continue to monitor.

## 2025-02-12 NOTE — PLAN OF CARE
Problem: Chronic Conditions and Co-morbidities  Goal: Patient's chronic conditions and co-morbidity symptoms are monitored and maintained or improved  2/12/2025 0954 by Toma Bullock RN  Outcome: Progressing  Flowsheets (Taken 2/11/2025 2039 by Sally Todd, RN)  Care Plan - Patient's Chronic Conditions and Co-Morbidity Symptoms are Monitored and Maintained or Improved: Monitor and assess patient's chronic conditions and comorbid symptoms for stability, deterioration, or improvement  2/11/2025 2208 by Sally Todd RN  Outcome: Progressing  Flowsheets (Taken 2/11/2025 2039)  Care Plan - Patient's Chronic Conditions and Co-Morbidity Symptoms are Monitored and Maintained or Improved: Monitor and assess patient's chronic conditions and comorbid symptoms for stability, deterioration, or improvement     Problem: Discharge Planning  Goal: Discharge to home or other facility with appropriate resources  2/12/2025 0954 by Toma Bullock RN  Outcome: Progressing  Flowsheets (Taken 2/11/2025 2039 by Sally Todd, RN)  Discharge to home or other facility with appropriate resources: Identify barriers to discharge with patient and caregiver  2/11/2025 2208 by Sally Todd RN  Outcome: Progressing  Flowsheets (Taken 2/11/2025 2039)  Discharge to home or other facility with appropriate resources: Identify barriers to discharge with patient and caregiver     Problem: Pain  Goal: Verbalizes/displays adequate comfort level or baseline comfort level  2/12/2025 0954 by Toma Bullock RN  Outcome: Progressing  Flowsheets (Taken 2/12/2025 0954)  Verbalizes/displays adequate comfort level or baseline comfort level: Encourage patient to monitor pain and request assistance  2/11/2025 2208 by Sally Todd, RN  Outcome: Progressing     Problem: Safety - Adult  Goal: Free from fall injury  2/12/2025 0954 by Toma Bullock RN  Outcome: Progressing  Flowsheets (Taken 2/10/2025 0100 by Leo 
  Problem: Chronic Conditions and Co-morbidities  Goal: Patient's chronic conditions and co-morbidity symptoms are monitored and maintained or improved  2/6/2025 2321 by Yolanda Rasmussen RN  Outcome: Progressing  2/6/2025 1521 by Dedra Jimenez RN  Outcome: Progressing     Problem: Discharge Planning  Goal: Discharge to home or other facility with appropriate resources  2/6/2025 2321 by Yolanda Rasmussen RN  Outcome: Progressing  2/6/2025 1521 by Dedra Jimenez RN  Outcome: Progressing     Problem: Pain  Goal: Verbalizes/displays adequate comfort level or baseline comfort level  2/6/2025 2321 by Yolanda Rasmussen RN  Outcome: Progressing  2/6/2025 1521 by Dedra Jimenez RN  Outcome: Progressing     Problem: Safety - Adult  Goal: Free from fall injury  2/6/2025 2321 by Yolanda Rasmussen RN  Outcome: Progressing  2/6/2025 1521 by Dedra Jimenez RN  Outcome: Progressing     Problem: ABCDS Injury Assessment  Goal: Absence of physical injury  2/6/2025 2321 by Yolanda Rasmussen RN  Outcome: Progressing  2/6/2025 1521 by Dedra Jimenez RN  Outcome: Progressing     Problem: Respiratory - Adult  Goal: Achieves optimal ventilation and oxygenation  2/6/2025 2321 by Yolanda Rasmussen RN  Outcome: Progressing  2/6/2025 1521 by Dedra Jimenez RN  Outcome: Progressing     Problem: Cardiovascular - Adult  Goal: Maintains optimal cardiac output and hemodynamic stability  2/6/2025 2321 by Yolanda Rasmussen RN  Outcome: Progressing  2/6/2025 1521 by Dedra Jimenez RN  Outcome: Progressing  Goal: Absence of cardiac dysrhythmias or at baseline  2/6/2025 2321 by Yolanda Rasmussen RN  Outcome: Progressing  2/6/2025 1521 by Dedra Jimenez RN  Outcome: Progressing     Problem: Skin/Tissue Integrity - Adult  Goal: Skin integrity remains intact  Description: 1.  Monitor for areas of redness and/or skin breakdown  2.  Assess vascular access sites hourly  3.  Every 4-6 hours minimum:  Change oxygen saturation probe site  4.  Every 4-6 hours:  
  Problem: Chronic Conditions and Co-morbidities  Goal: Patient's chronic conditions and co-morbidity symptoms are monitored and maintained or improved  Outcome: Progressing     Problem: Discharge Planning  Goal: Discharge to home or other facility with appropriate resources  Outcome: Progressing     Problem: Pain  Goal: Verbalizes/displays adequate comfort level or baseline comfort level  Outcome: Progressing     Problem: Safety - Adult  Goal: Free from fall injury  Outcome: Progressing     Problem: ABCDS Injury Assessment  Goal: Absence of physical injury  Outcome: Progressing     Problem: Respiratory - Adult  Goal: Achieves optimal ventilation and oxygenation  Outcome: Progressing     
  Problem: Chronic Conditions and Co-morbidities  Goal: Patient's chronic conditions and co-morbidity symptoms are monitored and maintained or improved  Outcome: Progressing     Problem: Discharge Planning  Goal: Discharge to home or other facility with appropriate resources  Outcome: Progressing     Problem: Pain  Goal: Verbalizes/displays adequate comfort level or baseline comfort level  Outcome: Progressing     Problem: Safety - Adult  Goal: Free from fall injury  Outcome: Progressing     Problem: ABCDS Injury Assessment  Goal: Absence of physical injury  Outcome: Progressing     Problem: Respiratory - Adult  Goal: Achieves optimal ventilation and oxygenation  Outcome: Progressing     Problem: Cardiovascular - Adult  Goal: Maintains optimal cardiac output and hemodynamic stability  Outcome: Progressing  Goal: Absence of cardiac dysrhythmias or at baseline  Outcome: Progressing     Problem: Skin/Tissue Integrity - Adult  Goal: Skin integrity remains intact  Description: 1.  Monitor for areas of redness and/or skin breakdown  2.  Assess vascular access sites hourly  3.  Every 4-6 hours minimum:  Change oxygen saturation probe site  4.  Every 4-6 hours:  If on nasal continuous positive airway pressure, respiratory therapy assess nares and determine need for appliance change or resting period  Outcome: Progressing     Problem: Genitourinary - Adult  Goal: Absence of urinary retention  Outcome: Progressing     Problem: Musculoskeletal - Adult  Goal: Return mobility to safest level of function  Outcome: Progressing     Problem: Skin/Tissue Integrity  Goal: Skin integrity remains intact  Description: 1.  Monitor for areas of redness and/or skin breakdown  2.  Assess vascular access sites hourly  3.  Every 4-6 hours minimum:  Change oxygen saturation probe site  4.  Every 4-6 hours:  If on nasal continuous positive airway pressure, respiratory therapy assess nares and determine need for appliance change or resting 
  Problem: Chronic Conditions and Co-morbidities  Goal: Patient's chronic conditions and co-morbidity symptoms are monitored and maintained or improved  Outcome: Progressing  Flowsheets (Taken 2/10/2025 0100)  Care Plan - Patient's Chronic Conditions and Co-Morbidity Symptoms are Monitored and Maintained or Improved: Monitor and assess patient's chronic conditions and comorbid symptoms for stability, deterioration, or improvement     Problem: Respiratory - Adult  Goal: Achieves optimal ventilation and oxygenation  Outcome: Progressing  Flowsheets (Taken 2/10/2025 0100)  Achieves optimal ventilation and oxygenation: Assess for changes in respiratory status     Problem: Genitourinary - Adult  Goal: Absence of urinary retention  Outcome: Not Progressing  Flowsheets (Taken 2/10/2025 0100)  Absence of urinary retention: Assess patient’s ability to void and empty bladder     Problem: Genitourinary - Adult  Goal: Absence of urinary retention  Outcome: Not Progressing  Flowsheets (Taken 2/10/2025 0100)  Absence of urinary retention: Assess patient’s ability to void and empty bladder     
  Problem: Chronic Conditions and Co-morbidities  Goal: Patient's chronic conditions and co-morbidity symptoms are monitored and maintained or improved  Outcome: Progressing  Flowsheets (Taken 2/3/2025 2204)  Care Plan - Patient's Chronic Conditions and Co-Morbidity Symptoms are Monitored and Maintained or Improved: Monitor and assess patient's chronic conditions and comorbid symptoms for stability, deterioration, or improvement     Problem: Discharge Planning  Goal: Discharge to home or other facility with appropriate resources  Outcome: Progressing  Flowsheets (Taken 2/3/2025 2204)  Discharge to home or other facility with appropriate resources: Identify barriers to discharge with patient and caregiver     Problem: Pain  Goal: Verbalizes/displays adequate comfort level or baseline comfort level  Outcome: Progressing     Problem: Safety - Adult  Goal: Free from fall injury  Outcome: Progressing     Problem: ABCDS Injury Assessment  Goal: Absence of physical injury  Outcome: Progressing     Problem: Respiratory - Adult  Goal: Achieves optimal ventilation and oxygenation  Outcome: Progressing     Problem: Cardiovascular - Adult  Goal: Maintains optimal cardiac output and hemodynamic stability  Outcome: Progressing  Goal: Absence of cardiac dysrhythmias or at baseline  Outcome: Progressing     Problem: Skin/Tissue Integrity - Adult  Goal: Skin integrity remains intact  Outcome: Progressing     Problem: Genitourinary - Adult  Goal: Absence of urinary retention  Outcome: Progressing     Problem: Musculoskeletal - Adult  Goal: Return mobility to safest level of function  Outcome: Progressing     
  Problem: Chronic Conditions and Co-morbidities  Goal: Patient's chronic conditions and co-morbidity symptoms are monitored and maintained or improved  Outcome: Progressing  Flowsheets (Taken 2/4/2025 1954)  Care Plan - Patient's Chronic Conditions and Co-Morbidity Symptoms are Monitored and Maintained or Improved: Monitor and assess patient's chronic conditions and comorbid symptoms for stability, deterioration, or improvement     Problem: Discharge Planning  Goal: Discharge to home or other facility with appropriate resources  Outcome: Progressing  Flowsheets (Taken 2/4/2025 1954)  Discharge to home or other facility with appropriate resources: Identify barriers to discharge with patient and caregiver     Problem: Pain  Goal: Verbalizes/displays adequate comfort level or baseline comfort level  Outcome: Progressing     Problem: Safety - Adult  Goal: Free from fall injury  Outcome: Progressing     Problem: ABCDS Injury Assessment  Goal: Absence of physical injury  Outcome: Progressing     Problem: Respiratory - Adult  Goal: Achieves optimal ventilation and oxygenation  Outcome: Progressing  Flowsheets (Taken 2/4/2025 1954)  Achieves optimal ventilation and oxygenation:   Assess for changes in respiratory status   Assess for changes in mentation and behavior   Position to facilitate oxygenation and minimize respiratory effort   Oxygen supplementation based on oxygen saturation or arterial blood gases   Assess and instruct to report shortness of breath or any respiratory difficulty     Problem: Cardiovascular - Adult  Goal: Maintains optimal cardiac output and hemodynamic stability  Outcome: Progressing  Flowsheets (Taken 2/4/2025 1954)  Maintains optimal cardiac output and hemodynamic stability:   Monitor blood pressure and heart rate   Monitor urine output and notify Licensed Independent Practitioner for values outside of normal range  Goal: Absence of cardiac dysrhythmias or at baseline  Outcome: 
Admit to PCU from [facility]    Afib with RVR ? If on anticoagulation.  Assessing provider needs to verify.   PNA  Cardiology consulted  Rocephin and Zithromax    Home mediations not verified on admission- ? If patient is on coumadin     ISADORA Nunez - CNP     
SLP completed evaluation. Please refer to notes in EMR.      Thank you,   Pippa Stephenson M.A. CCC-SLP   Speech-Language Pathologist    
Every 4-6 hours:  If on nasal continuous positive airway pressure, respiratory therapy assess nares and determine need for appliance change or resting period  Outcome: Progressing  Flowsheets (Taken 2/11/2025 2039)  Skin Integrity Remains Intact: Monitor for areas of redness and/or skin breakdown     Problem: Genitourinary - Adult  Goal: Absence of urinary retention  Outcome: Progressing  Flowsheets (Taken 2/11/2025 2039)  Absence of urinary retention: Assess patient’s ability to void and empty bladder     Problem: Musculoskeletal - Adult  Goal: Return mobility to safest level of function  Outcome: Progressing  Flowsheets (Taken 2/11/2025 2039)  Return Mobility to Safest Level of Function: Assess patient stability and activity tolerance for standing, transferring and ambulating with or without assistive devices     Problem: Skin/Tissue Integrity  Goal: Skin integrity remains intact  Description: 1.  Monitor for areas of redness and/or skin breakdown  2.  Assess vascular access sites hourly  3.  Every 4-6 hours minimum:  Change oxygen saturation probe site  4.  Every 4-6 hours:  If on nasal continuous positive airway pressure, respiratory therapy assess nares and determine need for appliance change or resting period  Outcome: Progressing  Flowsheets (Taken 2/11/2025 2039)  Skin Integrity Remains Intact: Monitor for areas of redness and/or skin breakdown     Problem: Infection - Adult  Goal: Absence of infection at discharge  Outcome: Progressing  Flowsheets (Taken 2/11/2025 2039)  Absence of infection at discharge: Assess and monitor for signs and symptoms of infection  Goal: Absence of infection during hospitalization  Outcome: Progressing  Flowsheets (Taken 2/11/2025 2039)  Absence of infection during hospitalization: Assess and monitor for signs and symptoms of infection  Goal: Absence of fever/infection during anticipated neutropenic period  Outcome: Progressing  Flowsheets (Taken 2/11/2025 2039)  Absence of 
period  Outcome: Progressing     Problem: Infection - Adult  Goal: Absence of infection at discharge  Outcome: Progressing  Goal: Absence of infection during hospitalization  Outcome: Progressing  Goal: Absence of fever/infection during anticipated neutropenic period  Outcome: Progressing     Problem: Metabolic/Fluid and Electrolytes - Adult  Goal: Electrolytes maintained within normal limits  Outcome: Progressing  Goal: Hemodynamic stability and optimal renal function maintained  Outcome: Progressing  Goal: Glucose maintained within prescribed range  Outcome: Progressing

## 2025-02-12 NOTE — FLOWSHEET NOTE
02/12/25 0726   Vital Signs   Temp 97.6 °F (36.4 °C)   Temp Source Oral   Pulse 85   Heart Rate Source Monitor   Respirations 18   /61   MAP (Calculated) 78   BP Location Left upper arm   BP Method Automatic   Patient Position Semi fowlers   Oxygen Therapy   SpO2 96 %   O2 Device Nasal cannula   O2 Flow Rate (L/min) 1 L/min     AM assessment complete. Pt denies any pain or needs at this time, pt repositioned in bed, gauthier intact and secured to thigh.

## 2025-03-21 ENCOUNTER — TELEPHONE (OUTPATIENT)
Dept: CARDIOLOGY CLINIC | Age: 89
End: 2025-03-21

## 2025-03-21 NOTE — TELEPHONE ENCOUNTER
Kya XIAO with [employer] [employer] Calling stating  Pt's WT today is 214 lbs.  Monday she weighed 209 lbs.    Per [facility] pt still has leg swelling 2+ pitting edema. That has not changed.    Pt has no complaints.  NO SOB  NO CP.    NINOSKA Boland can be reached @ [phone].

## 2025-05-27 RX ORDER — FUROSEMIDE 40 MG/1
40 TABLET ORAL DAILY
Qty: 90 TABLET | Refills: 0 | Status: SHIPPED | OUTPATIENT
Start: 2025-05-27

## 2025-05-27 NOTE — TELEPHONE ENCOUNTER
LOV 5/9/24  NOV 7/3/25    Lab Results   Component Value Date/Time     02/11/2025 12:58 PM    K 4.3 02/11/2025 12:58 PM    K 2.8 02/11/2025 04:25 AM     02/11/2025 12:58 PM    CO2 29 02/11/2025 12:58 PM    BUN 24 02/11/2025 12:58 PM    CREATININE 0.7 02/11/2025 12:58 PM    GLUCOSE 199 02/11/2025 12:58 PM    CALCIUM 7.4 02/11/2025 12:58 PM    LABGLOM 83 02/11/2025 12:58 PM    LABGLOM 54 10/18/2023 12:00 AM

## 2025-06-19 NOTE — PROGRESS NOTES
[facility] Office Note  7/3/2025     Subjective:  Ms. Solomon is here today for cardiology follow up of permanent Afib, High cholesterol, HBP moderate to severe aortic stenosis and aortic insufficiency and diastolic CHF.  PCP Hussein Merino manages INR  C/o feels like hair is falling out since diltiazem was started for afib  .     [place]:      Admitted 2/3/25 generalized weakness, afib RVR. Echo EF of 55-60%, moderate stenosis of the aortic valve.   Today, she is here with her son. She uses a walker. She reports hair loss since starting diltiazem. She reports she has had a DVT in her right leg before. Patient denies current edema, chest pain, shortness of breath, palpitations, dizziness or syncope. Patient is taking all cardiac medications as prescribed and tolerates them well.     Patient is not vaccinated against Covid and is not interested. Patient educated regarding the benefits and low risk of side effects.    [facility]  She lost her other son in November 2022 and lost 2 daughters prior to him.    Afib, HLD, PAD, s/p thrombectomy and fasciotomy on 6/20/15, then underwent angiogram w/ angioplasty on 6/22/15, DM2. Acute renal failure recovered  She presented to the hospital 2/17/21 for elective cholecystectomy but was found to be afib rvr. She had echo during hospitalization  Which revealed EF 55-60%. Mild MR. Moderate AS and moderate AR.   She came back to hospital 3/24/21-3/25/21 with symptoms of CHF Acute on chronic dCHF  Admitted 4/17-4/19/21 Acute Enteritis  - CT interval worsening of the mid to distal jejunal surrounding mesenteric fat stranding - proximal to small bowel ostomy site  Hx of Colon Cancer s/p ileotransverse colectomy with ileal distal transverse anastomosis  - Admitted to Med Surg  -  Continued Cipro, flagyl IV, IVF   she had incarcerated ventral incisional hernia repair 12/21/21  OV 11/28/22 - doing well. Recently lost son to Cancer and lost 2 daughters in past 1.5 years.     Review of

## 2025-06-24 RX ORDER — ATORVASTATIN CALCIUM 10 MG/1
10 TABLET, FILM COATED ORAL DAILY
Qty: 90 TABLET | Refills: 4 | Status: SHIPPED | OUTPATIENT
Start: 2025-06-24

## 2025-06-24 NOTE — TELEPHONE ENCOUNTER
Lab Results   Component Value Date    CHOL 108 05/09/2024    TRIG 189 (H) 05/09/2024    HDL 45 05/09/2024    LDL 25 05/09/2024    VLDL 38 05/09/2024

## 2025-07-03 ENCOUNTER — OFFICE VISIT (OUTPATIENT)
Dept: CARDIOLOGY CLINIC | Age: 89
End: 2025-07-03

## 2025-07-03 VITALS
OXYGEN SATURATION: 97 % | HEIGHT: 65 IN | SYSTOLIC BLOOD PRESSURE: 130 MMHG | WEIGHT: 197 LBS | BODY MASS INDEX: 32.82 KG/M2 | DIASTOLIC BLOOD PRESSURE: 60 MMHG | HEART RATE: 124 BPM

## 2025-07-03 DIAGNOSIS — I50.32 CHRONIC DIASTOLIC CONGESTIVE HEART FAILURE (HCC): ICD-10-CM

## 2025-07-03 DIAGNOSIS — I10 ESSENTIAL HYPERTENSION: ICD-10-CM

## 2025-07-03 DIAGNOSIS — I48.21 PERMANENT ATRIAL FIBRILLATION (HCC): Primary | ICD-10-CM

## 2025-07-03 DIAGNOSIS — I35.0 MODERATE AORTIC STENOSIS: ICD-10-CM

## 2025-07-03 DIAGNOSIS — I82.5Z1 LOWER LEG DVT (DEEP VENOUS THROMBOEMBOLISM), CHRONIC, RIGHT (HCC): ICD-10-CM

## 2025-07-03 DIAGNOSIS — Z79.899 MEDICATION MANAGEMENT: ICD-10-CM

## 2025-07-03 DIAGNOSIS — I73.9 PAD (PERIPHERAL ARTERY DISEASE): ICD-10-CM

## 2025-07-03 DIAGNOSIS — I35.1 NONRHEUMATIC AORTIC VALVE INSUFFICIENCY: ICD-10-CM

## 2025-07-03 DIAGNOSIS — E78.2 MIXED HYPERLIPIDEMIA: ICD-10-CM

## 2025-07-03 PROCEDURE — 1160F RVW MEDS BY RX/DR IN RCRD: CPT | Performed by: INTERNAL MEDICINE

## 2025-07-03 PROCEDURE — 1123F ACP DISCUSS/DSCN MKR DOCD: CPT | Performed by: INTERNAL MEDICINE

## 2025-07-03 PROCEDURE — 1159F MED LIST DOCD IN RCRD: CPT | Performed by: INTERNAL MEDICINE

## 2025-07-03 PROCEDURE — 99214 OFFICE O/P EST MOD 30 MIN: CPT | Performed by: INTERNAL MEDICINE

## 2025-07-03 RX ORDER — FUROSEMIDE 40 MG/1
40 TABLET ORAL DAILY
Qty: 90 TABLET | Refills: 3 | Status: CANCELLED | OUTPATIENT
Start: 2025-07-03

## 2025-07-03 RX ORDER — FUROSEMIDE 40 MG/1
40 TABLET ORAL DAILY
Qty: 90 TABLET | Refills: 3 | Status: SHIPPED | OUTPATIENT
Start: 2025-07-03

## 2025-07-03 RX ORDER — METOPROLOL SUCCINATE 50 MG/1
50 TABLET, EXTENDED RELEASE ORAL DAILY
Qty: 90 TABLET | Refills: 3 | Status: SHIPPED | OUTPATIENT
Start: 2025-07-03

## 2025-07-03 NOTE — TELEPHONE ENCOUNTER
Lab Results   Component Value Date/Time     02/11/2025 12:58 PM    K 4.3 02/11/2025 12:58 PM    K 2.8 02/11/2025 04:25 AM     02/11/2025 12:58 PM    CO2 29 02/11/2025 12:58 PM    BUN 24 02/11/2025 12:58 PM    CREATININE 0.7 02/11/2025 12:58 PM    GLUCOSE 199 02/11/2025 12:58 PM    CALCIUM 7.4 02/11/2025 12:58 PM    LABGLOM 83 02/11/2025 12:58 PM    LABGLOM 54 10/18/2023 12:00 AM      LOV 5/9/24    NOV today

## 2025-07-03 NOTE — PATIENT INSTRUCTIONS
Labs reviewed in epic and discussed with patient.  Current medications reviewed.  Refills given as warranted.  Recommend obtaining knee high compression stockings from [facility], [address] [place] [address]. We recommend 20-30 mmHg. Wear these during the day and take off at night. Would also recommend elevating feet when sitting to help mobilize fluid.     Stop taking diltiazem 180mg.   Start taking lasix 40mg to once a day.  Start taking metoprolol 50mg daily. Patient verbalizes understanding of the need for treatment and education provided at today's visit. Additional education materials will be provided in the AVS.       Please call in November to make your next appointment. Our 2026 schedule is not out yet. [phone].

## (undated) DEVICE — ELECTRODE PT RET AD L9FT HI MOIST COND ADH HYDRGEL CORDED

## (undated) DEVICE — PUMP SUC IRR TBNG L10FT W/ HNDPC ASSEMB STRYKEFLOW 2

## (undated) DEVICE — SYRINGE MED 10ML LUERLOCK TIP W/O SFTY DISP

## (undated) DEVICE — MAJOR SET UP PK

## (undated) DEVICE — TISSUE RETRIEVAL SYSTEM: Brand: INZII RETRIEVAL SYSTEM

## (undated) DEVICE — TUBING INSUF ISO CONN DISP

## (undated) DEVICE — GLOVE ORANGE PI 8 1/2   MSG9085

## (undated) DEVICE — APPLIER CLP M L L11.4IN DIA10MM ENDOSCP ROT MULT FOR LIG

## (undated) DEVICE — INTENDED FOR TISSUE SEPARATION, AND OTHER PROCEDURES THAT REQUIRE A SHARP SURGICAL BLADE TO PUNCTURE OR CUT.: Brand: BARD-PARKER ® STAINLESS STEEL BLADES

## (undated) DEVICE — YANKAUER,BULB TIP,W/O VENT,RIGID,STERILE: Brand: MEDLINE

## (undated) DEVICE — SUTURE VCRL SZ 4-0 L18IN ABSRB UD L19MM PS-2 3/8 CIR PRIM J496H

## (undated) DEVICE — CIRCUIT ANES L72IN 3L BACT AND VIR FLTR EL CONN SGL LIMB

## (undated) DEVICE — GAUZE SPONGES,8 PLY: Brand: CURITY

## (undated) DEVICE — TROCAR ENDOSCP L100MM DIA12MM BLDELSS OBT RADLUC STBL SL

## (undated) DEVICE — KIT,ANTI FOG,W/SPONGE & FLUID,SOFT PACK: Brand: MEDLINE

## (undated) DEVICE — LOOP LIG SUT SZ 0 L18IN ABSRB POLYDIOXANONE MFIL PDS II

## (undated) DEVICE — APPLICATOR PREP 26ML 0.7% IOD POVACRYLEX 74% ISO ALC ST

## (undated) DEVICE — NEEDLE SPNL 22GA L3.5IN BLK HUB S STL REG WALL FIT STYL W/

## (undated) DEVICE — TUBING, SUCTION, 3/16" X 10', STRAIGHT: Brand: MEDLINE

## (undated) DEVICE — GOWN,AURORA,NONREINF,RAGLAN,XXL,STERILE: Brand: MEDLINE

## (undated) DEVICE — LAPAROSCOPIC SCISSORS: Brand: EPIX LAPAROSCOPIC SCISSORS

## (undated) DEVICE — STANDARD HYPODERMIC NEEDLE,POLYPROPYLENE HUB: Brand: MONOJECT

## (undated) DEVICE — BINDER ABD H10IN FOR 27 48IN HIP UNIV E SGL PNL CNTCT CLSR

## (undated) DEVICE — INTENDED FOR TISSUE SEPARATION, AND OTHER PROCEDURES THAT REQUIRE A SHARP SURGICAL BLADE TO PUNCTURE OR CUT.: Brand: BARD-PARKER ® DISPOSABLE SCALPELS

## (undated) DEVICE — CO2 INSUFFLATION NEEDLE: Brand: CORE DYNAMICS

## (undated) DEVICE — SUTURE SZ 0 27IN 5/8 CIR UR-6  TAPER PT VIOLET ABSRB VICRYL J603H

## (undated) DEVICE — TROCAR ENDOSCP L100MM DIA5MM BLDELSS STBL SL OBT RADLUC

## (undated) DEVICE — NEEDLE INSUF L120MM DIA2MM DISP FOR PNEUMOPERI ENDOPATH

## (undated) DEVICE — DRAPE,ABDOMINAL,MAJOR,STERILE: Brand: MEDLINE

## (undated) DEVICE — CORD ES L10FT MPLR LAP

## (undated) DEVICE — ABDOMINAL BINDER: Brand: DEROYAL

## (undated) DEVICE — TROCAR ENDOSCP L100MM DIA5MM BLDELSS STBL SL THRD OPT VW

## (undated) DEVICE — CONMED SCOPE SAVER BITE BLOCK, 20X27 MM: Brand: SCOPE SAVER

## (undated) DEVICE — TROCAR ENDOSCP L100MM DIA11MM STBL SL BLDELSS DISP ENDOPATH

## (undated) DEVICE — SOLUTION IV IRRIG 500ML 0.9% SODIUM CHL 2F7123

## (undated) DEVICE — SEALER LAP L37CM MARYLAND JAW OPN NANO COAT MULTIFUNCTIONAL

## (undated) DEVICE — BANDAGE,GAUZE,4.5"X4.1YD,STERILE,LF: Brand: MEDLINE

## (undated) DEVICE — ENDOSCOPIC KIT 2 12 FT OP4 DE2 GWN SYR

## (undated) DEVICE — KIT EVAC 100CC W10MMXL20CM SIL FULL PERF HUBLESS FLAT DRN

## (undated) DEVICE — 3M™ TEGADERM™ TRANSPARENT FILM DRESSING FRAME STYLE, 1624W, 2-3/8 IN X 2-3/4 IN (6 CM X 7 CM), 100/CT 4CT/CASE: Brand: 3M™ TEGADERM™

## (undated) DEVICE — CANNULA,OXY,ADULT,SUPERSOFT,W/7'TUB,SC: Brand: MEDLINE INDUSTRIES, INC.

## (undated) DEVICE — 3M™ STERI-STRIP™ COMPOUND BENZOIN TINCTURE 40 BAGS/CARTON 4 CARTONS/CASE C1544: Brand: 3M™ STERI-STRIP™

## (undated) DEVICE — AGENT HEMSTAT W2XL4IN OXIDIZED REGENERATED CELOS ABSRB